# Patient Record
Sex: FEMALE | Race: BLACK OR AFRICAN AMERICAN | Employment: OTHER | URBAN - METROPOLITAN AREA
[De-identification: names, ages, dates, MRNs, and addresses within clinical notes are randomized per-mention and may not be internally consistent; named-entity substitution may affect disease eponyms.]

---

## 2017-01-31 PROBLEM — I10 ESSENTIAL HYPERTENSION: Status: ACTIVE | Noted: 2017-01-31

## 2017-06-14 PROBLEM — R07.89 ATYPICAL CHEST PAIN: Status: ACTIVE | Noted: 2017-06-14

## 2018-03-19 ENCOUNTER — OFFICE VISIT (OUTPATIENT)
Dept: FAMILY MEDICINE CLINIC | Age: 63
End: 2018-03-19
Payer: MEDICARE

## 2018-03-19 VITALS
BODY MASS INDEX: 33.63 KG/M2 | HEIGHT: 64 IN | TEMPERATURE: 97.5 F | WEIGHT: 197 LBS | SYSTOLIC BLOOD PRESSURE: 130 MMHG | OXYGEN SATURATION: 96 % | HEART RATE: 87 BPM | DIASTOLIC BLOOD PRESSURE: 60 MMHG

## 2018-03-19 DIAGNOSIS — L72.3 SEBACEOUS CYST: Primary | ICD-10-CM

## 2018-03-19 PROCEDURE — G8400 PT W/DXA NO RESULTS DOC: HCPCS | Performed by: PHYSICIAN ASSISTANT

## 2018-03-19 PROCEDURE — 4040F PNEUMOC VAC/ADMIN/RCVD: CPT | Performed by: PHYSICIAN ASSISTANT

## 2018-03-19 PROCEDURE — 3014F SCREEN MAMMO DOC REV: CPT | Performed by: PHYSICIAN ASSISTANT

## 2018-03-19 PROCEDURE — 1036F TOBACCO NON-USER: CPT | Performed by: PHYSICIAN ASSISTANT

## 2018-03-19 PROCEDURE — 1090F PRES/ABSN URINE INCON ASSESS: CPT | Performed by: PHYSICIAN ASSISTANT

## 2018-03-19 PROCEDURE — 3017F COLORECTAL CA SCREEN DOC REV: CPT | Performed by: PHYSICIAN ASSISTANT

## 2018-03-19 PROCEDURE — G8417 CALC BMI ABV UP PARAM F/U: HCPCS | Performed by: PHYSICIAN ASSISTANT

## 2018-03-19 PROCEDURE — 1123F ACP DISCUSS/DSCN MKR DOCD: CPT | Performed by: PHYSICIAN ASSISTANT

## 2018-03-19 PROCEDURE — 99213 OFFICE O/P EST LOW 20 MIN: CPT | Performed by: PHYSICIAN ASSISTANT

## 2018-03-19 PROCEDURE — G8484 FLU IMMUNIZE NO ADMIN: HCPCS | Performed by: PHYSICIAN ASSISTANT

## 2018-03-19 PROCEDURE — G8427 DOCREV CUR MEDS BY ELIG CLIN: HCPCS | Performed by: PHYSICIAN ASSISTANT

## 2018-03-19 RX ORDER — CEPHALEXIN 500 MG/1
500 CAPSULE ORAL 2 TIMES DAILY
Qty: 20 CAPSULE | Refills: 0 | Status: SHIPPED | OUTPATIENT
Start: 2018-03-19 | End: 2018-03-26

## 2018-03-19 NOTE — PROGRESS NOTES
nodules noted on left arm. No excoriations, macules, papules, or bullae noted. No drainage noted. No lymphangitic streaking. Neurological:  Alert and oriented. Motor functions intact.    ------------------------------------------Test Results Section----------------------------------------------  (All laboratory and radiology results have been personally reviewed by myself)  Laboratory:    Radiology: All Radiology results interpreted by Radiologist unless otherwise noted. -------------------------------------Impression & Disposition Section-----------------------------------  Impression(s):  1. Sebaceous cyst      Disposition:  Disposition: Discharge to home    New Prescriptions    CEPHALEXIN (KEFLEX) 500 MG CAPSULE    Take 1 capsule by mouth 2 times daily     Appears to be irritated due to attempted drainage from the area. Discussed warm compresses and avoidance of trying to pop or drain the area with force. Patient voiced understanding. Pt advised to f/u with PCP in 5-7 days for continued management and further care. ER if changes or worse. Pt advised to take all medications as directed.

## 2018-03-26 ENCOUNTER — OFFICE VISIT (OUTPATIENT)
Dept: FAMILY MEDICINE CLINIC | Age: 63
End: 2018-03-26
Payer: MEDICARE

## 2018-03-26 VITALS
WEIGHT: 199 LBS | HEIGHT: 64 IN | SYSTOLIC BLOOD PRESSURE: 134 MMHG | OXYGEN SATURATION: 99 % | BODY MASS INDEX: 33.97 KG/M2 | RESPIRATION RATE: 20 BRPM | DIASTOLIC BLOOD PRESSURE: 72 MMHG | HEART RATE: 76 BPM

## 2018-03-26 DIAGNOSIS — F31.62 BIPOLAR DISORDER, CURRENT EPISODE MIXED, MODERATE (HCC): ICD-10-CM

## 2018-03-26 DIAGNOSIS — E11.9 TYPE 2 DIABETES MELLITUS WITHOUT COMPLICATION, WITHOUT LONG-TERM CURRENT USE OF INSULIN (HCC): Primary | ICD-10-CM

## 2018-03-26 DIAGNOSIS — Z23 NEED FOR PROPHYLACTIC VACCINATION AGAINST STREPTOCOCCUS PNEUMONIAE (PNEUMOCOCCUS): ICD-10-CM

## 2018-03-26 LAB — HBA1C MFR BLD: 6.7 %

## 2018-03-26 PROCEDURE — 83036 HEMOGLOBIN GLYCOSYLATED A1C: CPT | Performed by: FAMILY MEDICINE

## 2018-03-26 PROCEDURE — 3017F COLORECTAL CA SCREEN DOC REV: CPT | Performed by: FAMILY MEDICINE

## 2018-03-26 PROCEDURE — 4040F PNEUMOC VAC/ADMIN/RCVD: CPT | Performed by: FAMILY MEDICINE

## 2018-03-26 PROCEDURE — G0009 ADMIN PNEUMOCOCCAL VACCINE: HCPCS | Performed by: FAMILY MEDICINE

## 2018-03-26 PROCEDURE — 1090F PRES/ABSN URINE INCON ASSESS: CPT | Performed by: FAMILY MEDICINE

## 2018-03-26 PROCEDURE — 99213 OFFICE O/P EST LOW 20 MIN: CPT | Performed by: FAMILY MEDICINE

## 2018-03-26 PROCEDURE — 1036F TOBACCO NON-USER: CPT | Performed by: FAMILY MEDICINE

## 2018-03-26 PROCEDURE — G8417 CALC BMI ABV UP PARAM F/U: HCPCS | Performed by: FAMILY MEDICINE

## 2018-03-26 PROCEDURE — G8598 ASA/ANTIPLAT THER USED: HCPCS | Performed by: FAMILY MEDICINE

## 2018-03-26 PROCEDURE — G8484 FLU IMMUNIZE NO ADMIN: HCPCS | Performed by: FAMILY MEDICINE

## 2018-03-26 PROCEDURE — 3044F HG A1C LEVEL LT 7.0%: CPT | Performed by: FAMILY MEDICINE

## 2018-03-26 PROCEDURE — 90670 PCV13 VACCINE IM: CPT | Performed by: FAMILY MEDICINE

## 2018-03-26 PROCEDURE — G8400 PT W/DXA NO RESULTS DOC: HCPCS | Performed by: FAMILY MEDICINE

## 2018-03-26 PROCEDURE — G8427 DOCREV CUR MEDS BY ELIG CLIN: HCPCS | Performed by: FAMILY MEDICINE

## 2018-03-26 PROCEDURE — 1123F ACP DISCUSS/DSCN MKR DOCD: CPT | Performed by: FAMILY MEDICINE

## 2018-03-26 PROCEDURE — 3014F SCREEN MAMMO DOC REV: CPT | Performed by: FAMILY MEDICINE

## 2018-03-26 ASSESSMENT — ENCOUNTER SYMPTOMS
DIARRHEA: 0
VOMITING: 0
BLURRED VISION: 0
NAUSEA: 0
SHORTNESS OF BREATH: 0

## 2018-03-26 NOTE — PATIENT INSTRUCTIONS
rapid-acting insulin to take before you eat. If you use an insulin pump, you get a constant rate of insulin during the day. So the pump must be programmed at meals to give you extra insulin to cover the rise in blood sugar after meals. When you know how much carbohydrate you will eat, you can take the right amount of insulin. Or, if you always use the same amount of insulin, you need to make sure that you eat the same amount of carbohydrate at meals. If you need more help to understand carbohydrate counting and food labels, ask your doctor, dietitian, or diabetes educator. How do you get started with meal planning? Here are some tips to get started:  · Plan your meals a week at a time. Don't forget to include snacks too. · Use cookbooks or online recipes to plan several main meals. Plan some quick meals for busy nights. You also can double some recipes that freeze well. Then you can save half for other busy nights when you don't have time to cook. · Make sure you have the ingredients you need for your recipes. If you're running low on basic items, put these items on your shopping list too. · List foods that you use to make breakfasts, lunches, and snacks. List plenty of fruits and vegetables. · Post this list on the refrigerator. Add to it as you think of more things you need. · Take the list to the store to do your weekly shopping. Follow-up care is a key part of your treatment and safety. Be sure to make and go to all appointments, and call your doctor if you are having problems. It's also a good idea to know your test results and keep a list of the medicines you take. Where can you learn more? Go to https://Codewisemarkewsukhdeep.Feedback. org and sign in to your [employer] account. Enter D100 in the Marketo box to learn more about \"Learning About Meal Planning for Diabetes. \"     If you do not have an account, please click on the \"Sign Up Now\" link.   Current as of: March 13, 2017  Content

## 2018-03-26 NOTE — PROGRESS NOTES
OFFICE PROGRESS NOTE      SUBJECTIVE:        Patient ID:   Viviana Camara is a 70 y.o. female who presents for follow up diabetes, hypertension  Chief Complaint   Patient presents with    Diabetes           HPI:   Patient is here to follow up on diabetes. Fasting blood sugars:120-130's Midday blood sugars: 90-low 100's. Patient checks blood glucose 2 times per day. Patient is following diabetic diet. Patient is a nonsmoker. Last ophthalmology visit: 1/18. Patient is taking a daily statin. Patient doing well on current regimen for hypertension. Patient not currently taking medication for bipolar disorder. Not following up with psychiatry at this time. Prior to Admission medications    Medication Sig Start Date End Date Taking? Authorizing Provider   ONE TOUCH LANCETS MISC Check blood sugar bid 2/16/18  Yes Ida Valdez MD   montelukast (SINGULAIR) 10 MG tablet Take 1 tablet by mouth nightly 2/6/18  Yes Vanessa Woods MD   fluticasone-salmeterol (ADVAIR HFA) 230-21 MCG/ACT inhaler Inhale 2 puffs into the lungs 2 times daily 2/6/18  Yes Vanessa Woods MD   amLODIPine (NORVASC) 10 MG tablet Take 1 tablet by mouth daily 2/6/18  Yes Vanessa Woods MD   metFORMIN (GLUCOPHAGE) 1000 MG tablet Take 1 tablet by mouth 2 times daily (with meals) 2/6/18  Yes Vanessa Woods MD   metoprolol succinate (TOPROL XL) 50 MG extended release tablet Take 1 tablet by mouth daily 2/6/18  Yes Vanessa Woods MD   famotidine (PEPCID) 20 MG tablet Take 1 tablet by mouth daily 2/6/18  Yes Vanessa Woods MD   lisinopril-hydrochlorothiazide (PRINZIDE;ZESTORETIC) 20-12.5 MG per tablet Take 1 tablet by mouth daily 2/6/18  Yes Vanessa Woods MD   mupirocin [facility] NASAL) 2 % nasal ointment Take by Nasal route 2 times daily.  2/6/18  Yes Vanessa Woods MD   JANUVIA 50 MG tablet TAKE 1 TABLET BY MOUTH ONCE DAILY 1/10/18  Yes Jay Jay Solares FRANDY Medina   glucose blood VI test strips (ONE TOUCH ULTRA TEST) strip Check blood sugar bid 12/20/17  Yes Claudia Boykin MD   Misc. Devices MISC Bedside commode 7/25/16  Yes Claudia Boykin MD   Blood Glucose Monitoring Suppl (ONE TOUCH ULTRA 2) W/DEVICE KIT Check blood sugar bid 3/8/16  Yes Sophia Mullen MD   pravastatin (PRAVACHOL) 40 MG tablet Take 1 tablet by mouth nightly 3/27/18 4/26/18  Harper Murray MD   Prenatal MV-Min-Fe Fum-FA-DHA (PRENATAL 1) 10-7.839-323 MG CAPS Take 1 tablet by mouth daily 3/27/18 4/26/18  Harper Murray MD   aspirin (ASPIRIN LOW DOSE) 81 MG EC tablet Take 1 tablet by mouth 2 times daily 3/27/18 4/26/18  Harper Murray MD     Social History     Social History    Marital status:      Spouse name: N/A    Number of children: 2    Years of education: 21     Occupational History    ? 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 Other Topics Concern    None     Social History Narrative    ** Merged History Encounter **            I have reviewed Sweetie's allergies, medications, problem list, medical, social and family history and have updated as needed in the electronic medical record  Review Of Systems:    Review of Systems   Eyes: Negative for blurred vision. Respiratory: Negative for shortness of breath. Cardiovascular: Positive for chest pain (rare--takes aspirin). Negative for palpitations and leg swelling. Gastrointestinal: Negative for diarrhea, nausea and vomiting. Genitourinary: Negative for dysuria, frequency and urgency. Skin: Negative for rash.            OBJECTIVE:     VS:  Wt Readings from Last 3 Encounters:   03/27/18 199 lb (90.3 kg)   03/26/18 199 lb (90.3 kg) have reviewed my findings and recommendations with Nehemias French.     Coleen Tam M.D

## 2018-03-28 PROBLEM — I67.2 CEREBRAL ATHEROSCLEROSIS: Status: ACTIVE | Noted: 2018-03-28

## 2018-03-28 PROBLEM — R41.89 COGNITIVE IMPAIRMENT: Status: ACTIVE | Noted: 2018-03-28

## 2018-03-28 PROBLEM — E78.00 HYPERCHOLESTEROLEMIA: Status: ACTIVE | Noted: 2018-03-28

## 2018-04-03 ENCOUNTER — HOSPITAL ENCOUNTER (OUTPATIENT)
Age: 63
Discharge: HOME | End: 2018-04-03
Payer: MEDICARE

## 2018-04-03 LAB
ALBUMIN SERPL-MCNC: 4.4 G/DL (ref 3.5–5.2)
ALP BLD-CCNC: 77 U/L (ref 35–104)
ALT SERPL-CCNC: 17 U/L (ref 0–32)
ANION GAP SERPL CALCULATED.3IONS-SCNC: 11 MMOL/L (ref 7–16)
AST SERPL-CCNC: 13 U/L (ref 0–31)
BASOPHILS ABSOLUTE: 0.09 E9/L (ref 0–0.2)
BASOPHILS RELATIVE PERCENT: 1 % (ref 0–2)
BILIRUB SERPL-MCNC: 0.5 MG/DL (ref 0–1.2)
BUN BLDV-MCNC: 12 MG/DL (ref 8–23)
BURR CELLS: ABNORMAL
CALCIUM SERPL-MCNC: 10.4 MG/DL (ref 8.6–10.2)
CHLORIDE BLD-SCNC: 102 MMOL/L (ref 98–107)
CO2: 29 MMOL/L (ref 22–29)
CREAT SERPL-MCNC: 0.7 MG/DL (ref 0.5–1)
CREATININE URINE: 221 MG/DL (ref 29–226)
EOSINOPHILS ABSOLUTE: 0.19 E9/L (ref 0.05–0.5)
EOSINOPHILS RELATIVE PERCENT: 2.1 % (ref 0–6)
GFR AFRICAN AMERICAN: >60
GFR NON-AFRICAN AMERICAN: >60 ML/MIN/1.73
GLUCOSE BLD-MCNC: 173 MG/DL (ref 74–109)
HCT VFR BLD CALC: 39.9 % (ref 34–48)
HEMOGLOBIN: 12 G/DL (ref 11.5–15.5)
HYPOCHROMIA: ABNORMAL
LYMPHOCYTES ABSOLUTE: 1.96 E9/L (ref 1.5–4)
LYMPHOCYTES RELATIVE PERCENT: 21.6 % (ref 20–42)
MAGNESIUM: 1.5 MG/DL (ref 1.6–2.6)
MCH RBC QN AUTO: 23.7 PG (ref 26–35)
MCHC RBC AUTO-ENTMCNC: 30.1 % (ref 32–34.5)
MCV RBC AUTO: 78.9 FL (ref 80–99.9)
MONOCYTES ABSOLUTE: 0.36 E9/L (ref 0.1–0.95)
MONOCYTES RELATIVE PERCENT: 3.6 % (ref 2–12)
NEUTROPHILS ABSOLUTE: 6.41 E9/L (ref 1.8–7.3)
NEUTROPHILS RELATIVE PERCENT: 71.6 % (ref 43–80)
OVALOCYTES: ABNORMAL
PDW BLD-RTO: 15.9 FL (ref 11.5–15)
PHOSPHORUS: 2.9 MG/DL (ref 2.5–4.5)
PLATELET # BLD: 298 E9/L (ref 130–450)
PMV BLD AUTO: 8.9 FL (ref 7–12)
POIKILOCYTES: ABNORMAL
POLYCHROMASIA: ABNORMAL
POTASSIUM SERPL-SCNC: 3.8 MMOL/L (ref 3.5–5)
PROTEIN PROTEIN: 26 MG/DL (ref 0–12)
PROTEIN/CREAT RATIO: 0.1
PROTEIN/CREAT RATIO: 0.1 (ref 0–0.2)
RBC # BLD: 5.06 E12/L (ref 3.5–5.5)
SODIUM BLD-SCNC: 142 MMOL/L (ref 132–146)
TEAR DROP CELLS: ABNORMAL
TOTAL PROTEIN: 8 G/DL (ref 6.4–8.3)
URIC ACID, SERUM: 8.1 MG/DL (ref 2.4–5.7)
WBC # BLD: 8.9 E9/L (ref 4.5–11.5)

## 2018-04-03 PROCEDURE — 84550 ASSAY OF BLOOD/URIC ACID: CPT

## 2018-04-03 PROCEDURE — 84156 ASSAY OF PROTEIN URINE: CPT

## 2018-04-03 PROCEDURE — 84100 ASSAY OF PHOSPHORUS: CPT

## 2018-04-03 PROCEDURE — 83735 ASSAY OF MAGNESIUM: CPT

## 2018-04-03 PROCEDURE — 80053 COMPREHEN METABOLIC PANEL: CPT

## 2018-04-03 PROCEDURE — 85025 COMPLETE CBC W/AUTO DIFF WBC: CPT

## 2018-04-03 PROCEDURE — 82570 ASSAY OF URINE CREATININE: CPT

## 2018-04-03 PROCEDURE — 36415 COLL VENOUS BLD VENIPUNCTURE: CPT

## 2018-04-05 DIAGNOSIS — E11.9 TYPE 2 DIABETES MELLITUS WITHOUT COMPLICATION, WITHOUT LONG-TERM CURRENT USE OF INSULIN (HCC): ICD-10-CM

## 2018-04-09 DIAGNOSIS — E11.9 TYPE 2 DIABETES MELLITUS WITHOUT COMPLICATION, WITHOUT LONG-TERM CURRENT USE OF INSULIN (HCC): ICD-10-CM

## 2018-04-27 ENCOUNTER — TELEPHONE (OUTPATIENT)
Dept: FAMILY MEDICINE CLINIC | Age: 63
End: 2018-04-27

## 2018-05-03 ENCOUNTER — TELEPHONE (OUTPATIENT)
Dept: FAMILY MEDICINE CLINIC | Age: 63
End: 2018-05-03

## 2018-05-06 ENCOUNTER — APPOINTMENT (OUTPATIENT)
Dept: GENERAL RADIOLOGY | Age: 63
End: 2018-05-06
Payer: MEDICARE

## 2018-05-06 ENCOUNTER — HOSPITAL ENCOUNTER (EMERGENCY)
Age: 63
Discharge: HOME | End: 2018-05-07
Attending: EMERGENCY MEDICINE
Payer: MEDICARE

## 2018-05-06 VITALS
BODY MASS INDEX: 35.33 KG/M2 | SYSTOLIC BLOOD PRESSURE: 159 MMHG | HEART RATE: 68 BPM | DIASTOLIC BLOOD PRESSURE: 75 MMHG | HEIGHT: 62 IN | WEIGHT: 192 LBS | OXYGEN SATURATION: 98 % | TEMPERATURE: 98.8 F | RESPIRATION RATE: 20 BRPM

## 2018-05-06 DIAGNOSIS — M16.10 HIP ARTHRITIS: Primary | ICD-10-CM

## 2018-05-06 LAB
ALBUMIN SERPL-MCNC: 3.9 G/DL (ref 3.5–5.2)
ALP BLD-CCNC: 58 U/L (ref 35–104)
ALT SERPL-CCNC: 14 U/L (ref 0–32)
ANION GAP SERPL CALCULATED.3IONS-SCNC: 12 MMOL/L (ref 7–16)
AST SERPL-CCNC: 19 U/L (ref 0–31)
BILIRUB SERPL-MCNC: 0.4 MG/DL (ref 0–1.2)
BILIRUBIN DIRECT: <0.2 MG/DL (ref 0–0.3)
BILIRUBIN URINE: NEGATIVE
BILIRUBIN, INDIRECT: NORMAL MG/DL (ref 0–1)
BLOOD, URINE: NEGATIVE
BUN BLDV-MCNC: 13 MG/DL (ref 8–23)
CALCIUM SERPL-MCNC: 9.7 MG/DL (ref 8.6–10.2)
CHLORIDE BLD-SCNC: 102 MMOL/L (ref 98–107)
CHP ED QC CHECK: YES
CLARITY: CLEAR
CO2: 26 MMOL/L (ref 22–29)
COLOR: YELLOW
CREAT SERPL-MCNC: 0.6 MG/DL (ref 0.5–1)
EKG ATRIAL RATE: 65 BPM
EKG P AXIS: 66 DEGREES
EKG P-R INTERVAL: 168 MS
EKG Q-T INTERVAL: 412 MS
EKG QRS DURATION: 86 MS
EKG QTC CALCULATION (BAZETT): 428 MS
EKG R AXIS: -10 DEGREES
EKG T AXIS: 40 DEGREES
EKG VENTRICULAR RATE: 65 BPM
GFR AFRICAN AMERICAN: >60
GFR NON-AFRICAN AMERICAN: >60 ML/MIN/1.73
GLUCOSE BLD-MCNC: 82 MG/DL
GLUCOSE BLD-MCNC: 97 MG/DL (ref 74–109)
GLUCOSE URINE: NEGATIVE MG/DL
HCT VFR BLD CALC: 32.3 % (ref 34–48)
HEMOGLOBIN: 10 G/DL (ref 11.5–15.5)
KETONES, URINE: NEGATIVE MG/DL
LACTIC ACID: 1.9 MMOL/L (ref 0.5–2.2)
LEUKOCYTE ESTERASE, URINE: NEGATIVE
LIPASE: 33 U/L (ref 13–60)
MCH RBC QN AUTO: 24.6 PG (ref 26–35)
MCHC RBC AUTO-ENTMCNC: 31 % (ref 32–34.5)
MCV RBC AUTO: 79.6 FL (ref 80–99.9)
METER GLUCOSE: 82 MG/DL (ref 70–110)
NITRITE, URINE: NEGATIVE
PDW BLD-RTO: 15.5 FL (ref 11.5–15)
PH UA: 7 (ref 5–9)
PLATELET # BLD: 219 E9/L (ref 130–450)
PMV BLD AUTO: 8.7 FL (ref 7–12)
POTASSIUM SERPL-SCNC: 3.4 MMOL/L (ref 3.5–5)
PROTEIN UA: NEGATIVE MG/DL
RBC # BLD: 4.06 E12/L (ref 3.5–5.5)
SODIUM BLD-SCNC: 140 MMOL/L (ref 132–146)
SPECIFIC GRAVITY UA: 1.01 (ref 1–1.03)
TOTAL PROTEIN: 6.8 G/DL (ref 6.4–8.3)
TROPONIN: <0.01 NG/ML (ref 0–0.03)
UROBILINOGEN, URINE: 0.2 E.U./DL
WBC # BLD: 8.5 E9/L (ref 4.5–11.5)

## 2018-05-06 PROCEDURE — 84484 ASSAY OF TROPONIN QUANT: CPT

## 2018-05-06 PROCEDURE — 99284 EMERGENCY DEPT VISIT MOD MDM: CPT

## 2018-05-06 PROCEDURE — 80076 HEPATIC FUNCTION PANEL: CPT

## 2018-05-06 PROCEDURE — 83690 ASSAY OF LIPASE: CPT

## 2018-05-06 PROCEDURE — 36415 COLL VENOUS BLD VENIPUNCTURE: CPT

## 2018-05-06 PROCEDURE — 74022 RADEX COMPL AQT ABD SERIES: CPT

## 2018-05-06 PROCEDURE — 85027 COMPLETE CBC AUTOMATED: CPT

## 2018-05-06 PROCEDURE — 6370000000 HC RX 637 (ALT 250 FOR IP): Performed by: EMERGENCY MEDICINE

## 2018-05-06 PROCEDURE — 83605 ASSAY OF LACTIC ACID: CPT

## 2018-05-06 PROCEDURE — 2580000003 HC RX 258: Performed by: EMERGENCY MEDICINE

## 2018-05-06 PROCEDURE — 80048 BASIC METABOLIC PNL TOTAL CA: CPT

## 2018-05-06 PROCEDURE — 82962 GLUCOSE BLOOD TEST: CPT

## 2018-05-06 PROCEDURE — 81003 URINALYSIS AUTO W/O SCOPE: CPT

## 2018-05-06 RX ORDER — POTASSIUM CHLORIDE 20 MEQ/1
40 TABLET, EXTENDED RELEASE ORAL ONCE
Status: COMPLETED | OUTPATIENT
Start: 2018-05-06 | End: 2018-05-06

## 2018-05-06 RX ORDER — LITHIUM CARBONATE 300 MG
300 TABLET ORAL 2 TIMES DAILY
COMMUNITY
End: 2019-01-24

## 2018-05-06 RX ORDER — SODIUM CHLORIDE 0.9 % (FLUSH) 0.9 %
10 SYRINGE (ML) INJECTION PRN
Status: DISCONTINUED | OUTPATIENT
Start: 2018-05-06 | End: 2018-05-07 | Stop reason: HOSPADM

## 2018-05-06 RX ORDER — 0.9 % SODIUM CHLORIDE 0.9 %
1000 INTRAVENOUS SOLUTION INTRAVENOUS ONCE
Status: COMPLETED | OUTPATIENT
Start: 2018-05-06 | End: 2018-05-07

## 2018-05-06 RX ADMIN — SODIUM CHLORIDE 1000 ML: 900 INJECTION, SOLUTION INTRAVENOUS at 21:33

## 2018-05-06 RX ADMIN — POTASSIUM CHLORIDE 40 MEQ: 20 TABLET, EXTENDED RELEASE ORAL at 22:59

## 2018-05-06 ASSESSMENT — ENCOUNTER SYMPTOMS
EYE REDNESS: 0
SHORTNESS OF BREATH: 0
VOMITING: 0
NAUSEA: 0
SINUS PRESSURE: 0
SORE THROAT: 0
EYE PAIN: 0
COUGH: 0
WHEEZING: 0
DIARRHEA: 1
ABDOMINAL DISTENTION: 0
EYE DISCHARGE: 0
ABDOMINAL PAIN: 0
BACK PAIN: 0

## 2018-05-06 ASSESSMENT — PAIN DESCRIPTION - PAIN TYPE: TYPE: ACUTE PAIN

## 2018-05-06 ASSESSMENT — PAIN SCALES - GENERAL: PAINLEVEL_OUTOF10: 10

## 2018-05-06 ASSESSMENT — PAIN DESCRIPTION - ORIENTATION: ORIENTATION: LEFT

## 2018-05-06 ASSESSMENT — PAIN DESCRIPTION - LOCATION: LOCATION: LEG

## 2018-05-07 ENCOUNTER — OFFICE VISIT (OUTPATIENT)
Dept: FAMILY MEDICINE CLINIC | Age: 63
End: 2018-05-07
Payer: MEDICARE

## 2018-05-07 VITALS
OXYGEN SATURATION: 96 % | WEIGHT: 198 LBS | SYSTOLIC BLOOD PRESSURE: 136 MMHG | BODY MASS INDEX: 36.44 KG/M2 | HEIGHT: 62 IN | HEART RATE: 90 BPM | DIASTOLIC BLOOD PRESSURE: 82 MMHG | TEMPERATURE: 98.6 F

## 2018-05-07 DIAGNOSIS — R19.7 DIARRHEA, UNSPECIFIED TYPE: Primary | ICD-10-CM

## 2018-05-07 DIAGNOSIS — R11.0 NAUSEA: ICD-10-CM

## 2018-05-07 PROCEDURE — G8598 ASA/ANTIPLAT THER USED: HCPCS | Performed by: PHYSICIAN ASSISTANT

## 2018-05-07 PROCEDURE — 3017F COLORECTAL CA SCREEN DOC REV: CPT | Performed by: PHYSICIAN ASSISTANT

## 2018-05-07 PROCEDURE — 1123F ACP DISCUSS/DSCN MKR DOCD: CPT | Performed by: PHYSICIAN ASSISTANT

## 2018-05-07 PROCEDURE — G8417 CALC BMI ABV UP PARAM F/U: HCPCS | Performed by: PHYSICIAN ASSISTANT

## 2018-05-07 PROCEDURE — 1090F PRES/ABSN URINE INCON ASSESS: CPT | Performed by: PHYSICIAN ASSISTANT

## 2018-05-07 PROCEDURE — 4040F PNEUMOC VAC/ADMIN/RCVD: CPT | Performed by: PHYSICIAN ASSISTANT

## 2018-05-07 PROCEDURE — G8400 PT W/DXA NO RESULTS DOC: HCPCS | Performed by: PHYSICIAN ASSISTANT

## 2018-05-07 PROCEDURE — 99213 OFFICE O/P EST LOW 20 MIN: CPT | Performed by: PHYSICIAN ASSISTANT

## 2018-05-07 PROCEDURE — G8427 DOCREV CUR MEDS BY ELIG CLIN: HCPCS | Performed by: PHYSICIAN ASSISTANT

## 2018-05-07 PROCEDURE — 1036F TOBACCO NON-USER: CPT | Performed by: PHYSICIAN ASSISTANT

## 2018-05-16 ENCOUNTER — OFFICE VISIT (OUTPATIENT)
Dept: FAMILY MEDICINE CLINIC | Age: 63
End: 2018-05-16
Payer: MEDICARE

## 2018-05-16 VITALS
OXYGEN SATURATION: 97 % | HEART RATE: 82 BPM | BODY MASS INDEX: 35.51 KG/M2 | HEIGHT: 62 IN | SYSTOLIC BLOOD PRESSURE: 126 MMHG | RESPIRATION RATE: 16 BRPM | DIASTOLIC BLOOD PRESSURE: 72 MMHG | WEIGHT: 193 LBS

## 2018-05-16 DIAGNOSIS — M25.552 CHRONIC LEFT HIP PAIN: Primary | ICD-10-CM

## 2018-05-16 DIAGNOSIS — E11.9 TYPE 2 DIABETES MELLITUS WITHOUT COMPLICATION, WITHOUT LONG-TERM CURRENT USE OF INSULIN (HCC): ICD-10-CM

## 2018-05-16 DIAGNOSIS — G89.29 CHRONIC LEFT HIP PAIN: Primary | ICD-10-CM

## 2018-05-16 LAB — HBA1C MFR BLD: 6.9 %

## 2018-05-16 PROCEDURE — 83036 HEMOGLOBIN GLYCOSYLATED A1C: CPT | Performed by: FAMILY MEDICINE

## 2018-05-16 PROCEDURE — G8400 PT W/DXA NO RESULTS DOC: HCPCS | Performed by: FAMILY MEDICINE

## 2018-05-16 PROCEDURE — 1123F ACP DISCUSS/DSCN MKR DOCD: CPT | Performed by: FAMILY MEDICINE

## 2018-05-16 PROCEDURE — 3017F COLORECTAL CA SCREEN DOC REV: CPT | Performed by: FAMILY MEDICINE

## 2018-05-16 PROCEDURE — 4040F PNEUMOC VAC/ADMIN/RCVD: CPT | Performed by: FAMILY MEDICINE

## 2018-05-16 PROCEDURE — 1036F TOBACCO NON-USER: CPT | Performed by: FAMILY MEDICINE

## 2018-05-16 PROCEDURE — 99214 OFFICE O/P EST MOD 30 MIN: CPT | Performed by: FAMILY MEDICINE

## 2018-05-16 PROCEDURE — 2022F DILAT RTA XM EVC RTNOPTHY: CPT | Performed by: FAMILY MEDICINE

## 2018-05-16 PROCEDURE — G8417 CALC BMI ABV UP PARAM F/U: HCPCS | Performed by: FAMILY MEDICINE

## 2018-05-16 PROCEDURE — G8427 DOCREV CUR MEDS BY ELIG CLIN: HCPCS | Performed by: FAMILY MEDICINE

## 2018-05-16 PROCEDURE — G8598 ASA/ANTIPLAT THER USED: HCPCS | Performed by: FAMILY MEDICINE

## 2018-05-16 PROCEDURE — 1090F PRES/ABSN URINE INCON ASSESS: CPT | Performed by: FAMILY MEDICINE

## 2018-05-16 PROCEDURE — 3044F HG A1C LEVEL LT 7.0%: CPT | Performed by: FAMILY MEDICINE

## 2018-05-16 ASSESSMENT — ENCOUNTER SYMPTOMS
DIARRHEA: 0
NAUSEA: 0
VOMITING: 1
SHORTNESS OF BREATH: 0

## 2018-06-05 ENCOUNTER — HOSPITAL ENCOUNTER (OUTPATIENT)
Age: 63
Discharge: HOME | End: 2018-06-05
Payer: MEDICARE

## 2018-06-05 LAB
ALBUMIN SERPL-MCNC: 4 G/DL (ref 3.5–5.2)
ALP BLD-CCNC: 60 U/L (ref 35–104)
ALT SERPL-CCNC: 17 U/L (ref 0–32)
ANION GAP SERPL CALCULATED.3IONS-SCNC: 11 MMOL/L (ref 7–16)
AST SERPL-CCNC: 19 U/L (ref 0–31)
BILIRUB SERPL-MCNC: 0.3 MG/DL (ref 0–1.2)
BUN BLDV-MCNC: 12 MG/DL (ref 8–23)
CALCIUM SERPL-MCNC: 9.8 MG/DL (ref 8.6–10.2)
CHLORIDE BLD-SCNC: 103 MMOL/L (ref 98–107)
CO2: 26 MMOL/L (ref 22–29)
CREAT SERPL-MCNC: 0.9 MG/DL (ref 0.5–1)
GFR AFRICAN AMERICAN: >60
GFR NON-AFRICAN AMERICAN: >60 ML/MIN/1.73
GLUCOSE BLD-MCNC: 93 MG/DL (ref 74–109)
LITHIUM DOSE AMOUNT: NORMAL
LITHIUM LEVEL: 1.28 MMOL/L (ref 0.5–1.5)
POTASSIUM SERPL-SCNC: 3.9 MMOL/L (ref 3.5–5)
SODIUM BLD-SCNC: 140 MMOL/L (ref 132–146)
TOTAL PROTEIN: 7 G/DL (ref 6.4–8.3)
TSH SERPL DL<=0.05 MIU/L-ACNC: 2.21 UIU/ML (ref 0.27–4.2)

## 2018-06-05 PROCEDURE — 80053 COMPREHEN METABOLIC PANEL: CPT

## 2018-06-05 PROCEDURE — 80178 ASSAY OF LITHIUM: CPT

## 2018-06-05 PROCEDURE — 84443 ASSAY THYROID STIM HORMONE: CPT

## 2018-06-05 PROCEDURE — 36415 COLL VENOUS BLD VENIPUNCTURE: CPT

## 2018-06-07 ENCOUNTER — HOSPITAL ENCOUNTER (EMERGENCY)
Age: 63
Discharge: HOME | End: 2018-06-08
Attending: EMERGENCY MEDICINE
Payer: MEDICARE

## 2018-06-07 DIAGNOSIS — R07.89 ATYPICAL CHEST PAIN: Primary | ICD-10-CM

## 2018-06-07 DIAGNOSIS — K21.9 GASTROESOPHAGEAL REFLUX DISEASE WITHOUT ESOPHAGITIS: ICD-10-CM

## 2018-06-07 LAB
EKG ATRIAL RATE: 86 BPM
EKG P AXIS: 68 DEGREES
EKG P-R INTERVAL: 154 MS
EKG Q-T INTERVAL: 384 MS
EKG QRS DURATION: 78 MS
EKG QTC CALCULATION (BAZETT): 459 MS
EKG R AXIS: -7 DEGREES
EKG T AXIS: 58 DEGREES
EKG VENTRICULAR RATE: 86 BPM
HCT VFR BLD CALC: 31.8 % (ref 34–48)
HEMOGLOBIN: 9.9 G/DL (ref 11.5–15.5)
MCH RBC QN AUTO: 24.7 PG (ref 26–35)
MCHC RBC AUTO-ENTMCNC: 31.1 % (ref 32–34.5)
MCV RBC AUTO: 79.3 FL (ref 80–99.9)
PDW BLD-RTO: 14.7 FL (ref 11.5–15)
PLATELET # BLD: 235 E9/L (ref 130–450)
PMV BLD AUTO: 9.2 FL (ref 7–12)
RBC # BLD: 4.01 E12/L (ref 3.5–5.5)
WBC # BLD: 7.1 E9/L (ref 4.5–11.5)

## 2018-06-07 PROCEDURE — 93005 ELECTROCARDIOGRAM TRACING: CPT

## 2018-06-07 PROCEDURE — 85378 FIBRIN DEGRADE SEMIQUANT: CPT

## 2018-06-07 PROCEDURE — 80076 HEPATIC FUNCTION PANEL: CPT

## 2018-06-07 PROCEDURE — 85027 COMPLETE CBC AUTOMATED: CPT

## 2018-06-07 PROCEDURE — 36415 COLL VENOUS BLD VENIPUNCTURE: CPT

## 2018-06-07 PROCEDURE — 83690 ASSAY OF LIPASE: CPT

## 2018-06-07 PROCEDURE — 99285 EMERGENCY DEPT VISIT HI MDM: CPT

## 2018-06-07 PROCEDURE — 80048 BASIC METABOLIC PNL TOTAL CA: CPT

## 2018-06-07 PROCEDURE — 84484 ASSAY OF TROPONIN QUANT: CPT

## 2018-06-07 ASSESSMENT — ENCOUNTER SYMPTOMS
ABDOMINAL PAIN: 1
BACK PAIN: 0
VOMITING: 1
DIARRHEA: 1
NAUSEA: 1
COUGH: 0
CONSTIPATION: 0
EYES NEGATIVE: 1
SHORTNESS OF BREATH: 1

## 2018-06-07 ASSESSMENT — PAIN DESCRIPTION - PAIN TYPE: TYPE: ACUTE PAIN

## 2018-06-07 ASSESSMENT — PAIN DESCRIPTION - DESCRIPTORS: DESCRIPTORS: ACHING

## 2018-06-07 ASSESSMENT — PAIN SCALES - GENERAL: PAINLEVEL_OUTOF10: 10

## 2018-06-07 ASSESSMENT — PAIN DESCRIPTION - LOCATION: LOCATION: LEG;ARM;CHEST

## 2018-06-08 ENCOUNTER — APPOINTMENT (OUTPATIENT)
Dept: GENERAL RADIOLOGY | Age: 63
End: 2018-06-08
Payer: MEDICARE

## 2018-06-08 VITALS
OXYGEN SATURATION: 99 % | RESPIRATION RATE: 20 BRPM | DIASTOLIC BLOOD PRESSURE: 86 MMHG | HEART RATE: 81 BPM | HEIGHT: 62 IN | TEMPERATURE: 98.6 F | WEIGHT: 193 LBS | BODY MASS INDEX: 35.51 KG/M2 | SYSTOLIC BLOOD PRESSURE: 195 MMHG

## 2018-06-08 LAB
ALBUMIN SERPL-MCNC: 4 G/DL (ref 3.5–5.2)
ALP BLD-CCNC: 64 U/L (ref 35–104)
ALT SERPL-CCNC: 20 U/L (ref 0–32)
ANION GAP SERPL CALCULATED.3IONS-SCNC: 13 MMOL/L (ref 7–16)
AST SERPL-CCNC: 21 U/L (ref 0–31)
BILIRUB SERPL-MCNC: 0.3 MG/DL (ref 0–1.2)
BILIRUBIN DIRECT: <0.2 MG/DL (ref 0–0.3)
BILIRUBIN, INDIRECT: NORMAL MG/DL (ref 0–1)
BUN BLDV-MCNC: 9 MG/DL (ref 8–23)
CALCIUM SERPL-MCNC: 9.5 MG/DL (ref 8.6–10.2)
CHLORIDE BLD-SCNC: 104 MMOL/L (ref 98–107)
CO2: 25 MMOL/L (ref 22–29)
CREAT SERPL-MCNC: 0.6 MG/DL (ref 0.5–1)
D DIMER: <200 NG/ML DDU
GFR AFRICAN AMERICAN: >60
GFR NON-AFRICAN AMERICAN: >60 ML/MIN/1.73
GLUCOSE BLD-MCNC: 156 MG/DL (ref 74–109)
LIPASE: 44 U/L (ref 13–60)
POTASSIUM SERPL-SCNC: 4.3 MMOL/L (ref 3.5–5)
SODIUM BLD-SCNC: 142 MMOL/L (ref 132–146)
TOTAL PROTEIN: 6.7 G/DL (ref 6.4–8.3)
TROPONIN: <0.01 NG/ML (ref 0–0.03)

## 2018-06-08 PROCEDURE — 96374 THER/PROPH/DIAG INJ IV PUSH: CPT

## 2018-06-08 PROCEDURE — 2500000003 HC RX 250 WO HCPCS: Performed by: EMERGENCY MEDICINE

## 2018-06-08 PROCEDURE — 6370000000 HC RX 637 (ALT 250 FOR IP): Performed by: EMERGENCY MEDICINE

## 2018-06-08 PROCEDURE — S0028 INJECTION, FAMOTIDINE, 20 MG: HCPCS | Performed by: EMERGENCY MEDICINE

## 2018-06-08 PROCEDURE — 71045 X-RAY EXAM CHEST 1 VIEW: CPT

## 2018-06-08 RX ADMIN — FAMOTIDINE 20 MG: 10 INJECTION, SOLUTION INTRAVENOUS at 01:18

## 2018-06-08 RX ADMIN — LIDOCAINE HYDROCHLORIDE: 20 SOLUTION ORAL; TOPICAL at 01:18

## 2018-06-08 ASSESSMENT — ENCOUNTER SYMPTOMS
BACK PAIN: 0
EYES NEGATIVE: 1
NAUSEA: 1
ABDOMINAL PAIN: 1
VOMITING: 1
CONSTIPATION: 0
SHORTNESS OF BREATH: 1
COUGH: 0
DIARRHEA: 1

## 2018-06-18 ENCOUNTER — OFFICE VISIT (OUTPATIENT)
Dept: ORTHOPEDIC SURGERY | Age: 63
End: 2018-06-18
Payer: MEDICARE

## 2018-06-18 VITALS — WEIGHT: 198 LBS | BODY MASS INDEX: 36.44 KG/M2 | HEIGHT: 62 IN

## 2018-06-18 DIAGNOSIS — M16.12 PRIMARY OSTEOARTHRITIS OF LEFT HIP: Primary | ICD-10-CM

## 2018-06-18 PROCEDURE — 1123F ACP DISCUSS/DSCN MKR DOCD: CPT | Performed by: ORTHOPAEDIC SURGERY

## 2018-06-18 PROCEDURE — G8598 ASA/ANTIPLAT THER USED: HCPCS | Performed by: ORTHOPAEDIC SURGERY

## 2018-06-18 PROCEDURE — G8417 CALC BMI ABV UP PARAM F/U: HCPCS | Performed by: ORTHOPAEDIC SURGERY

## 2018-06-18 PROCEDURE — 1036F TOBACCO NON-USER: CPT | Performed by: ORTHOPAEDIC SURGERY

## 2018-06-18 PROCEDURE — 4040F PNEUMOC VAC/ADMIN/RCVD: CPT | Performed by: ORTHOPAEDIC SURGERY

## 2018-06-18 PROCEDURE — 1090F PRES/ABSN URINE INCON ASSESS: CPT | Performed by: ORTHOPAEDIC SURGERY

## 2018-06-18 PROCEDURE — 99214 OFFICE O/P EST MOD 30 MIN: CPT | Performed by: ORTHOPAEDIC SURGERY

## 2018-06-18 PROCEDURE — G8400 PT W/DXA NO RESULTS DOC: HCPCS | Performed by: ORTHOPAEDIC SURGERY

## 2018-06-18 PROCEDURE — 3017F COLORECTAL CA SCREEN DOC REV: CPT | Performed by: ORTHOPAEDIC SURGERY

## 2018-06-18 PROCEDURE — G8428 CUR MEDS NOT DOCUMENT: HCPCS | Performed by: ORTHOPAEDIC SURGERY

## 2018-06-25 ENCOUNTER — OFFICE VISIT (OUTPATIENT)
Dept: FAMILY MEDICINE CLINIC | Age: 63
End: 2018-06-25
Payer: MEDICARE

## 2018-06-25 VITALS
DIASTOLIC BLOOD PRESSURE: 72 MMHG | OXYGEN SATURATION: 99 % | WEIGHT: 192 LBS | BODY MASS INDEX: 34.02 KG/M2 | SYSTOLIC BLOOD PRESSURE: 124 MMHG | HEIGHT: 63 IN | RESPIRATION RATE: 20 BRPM | HEART RATE: 82 BPM

## 2018-06-25 DIAGNOSIS — M16.12 ARTHRITIS OF LEFT HIP: ICD-10-CM

## 2018-06-25 DIAGNOSIS — E11.9 TYPE 2 DIABETES MELLITUS WITHOUT COMPLICATION, WITHOUT LONG-TERM CURRENT USE OF INSULIN (HCC): Primary | ICD-10-CM

## 2018-06-25 DIAGNOSIS — I10 ESSENTIAL HYPERTENSION: ICD-10-CM

## 2018-06-25 LAB — HBA1C MFR BLD: 7.4 %

## 2018-06-25 PROCEDURE — 2022F DILAT RTA XM EVC RTNOPTHY: CPT | Performed by: FAMILY MEDICINE

## 2018-06-25 PROCEDURE — 1123F ACP DISCUSS/DSCN MKR DOCD: CPT | Performed by: FAMILY MEDICINE

## 2018-06-25 PROCEDURE — 83036 HEMOGLOBIN GLYCOSYLATED A1C: CPT | Performed by: FAMILY MEDICINE

## 2018-06-25 PROCEDURE — G8427 DOCREV CUR MEDS BY ELIG CLIN: HCPCS | Performed by: FAMILY MEDICINE

## 2018-06-25 PROCEDURE — 1090F PRES/ABSN URINE INCON ASSESS: CPT | Performed by: FAMILY MEDICINE

## 2018-06-25 PROCEDURE — 99214 OFFICE O/P EST MOD 30 MIN: CPT | Performed by: FAMILY MEDICINE

## 2018-06-25 PROCEDURE — 3045F PR MOST RECENT HEMOGLOBIN A1C LEVEL 7.0-9.0%: CPT | Performed by: FAMILY MEDICINE

## 2018-06-25 PROCEDURE — G8400 PT W/DXA NO RESULTS DOC: HCPCS | Performed by: FAMILY MEDICINE

## 2018-06-25 PROCEDURE — G8598 ASA/ANTIPLAT THER USED: HCPCS | Performed by: FAMILY MEDICINE

## 2018-06-25 PROCEDURE — 4040F PNEUMOC VAC/ADMIN/RCVD: CPT | Performed by: FAMILY MEDICINE

## 2018-06-25 PROCEDURE — 3017F COLORECTAL CA SCREEN DOC REV: CPT | Performed by: FAMILY MEDICINE

## 2018-06-25 PROCEDURE — G8417 CALC BMI ABV UP PARAM F/U: HCPCS | Performed by: FAMILY MEDICINE

## 2018-06-25 PROCEDURE — 1036F TOBACCO NON-USER: CPT | Performed by: FAMILY MEDICINE

## 2018-06-25 RX ORDER — MONTELUKAST SODIUM 10 MG/1
10 TABLET ORAL NIGHTLY
Qty: 30 TABLET | Refills: 2 | Status: SHIPPED | OUTPATIENT
Start: 2018-06-25 | End: 2018-09-25 | Stop reason: SDUPTHER

## 2018-06-25 RX ORDER — FAMOTIDINE 20 MG/1
20 TABLET, FILM COATED ORAL DAILY
Qty: 30 TABLET | Refills: 3 | Status: SHIPPED | OUTPATIENT
Start: 2018-06-25 | End: 2018-09-25 | Stop reason: SDUPTHER

## 2018-06-25 RX ORDER — AMLODIPINE BESYLATE 10 MG/1
10 TABLET ORAL DAILY
Qty: 30 TABLET | Refills: 3 | Status: SHIPPED | OUTPATIENT
Start: 2018-06-25 | End: 2018-09-25 | Stop reason: SDUPTHER

## 2018-06-25 ASSESSMENT — ENCOUNTER SYMPTOMS
NAUSEA: 0
VOMITING: 0
DIARRHEA: 0
BLURRED VISION: 0
SHORTNESS OF BREATH: 0

## 2018-06-26 RX ORDER — BLOOD-GLUCOSE METER
EACH MISCELLANEOUS
Qty: 1 KIT | Refills: 0 | Status: SHIPPED | OUTPATIENT
Start: 2018-06-26 | End: 2018-07-25 | Stop reason: SDUPTHER

## 2018-07-11 ENCOUNTER — TELEPHONE (OUTPATIENT)
Dept: FAMILY MEDICINE CLINIC | Age: 63
End: 2018-07-11

## 2018-07-11 NOTE — TELEPHONE ENCOUNTER
Received a pre-op clearance form for pt as she is having a left hit SILVINA on 8/8/18 per Dr Lake Tran. Pt was here on 6/25/18 and was cleared by Dr Naet Matthews. This office note and copy of EKG was attached to form and set on Dr [employer] desk to sign when she returns so the patient can proceed with surgery. When this is complete I will deliver this personally to Dr Lake Tran.

## 2018-07-26 RX ORDER — BLOOD-GLUCOSE METER
EACH MISCELLANEOUS
Qty: 1 KIT | Refills: 0 | Status: SHIPPED
Start: 2018-07-26 | End: 2021-01-13

## 2018-07-27 ENCOUNTER — HOSPITAL ENCOUNTER (OUTPATIENT)
Dept: ULTRASOUND IMAGING | Age: 63
Discharge: HOME | End: 2018-07-27
Payer: COMMERCIAL

## 2018-07-27 DIAGNOSIS — E04.2 AUTOSOMAL DOMINANT MULTINODULAR GOITER ASSOCIATED WITH MUTATION IN DICER1 GENE: ICD-10-CM

## 2018-07-27 PROCEDURE — 76536 US EXAM OF HEAD AND NECK: CPT

## 2018-07-30 PROBLEM — M16.12 PRIMARY OSTEOARTHRITIS OF LEFT HIP: Status: ACTIVE | Noted: 2018-07-30

## 2018-08-01 DIAGNOSIS — Z78.9 DIFFICULT INTRAVENOUS ACCESS: Primary | ICD-10-CM

## 2018-08-02 RX ORDER — SODIUM CHLORIDE 9 MG/ML
INJECTION, SOLUTION INTRAVENOUS CONTINUOUS
Status: CANCELLED | OUTPATIENT
Start: 2018-08-08

## 2018-08-03 ENCOUNTER — HOSPITAL ENCOUNTER (OUTPATIENT)
Dept: PREADMISSION TESTING | Age: 63
Discharge: HOME | End: 2018-08-03
Payer: COMMERCIAL

## 2018-08-03 ENCOUNTER — HOSPITAL ENCOUNTER (OUTPATIENT)
Dept: GENERAL RADIOLOGY | Age: 63
Discharge: HOME | End: 2018-08-05
Payer: COMMERCIAL

## 2018-08-03 VITALS
BODY MASS INDEX: 34.65 KG/M2 | DIASTOLIC BLOOD PRESSURE: 60 MMHG | WEIGHT: 188.31 LBS | TEMPERATURE: 98.7 F | SYSTOLIC BLOOD PRESSURE: 130 MMHG | HEART RATE: 98 BPM | OXYGEN SATURATION: 96 % | RESPIRATION RATE: 16 BRPM | HEIGHT: 62 IN

## 2018-08-03 DIAGNOSIS — I10 ESSENTIAL HYPERTENSION: Primary | ICD-10-CM

## 2018-08-03 DIAGNOSIS — M16.12 PRIMARY OSTEOARTHRITIS OF LEFT HIP: ICD-10-CM

## 2018-08-03 LAB
ABO/RH: NORMAL
ANION GAP SERPL CALCULATED.3IONS-SCNC: 12 MMOL/L (ref 7–16)
ANTIBODY SCREEN: NORMAL
APTT: 31.6 SEC (ref 24.5–35.1)
BACTERIA: NORMAL /HPF
BILIRUBIN URINE: NEGATIVE
BLOOD, URINE: NEGATIVE
BUN BLDV-MCNC: 11 MG/DL (ref 8–23)
CALCIUM SERPL-MCNC: 9.8 MG/DL (ref 8.6–10.2)
CHLORIDE BLD-SCNC: 107 MMOL/L (ref 98–107)
CLARITY: CLEAR
CO2: 22 MMOL/L (ref 22–29)
COLOR: YELLOW
CREAT SERPL-MCNC: 0.7 MG/DL (ref 0.5–1)
EPITHELIAL CELLS, UA: NORMAL /HPF
GFR AFRICAN AMERICAN: >60
GFR NON-AFRICAN AMERICAN: >60 ML/MIN/1.73
GLUCOSE BLD-MCNC: 140 MG/DL (ref 74–109)
GLUCOSE URINE: NEGATIVE MG/DL
HCT VFR BLD CALC: 34.4 % (ref 34–48)
HEMOGLOBIN: 10.6 G/DL (ref 11.5–15.5)
INR BLD: 1
KETONES, URINE: NEGATIVE MG/DL
LEUKOCYTE ESTERASE, URINE: NEGATIVE
MCH RBC QN AUTO: 24.4 PG (ref 26–35)
MCHC RBC AUTO-ENTMCNC: 30.8 % (ref 32–34.5)
MCV RBC AUTO: 79.3 FL (ref 80–99.9)
NITRITE, URINE: NEGATIVE
PDW BLD-RTO: 15.3 FL (ref 11.5–15)
PH UA: 6.5 (ref 5–9)
PLATELET # BLD: 275 E9/L (ref 130–450)
PMV BLD AUTO: 9.7 FL (ref 7–12)
POTASSIUM REFLEX MAGNESIUM: 4 MMOL/L (ref 3.5–5)
PREALBUMIN: 37 MG/DL (ref 20–40)
PROTEIN UA: NORMAL MG/DL
PROTHROMBIN TIME: 11 SEC (ref 9.3–12.4)
RBC # BLD: 4.34 E12/L (ref 3.5–5.5)
RBC UA: NORMAL /HPF (ref 0–2)
SODIUM BLD-SCNC: 141 MMOL/L (ref 132–146)
SPECIFIC GRAVITY UA: 1.01 (ref 1–1.03)
UROBILINOGEN, URINE: 0.2 E.U./DL
WBC # BLD: 7.1 E9/L (ref 4.5–11.5)
WBC UA: NORMAL /HPF (ref 0–5)

## 2018-08-03 PROCEDURE — 85730 THROMBOPLASTIN TIME PARTIAL: CPT

## 2018-08-03 PROCEDURE — 86900 BLOOD TYPING SEROLOGIC ABO: CPT

## 2018-08-03 PROCEDURE — 36415 COLL VENOUS BLD VENIPUNCTURE: CPT

## 2018-08-03 PROCEDURE — 71046 X-RAY EXAM CHEST 2 VIEWS: CPT

## 2018-08-03 PROCEDURE — 86901 BLOOD TYPING SEROLOGIC RH(D): CPT

## 2018-08-03 PROCEDURE — 80048 BASIC METABOLIC PNL TOTAL CA: CPT

## 2018-08-03 PROCEDURE — 84134 ASSAY OF PREALBUMIN: CPT

## 2018-08-03 PROCEDURE — 87088 URINE BACTERIA CULTURE: CPT

## 2018-08-03 PROCEDURE — 87081 CULTURE SCREEN ONLY: CPT

## 2018-08-03 PROCEDURE — 85027 COMPLETE CBC AUTOMATED: CPT

## 2018-08-03 PROCEDURE — 86850 RBC ANTIBODY SCREEN: CPT

## 2018-08-03 PROCEDURE — 85610 PROTHROMBIN TIME: CPT

## 2018-08-03 PROCEDURE — 81001 URINALYSIS AUTO W/SCOPE: CPT

## 2018-08-03 ASSESSMENT — PAIN DESCRIPTION - DESCRIPTORS: DESCRIPTORS: CONSTANT

## 2018-08-03 ASSESSMENT — PAIN DESCRIPTION - LOCATION: LOCATION: GROIN;HIP

## 2018-08-03 ASSESSMENT — PAIN DESCRIPTION - ONSET: ONSET: ON-GOING

## 2018-08-03 ASSESSMENT — PAIN SCALES - GENERAL: PAINLEVEL_OUTOF10: 8

## 2018-08-03 ASSESSMENT — PAIN DESCRIPTION - PAIN TYPE: TYPE: CHRONIC PAIN

## 2018-08-03 ASSESSMENT — PAIN DESCRIPTION - ORIENTATION: ORIENTATION: LEFT

## 2018-08-03 NOTE — PROGRESS NOTES
fingers or toes. 11. DO NOT wear any jewelry or piercings on day of surgery. All body piercing jewelry must be removed. 12. Shower the night before surgery with _x__Antibacterial soap ___Hibiclens. 15. Remember to bring Blood Bank bracelet to the hospital on the day of surgery. 14. If you have a Living Will and Durable Power of  for Healthcare, please bring in a copy. 15. If appropriate bring crutches, inspirex, etc... 12. Notify your Surgeon if you develop any illness between now and surgery time, cough, cold, fever, sore throat, nausea, vomiting, etc.  Please notify your surgeon if you experience dizziness, shortness of breath or blurred vision between now & the time of your surgery. 17. If you have ___dentures, they will be removed before going to the OR; we will provide you a container. If you wear ___contact lenses or _x__glasses, they will be removed; please bring a case for them. 18. To provide excellent care visitors will be limited to one in the room at any given time. 19. Please bring picture ID and insurance card. 20. Sleep apnea patients need to bring CPAP to hospital on day of surgery. 21. Visit our web site for additional information: ThemeContent.[email or web address] [email or web address]_[email or web address]    22. During flu season no children under the age of 15 are permitted in the hospital for the safety of all patients. 23. Other                 Please call pre admission testing if you have any further questions.    [facility]     [address]

## 2018-08-05 LAB
ORGANISM: ABNORMAL
URINE CULTURE, ROUTINE: NORMAL

## 2018-08-06 ENCOUNTER — HOSPITAL ENCOUNTER (OUTPATIENT)
Dept: INTERVENTIONAL RADIOLOGY/VASCULAR | Age: 63
Discharge: HOME | End: 2018-08-06
Payer: COMMERCIAL

## 2018-08-06 DIAGNOSIS — Z22.322 MRSA (METHICILLIN RESISTANT STAPH AUREUS) CULTURE POSITIVE: Primary | ICD-10-CM

## 2018-08-06 DIAGNOSIS — Z78.9 DIFFICULT INTRAVENOUS ACCESS: ICD-10-CM

## 2018-08-06 PROCEDURE — C1751 CATH, INF, PER/CENT/MIDLINE: HCPCS

## 2018-08-07 ENCOUNTER — HOSPITAL ENCOUNTER (OUTPATIENT)
Age: 63
Discharge: HOME | End: 2018-08-09
Payer: COMMERCIAL

## 2018-08-07 PROCEDURE — 76937 US GUIDE VASCULAR ACCESS: CPT | Performed by: RADIOLOGY

## 2018-08-07 PROCEDURE — C1751 CATH, INF, PER/CENT/MIDLINE: HCPCS

## 2018-08-07 PROCEDURE — 36569 INSJ PICC 5 YR+ W/O IMAGING: CPT | Performed by: RADIOLOGY

## 2018-08-07 PROCEDURE — 77001 FLUOROGUIDE FOR VEIN DEVICE: CPT | Performed by: RADIOLOGY

## 2018-08-08 ENCOUNTER — APPOINTMENT (OUTPATIENT)
Dept: GENERAL RADIOLOGY | Age: 63
DRG: 470 | End: 2018-08-08
Attending: ORTHOPAEDIC SURGERY
Payer: COMMERCIAL

## 2018-08-08 ENCOUNTER — HOSPITAL ENCOUNTER (INPATIENT)
Age: 63
LOS: 2 days | Discharge: NURSING, REHAB OR LONG-TERM CARE | DRG: 470 | End: 2018-08-10
Attending: ORTHOPAEDIC SURGERY | Admitting: ORTHOPAEDIC SURGERY
Payer: COMMERCIAL

## 2018-08-08 ENCOUNTER — ANESTHESIA EVENT (OUTPATIENT)
Dept: OPERATING ROOM | Age: 63
DRG: 470 | End: 2018-08-08
Payer: COMMERCIAL

## 2018-08-08 ENCOUNTER — ANESTHESIA (OUTPATIENT)
Dept: OPERATING ROOM | Age: 63
DRG: 470 | End: 2018-08-08
Payer: COMMERCIAL

## 2018-08-08 VITALS
TEMPERATURE: 96.4 F | RESPIRATION RATE: 23 BRPM | OXYGEN SATURATION: 100 % | DIASTOLIC BLOOD PRESSURE: 60 MMHG | SYSTOLIC BLOOD PRESSURE: 103 MMHG

## 2018-08-08 DIAGNOSIS — M16.12 PRIMARY OSTEOARTHRITIS OF LEFT HIP: Primary | ICD-10-CM

## 2018-08-08 LAB
METER GLUCOSE: 133 MG/DL (ref 70–110)
METER GLUCOSE: 208 MG/DL (ref 70–110)
METER GLUCOSE: 217 MG/DL (ref 70–110)
METER GLUCOSE: 227 MG/DL (ref 70–110)

## 2018-08-08 PROCEDURE — 6360000002 HC RX W HCPCS: Performed by: ORTHOPAEDIC SURGERY

## 2018-08-08 PROCEDURE — 97165 OT EVAL LOW COMPLEX 30 MIN: CPT

## 2018-08-08 PROCEDURE — 3600000005 HC SURGERY LEVEL 5 BASE: Performed by: ORTHOPAEDIC SURGERY

## 2018-08-08 PROCEDURE — 88311 DECALCIFY TISSUE: CPT

## 2018-08-08 PROCEDURE — 2580000003 HC RX 258: Performed by: NURSE PRACTITIONER

## 2018-08-08 PROCEDURE — C1776 JOINT DEVICE (IMPLANTABLE): HCPCS | Performed by: ORTHOPAEDIC SURGERY

## 2018-08-08 PROCEDURE — 6360000002 HC RX W HCPCS: Performed by: NURSE PRACTITIONER

## 2018-08-08 PROCEDURE — 3600000015 HC SURGERY LEVEL 5 ADDTL 15MIN: Performed by: ORTHOPAEDIC SURGERY

## 2018-08-08 PROCEDURE — G8979 MOBILITY GOAL STATUS: HCPCS | Performed by: PHYSICAL THERAPIST

## 2018-08-08 PROCEDURE — 2709999900 HC NON-CHARGEABLE SUPPLY: Performed by: ORTHOPAEDIC SURGERY

## 2018-08-08 PROCEDURE — 6360000002 HC RX W HCPCS

## 2018-08-08 PROCEDURE — G8987 SELF CARE CURRENT STATUS: HCPCS

## 2018-08-08 PROCEDURE — 6370000000 HC RX 637 (ALT 250 FOR IP): Performed by: INTERNAL MEDICINE

## 2018-08-08 PROCEDURE — 97530 THERAPEUTIC ACTIVITIES: CPT | Performed by: PHYSICAL THERAPIST

## 2018-08-08 PROCEDURE — 27130 TOTAL HIP ARTHROPLASTY: CPT | Performed by: ORTHOPAEDIC SURGERY

## 2018-08-08 PROCEDURE — 7100000000 HC PACU RECOVERY - FIRST 15 MIN: Performed by: ORTHOPAEDIC SURGERY

## 2018-08-08 PROCEDURE — 97161 PT EVAL LOW COMPLEX 20 MIN: CPT | Performed by: PHYSICAL THERAPIST

## 2018-08-08 PROCEDURE — 6360000002 HC RX W HCPCS: Performed by: STUDENT IN AN ORGANIZED HEALTH CARE EDUCATION/TRAINING PROGRAM

## 2018-08-08 PROCEDURE — 2500000003 HC RX 250 WO HCPCS

## 2018-08-08 PROCEDURE — 94664 DEMO&/EVAL PT USE INHALER: CPT

## 2018-08-08 PROCEDURE — 2580000003 HC RX 258: Performed by: ORTHOPAEDIC SURGERY

## 2018-08-08 PROCEDURE — 2500000003 HC RX 250 WO HCPCS: Performed by: ORTHOPAEDIC SURGERY

## 2018-08-08 PROCEDURE — 6370000000 HC RX 637 (ALT 250 FOR IP): Performed by: ANESTHESIOLOGY

## 2018-08-08 PROCEDURE — 0SRB04A REPLACEMENT OF LEFT HIP JOINT WITH CERAMIC ON POLYETHYLENE SYNTHETIC SUBSTITUTE, UNCEMENTED, OPEN APPROACH: ICD-10-PCS | Performed by: ORTHOPAEDIC SURGERY

## 2018-08-08 PROCEDURE — 2580000003 HC RX 258: Performed by: ANESTHESIOLOGY

## 2018-08-08 PROCEDURE — 1200000000 HC SEMI PRIVATE

## 2018-08-08 PROCEDURE — 2580000003 HC RX 258: Performed by: STUDENT IN AN ORGANIZED HEALTH CARE EDUCATION/TRAINING PROGRAM

## 2018-08-08 PROCEDURE — 73502 X-RAY EXAM HIP UNI 2-3 VIEWS: CPT

## 2018-08-08 PROCEDURE — 3700000000 HC ANESTHESIA ATTENDED CARE: Performed by: ORTHOPAEDIC SURGERY

## 2018-08-08 PROCEDURE — 7100000001 HC PACU RECOVERY - ADDTL 15 MIN: Performed by: ORTHOPAEDIC SURGERY

## 2018-08-08 PROCEDURE — 2500000003 HC RX 250 WO HCPCS: Performed by: STUDENT IN AN ORGANIZED HEALTH CARE EDUCATION/TRAINING PROGRAM

## 2018-08-08 PROCEDURE — 97530 THERAPEUTIC ACTIVITIES: CPT

## 2018-08-08 PROCEDURE — G8978 MOBILITY CURRENT STATUS: HCPCS | Performed by: PHYSICAL THERAPIST

## 2018-08-08 PROCEDURE — 88304 TISSUE EXAM BY PATHOLOGIST: CPT

## 2018-08-08 PROCEDURE — 82962 GLUCOSE BLOOD TEST: CPT

## 2018-08-08 PROCEDURE — 6370000000 HC RX 637 (ALT 250 FOR IP): Performed by: ORTHOPAEDIC SURGERY

## 2018-08-08 PROCEDURE — G8988 SELF CARE GOAL STATUS: HCPCS

## 2018-08-08 PROCEDURE — 3700000001 HC ADD 15 MINUTES (ANESTHESIA): Performed by: ORTHOPAEDIC SURGERY

## 2018-08-08 DEVICE — HEAD FEM DIA32MM +4MM OFFSET BIOLOX DELT CERAMIC V40 TAPR: Type: IMPLANTABLE DEVICE | Status: FUNCTIONAL

## 2018-08-08 DEVICE — COMPONENT TOT HIP CAPPED ADV STRYHIPA] STRYKER CORP]: Type: IMPLANTABLE DEVICE | Status: FUNCTIONAL

## 2018-08-08 DEVICE — CUP ACET HIP TRITANIUM: Type: IMPLANTABLE DEVICE | Status: FUNCTIONAL

## 2018-08-08 DEVICE — SCREW BNE L35MM DIA6.5MM CANC HIP TRITANIUM ST CANN: Type: IMPLANTABLE DEVICE | Status: FUNCTIONAL

## 2018-08-08 DEVICE — IMPLANTABLE DEVICE: Type: IMPLANTABLE DEVICE | Status: FUNCTIONAL

## 2018-08-08 DEVICE — SHELL ACET SZ D DIA50MM HIP X3 TRITANIUM HMSPHR CLUS H MOD: Type: IMPLANTABLE DEVICE | Status: FUNCTIONAL

## 2018-08-08 DEVICE — LINER ACET SZ D OD50X52MM ID32MM THK5.9MM 0DEG HIP X3: Type: IMPLANTABLE DEVICE | Status: FUNCTIONAL

## 2018-08-08 RX ORDER — BISACODYL 10 MG
10 SUPPOSITORY, RECTAL RECTAL PRN
Status: DISCONTINUED | OUTPATIENT
Start: 2018-08-08 | End: 2018-08-10 | Stop reason: HOSPADM

## 2018-08-08 RX ORDER — GLYCOPYRROLATE 1 MG/5 ML
SYRINGE (ML) INTRAVENOUS PRN
Status: DISCONTINUED | OUTPATIENT
Start: 2018-08-08 | End: 2018-08-08 | Stop reason: SDUPTHER

## 2018-08-08 RX ORDER — FAMOTIDINE 20 MG/1
20 TABLET, FILM COATED ORAL DAILY
Status: DISCONTINUED | OUTPATIENT
Start: 2018-08-08 | End: 2018-08-10 | Stop reason: HOSPADM

## 2018-08-08 RX ORDER — FENTANYL CITRATE 50 UG/ML
INJECTION, SOLUTION INTRAMUSCULAR; INTRAVENOUS
Status: DISPENSED
Start: 2018-08-08 | End: 2018-08-08

## 2018-08-08 RX ORDER — DEXTROSE MONOHYDRATE 50 MG/ML
100 INJECTION, SOLUTION INTRAVENOUS PRN
Status: DISCONTINUED | OUTPATIENT
Start: 2018-08-08 | End: 2018-08-10 | Stop reason: HOSPADM

## 2018-08-08 RX ORDER — ACETAMINOPHEN 325 MG/1
650 TABLET ORAL EVERY 4 HOURS PRN
Status: DISCONTINUED | OUTPATIENT
Start: 2018-08-08 | End: 2018-08-10 | Stop reason: HOSPADM

## 2018-08-08 RX ORDER — HYDROCODONE BITARTRATE AND ACETAMINOPHEN 5; 325 MG/1; MG/1
1 TABLET ORAL EVERY 6 HOURS PRN
Qty: 28 TABLET | Refills: 0 | Status: SHIPPED | OUTPATIENT
Start: 2018-08-08 | End: 2018-08-15

## 2018-08-08 RX ORDER — SODIUM CHLORIDE 9 MG/ML
INJECTION, SOLUTION INTRAVENOUS CONTINUOUS
Status: DISCONTINUED | OUTPATIENT
Start: 2018-08-08 | End: 2018-08-08

## 2018-08-08 RX ORDER — PROMETHAZINE HYDROCHLORIDE 25 MG/ML
25 INJECTION, SOLUTION INTRAMUSCULAR; INTRAVENOUS EVERY 6 HOURS PRN
Status: DISCONTINUED | OUTPATIENT
Start: 2018-08-08 | End: 2018-08-10 | Stop reason: HOSPADM

## 2018-08-08 RX ORDER — VANCOMYCIN HYDROCHLORIDE 500 MG/10ML
INJECTION, POWDER, LYOPHILIZED, FOR SOLUTION INTRAVENOUS PRN
Status: DISCONTINUED | OUTPATIENT
Start: 2018-08-08 | End: 2018-08-08 | Stop reason: HOSPADM

## 2018-08-08 RX ORDER — CELECOXIB 100 MG/1
200 CAPSULE ORAL ONCE
Status: COMPLETED | OUTPATIENT
Start: 2018-08-08 | End: 2018-08-08

## 2018-08-08 RX ORDER — ACETAMINOPHEN 500 MG
1000 TABLET ORAL ONCE
Status: COMPLETED | OUTPATIENT
Start: 2018-08-08 | End: 2018-08-08

## 2018-08-08 RX ORDER — NEOSTIGMINE METHYLSULFATE 1 MG/ML
INJECTION, SOLUTION INTRAVENOUS PRN
Status: DISCONTINUED | OUTPATIENT
Start: 2018-08-08 | End: 2018-08-08 | Stop reason: SDUPTHER

## 2018-08-08 RX ORDER — GABAPENTIN 100 MG/1
200 CAPSULE ORAL ONCE
Status: COMPLETED | OUTPATIENT
Start: 2018-08-08 | End: 2018-08-08

## 2018-08-08 RX ORDER — DEXAMETHASONE SODIUM PHOSPHATE 10 MG/ML
INJECTION, SOLUTION INTRAMUSCULAR; INTRAVENOUS PRN
Status: DISCONTINUED | OUTPATIENT
Start: 2018-08-08 | End: 2018-08-08 | Stop reason: SDUPTHER

## 2018-08-08 RX ORDER — LIDOCAINE HYDROCHLORIDE 20 MG/ML
INJECTION, SOLUTION INFILTRATION; PERINEURAL PRN
Status: DISCONTINUED | OUTPATIENT
Start: 2018-08-08 | End: 2018-08-08 | Stop reason: SDUPTHER

## 2018-08-08 RX ORDER — MONTELUKAST SODIUM 10 MG/1
10 TABLET ORAL NIGHTLY
Status: DISCONTINUED | OUTPATIENT
Start: 2018-08-08 | End: 2018-08-10 | Stop reason: HOSPADM

## 2018-08-08 RX ORDER — MORPHINE SULFATE 10 MG/ML
INJECTION, SOLUTION INTRAMUSCULAR; INTRAVENOUS PRN
Status: DISCONTINUED | OUTPATIENT
Start: 2018-08-08 | End: 2018-08-08 | Stop reason: SDUPTHER

## 2018-08-08 RX ORDER — ASPIRIN 81 MG/1
81 TABLET ORAL 2 TIMES DAILY
Status: DISCONTINUED | OUTPATIENT
Start: 2018-08-09 | End: 2018-08-10 | Stop reason: HOSPADM

## 2018-08-08 RX ORDER — MORPHINE SULFATE 4 MG/ML
4 INJECTION, SOLUTION INTRAMUSCULAR; INTRAVENOUS EVERY 4 HOURS PRN
Status: DISCONTINUED | OUTPATIENT
Start: 2018-08-08 | End: 2018-08-10 | Stop reason: HOSPADM

## 2018-08-08 RX ORDER — MIDAZOLAM HYDROCHLORIDE 1 MG/ML
INJECTION INTRAMUSCULAR; INTRAVENOUS PRN
Status: DISCONTINUED | OUTPATIENT
Start: 2018-08-08 | End: 2018-08-08 | Stop reason: SDUPTHER

## 2018-08-08 RX ORDER — LITHIUM CARBONATE 300 MG/1
300 CAPSULE ORAL 2 TIMES DAILY
Status: DISCONTINUED | OUTPATIENT
Start: 2018-08-08 | End: 2018-08-10 | Stop reason: HOSPADM

## 2018-08-08 RX ORDER — DOCUSATE SODIUM 100 MG/1
100 CAPSULE, LIQUID FILLED ORAL 2 TIMES DAILY
Status: DISCONTINUED | OUTPATIENT
Start: 2018-08-08 | End: 2018-08-10 | Stop reason: HOSPADM

## 2018-08-08 RX ORDER — ROCURONIUM BROMIDE 10 MG/ML
INJECTION, SOLUTION INTRAVENOUS PRN
Status: DISCONTINUED | OUTPATIENT
Start: 2018-08-08 | End: 2018-08-08 | Stop reason: SDUPTHER

## 2018-08-08 RX ORDER — FENTANYL CITRATE 50 UG/ML
50 INJECTION, SOLUTION INTRAMUSCULAR; INTRAVENOUS EVERY 5 MIN PRN
Status: DISCONTINUED | OUTPATIENT
Start: 2018-08-08 | End: 2018-08-08 | Stop reason: HOSPADM

## 2018-08-08 RX ORDER — FENTANYL CITRATE 50 UG/ML
25 INJECTION, SOLUTION INTRAMUSCULAR; INTRAVENOUS EVERY 5 MIN PRN
Status: DISCONTINUED | OUTPATIENT
Start: 2018-08-08 | End: 2018-08-08 | Stop reason: HOSPADM

## 2018-08-08 RX ORDER — ONDANSETRON 2 MG/ML
INJECTION INTRAMUSCULAR; INTRAVENOUS PRN
Status: DISCONTINUED | OUTPATIENT
Start: 2018-08-08 | End: 2018-08-08 | Stop reason: SDUPTHER

## 2018-08-08 RX ORDER — CHLORHEXIDINE GLUCONATE 4 G/100ML
SOLUTION TOPICAL
Status: DISCONTINUED | OUTPATIENT
Start: 2018-08-08 | End: 2018-08-08 | Stop reason: HOSPADM

## 2018-08-08 RX ORDER — ONDANSETRON 2 MG/ML
4 INJECTION INTRAMUSCULAR; INTRAVENOUS
Status: DISCONTINUED | OUTPATIENT
Start: 2018-08-08 | End: 2018-08-08 | Stop reason: HOSPADM

## 2018-08-08 RX ORDER — SODIUM CHLORIDE 0.9 % (FLUSH) 0.9 %
10 SYRINGE (ML) INJECTION PRN
Status: DISCONTINUED | OUTPATIENT
Start: 2018-08-08 | End: 2018-08-08 | Stop reason: HOSPADM

## 2018-08-08 RX ORDER — M-VIT,TX,IRON,MINS/CALC/FOLIC 27MG-0.4MG
1 TABLET ORAL DAILY
Status: DISCONTINUED | OUTPATIENT
Start: 2018-08-08 | End: 2018-08-10 | Stop reason: HOSPADM

## 2018-08-08 RX ORDER — METFORMIN HYDROCHLORIDE 500 MG/1
1000 TABLET, EXTENDED RELEASE ORAL
Status: DISCONTINUED | OUTPATIENT
Start: 2018-08-09 | End: 2018-08-10 | Stop reason: HOSPADM

## 2018-08-08 RX ORDER — LABETALOL HYDROCHLORIDE 5 MG/ML
INJECTION, SOLUTION INTRAVENOUS PRN
Status: DISCONTINUED | OUTPATIENT
Start: 2018-08-08 | End: 2018-08-08 | Stop reason: SDUPTHER

## 2018-08-08 RX ORDER — SODIUM CHLORIDE 9 MG/ML
INJECTION, SOLUTION INTRAVENOUS CONTINUOUS
Status: DISCONTINUED | OUTPATIENT
Start: 2018-08-08 | End: 2018-08-10 | Stop reason: HOSPADM

## 2018-08-08 RX ORDER — PROPOFOL 10 MG/ML
INJECTION, EMULSION INTRAVENOUS PRN
Status: DISCONTINUED | OUTPATIENT
Start: 2018-08-08 | End: 2018-08-08 | Stop reason: SDUPTHER

## 2018-08-08 RX ORDER — FENTANYL CITRATE 50 UG/ML
INJECTION, SOLUTION INTRAMUSCULAR; INTRAVENOUS PRN
Status: DISCONTINUED | OUTPATIENT
Start: 2018-08-08 | End: 2018-08-08 | Stop reason: SDUPTHER

## 2018-08-08 RX ORDER — NICOTINE POLACRILEX 4 MG
15 LOZENGE BUCCAL PRN
Status: DISCONTINUED | OUTPATIENT
Start: 2018-08-08 | End: 2018-08-10 | Stop reason: HOSPADM

## 2018-08-08 RX ORDER — SODIUM CHLORIDE 0.9 % (FLUSH) 0.9 %
10 SYRINGE (ML) INJECTION EVERY 12 HOURS SCHEDULED
Status: DISCONTINUED | OUTPATIENT
Start: 2018-08-08 | End: 2018-08-08 | Stop reason: HOSPADM

## 2018-08-08 RX ORDER — DEXTROSE MONOHYDRATE 25 G/50ML
12.5 INJECTION, SOLUTION INTRAVENOUS PRN
Status: DISCONTINUED | OUTPATIENT
Start: 2018-08-08 | End: 2018-08-10 | Stop reason: HOSPADM

## 2018-08-08 RX ADMIN — SODIUM CHLORIDE: 9 INJECTION, SOLUTION INTRAVENOUS at 06:53

## 2018-08-08 RX ADMIN — ONDANSETRON HYDROCHLORIDE 4 MG: 2 INJECTION, SOLUTION INTRAMUSCULAR; INTRAVENOUS at 08:37

## 2018-08-08 RX ADMIN — LABETALOL HYDROCHLORIDE 10 MG: 5 INJECTION, SOLUTION INTRAVENOUS at 09:01

## 2018-08-08 RX ADMIN — FENTANYL CITRATE 50 MCG: 50 INJECTION, SOLUTION INTRAMUSCULAR; INTRAVENOUS at 08:42

## 2018-08-08 RX ADMIN — MORPHINE SULFATE 5 MG: 10 INJECTION INTRAVENOUS at 10:21

## 2018-08-08 RX ADMIN — INSULIN LISPRO 1 UNITS: 100 INJECTION, SOLUTION INTRAVENOUS; SUBCUTANEOUS at 21:23

## 2018-08-08 RX ADMIN — Medication 10 ML: at 06:49

## 2018-08-08 RX ADMIN — GABAPENTIN 200 MG: 100 CAPSULE ORAL at 07:19

## 2018-08-08 RX ADMIN — ROCURONIUM BROMIDE 10 MG: 10 INJECTION, SOLUTION INTRAVENOUS at 09:38

## 2018-08-08 RX ADMIN — Medication 3 MG: at 10:05

## 2018-08-08 RX ADMIN — ACETAMINOPHEN 1000 MG: 500 TABLET, FILM COATED ORAL at 07:18

## 2018-08-08 RX ADMIN — ROCURONIUM BROMIDE 50 MG: 10 INJECTION, SOLUTION INTRAVENOUS at 08:13

## 2018-08-08 RX ADMIN — LIDOCAINE HYDROCHLORIDE 80 MG: 20 INJECTION, SOLUTION INFILTRATION; PERINEURAL at 08:13

## 2018-08-08 RX ADMIN — MIDAZOLAM 2 MG: 1 INJECTION INTRAMUSCULAR; INTRAVENOUS at 08:00

## 2018-08-08 RX ADMIN — FENTANYL CITRATE 50 MCG: 50 INJECTION, SOLUTION INTRAMUSCULAR; INTRAVENOUS at 08:13

## 2018-08-08 RX ADMIN — PROMETHAZINE HYDROCHLORIDE 25 MG: 25 INJECTION INTRAMUSCULAR; INTRAVENOUS at 13:10

## 2018-08-08 RX ADMIN — LITHIUM CARBONATE 300 MG: 300 CAPSULE, GELATIN COATED ORAL at 21:19

## 2018-08-08 RX ADMIN — DOCUSATE SODIUM 100 MG: 100 CAPSULE, LIQUID FILLED ORAL at 21:19

## 2018-08-08 RX ADMIN — Medication 1 MG: at 10:15

## 2018-08-08 RX ADMIN — MONTELUKAST SODIUM 10 MG: 10 TABLET, COATED ORAL at 21:19

## 2018-08-08 RX ADMIN — Medication 2 G: at 11:34

## 2018-08-08 RX ADMIN — SODIUM CHLORIDE: 9 INJECTION, SOLUTION INTRAVENOUS at 09:34

## 2018-08-08 RX ADMIN — Medication 2 G: at 19:29

## 2018-08-08 RX ADMIN — DEXAMETHASONE SODIUM PHOSPHATE 10 MG: 10 INJECTION, SOLUTION INTRAMUSCULAR; INTRAVENOUS at 08:33

## 2018-08-08 RX ADMIN — FAMOTIDINE 20 MG: 20 TABLET ORAL at 16:39

## 2018-08-08 RX ADMIN — FENTANYL CITRATE 100 MCG: 50 INJECTION, SOLUTION INTRAMUSCULAR; INTRAVENOUS at 08:32

## 2018-08-08 RX ADMIN — MOMETASONE FUROATE AND FORMOTEROL FUMARATE DIHYDRATE 2 PUFF: 200; 5 AEROSOL RESPIRATORY (INHALATION) at 17:56

## 2018-08-08 RX ADMIN — Medication 0.6 MG: at 10:05

## 2018-08-08 RX ADMIN — LABETALOL HYDROCHLORIDE 5 MG: 5 INJECTION, SOLUTION INTRAVENOUS at 08:50

## 2018-08-08 RX ADMIN — Medication 2 G: at 08:06

## 2018-08-08 RX ADMIN — MORPHINE SULFATE 5 MG: 10 INJECTION INTRAVENOUS at 10:24

## 2018-08-08 RX ADMIN — PROPOFOL 170 MG: 10 INJECTION, EMULSION INTRAVENOUS at 08:13

## 2018-08-08 RX ADMIN — LABETALOL HYDROCHLORIDE 10 MG: 5 INJECTION, SOLUTION INTRAVENOUS at 08:54

## 2018-08-08 RX ADMIN — CELECOXIB 200 MG: 100 CAPSULE ORAL at 07:18

## 2018-08-08 RX ADMIN — INSULIN LISPRO 2 UNITS: 100 INJECTION, SOLUTION INTRAVENOUS; SUBCUTANEOUS at 16:34

## 2018-08-08 RX ADMIN — FENTANYL CITRATE 50 MCG: 50 INJECTION, SOLUTION INTRAMUSCULAR; INTRAVENOUS at 08:01

## 2018-08-08 ASSESSMENT — PULMONARY FUNCTION TESTS
PIF_VALUE: 29
PIF_VALUE: 27
PIF_VALUE: 24
PIF_VALUE: 28
PIF_VALUE: 28
PIF_VALUE: 12
PIF_VALUE: 53
PIF_VALUE: 3
PIF_VALUE: 27
PIF_VALUE: 26
PIF_VALUE: 28
PIF_VALUE: 27
PIF_VALUE: 3
PIF_VALUE: 29
PIF_VALUE: 31
PIF_VALUE: 26
PIF_VALUE: 27
PIF_VALUE: 26
PIF_VALUE: 30
PIF_VALUE: 26
PIF_VALUE: 28
PIF_VALUE: 28
PIF_VALUE: 27
PIF_VALUE: 25
PIF_VALUE: 28
PIF_VALUE: 28
PIF_VALUE: 27
PIF_VALUE: 28
PIF_VALUE: 28
PIF_VALUE: 26
PIF_VALUE: 28
PIF_VALUE: 28
PIF_VALUE: 26
PIF_VALUE: 3
PIF_VALUE: 27
PIF_VALUE: 28
PIF_VALUE: 4
PIF_VALUE: 27
PIF_VALUE: 25
PIF_VALUE: 28
PIF_VALUE: 27
PIF_VALUE: 28
PIF_VALUE: 4
PIF_VALUE: 27
PIF_VALUE: 27
PIF_VALUE: 28
PIF_VALUE: 2
PIF_VALUE: 27
PIF_VALUE: 28
PIF_VALUE: 25
PIF_VALUE: 25
PIF_VALUE: 28
PIF_VALUE: 27
PIF_VALUE: 20
PIF_VALUE: 28
PIF_VALUE: 28
PIF_VALUE: 27
PIF_VALUE: 1
PIF_VALUE: 27
PIF_VALUE: 26
PIF_VALUE: 25
PIF_VALUE: 12
PIF_VALUE: 26
PIF_VALUE: 27
PIF_VALUE: 27
PIF_VALUE: 28
PIF_VALUE: 27
PIF_VALUE: 27
PIF_VALUE: 2
PIF_VALUE: 30
PIF_VALUE: 28
PIF_VALUE: 27
PIF_VALUE: 28
PIF_VALUE: 28
PIF_VALUE: 27
PIF_VALUE: 27
PIF_VALUE: 0
PIF_VALUE: 27
PIF_VALUE: 27
PIF_VALUE: 30
PIF_VALUE: 28
PIF_VALUE: 1
PIF_VALUE: 27
PIF_VALUE: 28
PIF_VALUE: 6
PIF_VALUE: 28
PIF_VALUE: 22
PIF_VALUE: 28
PIF_VALUE: 25
PIF_VALUE: 28
PIF_VALUE: 27
PIF_VALUE: 23
PIF_VALUE: 25
PIF_VALUE: 28
PIF_VALUE: 22
PIF_VALUE: 29
PIF_VALUE: 1
PIF_VALUE: 28
PIF_VALUE: 27
PIF_VALUE: 28
PIF_VALUE: 27
PIF_VALUE: 6
PIF_VALUE: 27
PIF_VALUE: 29
PIF_VALUE: 26
PIF_VALUE: 27
PIF_VALUE: 27
PIF_VALUE: 30
PIF_VALUE: 3
PIF_VALUE: 3
PIF_VALUE: 28
PIF_VALUE: 25
PIF_VALUE: 27
PIF_VALUE: 28
PIF_VALUE: 26
PIF_VALUE: 28
PIF_VALUE: 26
PIF_VALUE: 1
PIF_VALUE: 27
PIF_VALUE: 28
PIF_VALUE: 28
PIF_VALUE: 27
PIF_VALUE: 28
PIF_VALUE: 29
PIF_VALUE: 31
PIF_VALUE: 27
PIF_VALUE: 27
PIF_VALUE: 25

## 2018-08-08 ASSESSMENT — PAIN DESCRIPTION - LOCATION: LOCATION: HIP

## 2018-08-08 ASSESSMENT — PAIN SCALES - GENERAL
PAINLEVEL_OUTOF10: 0
PAINLEVEL_OUTOF10: 6
PAINLEVEL_OUTOF10: 0
PAINLEVEL_OUTOF10: 0
PAINLEVEL_OUTOF10: 9
PAINLEVEL_OUTOF10: 0

## 2018-08-08 ASSESSMENT — COPD QUESTIONNAIRES: CAT_SEVERITY: NO INTERVAL CHANGE

## 2018-08-08 ASSESSMENT — PAIN DESCRIPTION - DESCRIPTORS: DESCRIPTORS: CONSTANT;DISCOMFORT;DULL

## 2018-08-08 ASSESSMENT — PAIN - FUNCTIONAL ASSESSMENT: PAIN_FUNCTIONAL_ASSESSMENT: 0-10

## 2018-08-08 ASSESSMENT — PAIN DESCRIPTION - ORIENTATION: ORIENTATION: LEFT

## 2018-08-08 ASSESSMENT — PAIN DESCRIPTION - PROGRESSION: CLINICAL_PROGRESSION: GRADUALLY IMPROVING

## 2018-08-08 ASSESSMENT — PAIN DESCRIPTION - FREQUENCY: FREQUENCY: CONTINUOUS

## 2018-08-08 ASSESSMENT — PAIN DESCRIPTION - PAIN TYPE: TYPE: ACUTE PAIN;SURGICAL PAIN

## 2018-08-08 ASSESSMENT — PAIN DESCRIPTION - ONSET: ONSET: ON-GOING

## 2018-08-08 NOTE — CONSULTS
Name:  Slava Campuzano  :  1955  MRN:  [id number]  Room:  [facility]/0320-01  DOS:  2018    [address] [facility]  Internal Medicine  -Resident Consult Note-    PCP:  Kya Jones MD  Admitting Physician:  Chavo Benavidez DO  Consultants: Internal medicine  Chief Complaint:  No chief complaint on file. History of Present Illness  Slava Campuzano is a 70 y.o. female who presents to [facility] complaining of left hip pain. Slava Campuzano has a past medical history that includes diabetes mellitus, seizures, bipolar, hypertension, hyperlipidemia, history of blood clots, emphysema, atrial fibrillation. Sedrick King presents twith left hip pain that began two years ago. The pain has been worsening. It was ultimately decided that patient was to undergo left hip arthoplasty. She has no complaints post op. Last Hospital Admission - 10/22/15   1. Bipolar disorder with acute psychosis. 2.    Hypertension. 3.    Arthritis. 4.    Non-insulin-dependent diabetes mellitus type 2.   5.    Hyperlipidemia. 6.    Microcytic anemia. Last Echocardiogram - 16   Left ventricle is normal in size .   Borderline concentric left ventricular hypertrophy   Normal left ventricular ejection fraction.   There is doppler evidence of stage I diastolic dysfunction.   The aortic valve appears mildly sclerotic.      VITALS  BP: 101/62, Temp: 97.4 °F (36.3 °C), Pulse: 62, Resp: 16, SpO2: 99 %    Vitals:    18 1146 18 1147 18 1148 18 1215   BP:    101/62   Pulse: 67   62   Resp: 13   16   Temp:    97.4 °F (36.3 °C)   TempSrc:    Oral   SpO2: 99% 100% 99% 99%   Weight:    190 lb (86.2 kg)   Height:    5' 2\" (1.575 m)         Histories  Past Medical History:   Diagnosis Date    [facility]fib [facility] [address]     Anxiety     Arthritis     Atrial fibrillation (HCC)     DVT (deep venous thrombosis) (HCC)     Emphysema of lung ([facility] [address]     Encounter for imaging of bilateral cephalic veins History of bilateral cephalic vein thrombosis    Headache     History of blood transfusion     Hx of blood clots     Hyperlipidemia     Hypertension     Psychiatric problem     Seizures ([address]     Thyroid disease     Type II or unspecified type diabetes mellitus without mention of complication, not stated as uncontrolled      Past Surgical History:   Procedure Laterality Date    CARDIAC CATHETERIZATION  12/01/2008    Normal Coronary arteries. Normal LV. Elevated left ventricular end diastolic pressure suggestive of impaired relaxatin and possible diastolic dysfunction    HYSTERECTOMY      LEG SURGERY Right     right hip area, removed cyst      Family History   Problem Relation Age of Onset    Diabetes Mother     Stroke Father        Home Medications  Prior to Admission medications    Medication Sig Start Date End Date Taking? Authorizing Provider   HYDROcodone-acetaminophen (NORCO) 5-325 MG per tablet Take 1 tablet by mouth every 6 hours as needed for Pain for up to 7 days. Intended supply: 7 days. Take lowest dose possible to manage pain.  8/8/18 8/15/18 Yes Bill Butler DO   rivaroxaban (XARELTO) 10 MG TABS tablet Take 1 tablet by mouth daily 8/8/18  Yes Bill Butler DO   montelukast (SINGULAIR) 10 MG tablet Take 1 tablet by mouth nightly 6/25/18  Yes Jim Cook MD   amLODIPine (NORVASC) 10 MG tablet Take 1 tablet by mouth daily 6/25/18  Yes Jim Cook MD   famotidine (PEPCID) 20 MG tablet Take 1 tablet by mouth daily 6/25/18  Yes Jim Cook MD   SITagliptin-metFORMIN (JANUMET XR)  MG TB24 per extended release tablet 2 po qam 6/25/18  Yes Jim Cook MD   metoprolol succinate (TOPROL XL) 50 MG extended release tablet TAKE 1 TABLET BY MOUTH DAILY 5/23/18  Yes Jim Cook MD   lisinopril-hydrochlorothiazide (PRINZIDE;ZESTORETIC) 20-12.5 MG per tablet TAKE 1 TABLET BY MOUTH DAILY 5/23/18  Yes Melanie Muhammad MD Kenneth   lithium 300 MG tablet Take 300 mg by mouth 2 times daily   Yes Historical Provider, MD   fluticasone-salmeterol (ADVAIR HFA) 230-21 MCG/ACT inhaler Inhale 2 puffs into the lungs 2 times daily 2/6/18  Yes MD sandro Looney [facility] NASAL) 2 % nasal ointment Take by Nasal route 2 times daily. 2/6/18  Yes MD sandro Looney [facility] NASAL) 2 % nasal ointment Take by Nasal route 2 times daily. 8/6/18   WILLIAM Mosquera - CNP   Blood Glucose Monitoring Suppl (ONE TOUCH ULTRA 2) w/Device KIT Check blood sugar bid 7/26/18   Jessica Glover MD   ONE TOUCH LANCETS MISC Check blood sugar bid. Switch to whatever brand is covered by insurance. 5/3/18   Jessica Glover MD   pravastatin (PRAVACHOL) 40 MG tablet Take 1 tablet by mouth nightly 3/27/18 8/2/18  Beacher Aase., MD   Prenatal MV-Min-Fe Fum-FA-DHA (PRENATAL 1) 34-0.801-320 MG CAPS Take 1 tablet by mouth daily 3/27/18 8/2/18  Beacher Aase., MD   aspirin (ASPIRIN LOW DOSE) 81 MG EC tablet Take 1 tablet by mouth 2 times daily 3/27/18 8/2/18  Beacher Aase., MD   glucose blood VI test strips (ONE TOUCH ULTRA TEST) strip Check blood sugar bid 12/20/17   Jessica Glover MD   [facility]. Devices [facility] Bedside commode 7/25/16   Jessica Glover MD       Allergies  Atenolol; Codeine; Lipitor [atorvastatin]; Other; Percocet [oxycodone-acetaminophen]; and Vicodin [hydrocodone-acetaminophen]    Social Hx  Social History     Social History    Marital status:       Spouse name: N/A    Number of children: 2    Years of education: 21     Occupational History    retired Unemployed     PT IS A POOR HISTORIAN     Social History Main Topics    Smoking status: Never Smoker    Smokeless tobacco: Never Used    Alcohol use No    Drug use: No    Sexual activity: Yes     Other Topics Concern    Not on file     Social History Narrative    ** Merged History Encounter **            Review of Systems  All bolded are positive; please see HPI  General:  Fever, chills, diaphoresis, fatigue, malaise, night sweats, weight loss  Psychological:  Anxiety, disorientation, hallucinations. ENT:  Epistaxis, headaches, vertigo, visual changes. Cardiovascular:  Chest pain, irregular heartbeats, palpitations, paroxysmal nocturnal dyspnea. Respiratory:  Shortness of breath, coughing, sputum production, hemoptysis, wheezing, orthopnea. Gastrointestinal:  Nausea, vomiting, diarrhea, heartburn, constipation, abdominal pain, hematemesis, hematochezia, melena, acholic stools  Genito-Urinary:  Dysuria, urgency, frequency, hematuria  Musculoskeletal:  Joint pain, joint stiffness, joint swelling, muscle pain  Neurology:  Headache, focal neurological deficits, weakness, numbness, paresthesia  Derm:  Rashes, ulcers, excoriations, bruising  Extremities:  Decreased ROM, peripheral edema, mottling    Physical Examination  Vitals:  /62   Pulse 62   Temp 97.4 °F (36.3 °C) (Oral)   Resp 16   Ht 5' 2\" (1.575 m)   Wt 190 lb (86.2 kg)   LMP 06/26/1953   SpO2 99%   BMI 34.75 kg/m²   General Appearance:  awake, alert, and oriented to person, place, time, and purpose; appears stated age and cooperative; no apparent distress no labored breathing  HEENT:  NCAT; PERRL; conjunctiva pink, sclera clear  Neck:  no adenopathy, bruit, JVD, tenderness, masses, or nodules; supple, symmetrical, trachea midline, thyroid not enlarged  Lung:  clear to auscultation bilaterally; no use of accessory muscles; no rhonchi, rales, or crackles  Heart:  regular rate and regular rhythm without murmur, rub, or gallop  Abdomen:  soft, nontender, nondistended; normoactive bowel sounds; no organomegaly  Extremities:  extremities normal, atraumatic, no cyanosis post op left hip  Musculokeletal:  no joint swelling, no muscle tenderness. ROM normal in all joints of extremities.    Neurologic:  mental status diabetes mellitus type 2  · History of bipolar disorder with psychosis  · Hyperlipidemia  · Microcytic anemia    - held hypertension meds secondary to current hypotension  - reordered home meds  - low dose SSI  - hypoglycemia protocol    · Routine labs in the morning. · DVT prophylaxis. · Please see orders for further management and care. Lisa Love DO, PGYII  2:18 PM  8/8/2018    The chart was reviewed and the patient was seen and examined. The case was discussed in detail with the resident and agree with current impressions and plan.     Pablo Godfrey D.O.  3:36 PM  8/8/2018

## 2018-08-08 NOTE — H&P
Chief Complaint   Patient presents with    Hip Pain       left hip pain for a couple of years         Divya Pacheco  Is a 70 y.o.  female who presents today complaining of left hip pain. She states that the pain began 2  year(s) ago. She did not have a history of injury. Patients states pain is located anterior and has tenderness over the  Anterior portion of the hip. Hip pain is described as aching and  is worse with weight bearing, is aggravated by walking and is worse after period of inactivity along with diffuse and groin discomfort. She states the pain occurs in the  continuous. Patient states hip pain is relieved by rest. Patient does not have a history of back pain. The patient does use ambulatory aid walker or cane.     Past Medical History        Past Medical History:   Diagnosis Date    A-fib ([facility] [address]      Anxiety      Arthritis      Atrial fibrillation (HCC)      DVT (deep venous thrombosis) (HCC)      Emphysema of lung (HCC)      Encounter for imaging of bilateral cephalic veins       History of bilateral cephalic vein thrombosis    Headache      Hyperlipidemia      Hypertension      Psychiatric problem      Seizures (HCC)      Thyroid disease      Type II or unspecified type diabetes mellitus without mention of complication, not stated as uncontrolled           Past Surgical History         Past Surgical History:   Procedure Laterality Date    CARDIAC CATHETERIZATION   12/01/2008     Normal Coronary arteries. Normal LV.   Elevated left ventricular end diastolic pressure suggestive of impaired relaxatin and possible diastolic dysfunction    HYSTERECTOMY        LEG SURGERY               Current Medication      Current Outpatient Prescriptions:     metoprolol succinate (TOPROL XL) 50 MG extended release tablet, TAKE 1 TABLET BY MOUTH DAILY, Disp: 30 tablet, Rfl: 3    lisinopril-hydrochlorothiazide (PRINZIDE;ZESTORETIC) 20-12.5 MG per tablet, TAKE 1 TABLET BY MOUTH effort is not labored. Patient is not gasping. Palpation of the chest reveals no tactile fremitus.     Skin:     Upon inspection: the skin appears warm, dry and intact. There is not a previous scar over the affected area. There is not any cellulitis, lymphedema or cutaneous lesions noted in the lower extremities. Upon palpation there is no induration noted.             Neurologic:        Motor exam of the lower extremities show ; quadriceps, hamstrings, foot dorsi and plantar flexors intact R.  5/5 and L. 5/5. Deep tendon reflexes are 2/4 at the knees and 2/4 at the ankles with strong extensor hallicus longus motor strength bilaterally. Sensory to both feet is intact to all sensory roots.     Cardiovascular:     The vascular exam is normal and is well perfused to distal extremities. Distal pulses DP/PT: R. 2+; L. 2+. There is cap refill noted less than two seconds in all digits. There is not edema of the bilateral lower extremities. There is not varicosities noted in the distal extremities.       Lymph:     Upon palpation,  there is no lymphadenopathy noted in bilateral lower extremities.          Musculoskeletal:  Gait: antalgic;  Examination of the digits and nails reveal no cyanosis or clubbing           Lumbar exam:     On visual inspection, there is not deformity of the spine. limited range of motion, pain with motion noted during exam. Special tests: Straight Leg Raise negative, Gonsalo test negative.        Hip exam:     Upon visual inspection there is not a deformity noted. Patient complains of tenderness at the  groin. Exam of the bilateral hip shows none leg length discrepancy. Range of motion of the involved/uninvolved hip is 10 degrees internal rotation and 10 degrees external rotation/5 degrees internal rotation and 10 degrees external rotation, the hip joint is stable to testing.  Strength of the lower extremity is normal.The patient does not have ipsilateral knee pain, and is described

## 2018-08-08 NOTE — OP NOTE
Department of Orthopedic Surgery  Operative Report        Pre-operative Diagnosis:  Left Hip Osteoarthritis    Post-operative Diagnosis:  Left Hip Osteoarthritis    Procedure:  Left Hip Arthroplasty    Surgeon:  Dorota Hernandez DO     Assistant(s):  Ayala/kevon/santiago    Anesthesia:  Spinal anesthesia    Estimated blood loss:  500 ml    Specimens:  none    Implants:  tritanium cup 50, 9 secure fit stem, 32+4 ceramic head    Complications:  none    Condition:  Stable    Brief Hospital Course:  Fernando Garcia is a patient known to [facility] practice with persistent complaints of Left hip pain. Hip pain has failed to be relieved by non-operative conservative measures, and has began affecting daily activities of living. After examination of the patient, review of the radiologic studies, and appropriate pre-operative risk assessment, Karri Ruiz DO recommended Left hip arthroplasty, which the patient was agreeable towards. Operative Course: The patient seen and identified outside the operative suite. Operative site was marked as appropriate by patient, surgeon, staff, and anesthesia. The patient was then taken into the operative suite, and transferred to the operative table. The patient was then sedated under the care of the anesthesia team. The patient was then positioned in the lateral decubitus position with the operative leg up. All bony prominences and neurovascular structures were well padded and protected. Operative site was then prepped and draped in the standard sterile fashion. Using a posterior Baum approach, I made an incision through the skin and subcutaneous tissue. I dissected down to the level of the abductor mechanism. The IT band and gluteus katherin fascia was identified and then released in the center of the trochanter. I put Charnley retractor deep within the wound and identified our short external rotators. They were tagged and released.  I identified the posterior hip capsule. It was  from the short external rotators. It was T'd, released off the neck of the femur and released up to the edge of the acetabulum. Hip was then carefully dislocated. The femoral neck osteotomy approximately 1 fingerbreadth proximal to the lesser trochanter was then performed with an oscillating saw. At this time, the acetabulum was exposed with 3 retractors, 1 anterior, 1 posterolateral, and 1 superiorly to expose the acetabulum. The acetabulum was completely worn. I then carried out reaming of the shell, starting about 6 sizes below the templated femoral head size. Once reaming reached appropriate depth and coverage, an acetabular trial at the appropriate size was then placed. It was found that there was appropriate fit, coronal tilt, and anteversion. Copious irrigation was performed after removal of the trial and the final cup was then impacted into position. I had great fixation, had good bleeding bone underneath the shell. I then placed a trial liner within the hip, prepared the proximal femur, using a box chisel reamers and then followed by a broach. Broaching was carried up until appropriate fit was appreciated. The final femoral stem was placed after trialing of appropriate neck and head component trials. The hip was then trialed. The patient had good shuck, a few millimeters, had full extension of the hip, had no significant tightness in extension. The patient had no dislocation of the dislocation of the hip until about 85 to 90 degrees of internal rotation in an adducted position. We had great stability of the hip. The trial implants were removed. Final implants were implanted and impacted into position. The hip was reduced to the acetabulum. I thoroughly irrigated the wound upon closure. Closed the capsular incision with #2 TiCron, closed the short external rotators in the posterior femur with #2 TiCron. I placed our PRP deep within the hip wound.  I closed the subcutaneous layer

## 2018-08-08 NOTE — PROGRESS NOTES
steps to enter with rail, tub shower. PLOF: independent with BADL and IADL, ambulated with rollator as needed    Equipment owned: rollator, cane, shower chair  Cognition: oriented x 3; follows 2 step directions. Very lethargic    fair  Problem solving skills   fair  Memory    fair  Sequencing  Communication: intact   Visual perceptual skills: intact     Glasses: yes   Edema: yes, surgical extremity     Sensation: intact   Hand Dominance:  Left     X Right     Left Right Comment   Passive range of motion [facility]     Active range of motion [facility]     Muscle Grade 4/5 4/5    /pinch Strength Intact  Intact     [] Malnutrition indicators have been identified and nursing has been notified to ensure a dietitian consult is ordered. Function Assessment    Status  Goal Comment   Feeding:  Supervision   Independent      Grooming: Moderate Assist  Independent      UE dressing: Moderate Assist  Independent     LE dressing:  Dependent  Independent     Bathing:  Maximal Assist  Independent     Bed Mobility:     Minimal Assist  for supine to sit,   Minimal Assist  for scooting,  Minimal Assist  for sit to supine  Independent     Functional Transfers:    Minimal Assist x 2 for sit to stand transfer from EOB to wheeled walker Independent  Safety: good    Functional Mobility: 3 side steps towards head of bed and 2 feet forward and 2 feet backward with wheeled walker and  Minimal Assist x 2  Modified [place]       Pt/family participated in establishment of goals.      Balance:   Sitting: good   Standing: fair with wheeled walker    Endurance: fair       Assessment of Current Deficits:   [x]Functional mobility  []ROM  [x]Strength   []Cognition   [x]Safety Awareness    [x]ADLs [x]IADLs   [x]Endurance  [x]Balance    []Vision  []Sensation     []Gross Motor Coordination       []Fine Motor Coordination     · Low Complexity  · History: Brief history including review of medical records relating to the problem  · Exam: 1-3 performance Deficits  · Assistance/Modification: No assistance or modifications required to perform tasks. No comorbities affecting occupational performance. Patients Goal: go to rehab    Treatment: OT noemi, pt educated and trained in weight bearing status and hip precautions, will reinforced d/t lethargy during evaluation, bed mobility with assistance to guide lower extremity off bed and bring trunk to upright position, sitting balance at EOB for 10 minutes with intermittent minimal assist, transfer training with verbal cues for hand placement, static standing balance with wheeled walker, functional mobility with wheeled walker, verbal cues for walker sequence and safety, pt returned to bed at end of eval. Education provided for proper positioning of lower extremity in bed. All needs within reach. Bed alarm on. Rehab Potential: good   Plan of care: Patient will be seen by OT 1-3 times a week for therapeutic activity, ADL re-training, bed mobility, functional transfers, functional mobility, safety and fall prevention, balance and endurance activities, instruction in energy conservation principles, and patient/family education. Patient and/or family understands diagnosis, prognosis, education and plan of care. Pt/family verbalized understanding.      Time Code treatment minutes: Malgorzata OTR/L #[id number]

## 2018-08-08 NOTE — ANESTHESIA PRE PROCEDURE
Department of Anesthesiology  Preprocedure Note       Name:  Huy Garcia   Age:  70 y.o.  :  1955                                          MRN:  [id number]         Date:  2018      Surgeon: Renny Chilel): Chandrakant Mckeon DO    Procedure: Procedure(s):  LEFT HIP TOTAL ARTHROPLASTY (KYLIE)    Medications prior to admission:   Prior to Admission medications    Medication Sig Start Date End Date Taking? Authorizing Provider   montelukast (SINGULAIR) 10 MG tablet Take 1 tablet by mouth nightly 18  Yes Jamie Cervantes MD   amLODIPine (NORVASC) 10 MG tablet Take 1 tablet by mouth daily 18  Yes Jamie Cervantes MD   famotidine (PEPCID) 20 MG tablet Take 1 tablet by mouth daily 18  Yes Jamie Cervantes MD   SITagliptin-metFORMIN (JANUMET XR)  MG TB24 per extended release tablet 2 po qam 18  Yes Jamie Cervantes MD   metoprolol succinate (TOPROL XL) 50 MG extended release tablet TAKE 1 TABLET BY MOUTH DAILY 18  Yes Jamie Cervantes MD   lisinopril-hydrochlorothiazide (PRINZIDE;ZESTORETIC) 20-12.5 MG per tablet TAKE 1 TABLET BY MOUTH DAILY 18  Yes Jamie Cervantes MD   lithium 300 MG tablet Take 300 mg by mouth 2 times daily   Yes Historical Provider, MD   fluticasone-salmeterol (ADVAIR HFA) 230-21 MCG/ACT inhaler Inhale 2 puffs into the lungs 2 times daily 18  Yes MD sandro Wilkinson [facility]) 2 % nasal ointment Take by Nasal route 2 times daily. 18  Yes MD sandro Wilkinson [facility]) 2 % nasal ointment Take by Nasal route 2 times daily. 18   Joana Sandoval, APRN - CNP   Blood Glucose Monitoring Suppl (ONE TOUCH ULTRA 2) w/Device KIT Check blood sugar bid 18   Jamie Cervantes MD   ONE TOUCH LANCETS MISC Check blood sugar bid. Switch to whatever brand is covered by insurance.  5/3/18   Jamie Cervantes MD Delirium R41.0    Microcytic anemia D50.9    Bipolar disorder, current episode manic severe with psychotic features ([facility] F31.2    Mixed hyperlipidemia E78.2    Type 2 diabetes mellitus without complication, without long-term current use of insulin (HCC) E11.9    Moderate obesity E66.8    Palpitations R00.2    Essential hypertension I10    Atypical chest pain R07.89    Cerebral atherosclerosis I67.2    Cognitive impairment R41.89    Hypercholesterolemia E78.00    Primary osteoarthritis of left hip M16.12    Primary localized osteoarthritis of left hip M16.12       Past Medical History:        Diagnosis Date    A-fib ([facility]     Anxiety     Arthritis     [facility] fibrillation [facility]     DVT (deep venous thrombosis) ([facility])     [facility]     Encounter for imaging of bilateral cephalic veins     History of bilateral cephalic vein thrombosis    Headache     History of blood transfusion     Hx of blood clots     Hyperlipidemia     Hypertension     Psychiatric problem     Seizures ([facility]     Thyroid disease     Type II or unspecified type diabetes mellitus without mention of complication, not stated as uncontrolled        Past Surgical History:        Procedure Laterality Date    CARDIAC CATHETERIZATION  12/01/2008    Normal Coronary arteries. Normal LV.   Elevated left ventricular end diastolic pressure suggestive of impaired relaxatin and possible diastolic dysfunction    HYSTERECTOMY      LEG SURGERY Right     right hip area, removed cyst        Social History:    Social History   Substance Use Topics    Smoking status: Never Smoker    Smokeless tobacco: Never Used    Alcohol use No                                Counseling given: Not Answered      Vital Signs (Current):   Vitals:    08/08/18 0622   BP: 130/74   Pulse: 70   Resp: 16   Temp: 97.6 °F (36.4 °C)   TempSrc: Temporal   SpO2: 98%   Weight: 190 lb (86.2 kg)   Height: 5' 2\" (1.575 m) Cardiovascular:    (+) hypertension:, CHF: diastolic, hyperlipidemia      ECG reviewed  Rhythm: regular  Rate: normal  Echocardiogram reviewed         Beta Blocker:  Dose within 24 Hrs      ROS comment: Pt denies active cardiac sx    TTE (2016)   Summary   Left ventricle is normal in size .   Borderline concentric left ventricular hypertrophy   Normal left ventricular ejection fraction.   There is doppler evidence of stage I diastolic dysfunction.   The aortic valve appears mildly sclerotic.        Neuro/Psych:   (+) seizures (over 10 yrs ago): no interval change, headaches:, psychiatric history: stable with treatment            GI/Hepatic/Renal:             Endo/Other:    (+) Diabetes, : arthritis: OA., .                 Abdominal:   (+) obese,         Vascular:   + DVT, . Anesthesia Plan      general     ASA 3     (Spinal discussed. Pt does not want to consider spinal.  Plan for GA. Allergies reviewed, pt says okay to take PO tylenol.  )  Induction: intravenous. MIPS: Postoperative opioids intended and Prophylactic antiemetics administered. Anesthetic plan and risks discussed with patient. Plan discussed with CRNA.                   Lauren Hong MD   8/8/2018

## 2018-08-09 LAB
ANION GAP SERPL CALCULATED.3IONS-SCNC: 14 MMOL/L (ref 7–16)
BUN BLDV-MCNC: 19 MG/DL (ref 8–23)
CALCIUM SERPL-MCNC: 9.2 MG/DL (ref 8.6–10.2)
CHLORIDE BLD-SCNC: 104 MMOL/L (ref 98–107)
CO2: 20 MMOL/L (ref 22–29)
CREAT SERPL-MCNC: 0.9 MG/DL (ref 0.5–1)
GFR AFRICAN AMERICAN: >60
GFR NON-AFRICAN AMERICAN: >60 ML/MIN/1.73
GLUCOSE BLD-MCNC: 214 MG/DL (ref 74–109)
HCT VFR BLD CALC: 25.2 % (ref 34–48)
HCT VFR BLD CALC: 25.7 % (ref 34–48)
HEMOGLOBIN: 7.7 G/DL (ref 11.5–15.5)
HEMOGLOBIN: 8 G/DL (ref 11.5–15.5)
METER GLUCOSE: 152 MG/DL (ref 70–110)
METER GLUCOSE: 182 MG/DL (ref 70–110)
METER GLUCOSE: 254 MG/DL (ref 70–110)
METER GLUCOSE: 282 MG/DL (ref 70–110)
POTASSIUM SERPL-SCNC: 4.6 MMOL/L (ref 3.5–5)
SODIUM BLD-SCNC: 138 MMOL/L (ref 132–146)

## 2018-08-09 PROCEDURE — 82962 GLUCOSE BLOOD TEST: CPT

## 2018-08-09 PROCEDURE — 94640 AIRWAY INHALATION TREATMENT: CPT

## 2018-08-09 PROCEDURE — 36415 COLL VENOUS BLD VENIPUNCTURE: CPT

## 2018-08-09 PROCEDURE — 6360000002 HC RX W HCPCS: Performed by: ORTHOPAEDIC SURGERY

## 2018-08-09 PROCEDURE — 85018 HEMOGLOBIN: CPT

## 2018-08-09 PROCEDURE — 6370000000 HC RX 637 (ALT 250 FOR IP): Performed by: INTERNAL MEDICINE

## 2018-08-09 PROCEDURE — 6370000000 HC RX 637 (ALT 250 FOR IP): Performed by: ORTHOPAEDIC SURGERY

## 2018-08-09 PROCEDURE — 80048 BASIC METABOLIC PNL TOTAL CA: CPT

## 2018-08-09 PROCEDURE — 97535 SELF CARE MNGMENT TRAINING: CPT

## 2018-08-09 PROCEDURE — 1200000000 HC SEMI PRIVATE

## 2018-08-09 PROCEDURE — 85014 HEMATOCRIT: CPT

## 2018-08-09 PROCEDURE — 97530 THERAPEUTIC ACTIVITIES: CPT

## 2018-08-09 PROCEDURE — 97110 THERAPEUTIC EXERCISES: CPT

## 2018-08-09 PROCEDURE — 36592 COLLECT BLOOD FROM PICC: CPT

## 2018-08-09 RX ORDER — AMLODIPINE BESYLATE 10 MG/1
10 TABLET ORAL DAILY
Status: DISCONTINUED | OUTPATIENT
Start: 2018-08-09 | End: 2018-08-09

## 2018-08-09 RX ORDER — METOPROLOL SUCCINATE 50 MG/1
50 TABLET, EXTENDED RELEASE ORAL DAILY
Status: DISCONTINUED | OUTPATIENT
Start: 2018-08-09 | End: 2018-08-10 | Stop reason: HOSPADM

## 2018-08-09 RX ADMIN — LITHIUM CARBONATE 300 MG: 300 CAPSULE, GELATIN COATED ORAL at 08:08

## 2018-08-09 RX ADMIN — DOCUSATE SODIUM 100 MG: 100 CAPSULE, LIQUID FILLED ORAL at 20:19

## 2018-08-09 RX ADMIN — MOMETASONE FUROATE AND FORMOTEROL FUMARATE DIHYDRATE 2 PUFF: 200; 5 AEROSOL RESPIRATORY (INHALATION) at 18:39

## 2018-08-09 RX ADMIN — DOCUSATE SODIUM 100 MG: 100 CAPSULE, LIQUID FILLED ORAL at 08:07

## 2018-08-09 RX ADMIN — METOPROLOL SUCCINATE 50 MG: 50 TABLET, EXTENDED RELEASE ORAL at 13:41

## 2018-08-09 RX ADMIN — AMLODIPINE BESYLATE 10 MG: 10 TABLET ORAL at 10:02

## 2018-08-09 RX ADMIN — MOMETASONE FUROATE AND FORMOTEROL FUMARATE DIHYDRATE 2 PUFF: 200; 5 AEROSOL RESPIRATORY (INHALATION) at 06:23

## 2018-08-09 RX ADMIN — LITHIUM CARBONATE 300 MG: 300 CAPSULE, GELATIN COATED ORAL at 20:18

## 2018-08-09 RX ADMIN — MULTIPLE VITAMINS W/ MINERALS TAB 1 TABLET: TAB at 08:08

## 2018-08-09 RX ADMIN — INSULIN LISPRO 3 UNITS: 100 INJECTION, SOLUTION INTRAVENOUS; SUBCUTANEOUS at 20:20

## 2018-08-09 RX ADMIN — RIVAROXABAN 10 MG: 10 TABLET, FILM COATED ORAL at 08:07

## 2018-08-09 RX ADMIN — FAMOTIDINE 20 MG: 20 TABLET ORAL at 08:08

## 2018-08-09 RX ADMIN — INSULIN LISPRO 2 UNITS: 100 INJECTION, SOLUTION INTRAVENOUS; SUBCUTANEOUS at 16:06

## 2018-08-09 RX ADMIN — ACETAMINOPHEN 650 MG: 325 TABLET, FILM COATED ORAL at 04:19

## 2018-08-09 RX ADMIN — LINAGLIPTIN 5 MG: 5 TABLET, FILM COATED ORAL at 08:08

## 2018-08-09 RX ADMIN — Medication 2 G: at 02:48

## 2018-08-09 RX ADMIN — INSULIN LISPRO 2 UNITS: 100 INJECTION, SOLUTION INTRAVENOUS; SUBCUTANEOUS at 11:49

## 2018-08-09 RX ADMIN — ACETAMINOPHEN 650 MG: 325 TABLET, FILM COATED ORAL at 14:10

## 2018-08-09 RX ADMIN — MONTELUKAST SODIUM 10 MG: 10 TABLET, COATED ORAL at 20:18

## 2018-08-09 RX ADMIN — ASPIRIN 81 MG: 81 TABLET, COATED ORAL at 08:07

## 2018-08-09 RX ADMIN — ASPIRIN 81 MG: 81 TABLET, COATED ORAL at 20:19

## 2018-08-09 RX ADMIN — ACETAMINOPHEN 650 MG: 325 TABLET, FILM COATED ORAL at 19:32

## 2018-08-09 RX ADMIN — INSULIN LISPRO 3 UNITS: 100 INJECTION, SOLUTION INTRAVENOUS; SUBCUTANEOUS at 08:04

## 2018-08-09 RX ADMIN — METFORMIN HYDROCHLORIDE 1000 MG: 500 TABLET, EXTENDED RELEASE ORAL at 08:08

## 2018-08-09 ASSESSMENT — PAIN SCALES - GENERAL
PAINLEVEL_OUTOF10: 1
PAINLEVEL_OUTOF10: 3
PAINLEVEL_OUTOF10: 4
PAINLEVEL_OUTOF10: 0
PAINLEVEL_OUTOF10: 2
PAINLEVEL_OUTOF10: 4
PAINLEVEL_OUTOF10: 1
PAINLEVEL_OUTOF10: 2
PAINLEVEL_OUTOF10: 0

## 2018-08-09 ASSESSMENT — PAIN DESCRIPTION - FREQUENCY: FREQUENCY: INTERMITTENT

## 2018-08-09 ASSESSMENT — PAIN DESCRIPTION - LOCATION: LOCATION: HIP

## 2018-08-09 ASSESSMENT — PAIN DESCRIPTION - DESCRIPTORS: DESCRIPTORS: DISCOMFORT;SORE;TENDER

## 2018-08-09 ASSESSMENT — PAIN DESCRIPTION - ORIENTATION: ORIENTATION: LEFT

## 2018-08-09 ASSESSMENT — PAIN DESCRIPTION - PAIN TYPE: TYPE: SURGICAL PAIN

## 2018-08-09 NOTE — PROGRESS NOTES
[facility]  Internal Medicine  -Resident Progress Note-    PCP:  Jaylen Herzog MD  Admitting Physician:  Luda Saenz DO  Consultants:  Internal medicine  Chief Complaint:  No chief complaint on file. History of Present Illness  Jason Duarte was seen and examined at bedside today; no family was present for the examination. No acute overnight events were noted. She states she has mild post operative pain. Hemoglobin 7.7 today. Continue to monitor. Review of Systems  All bolded are positive; please see HPI  General:  Fever, chills, diaphoresis, fatigue, malaise, night sweats, weight loss  Psychological:  Anxiety, disorientation, hallucinations. ENT:  Epistaxis, headaches, vertigo, visual changes. Cardiovascular:  Chest pain, irregular heartbeats, palpitations, paroxysmal nocturnal dyspnea. Respiratory:  Shortness of breath, coughing, sputum production, hemoptysis, wheezing, orthopnea.   Gastrointestinal:  Nausea, vomiting, diarrhea, heartburn, constipation, abdominal pain, hematemesis, hematochezia, melena, acholic stools  Genito-Urinary:  Dysuria, urgency, frequency, hematuria  Musculoskeletal:  Joint pain, joint stiffness, joint swelling, muscle pain  Neurology:  Headache, focal neurological deficits, weakness, numbness, paresthesia  Derm:  Rashes, ulcers, excoriations, bruising  Extremities:  Decreased ROM, peripheral edema, mottling    Physical Examination  Vitals:  /69   Pulse 99   Temp 98.9 °F (37.2 °C) (Oral)   Resp 16   Ht 5' 2\" (1.575 m)   Wt 190 lb (86.2 kg)   LMP 06/26/1953   SpO2 98%   BMI 34.75 kg/m²   General Appearance:  awake, alert, and oriented to person, place, time, and purpose; appears stated age and cooperative; no apparent distress no labored breathing  HEENT:  PERRL; EOMI; sclera clear; buccal mucosa moist  Neck:  supple; trachea midline; no thyromegaly; no JVD; no bruits  Heart:  rhythm regular; rate controlled; no murmurs  Lungs: mL/min/1.73    GFR African American >60     Calcium 9.2 8.6 - 10.2 mg/dL   Hemoglobin and Hematocrit, Blood    Collection Time: 08/09/18  5:10 AM   Result Value Ref Range    Hemoglobin 7.7 (L) 11.5 - 15.5 g/dL    Hematocrit 25.2 (L) 34.0 - 48.0 %   POCT Glucose    Collection Time: 08/09/18  6:41 AM   Result Value Ref Range    Meter Glucose 254 (H) 70 - 110 mg/dL       Imaging  X-ray Chest Pa And Lateral    Result Date: 8/3/2018  Reading location: 200 Indication: Preoperative examination. Comparison: Chest radiograph from 6/8/2018. Technique: Chest PA and lateral radiographs were obtained. Findings: The cardiomediastinal silhouette is stable in size and contours. Bilateral lungs and costophrenic angles are clear. There is no evidence of pneumothorax. No acute cardiopulmonary disease. Xr Hip Left (2-3 Views)    Result Date: 8/8/2018  Reading location: 200 INDICATION: Left total hip replacement FINDINGS: 2 portable views left hip. Intact alignment at the hip joints. Left total hip prosthesis. No fracture. Severe narrowing of the right hip joint space with subchondral sclerosis and spurring consistent with osteoarthritis. Left total hip replacement without radiographic complication. Us Head Neck Soft Tissue Thyroid    Result Date: 7/27/2018  LOCATION: 200 EXAM: US HEAD NECK SOFT TISSUE THYROID COMPARISON: None HISTORY: Thyroid goiter TECHNIQUE: Realtime grayscale ultrasound imaging was obtained in the transverse and longitudinal planes of the thyroid gland. FINDINGS: The thyroid gland is heterogeneous with 2 nodules seen inferiorly in the right thyroid lobe. A solid nodule inferiorly measuring 0.7 cm while a cystic nodule measuring 0.9 cm. The right thyroid lobe measures 3.3 x 1.7 x 1.6 cm in size. The left thyroid lobe measures  3.7 x 1.8 x 2.0 cm in size. The isthmus measures 0.2 cm  in thickness. 2 small subcentimeter nodules in the right thyroid lobe, one of which is solid.  Continued follow-up

## 2018-08-09 NOTE — PROGRESS NOTES
Department of Orthopedic Surgery  Resident Progress Note    Patient seen and examined. Pain controlled. No new complaints. Denies chest pain, shortness of breath, calf pain, dizziness/lightheadedness. -BM, +flatulence. 5 steps, no stairs with PT yesterday.  Plan is for acute rehab at discharge    VITALS:  BP (!) 152/69   Pulse 99   Temp 98.4 °F (36.9 °C) (Oral)   Resp 18   Ht 5' 2\" (1.575 m)   Wt 190 lb (86.2 kg)   LMP 06/26/1953   SpO2 96%   BMI 34.75 kg/m²     GENERAL: alert, awake, oriented  MUSCULOSKELETAL:   left lower extremity:  · Aquacel dressing C/D/I  · Compartments soft and compressible, calf non-tender  · Palpable dorsalis pedis and posterior tibialis pulse, brisk cap refill to toes, foot warm and perfused  · Sensation intact to light touch in sural/deep peroneal/superficial peroneal/saphenous/posterior tibial nerve distributions to foot/ankle  · Demonstrates active ankle plantar/dorsiflexion/great toe extension    CBC:   Lab Results   Component Value Date    WBC 7.1 08/03/2018    HGB 10.6 08/03/2018    HCT 34.4 08/03/2018     08/03/2018     Pre-op Hb- 10.6    ASSESSMENT  · L SILVINA 8/8    PLAN    WBAT  Pain control IV & PO  DVT prophylaxis- xarelto, early mobilization  Monitor Labs  Trend H&H- pending this am  24 hr post op ABX   PT/OT  D/C planning, SW/PT recs  Discuss with attending

## 2018-08-09 NOTE — PROGRESS NOTES
decreased endurance, strengt, pain and fatigue)  -Edema: yes - LLE; nsg aware; ice provided  -Safety:fair (VC's for walker safety, sequencing, hand placement, hip precautions)  - Pt/family education/training: bed mobility, transfer training, functional mobility, AE for LE dressing, LE dressing technique, UE dressing,   sequencing, hand placement, walker safety, bathroom safety, DME, x's, ECT's, hip precautions,  grooming, ADL retraining  -Pt would benefit from additional ADLs, safety education, balance activities, transfer training, strength exercises, and over all endurance building.     P:  - Plan of care: Patient will be seen by OT 1-3x/wk  for therapeutic activity, ADL re-training, bed mobility,functional transfers, functional mobility, safety and fall prevention, balance and endurance activities, instruction and training in energy conservation principles, and   patient and family education.   - Patient and/or family understands diagnosis, prognosis and plan of care: yes   - ADL retraining, bathroom safety, DME    Treatment minutes: [facility], [address]

## 2018-08-10 VITALS
TEMPERATURE: 98 F | BODY MASS INDEX: 34.96 KG/M2 | HEIGHT: 62 IN | RESPIRATION RATE: 18 BRPM | OXYGEN SATURATION: 96 % | DIASTOLIC BLOOD PRESSURE: 72 MMHG | SYSTOLIC BLOOD PRESSURE: 140 MMHG | WEIGHT: 190 LBS | HEART RATE: 83 BPM

## 2018-08-10 LAB
HCT VFR BLD CALC: 24.7 % (ref 34–48)
HEMOGLOBIN: 7.6 G/DL (ref 11.5–15.5)
METER GLUCOSE: 122 MG/DL (ref 70–110)
METER GLUCOSE: 149 MG/DL (ref 70–110)

## 2018-08-10 PROCEDURE — 97116 GAIT TRAINING THERAPY: CPT

## 2018-08-10 PROCEDURE — 36415 COLL VENOUS BLD VENIPUNCTURE: CPT

## 2018-08-10 PROCEDURE — 6370000000 HC RX 637 (ALT 250 FOR IP): Performed by: ORTHOPAEDIC SURGERY

## 2018-08-10 PROCEDURE — 85018 HEMOGLOBIN: CPT

## 2018-08-10 PROCEDURE — 6370000000 HC RX 637 (ALT 250 FOR IP): Performed by: INTERNAL MEDICINE

## 2018-08-10 PROCEDURE — 97535 SELF CARE MNGMENT TRAINING: CPT

## 2018-08-10 PROCEDURE — 97530 THERAPEUTIC ACTIVITIES: CPT

## 2018-08-10 PROCEDURE — 82962 GLUCOSE BLOOD TEST: CPT

## 2018-08-10 PROCEDURE — 97110 THERAPEUTIC EXERCISES: CPT

## 2018-08-10 PROCEDURE — 85014 HEMATOCRIT: CPT

## 2018-08-10 PROCEDURE — 94640 AIRWAY INHALATION TREATMENT: CPT

## 2018-08-10 RX ORDER — SODIUM CHLORIDE 0.9 % (FLUSH) 0.9 %
SYRINGE (ML) INJECTION
Status: DISCONTINUED
Start: 2018-08-10 | End: 2018-08-10 | Stop reason: HOSPADM

## 2018-08-10 RX ORDER — FERROUS SULFATE 325(65) MG
325 TABLET ORAL 2 TIMES DAILY WITH MEALS
Status: DISCONTINUED | OUTPATIENT
Start: 2018-08-10 | End: 2018-08-10 | Stop reason: HOSPADM

## 2018-08-10 RX ORDER — FERROUS SULFATE 325(65) MG
325 TABLET ORAL 2 TIMES DAILY WITH MEALS
Qty: 30 TABLET | Refills: 3
Start: 2018-08-10

## 2018-08-10 RX ORDER — LOSARTAN POTASSIUM 50 MG/1
50 TABLET ORAL DAILY
Status: DISCONTINUED | OUTPATIENT
Start: 2018-08-10 | End: 2018-08-10 | Stop reason: HOSPADM

## 2018-08-10 RX ORDER — AMLODIPINE BESYLATE 5 MG/1
5 TABLET ORAL DAILY
Status: DISCONTINUED | OUTPATIENT
Start: 2018-08-10 | End: 2018-08-10 | Stop reason: HOSPADM

## 2018-08-10 RX ADMIN — FERROUS SULFATE TAB 325 MG (65 MG ELEMENTAL FE) 325 MG: 325 (65 FE) TAB at 09:08

## 2018-08-10 RX ADMIN — ACETAMINOPHEN 650 MG: 325 TABLET, FILM COATED ORAL at 08:17

## 2018-08-10 RX ADMIN — LINAGLIPTIN 5 MG: 5 TABLET, FILM COATED ORAL at 09:08

## 2018-08-10 RX ADMIN — DOCUSATE SODIUM 100 MG: 100 CAPSULE, LIQUID FILLED ORAL at 09:08

## 2018-08-10 RX ADMIN — LOSARTAN POTASSIUM 50 MG: 50 TABLET ORAL at 09:08

## 2018-08-10 RX ADMIN — INSULIN LISPRO 2 UNITS: 100 INJECTION, SOLUTION INTRAVENOUS; SUBCUTANEOUS at 09:09

## 2018-08-10 RX ADMIN — MOMETASONE FUROATE AND FORMOTEROL FUMARATE DIHYDRATE 2 PUFF: 200; 5 AEROSOL RESPIRATORY (INHALATION) at 06:02

## 2018-08-10 RX ADMIN — METFORMIN HYDROCHLORIDE 1000 MG: 500 TABLET, EXTENDED RELEASE ORAL at 09:11

## 2018-08-10 RX ADMIN — AMLODIPINE BESYLATE 5 MG: 5 TABLET ORAL at 09:08

## 2018-08-10 RX ADMIN — FAMOTIDINE 20 MG: 20 TABLET ORAL at 09:08

## 2018-08-10 RX ADMIN — METOPROLOL SUCCINATE 50 MG: 50 TABLET, EXTENDED RELEASE ORAL at 09:08

## 2018-08-10 RX ADMIN — ASPIRIN 81 MG: 81 TABLET, COATED ORAL at 09:08

## 2018-08-10 RX ADMIN — LITHIUM CARBONATE 300 MG: 300 CAPSULE, GELATIN COATED ORAL at 09:11

## 2018-08-10 RX ADMIN — MULTIPLE VITAMINS W/ MINERALS TAB 1 TABLET: TAB at 09:08

## 2018-08-10 ASSESSMENT — PAIN SCALES - GENERAL
PAINLEVEL_OUTOF10: 4
PAINLEVEL_OUTOF10: 8

## 2018-08-10 ASSESSMENT — PAIN DESCRIPTION - PAIN TYPE: TYPE: SURGICAL PAIN

## 2018-08-10 ASSESSMENT — PAIN DESCRIPTION - LOCATION: LOCATION: HIP

## 2018-08-10 ASSESSMENT — PAIN DESCRIPTION - ORIENTATION: ORIENTATION: LEFT

## 2018-08-10 ASSESSMENT — PAIN DESCRIPTION - DESCRIPTORS: DESCRIPTORS: ACHING

## 2018-08-10 NOTE — PROGRESS NOTES
hip precautions and use of AE ; fair follow through    AM [facility] Daily Activity Inpatient     AM-[facility] Daily Activity Inpatient   How much help for putting on and taking off regular lower body clothing?:  A Lot  How much help for Bathing?: A Lot  How much help for Toileting?: A Little  How much help for putting on and taking off regular upper body clothing?: A Little  How much help for taking care of personal grooming?: A Little  How much help for eating meals?: A Little  AM-[facility] Inpatient Daily Activity Raw Score: 16/24      Precautions: falls, full weight bearing: L LE, Hip precautions     S:  - Pt cleared for treatment through nursing.  - Pain: pt c/o pain in L hip/groin, however pt did not rate pain. Nsg aware and provided pt with pain medication prior to session. Pt concerned about ANTONIO dressing; nsg notified; batteries changed  - Pt pleasant and cooperative during session. O:  - BUE strengthening exercises: 15 reps in all planes of movement with mod resist theraband to increase/maintain strength required for functional transfers/ADL participation. Exercises completed in shoulder and elbow flexion/extension, internal/external rotation and abduction/adduction. Min rest breaks provided 2* to decreased endurance/ tolerance. Ex's completed while pt seated in w/c for clinic. Function Assessment     Status  Goal Comment   Feeding:  Supervision   Independent    per last report   Grooming:  Min Assist  Independent   While standing sinkside in clinic to wash hands   UE dressing:  Min Assist  Independent  Per last report   LE dressing:   Mod Assist Independent  Assist to adjust socks; pt able to don/doff pants familiar with use of AE 2* to hip precautions; Min A for standing balance as pt donned/doffed pants over B hips to change batteries in ANTONIO dressing; continue to assess   Bathing:  Min/Mod Assist  Independent   Per last report; pt education, demonstration and participation with use of DME in clinic  for bathroom safety; Assist to support LLE into/out of tub/shower   Bed Mobility:       N/T as pt in w/c for clinic and in chair at end of session; alarm on; nsg aware Independent      Functional Transfers:    Minimal Assist for sit to stand transfers to/from w/c and multiple surfaces in clinic with use of FWW; transfer from w/c to chair in room at min A with FWW Independent  Safety: good   Cuing for hip precautions   Functional Mobility: 6 ft x 2 in clinic and 10 ft in room  with Minimal Assist  With FWW Modified [place]  Cuing for sequencing, walker safety, hip precautions      A:  -Balance: Sitting: fair         Standing: fair with Minimal Assist  with FWW  -Endurance: fair - (2* to decreased endurance, strength, pain)  -Edema: yes - LLE; nsg aware  -Safety:fair (VC's for walker safety, sequencing, hand placement, hip precautions)  - Pt/family education/training: transfer training, functional mobility,  LE dressing technique, sequencing, hand placement, walker safety, bathroom safety, DME, B UE ex's, ECT's, hip precautions,  grooming, ADL retraining  -Pt would benefit from additional ADLs, safety education, balance activities, transfer training, strength exercises, and over all endurance building. P:  - Plan of care: Patient will be D/C'd to IP rehab for OT PRN.   - Patient and/or family understands diagnosis, prognosis and plan of care: yes   - ADL retraining, bathroom safety, DME, review hip precautions    Treatment minutes: [facility]

## 2018-08-10 NOTE — PROGRESS NOTES
Physical Therapy  PHYSICAL THERAPY  TREATMENT NOTE    8/10/2018  0320/0320-01                      Patient Name: Taz Keen      [id number]                              1955     Recommendation for discharge: Acute vs. Subacute  Equipment prescriptions needed: to be determined    AM-[facility] Mobility Inpatient   How much difficulty turning over in bed?: A Little  How much difficulty sitting down on / standing up from a chair with arms?: A Little  How much difficulty moving from lying on back to sitting on side of bed?: A Little  How much help from another person moving to and from a bed to a chair?: A Little  How much help from another person needed to walk in hospital room?: A Little  How much help from another person for climbing 3-5 steps with a railing?: A Lot  AM-PAC Inpatient Mobility Raw Score : 17  AM-PAC Inpatient T-Scale Score : 42.13  Mobility Inpatient CMS 0-100% Score: 50.57  Mobility Inpatient CMS G-Code Modifier : CK          Patient Active Problem List   Diagnosis    Arthritis    Delirium    Microcytic anemia    Bipolar disorder, current episode manic severe with psychotic features ([address]    Mixed hyperlipidemia    Type 2 diabetes mellitus without complication, without long-term current use of insulin ([facility])    Moderate obesity    Palpitations    Essential hypertension    Atypical chest pain    Cerebral atherosclerosis    Cognitive impairment    Hypercholesterolemia    Primary osteoarthritis of left hip    Primary localized osteoarthritis of left hip    Thyroid disease    Hypertension    Hyperlipidemia    Hx of blood clots    Emphysema of lung ([address]    DVT (deep venous thrombosis) ([facility])    Atrial fibrillation ([address]    Anxiety         Weight bearing/Precautions: falls, LLE : FWB, hip precautions     S: Patient cleared by nursing for treatment. Patient is agreeable to treatment. Pt c/o pain with L hip.   Pain status: (measured on a visual analog scale with 0=no pain and

## 2018-08-10 NOTE — PROGRESS NOTES
6:40 AM   Result Value Ref Range    Meter Glucose 149 (H) 70 - 110 mg/dL   Hemoglobin and Hematocrit, Blood    Collection Time: 08/10/18  7:00 AM   Result Value Ref Range    Hemoglobin 7.6 (L) 11.5 - 15.5 g/dL    Hematocrit 24.7 (L) 34.0 - 48.0 %       Imaging  X-ray Chest Pa And Lateral    Result Date: 8/3/2018  Reading location: 200 Indication: Preoperative examination. Comparison: Chest radiograph from 6/8/2018. Technique: Chest PA and lateral radiographs were obtained. Findings: The cardiomediastinal silhouette is stable in size and contours. Bilateral lungs and costophrenic angles are clear. There is no evidence of pneumothorax. No acute cardiopulmonary disease. Xr Hip Left (2-3 Views)    Result Date: 8/8/2018  Reading location: 200 INDICATION: Left total hip replacement FINDINGS: 2 portable views left hip. Intact alignment at the hip joints. Left total hip prosthesis. No fracture. Severe narrowing of the right hip joint space with subchondral sclerosis and spurring consistent with osteoarthritis. Left total hip replacement without radiographic complication. Us Head Neck Soft Tissue Thyroid    Result Date: 7/27/2018  LOCATION: 200 EXAM: US HEAD NECK SOFT TISSUE THYROID COMPARISON: None HISTORY: Thyroid goiter TECHNIQUE: Realtime grayscale ultrasound imaging was obtained in the transverse and longitudinal planes of the thyroid gland. FINDINGS: The thyroid gland is heterogeneous with 2 nodules seen inferiorly in the right thyroid lobe. A solid nodule inferiorly measuring 0.7 cm while a cystic nodule measuring 0.9 cm. The right thyroid lobe measures 3.3 x 1.7 x 1.6 cm in size. The left thyroid lobe measures  3.7 x 1.8 x 2.0 cm in size. The isthmus measures 0.2 cm  in thickness. 2 small subcentimeter nodules in the right thyroid lobe, one of which is solid. Continued follow-up recommended.      Ir Audelia Powers Device Placement    Result Date: 8/7/2018  Location:200 Exam: IR FLUORO GUIDED CVA

## 2018-08-10 NOTE — PROGRESS NOTES
P Quality Flow/Interdisciplinary Rounds Progress Note        Quality Flow Rounds held on August 10, 2018    Disciplines Attending:  Bedside Nurse, ,  and Nursing Unit 35[address] Christo Uribe was admitted on 8/8/2018  6:05 AM    Anticipated Discharge Date:  Expected Discharge Date: 08/11/18    Disposition:    Kalia Score:  Kalia Scale Score: 20    Readmission Risk              Risk of Unplanned Readmission:        17             Discussed patient goal for the day, patient clinical progression, and barriers to discharge.   The following Goal(s) of the Day/Commitment(s) have been identified:  Awaiting precert to [facility]; monitor H/H; up twice with PT/OT today      [facility]  August 10, 2018

## 2018-08-10 NOTE — PROGRESS NOTES
Department of Orthopedic Surgery  Resident Progress Note    Patient seen and examined. Pain controlled. No new complaints. Denies chest pain, shortness of breath, calf pain, dizziness/lightheadedness. -BM, +flatulence.  70ft, no stairs with PT yesterday    VITALS:  BP (!) 156/80   Pulse 94   Temp 98.3 °F (36.8 °C) (Oral)   Resp 18   Ht 5' 2\" (1.575 m)   Wt 190 lb (86.2 kg)   LMP 06/26/1953   SpO2 94%   BMI 34.75 kg/m²     GENERAL: alert, awake, oriented  MUSCULOSKELETAL:   left lower extremity:  · Samantha dressing C/D/I  · Compartments soft and compressible, calf non-tender  · Palpable dorsalis pedis and posterior tibialis pulse, brisk cap refill to toes, foot warm and perfused  · Sensation intact to light touch in sural/deep peroneal/superficial peroneal/saphenous/posterior tibial nerve distributions to foot/ankle  · Demonstrates active ankle plantar/dorsiflexion/great toe extension    CBC:   Lab Results   Component Value Date    WBC 7.1 08/03/2018    HGB 8.0 08/09/2018    HCT 25.7 08/09/2018     08/03/2018     Pre-op Hb- 10.6    ASSESSMENT  · L SILVINA 8/8    PLAN    WBAT  Pain control IV & PO  DVT prophylaxis- xarelto, early mobilization  Monitor Labs  Trend H&H- 8  PT/OT  D/C planning, SW/PT recs, ok for rehab today after PT  Discuss with attending

## 2018-08-12 NOTE — DISCHARGE SUMMARY
[facility]  Progress Note   Nursery    Patient Name:  Wm Cabrera  MRN: [id number]  Admission Date: 2017    Subjective:     Stable, no events noted overnight.     Feeding: Breastmilk    Breastfeeding Q3-4hrs, 5-30 mins/feed  Infant has voided x2  Stooling x3    Objective:     Vital Signs (Most Recent)  Temp: 98.9 °F (37.2 °C) (17 0800)  Pulse: 144 (17 0800)  Resp: 62 (17 0800)  BP: 55/44 (17 1322)  BP Location: Left leg (17 1322)  SpO2: (!) 98 % (17 1430)    Most Recent Weight: 3.575 kg (7 lb 14.1 oz) (17 1322)  Percent Weight Change Since Birth: 0     Physical Exam   Constitutional: He appears well-developed and well-nourished. He is active and consolable. He cries on exam. He has a strong cry.   HENT:   Head: Normocephalic. Anterior fontanelle is flat.   Right Ear: External ear, pinna and canal normal.   Left Ear: External ear, pinna and canal normal.   Nose: Nose normal.   Mouth/Throat: Mucous membranes are moist. Oropharynx is clear.   Eyes: Conjunctivae, EOM and lids are normal.   Neck: Normal range of motion and full passive range of motion without pain. Neck supple. No tenderness is present.   Cardiovascular: Normal rate, regular rhythm, S1 normal and S2 normal.  Pulses are strong and palpable.    Pulses:       Brachial pulses are 2+ on the right side, and 2+ on the left side.       Femoral pulses are 2+ on the right side, and 2+ on the left side.  Pulmonary/Chest: Effort normal and breath sounds normal. There is normal air entry.   Abdominal: Soft. Bowel sounds are normal. The umbilical stump is clean.   Genitourinary: Testes normal and penis normal. Cremasteric reflex is present. Uncircumcised.   Musculoskeletal: Normal range of motion.   Neurological: He is alert. He has normal strength. Suck and root normal. Symmetric Mike.   Skin: Skin is warm and dry. Capillary refill takes less than 3 seconds. Turgor is turgor normal.        Labs:  Recent Results (from the past 24 hour(s))   Cord blood evaluation    Collection Time: 17  2:31 PM   Result Value Ref Range    Cord ABO B     Cord Rh POS     Cord Direct Kathy NEG        Assessment and Plan:     36w1d  , doing well. Continue routine  care. Cleared for circumcision. Anticipate discharge tomorrow pending no acute events. Pulse oximetry studies and bili level prior to discharge.      Active Hospital Problems    Diagnosis  POA    *Single liveborn, born in hospital, delivered without mention of  delivery [Z38.00]  Unknown      Resolved Hospital Problems    Diagnosis Date Resolved POA   No resolved problems to display.       Astrid Pantoja  Pediatrics  [facility]   appointment. staples (s) were to be removed on within 2 weeks.       Signed:  Hansa Carnes  8/12/2018  12:39 PM

## 2018-08-22 DIAGNOSIS — M16.12 PRIMARY OSTEOARTHRITIS OF LEFT HIP: Primary | ICD-10-CM

## 2018-08-23 ENCOUNTER — OFFICE VISIT (OUTPATIENT)
Dept: ORTHOPEDIC SURGERY | Age: 63
End: 2018-08-23

## 2018-08-23 VITALS — HEIGHT: 62 IN | TEMPERATURE: 98 F | WEIGHT: 180 LBS | BODY MASS INDEX: 33.13 KG/M2

## 2018-08-23 DIAGNOSIS — Z96.642 STATUS POST TOTAL REPLACEMENT OF LEFT HIP: Primary | ICD-10-CM

## 2018-08-23 PROCEDURE — 99024 POSTOP FOLLOW-UP VISIT: CPT | Performed by: ORTHOPAEDIC SURGERY

## 2018-08-24 ENCOUNTER — TELEPHONE (OUTPATIENT)
Dept: ADMINISTRATIVE | Age: 63
End: 2018-08-24

## 2018-09-20 ENCOUNTER — OFFICE VISIT (OUTPATIENT)
Dept: ORTHOPEDIC SURGERY | Age: 63
End: 2018-09-20

## 2018-09-20 ENCOUNTER — TELEPHONE (OUTPATIENT)
Dept: FAMILY MEDICINE CLINIC | Age: 63
End: 2018-09-20

## 2018-09-20 VITALS — WEIGHT: 180 LBS | BODY MASS INDEX: 33.13 KG/M2 | HEIGHT: 62 IN

## 2018-09-20 DIAGNOSIS — Z96.642 STATUS POST TOTAL REPLACEMENT OF LEFT HIP: Primary | ICD-10-CM

## 2018-09-20 PROCEDURE — 99024 POSTOP FOLLOW-UP VISIT: CPT | Performed by: NURSE PRACTITIONER

## 2018-09-20 NOTE — PROGRESS NOTES
Meena Neely is now 6 weeks post left total hip arthroplasty, DOS: 8/8/18. Pain is controlled without any medications. The patient denies  fever, wound drainage, increasing redness, pus, increasing pain, increasing swelling. Post op problems reported:  none. She is ambulating walker due to her balance       Objective:         General :    alert, appears stated age and cooperative   Gait:  Normal.   Sutures:   removed   Incision:  healing well, no significant drainage, no dehiscence, no significant erythema   Tenderness:  none   Flexion ROM:  full range of motion   Extension ROM:  full range of motion   Abduction ROM:  full range of motion   DVT Evaluation:  No evidence of DVT seen on physical exam.     Imaging    None today     Assessment:        Encounter Diagnosis   Name Primary?  Status post total replacement of left hip Yes          Plan:       Continues current post-op course  Patient is to continue taking enteric coated aspirin per PCP  Patient is to discontinue wearing EUSEBIA hose. Continue with outpatient physical therapy. Follow up 2 months.

## 2018-09-24 ENCOUNTER — TELEPHONE (OUTPATIENT)
Dept: FAMILY MEDICINE CLINIC | Age: 63
End: 2018-09-24

## 2018-09-24 RX ORDER — DIAPER,BRIEF,ADULT, DISPOSABLE
EACH MISCELLANEOUS
Qty: 28 EACH | Refills: 5 | Status: SHIPPED | OUTPATIENT
Start: 2018-09-24 | End: 2019-02-11 | Stop reason: SDUPTHER

## 2018-09-24 NOTE — TELEPHONE ENCOUNTER
re: Rx for hand held shower head. Area agency on [facility] [phone] requesting Rx for hand held shower head to be faxed to [phone].  Please advise

## 2018-09-25 ENCOUNTER — OFFICE VISIT (OUTPATIENT)
Dept: FAMILY MEDICINE CLINIC | Age: 63
End: 2018-09-25
Payer: COMMERCIAL

## 2018-09-25 VITALS
SYSTOLIC BLOOD PRESSURE: 118 MMHG | DIASTOLIC BLOOD PRESSURE: 64 MMHG | BODY MASS INDEX: 33.68 KG/M2 | WEIGHT: 183 LBS | RESPIRATION RATE: 20 BRPM | HEIGHT: 62 IN | HEART RATE: 60 BPM | OXYGEN SATURATION: 99 %

## 2018-09-25 DIAGNOSIS — I10 ESSENTIAL HYPERTENSION: ICD-10-CM

## 2018-09-25 DIAGNOSIS — E66.9 OBESITY (BMI 30-39.9): ICD-10-CM

## 2018-09-25 DIAGNOSIS — E11.9 TYPE 2 DIABETES MELLITUS WITHOUT COMPLICATION, WITHOUT LONG-TERM CURRENT USE OF INSULIN (HCC): Primary | ICD-10-CM

## 2018-09-25 LAB
CREATININE URINE POCT: 100
HBA1C MFR BLD: 5.9 %
MICROALBUMIN/CREAT 24H UR: 80 MG/G{CREAT}
MICROALBUMIN/CREAT UR-RTO: NORMAL

## 2018-09-25 PROCEDURE — 2022F DILAT RTA XM EVC RTNOPTHY: CPT | Performed by: FAMILY MEDICINE

## 2018-09-25 PROCEDURE — 99213 OFFICE O/P EST LOW 20 MIN: CPT | Performed by: FAMILY MEDICINE

## 2018-09-25 PROCEDURE — 1101F PT FALLS ASSESS-DOCD LE1/YR: CPT | Performed by: FAMILY MEDICINE

## 2018-09-25 PROCEDURE — G8417 CALC BMI ABV UP PARAM F/U: HCPCS | Performed by: FAMILY MEDICINE

## 2018-09-25 PROCEDURE — 4040F PNEUMOC VAC/ADMIN/RCVD: CPT | Performed by: FAMILY MEDICINE

## 2018-09-25 PROCEDURE — 82044 UR ALBUMIN SEMIQUANTITATIVE: CPT | Performed by: FAMILY MEDICINE

## 2018-09-25 PROCEDURE — G8427 DOCREV CUR MEDS BY ELIG CLIN: HCPCS | Performed by: FAMILY MEDICINE

## 2018-09-25 PROCEDURE — 1036F TOBACCO NON-USER: CPT | Performed by: FAMILY MEDICINE

## 2018-09-25 PROCEDURE — 3044F HG A1C LEVEL LT 7.0%: CPT | Performed by: FAMILY MEDICINE

## 2018-09-25 PROCEDURE — 1090F PRES/ABSN URINE INCON ASSESS: CPT | Performed by: FAMILY MEDICINE

## 2018-09-25 PROCEDURE — 83036 HEMOGLOBIN GLYCOSYLATED A1C: CPT | Performed by: FAMILY MEDICINE

## 2018-09-25 PROCEDURE — 1123F ACP DISCUSS/DSCN MKR DOCD: CPT | Performed by: FAMILY MEDICINE

## 2018-09-25 PROCEDURE — G8400 PT W/DXA NO RESULTS DOC: HCPCS | Performed by: FAMILY MEDICINE

## 2018-09-25 PROCEDURE — 3017F COLORECTAL CA SCREEN DOC REV: CPT | Performed by: FAMILY MEDICINE

## 2018-09-25 PROCEDURE — G8598 ASA/ANTIPLAT THER USED: HCPCS | Performed by: FAMILY MEDICINE

## 2018-09-25 RX ORDER — METOPROLOL SUCCINATE 50 MG/1
50 TABLET, EXTENDED RELEASE ORAL DAILY
Qty: 30 TABLET | Refills: 5 | Status: SHIPPED | OUTPATIENT
Start: 2018-09-25 | End: 2019-02-11 | Stop reason: SDUPTHER

## 2018-09-25 RX ORDER — FAMOTIDINE 20 MG/1
20 TABLET, FILM COATED ORAL DAILY
Qty: 30 TABLET | Refills: 5 | Status: SHIPPED | OUTPATIENT
Start: 2018-09-25 | End: 2019-02-11 | Stop reason: SDUPTHER

## 2018-09-25 RX ORDER — PRAVASTATIN SODIUM 40 MG
40 TABLET ORAL NIGHTLY
Qty: 30 TABLET | Refills: 5 | Status: SHIPPED | OUTPATIENT
Start: 2018-09-25 | End: 2019-02-11 | Stop reason: SDUPTHER

## 2018-09-25 RX ORDER — AMLODIPINE BESYLATE 10 MG/1
10 TABLET ORAL DAILY
Qty: 30 TABLET | Refills: 5 | Status: SHIPPED | OUTPATIENT
Start: 2018-09-25 | End: 2019-02-11 | Stop reason: SDUPTHER

## 2018-09-25 RX ORDER — ASPIRIN 81 MG/1
81 TABLET ORAL 2 TIMES DAILY
Qty: 60 TABLET | Refills: 5 | Status: SHIPPED | OUTPATIENT
Start: 2018-09-25 | End: 2019-02-26 | Stop reason: SDUPTHER

## 2018-09-25 RX ORDER — LISINOPRIL AND HYDROCHLOROTHIAZIDE 20; 12.5 MG/1; MG/1
1 TABLET ORAL DAILY
Qty: 30 TABLET | Refills: 5 | Status: SHIPPED | OUTPATIENT
Start: 2018-09-25 | End: 2019-02-11 | Stop reason: SDUPTHER

## 2018-09-25 RX ORDER — MONTELUKAST SODIUM 10 MG/1
10 TABLET ORAL NIGHTLY
Qty: 30 TABLET | Refills: 5 | Status: SHIPPED | OUTPATIENT
Start: 2018-09-25 | End: 2019-01-24

## 2018-09-25 ASSESSMENT — ENCOUNTER SYMPTOMS
NAUSEA: 0
BLURRED VISION: 1
DIARRHEA: 0
VOMITING: 0
SHORTNESS OF BREATH: 0

## 2018-09-25 ASSESSMENT — PATIENT HEALTH QUESTIONNAIRE - PHQ9
2. FEELING DOWN, DEPRESSED OR HOPELESS: 0
1. LITTLE INTEREST OR PLEASURE IN DOING THINGS: 0
SUM OF ALL RESPONSES TO PHQ QUESTIONS 1-9: 0
SUM OF ALL RESPONSES TO PHQ QUESTIONS 1-9: 0
SUM OF ALL RESPONSES TO PHQ9 QUESTIONS 1 & 2: 0

## 2018-09-25 NOTE — PATIENT INSTRUCTIONS
the Plate Method to plan meals. It is a good, quick way to make sure that you have a balanced meal. It also helps you spread carbs throughout the day. ¨ Divide your plate by types of foods. Put non-starchy vegetables on half the plate, meat or other protein food on one-quarter of the plate, and a grain or starchy vegetable in the final quarter of the plate. To this you can add a small piece of fruit and 1 cup of milk or yogurt, depending on how many carbs you are supposed to eat at a meal.  · Try to eat about the same amount of carbs at each meal. Do not \"save up\" your daily allowance of carbs to eat at one meal.  · Proteins have very little or no carbs per serving. Examples of proteins are beef, chicken, turkey, fish, eggs, tofu, cheese, cottage cheese, and peanut butter. A serving size of meat is 3 ounces, which is about the size of a deck of cards. Examples of meat substitute serving sizes (equal to 1 ounce of meat) are 1/4 cup of cottage cheese, 1 egg, 1 tablespoon of peanut butter, and ½ cup of tofu. How can you eat out and still eat healthy? · Learn to estimate the serving sizes of foods that have carbohydrate. If you measure food at home, it will be easier to estimate the amount in a serving of restaurant food. · If the meal you order has too much carbohydrate (such as potatoes, corn, or baked beans), ask to have a low-carbohydrate food instead. Ask for a salad or green vegetables. · If you use insulin, check your blood sugar before and after eating out to help you plan how much to eat in the future. · If you eat more carbohydrate at a meal than you had planned, take a walk or do other exercise. This will help lower your blood sugar. What else should you know? · Limit saturated fat, such as the fat from meat and dairy products. This is a healthy choice because people who have diabetes are at higher risk of heart disease. So choose lean cuts of meat and nonfat or low-fat dairy products.  Use olive or canola oil instead of butter or shortening when cooking. · Don't skip meals. Your blood sugar may drop too low if you skip meals and take insulin or certain medicines for diabetes. · Check with your doctor before you drink alcohol. Alcohol can cause your blood sugar to drop too low. Alcohol can also cause a bad reaction if you take certain diabetes medicines. Follow-up care is a key part of your treatment and safety. Be sure to make and go to all appointments, and call your doctor if you are having problems. It's also a good idea to know your test results and keep a list of the medicines you take. Where can you learn more? Go to https://[email or web address] ([email or web address]). org and sign in to your [employer] account. Enter N358 in the [employer] box to learn more about \"Learning About Diabetes Food Guidelines. \"     If you do not have an account, please click on the \"Sign Up Now\" link. Current as of: December 7, 2017  Content Version: 11.7  © 8068-2843 [employer]. Care instructions adapted under license by [facility] If you have questions about a medical condition or this instruction, always ask your healthcare professional. [place] [address] any warranty or liability for your use of this information.

## 2018-09-25 NOTE — PROGRESS NOTES
OFFICE PROGRESS NOTE      SUBJECTIVE:        Patient ID:   Nabil Langley is a 70 y.o. female who presents for   Chief Complaint   Patient presents with    Diabetes     needs rx for comode, shower bench extented to take to aj supply       HPI:   Patient is here to follow up on diabetes. Fasting blood sugars:220's Midday blood sugars: up to 300's. Patient checks blood glucose 2 times per day. Patient is following diabetic diet. Patient is a nonsmoker. Last ophthalmology visit: 1/2018. Patient is taking a daily statin. Patient doing well on current regimen for hypertension. Patient having difficulty losing weight. Prior to Admission medications    Medication Sig Start Date End Date Taking?  Authorizing Provider   montelukast (SINGULAIR) 10 MG tablet Take 1 tablet by mouth nightly 9/25/18  Yes Kayla Andres MD   amLODIPine (NORVASC) 10 MG tablet Take 1 tablet by mouth daily 9/25/18  Yes Kayla Andres MD   famotidine (PEPCID) 20 MG tablet Take 1 tablet by mouth daily 9/25/18  Yes Kayla Andres MD   SITagliptin-metFORMIN (JANUMET XR)  MG TB24 per extended release tablet 2 po qam 9/25/18  Yes Kayla Andres MD   metoprolol succinate (TOPROL XL) 50 MG extended release tablet Take 1 tablet by mouth daily 9/25/18  Yes Kayla Andres MD   lisinopril-hydrochlorothiazide (PRINZIDE;ZESTORETIC) 20-12.5 MG per tablet Take 1 tablet by mouth daily 9/25/18  Yes Kayla Andres MD   aspirin (ASPIRIN LOW DOSE) 81 MG EC tablet Take 1 tablet by mouth 2 times daily 9/25/18  Yes Kayla Andres MD   pravastatin (PRAVACHOL) 40 MG tablet Take 1 tablet by mouth nightly 9/25/18  Yes Kayla Andres MD   blood glucose test strips (ONE TOUCH ULTRA TEST) strip Check blood sugar bid 9/24/18  Yes Kayla Andres MD   Incontinence Supply Disposable (DEPEND UNDERWEAR LARGE) MISC Wear daily 9/24/18  Yes Josselyn Jones Negative for chest pain and leg swelling. Gastrointestinal: Negative for diarrhea, nausea and vomiting. Genitourinary: Negative for dysuria, frequency and urgency. Skin: Negative for rash. Psychiatric/Behavioral: Negative for depression. OBJECTIVE:     VS:  Wt Readings from Last 3 Encounters:   09/25/18 183 lb (83 kg)   09/20/18 180 lb (81.6 kg)   08/23/18 180 lb (81.6 kg)                   Vitals:    09/25/18 1625   BP: 118/64   Pulse: 60   Resp: 20   SpO2: 99%       Physical Exam   Constitutional: She is oriented to person, place, and time and well-developed, well-nourished, and in no distress. Neck: Neck supple. Carotid bruit is not present. Cardiovascular: Normal rate, regular rhythm and normal heart sounds. Exam reveals no gallop. No murmur heard. Pulmonary/Chest: Effort normal and breath sounds normal. She has no wheezes. She has no rales. Abdominal: Soft. Bowel sounds are normal. She exhibits no distension. There is no tenderness. Musculoskeletal: She exhibits no edema. Neurological: She is alert and oriented to person, place, and time. Skin: Skin is warm and dry. Psychiatric: Affect normal.   Vitals reviewed. Results for orders placed or performed in visit on 09/25/18   POCT glycosylated hemoglobin (Hb A1C)   Result Value Ref Range    Hemoglobin A1C 5.9 %   POCT Microalbumin   Result Value Ref Range    Microalb, Ur 80     Creatinine Ur POCT 100     Microalbumin Creatinine Ratio abnormal          Luiz Negrete was seen today for diabetes. Diagnoses and all orders for this visit:    Type 2 diabetes mellitus without complication, without long-term current use of insulin ([facility])  -     POCT glycosylated hemoglobin (Hb A1C)  -     POCT Microalbumin  -     SITagliptin-metFORMIN (JANUMET XR)  MG TB24 per extended release tablet; 2 po qam  -     aspirin (ASPIRIN LOW DOSE) 81 MG EC tablet;  Take 1 tablet by mouth 2 times daily    Essential hypertension  -     amLODIPine

## 2018-09-26 ENCOUNTER — TELEPHONE (OUTPATIENT)
Dept: FAMILY MEDICINE CLINIC | Age: 63
End: 2018-09-26

## 2018-09-26 RX ORDER — PRENATAL WITH FERROUS FUM AND FOLIC ACID 3080; 920; 120; 400; 22; 1.84; 3; 20; 10; 1; 12; 200; 27; 25; 2 [IU]/1; [IU]/1; MG/1; [IU]/1; MG/1; MG/1; MG/1; MG/1; MG/1; MG/1; UG/1; MG/1; MG/1; MG/1; MG/1
1 TABLET ORAL DAILY
Qty: 30 TABLET | Refills: 5 | Status: SHIPPED | OUTPATIENT
Start: 2018-09-26 | End: 2019-02-26 | Stop reason: SDUPTHER

## 2018-09-28 PROBLEM — E66.9 OBESITY (BMI 30-39.9): Status: ACTIVE | Noted: 2018-09-28

## 2018-09-29 ENCOUNTER — HOSPITAL ENCOUNTER (OUTPATIENT)
Age: 63
Discharge: HOME | End: 2018-09-29
Payer: COMMERCIAL

## 2018-09-29 DIAGNOSIS — I10 ESSENTIAL HYPERTENSION: ICD-10-CM

## 2018-09-29 LAB
ALBUMIN SERPL-MCNC: 4.1 G/DL (ref 3.5–5.2)
ALP BLD-CCNC: 103 U/L (ref 35–104)
ALT SERPL-CCNC: 12 U/L (ref 0–32)
ANION GAP SERPL CALCULATED.3IONS-SCNC: 14 MMOL/L (ref 7–16)
AST SERPL-CCNC: 12 U/L (ref 0–31)
BILIRUB SERPL-MCNC: 0.3 MG/DL (ref 0–1.2)
BUN BLDV-MCNC: 13 MG/DL (ref 8–23)
CALCIUM SERPL-MCNC: 9.6 MG/DL (ref 8.6–10.2)
CHLORIDE BLD-SCNC: 103 MMOL/L (ref 98–107)
CHOLESTEROL, TOTAL: 154 MG/DL (ref 0–199)
CO2: 21 MMOL/L (ref 22–29)
CREAT SERPL-MCNC: 0.6 MG/DL (ref 0.5–1)
GFR AFRICAN AMERICAN: >60
GFR NON-AFRICAN AMERICAN: >60 ML/MIN/1.73
GLUCOSE BLD-MCNC: 187 MG/DL (ref 74–109)
HCT VFR BLD CALC: 33.2 % (ref 34–48)
HDLC SERPL-MCNC: 66 MG/DL
HEMOGLOBIN: 9.8 G/DL (ref 11.5–15.5)
LDL CHOLESTEROL CALCULATED: 41 MG/DL (ref 0–99)
MCH RBC QN AUTO: 23.7 PG (ref 26–35)
MCHC RBC AUTO-ENTMCNC: 29.5 % (ref 32–34.5)
MCV RBC AUTO: 80.2 FL (ref 80–99.9)
PDW BLD-RTO: 15.5 FL (ref 11.5–15)
PLATELET # BLD: 301 E9/L (ref 130–450)
PMV BLD AUTO: 8.9 FL (ref 7–12)
POTASSIUM SERPL-SCNC: 3.8 MMOL/L (ref 3.5–5)
RBC # BLD: 4.14 E12/L (ref 3.5–5.5)
SODIUM BLD-SCNC: 138 MMOL/L (ref 132–146)
TOTAL PROTEIN: 7.4 G/DL (ref 6.4–8.3)
TRIGL SERPL-MCNC: 236 MG/DL (ref 0–149)
VLDLC SERPL CALC-MCNC: 47 MG/DL
WBC # BLD: 8.3 E9/L (ref 4.5–11.5)

## 2018-09-29 PROCEDURE — 80061 LIPID PANEL: CPT

## 2018-09-29 PROCEDURE — 36415 COLL VENOUS BLD VENIPUNCTURE: CPT

## 2018-09-29 PROCEDURE — 85027 COMPLETE CBC AUTOMATED: CPT

## 2018-09-29 PROCEDURE — 80053 COMPREHEN METABOLIC PANEL: CPT

## 2018-11-19 DIAGNOSIS — Z96.642 STATUS POST TOTAL REPLACEMENT OF LEFT HIP: Primary | ICD-10-CM

## 2018-12-03 ENCOUNTER — HOSPITAL ENCOUNTER (OUTPATIENT)
Age: 63
Discharge: HOME | End: 2018-12-03
Payer: COMMERCIAL

## 2018-12-03 LAB
ALBUMIN SERPL-MCNC: 4.3 G/DL (ref 3.5–5.2)
ALP BLD-CCNC: 83 U/L (ref 35–104)
ALT SERPL-CCNC: 18 U/L (ref 0–32)
ANION GAP SERPL CALCULATED.3IONS-SCNC: 15 MMOL/L (ref 7–16)
AST SERPL-CCNC: 19 U/L (ref 0–31)
BILIRUB SERPL-MCNC: 0.5 MG/DL (ref 0–1.2)
BUN BLDV-MCNC: 11 MG/DL (ref 8–23)
CALCIUM SERPL-MCNC: 10.8 MG/DL (ref 8.6–10.2)
CHLORIDE BLD-SCNC: 101 MMOL/L (ref 98–107)
CHOLESTEROL, TOTAL: 127 MG/DL (ref 0–199)
CO2: 22 MMOL/L (ref 22–29)
CREAT SERPL-MCNC: 0.7 MG/DL (ref 0.5–1)
GFR AFRICAN AMERICAN: >60
GFR NON-AFRICAN AMERICAN: >60 ML/MIN/1.73
GLUCOSE BLD-MCNC: 102 MG/DL (ref 74–99)
HBA1C MFR BLD: 6.6 % (ref 4–5.6)
HCT VFR BLD CALC: 34.1 % (ref 34–48)
HDLC SERPL-MCNC: 65 MG/DL
HEMOGLOBIN: 10.3 G/DL (ref 11.5–15.5)
IMMATURE RETIC FRACT: 22.3 % (ref 3–15.9)
IRON SATURATION: 18 % (ref 15–50)
IRON: 60 MCG/DL (ref 37–145)
LDL CHOLESTEROL CALCULATED: 41 MG/DL (ref 0–99)
MCH RBC QN AUTO: 23.5 PG (ref 26–35)
MCHC RBC AUTO-ENTMCNC: 30.2 % (ref 32–34.5)
MCV RBC AUTO: 77.7 FL (ref 80–99.9)
PDW BLD-RTO: 16 FL (ref 11.5–15)
PLATELET # BLD: 260 E9/L (ref 130–450)
PMV BLD AUTO: 9.1 FL (ref 7–12)
POTASSIUM SERPL-SCNC: 3.6 MMOL/L (ref 3.5–5)
RBC # BLD: 4.39 E12/L (ref 3.5–5.5)
RETIC HGB EQUIVALENT: 28.1 PG (ref 28.2–36.6)
RETICULOCYTE ABSOLUTE COUNT: 0.07 E12/L
RETICULOCYTE COUNT PCT: 1.7 % (ref 0.4–1.9)
SODIUM BLD-SCNC: 138 MMOL/L (ref 132–146)
TOTAL IRON BINDING CAPACITY: 338 MCG/DL (ref 250–450)
TOTAL PROTEIN: 7.9 G/DL (ref 6.4–8.3)
TRIGL SERPL-MCNC: 107 MG/DL (ref 0–149)
VLDLC SERPL CALC-MCNC: 21 MG/DL
WBC # BLD: 9.1 E9/L (ref 4.5–11.5)

## 2018-12-03 PROCEDURE — 80053 COMPREHEN METABOLIC PANEL: CPT

## 2018-12-03 PROCEDURE — 83036 HEMOGLOBIN GLYCOSYLATED A1C: CPT

## 2018-12-03 PROCEDURE — 83540 ASSAY OF IRON: CPT

## 2018-12-03 PROCEDURE — 36415 COLL VENOUS BLD VENIPUNCTURE: CPT

## 2018-12-03 PROCEDURE — 83550 IRON BINDING TEST: CPT

## 2018-12-03 PROCEDURE — 85045 AUTOMATED RETICULOCYTE COUNT: CPT

## 2018-12-03 PROCEDURE — 80061 LIPID PANEL: CPT

## 2018-12-03 PROCEDURE — 85027 COMPLETE CBC AUTOMATED: CPT

## 2018-12-17 ENCOUNTER — TELEPHONE (OUTPATIENT)
Dept: ADMINISTRATIVE | Age: 63
End: 2018-12-17

## 2018-12-26 RX ORDER — MONTELUKAST SODIUM 10 MG/1
10 TABLET ORAL NIGHTLY
Qty: 30 TABLET | Refills: 10 | Status: SHIPPED | OUTPATIENT
Start: 2018-12-26 | End: 2019-09-03

## 2019-01-04 ENCOUNTER — HOSPITAL ENCOUNTER (OUTPATIENT)
Age: 64
Discharge: HOME | End: 2019-01-06
Payer: COMMERCIAL

## 2019-01-04 ENCOUNTER — HOSPITAL ENCOUNTER (OUTPATIENT)
Age: 64
Discharge: HOME | End: 2019-01-04
Payer: COMMERCIAL

## 2019-01-04 ENCOUNTER — HOSPITAL ENCOUNTER (OUTPATIENT)
Dept: GENERAL RADIOLOGY | Age: 64
Discharge: HOME | End: 2019-01-06
Payer: COMMERCIAL

## 2019-01-04 DIAGNOSIS — E83.52 HYPERCALCEMIA: ICD-10-CM

## 2019-01-04 LAB
CALCIUM IONIZED: 1.48 MMOL/L (ref 1.15–1.33)
HCT VFR BLD CALC: 35.9 % (ref 34–48)
HEMOGLOBIN: 11.1 G/DL (ref 11.5–15.5)
MCH RBC QN AUTO: 23.6 PG (ref 26–35)
MCHC RBC AUTO-ENTMCNC: 30.9 % (ref 32–34.5)
MCV RBC AUTO: 76.4 FL (ref 80–99.9)
PARATHYROID HORMONE INTACT: 47 PG/ML (ref 15–65)
PDW BLD-RTO: 17 FL (ref 11.5–15)
PLATELET # BLD: 285 E9/L (ref 130–450)
PMV BLD AUTO: 9.4 FL (ref 7–12)
RBC # BLD: 4.7 E12/L (ref 3.5–5.5)
VITAMIN D 25-HYDROXY: 21 NG/ML (ref 30–100)
WBC # BLD: 9.2 E9/L (ref 4.5–11.5)

## 2019-01-04 PROCEDURE — 82306 VITAMIN D 25 HYDROXY: CPT

## 2019-01-04 PROCEDURE — 36415 COLL VENOUS BLD VENIPUNCTURE: CPT

## 2019-01-04 PROCEDURE — 83970 ASSAY OF PARATHORMONE: CPT

## 2019-01-04 PROCEDURE — 82330 ASSAY OF CALCIUM: CPT

## 2019-01-04 PROCEDURE — 85027 COMPLETE CBC AUTOMATED: CPT

## 2019-01-04 PROCEDURE — 71046 X-RAY EXAM CHEST 2 VIEWS: CPT

## 2019-01-05 ENCOUNTER — APPOINTMENT (OUTPATIENT)
Dept: CT IMAGING | Age: 64
End: 2019-01-05
Payer: COMMERCIAL

## 2019-01-05 ENCOUNTER — APPOINTMENT (OUTPATIENT)
Dept: GENERAL RADIOLOGY | Age: 64
End: 2019-01-05
Payer: COMMERCIAL

## 2019-01-05 ENCOUNTER — HOSPITAL ENCOUNTER (EMERGENCY)
Age: 64
Discharge: HOME | End: 2019-01-05
Attending: EMERGENCY MEDICINE
Payer: COMMERCIAL

## 2019-01-05 VITALS
WEIGHT: 190 LBS | OXYGEN SATURATION: 98 % | DIASTOLIC BLOOD PRESSURE: 76 MMHG | RESPIRATION RATE: 18 BRPM | HEIGHT: 62 IN | TEMPERATURE: 98.4 F | SYSTOLIC BLOOD PRESSURE: 160 MMHG | BODY MASS INDEX: 34.96 KG/M2 | HEART RATE: 98 BPM

## 2019-01-05 DIAGNOSIS — J18.9 COMMUNITY ACQUIRED PNEUMONIA, UNSPECIFIED LATERALITY: Primary | ICD-10-CM

## 2019-01-05 LAB
ANION GAP SERPL CALCULATED.3IONS-SCNC: 16 MMOL/L (ref 7–16)
BASOPHILS ABSOLUTE: 0.05 E9/L (ref 0–0.2)
BASOPHILS RELATIVE PERCENT: 0.5 % (ref 0–2)
BUN BLDV-MCNC: 14 MG/DL (ref 8–23)
CALCIUM SERPL-MCNC: 10.7 MG/DL (ref 8.6–10.2)
CHLORIDE BLD-SCNC: 99 MMOL/L (ref 98–107)
CO2: 21 MMOL/L (ref 22–29)
CREAT SERPL-MCNC: 0.6 MG/DL (ref 0.5–1)
EKG ATRIAL RATE: 94 BPM
EKG P AXIS: 66 DEGREES
EKG P-R INTERVAL: 158 MS
EKG Q-T INTERVAL: 362 MS
EKG QRS DURATION: 90 MS
EKG QTC CALCULATION (BAZETT): 452 MS
EKG R AXIS: 5 DEGREES
EKG T AXIS: 63 DEGREES
EKG VENTRICULAR RATE: 94 BPM
EOSINOPHILS ABSOLUTE: 0.05 E9/L (ref 0.05–0.5)
EOSINOPHILS RELATIVE PERCENT: 0.5 % (ref 0–6)
GFR AFRICAN AMERICAN: >60
GFR NON-AFRICAN AMERICAN: >60 ML/MIN/1.73
GLUCOSE BLD-MCNC: 134 MG/DL (ref 74–99)
HCT VFR BLD CALC: 35.6 % (ref 34–48)
HEMOGLOBIN: 11.5 G/DL (ref 11.5–15.5)
IMMATURE GRANULOCYTES #: 0.06 E9/L
IMMATURE GRANULOCYTES %: 0.6 % (ref 0–5)
INFLUENZA A BY PCR: NOT DETECTED
INFLUENZA B BY PCR: NOT DETECTED
LYMPHOCYTES ABSOLUTE: 1.74 E9/L (ref 1.5–4)
LYMPHOCYTES RELATIVE PERCENT: 18.5 % (ref 20–42)
MCH RBC QN AUTO: 24.5 PG (ref 26–35)
MCHC RBC AUTO-ENTMCNC: 32.3 % (ref 32–34.5)
MCV RBC AUTO: 75.9 FL (ref 80–99.9)
METER GLUCOSE: 144 MG/DL (ref 74–99)
MONOCYTES ABSOLUTE: 0.79 E9/L (ref 0.1–0.95)
MONOCYTES RELATIVE PERCENT: 8.4 % (ref 2–12)
NEUTROPHILS ABSOLUTE: 6.73 E9/L (ref 1.8–7.3)
NEUTROPHILS RELATIVE PERCENT: 71.5 % (ref 43–80)
PDW BLD-RTO: 17.7 FL (ref 11.5–15)
PLATELET # BLD: 382 E9/L (ref 130–450)
PMV BLD AUTO: 10.7 FL (ref 7–12)
POTASSIUM SERPL-SCNC: 3.6 MMOL/L (ref 3.5–5)
PRO-BNP: 34 PG/ML (ref 0–125)
RBC # BLD: 4.69 E12/L (ref 3.5–5.5)
SODIUM BLD-SCNC: 136 MMOL/L (ref 132–146)
TROPONIN: <0.01 NG/ML (ref 0–0.03)
WBC # BLD: 9.4 E9/L (ref 4.5–11.5)

## 2019-01-05 PROCEDURE — 70450 CT HEAD/BRAIN W/O DYE: CPT

## 2019-01-05 PROCEDURE — 36415 COLL VENOUS BLD VENIPUNCTURE: CPT

## 2019-01-05 PROCEDURE — 99285 EMERGENCY DEPT VISIT HI MDM: CPT

## 2019-01-05 PROCEDURE — 6370000000 HC RX 637 (ALT 250 FOR IP): Performed by: EMERGENCY MEDICINE

## 2019-01-05 PROCEDURE — 83880 ASSAY OF NATRIURETIC PEPTIDE: CPT

## 2019-01-05 PROCEDURE — 94664 DEMO&/EVAL PT USE INHALER: CPT

## 2019-01-05 PROCEDURE — 87502 INFLUENZA DNA AMP PROBE: CPT

## 2019-01-05 PROCEDURE — 71046 X-RAY EXAM CHEST 2 VIEWS: CPT

## 2019-01-05 PROCEDURE — 82962 GLUCOSE BLOOD TEST: CPT

## 2019-01-05 PROCEDURE — 85025 COMPLETE CBC W/AUTO DIFF WBC: CPT

## 2019-01-05 PROCEDURE — 80048 BASIC METABOLIC PNL TOTAL CA: CPT

## 2019-01-05 PROCEDURE — 84484 ASSAY OF TROPONIN QUANT: CPT

## 2019-01-05 PROCEDURE — 87040 BLOOD CULTURE FOR BACTERIA: CPT

## 2019-01-05 PROCEDURE — 93005 ELECTROCARDIOGRAM TRACING: CPT

## 2019-01-05 RX ORDER — CEFDINIR 300 MG/1
300 CAPSULE ORAL 2 TIMES DAILY
Qty: 14 CAPSULE | Refills: 0 | Status: SHIPPED | OUTPATIENT
Start: 2019-01-05 | End: 2019-01-12

## 2019-01-05 RX ORDER — DOXYCYCLINE HYCLATE 100 MG
100 TABLET ORAL 2 TIMES DAILY
Qty: 20 TABLET | Refills: 0 | Status: SHIPPED | OUTPATIENT
Start: 2019-01-05 | End: 2019-01-15

## 2019-01-05 RX ORDER — IPRATROPIUM BROMIDE AND ALBUTEROL SULFATE 2.5; .5 MG/3ML; MG/3ML
1 SOLUTION RESPIRATORY (INHALATION) ONCE
Status: COMPLETED | OUTPATIENT
Start: 2019-01-05 | End: 2019-01-05

## 2019-01-05 RX ADMIN — IPRATROPIUM BROMIDE AND ALBUTEROL SULFATE 1 AMPULE: .5; 3 SOLUTION RESPIRATORY (INHALATION) at 14:46

## 2019-01-05 ASSESSMENT — ENCOUNTER SYMPTOMS
WHEEZING: 1
VOMITING: 0
NAUSEA: 0
SHORTNESS OF BREATH: 1
BACK PAIN: 0
ABDOMINAL PAIN: 0
COUGH: 1
BLOOD IN STOOL: 0

## 2019-01-05 ASSESSMENT — PAIN DESCRIPTION - PAIN TYPE: TYPE: ACUTE PAIN

## 2019-01-05 ASSESSMENT — PAIN DESCRIPTION - LOCATION: LOCATION: CHEST

## 2019-01-07 ENCOUNTER — OFFICE VISIT (OUTPATIENT)
Dept: ORTHOPEDIC SURGERY | Age: 64
End: 2019-01-07
Payer: COMMERCIAL

## 2019-01-07 VITALS — WEIGHT: 190 LBS | BODY MASS INDEX: 34.96 KG/M2 | HEIGHT: 62 IN

## 2019-01-07 DIAGNOSIS — Z96.642 STATUS POST TOTAL REPLACEMENT OF LEFT HIP: Primary | ICD-10-CM

## 2019-01-07 PROCEDURE — G8400 PT W/DXA NO RESULTS DOC: HCPCS | Performed by: ORTHOPAEDIC SURGERY

## 2019-01-07 PROCEDURE — G8484 FLU IMMUNIZE NO ADMIN: HCPCS | Performed by: ORTHOPAEDIC SURGERY

## 2019-01-07 PROCEDURE — 1123F ACP DISCUSS/DSCN MKR DOCD: CPT | Performed by: ORTHOPAEDIC SURGERY

## 2019-01-07 PROCEDURE — G8598 ASA/ANTIPLAT THER USED: HCPCS | Performed by: ORTHOPAEDIC SURGERY

## 2019-01-07 PROCEDURE — 99213 OFFICE O/P EST LOW 20 MIN: CPT | Performed by: ORTHOPAEDIC SURGERY

## 2019-01-07 PROCEDURE — 1036F TOBACCO NON-USER: CPT | Performed by: ORTHOPAEDIC SURGERY

## 2019-01-07 PROCEDURE — 3017F COLORECTAL CA SCREEN DOC REV: CPT | Performed by: ORTHOPAEDIC SURGERY

## 2019-01-07 PROCEDURE — 4040F PNEUMOC VAC/ADMIN/RCVD: CPT | Performed by: ORTHOPAEDIC SURGERY

## 2019-01-07 PROCEDURE — G8417 CALC BMI ABV UP PARAM F/U: HCPCS | Performed by: ORTHOPAEDIC SURGERY

## 2019-01-07 PROCEDURE — 1101F PT FALLS ASSESS-DOCD LE1/YR: CPT | Performed by: ORTHOPAEDIC SURGERY

## 2019-01-07 PROCEDURE — 1090F PRES/ABSN URINE INCON ASSESS: CPT | Performed by: ORTHOPAEDIC SURGERY

## 2019-01-07 PROCEDURE — G8427 DOCREV CUR MEDS BY ELIG CLIN: HCPCS | Performed by: ORTHOPAEDIC SURGERY

## 2019-01-10 ENCOUNTER — TELEPHONE (OUTPATIENT)
Dept: ADMINISTRATIVE | Age: 64
End: 2019-01-10

## 2019-01-10 LAB
BLOOD CULTURE, ROUTINE: NORMAL
CULTURE, BLOOD 2: NORMAL

## 2019-01-17 ENCOUNTER — TELEPHONE (OUTPATIENT)
Dept: SURGERY | Age: 64
End: 2019-01-17

## 2019-01-17 ENCOUNTER — INITIAL CONSULT (OUTPATIENT)
Dept: SURGERY | Age: 64
End: 2019-01-17
Payer: COMMERCIAL

## 2019-01-17 VITALS
HEIGHT: 62 IN | HEART RATE: 90 BPM | BODY MASS INDEX: 34.78 KG/M2 | WEIGHT: 189 LBS | SYSTOLIC BLOOD PRESSURE: 138 MMHG | DIASTOLIC BLOOD PRESSURE: 83 MMHG | TEMPERATURE: 99 F

## 2019-01-17 DIAGNOSIS — A63.0 PERIANAL VENEREAL WARTS: Primary | ICD-10-CM

## 2019-01-17 PROCEDURE — 1090F PRES/ABSN URINE INCON ASSESS: CPT | Performed by: SURGERY

## 2019-01-17 PROCEDURE — 3017F COLORECTAL CA SCREEN DOC REV: CPT | Performed by: SURGERY

## 2019-01-17 PROCEDURE — 1123F ACP DISCUSS/DSCN MKR DOCD: CPT | Performed by: SURGERY

## 2019-01-17 PROCEDURE — G8484 FLU IMMUNIZE NO ADMIN: HCPCS | Performed by: SURGERY

## 2019-01-17 PROCEDURE — 99204 OFFICE O/P NEW MOD 45 MIN: CPT | Performed by: SURGERY

## 2019-01-17 PROCEDURE — G8598 ASA/ANTIPLAT THER USED: HCPCS | Performed by: SURGERY

## 2019-01-17 PROCEDURE — 1036F TOBACCO NON-USER: CPT | Performed by: SURGERY

## 2019-01-17 PROCEDURE — G8400 PT W/DXA NO RESULTS DOC: HCPCS | Performed by: SURGERY

## 2019-01-17 PROCEDURE — 1101F PT FALLS ASSESS-DOCD LE1/YR: CPT | Performed by: SURGERY

## 2019-01-17 PROCEDURE — G8427 DOCREV CUR MEDS BY ELIG CLIN: HCPCS | Performed by: SURGERY

## 2019-01-17 PROCEDURE — G8417 CALC BMI ABV UP PARAM F/U: HCPCS | Performed by: SURGERY

## 2019-01-17 PROCEDURE — 4040F PNEUMOC VAC/ADMIN/RCVD: CPT | Performed by: SURGERY

## 2019-01-19 ENCOUNTER — HOSPITAL ENCOUNTER (OUTPATIENT)
Dept: GENERAL RADIOLOGY | Age: 64
Discharge: HOME | End: 2019-01-21
Payer: COMMERCIAL

## 2019-01-19 ENCOUNTER — HOSPITAL ENCOUNTER (OUTPATIENT)
Age: 64
Discharge: HOME | End: 2019-01-21
Payer: COMMERCIAL

## 2019-01-19 DIAGNOSIS — J18.9 PNEUMONIA DUE TO INFECTIOUS ORGANISM, UNSPECIFIED LATERALITY, UNSPECIFIED PART OF LUNG: ICD-10-CM

## 2019-01-19 PROCEDURE — 71046 X-RAY EXAM CHEST 2 VIEWS: CPT

## 2019-01-21 ENCOUNTER — PREP FOR PROCEDURE (OUTPATIENT)
Dept: SURGERY | Age: 64
End: 2019-01-21

## 2019-01-21 RX ORDER — SODIUM CHLORIDE 0.9 % (FLUSH) 0.9 %
10 SYRINGE (ML) INJECTION EVERY 12 HOURS SCHEDULED
Status: CANCELLED | OUTPATIENT
Start: 2019-01-21

## 2019-01-21 RX ORDER — SODIUM CHLORIDE 0.9 % (FLUSH) 0.9 %
10 SYRINGE (ML) INJECTION PRN
Status: CANCELLED | OUTPATIENT
Start: 2019-01-21

## 2019-01-24 ENCOUNTER — ANESTHESIA EVENT (OUTPATIENT)
Dept: OPERATING ROOM | Age: 64
End: 2019-01-24
Payer: COMMERCIAL

## 2019-01-24 RX ORDER — GLUCOSAMINE HCL 500 MG
5000 TABLET ORAL DAILY
COMMUNITY
End: 2019-02-11 | Stop reason: HOSPADM

## 2019-01-25 ENCOUNTER — ANESTHESIA (OUTPATIENT)
Dept: OPERATING ROOM | Age: 64
End: 2019-01-25
Payer: COMMERCIAL

## 2019-01-25 ENCOUNTER — HOSPITAL ENCOUNTER (OUTPATIENT)
Age: 64
Setting detail: OUTPATIENT SURGERY
Discharge: HOME | End: 2019-01-25
Attending: SURGERY | Admitting: SURGERY
Payer: COMMERCIAL

## 2019-01-25 VITALS
RESPIRATION RATE: 15 BRPM | SYSTOLIC BLOOD PRESSURE: 130 MMHG | OXYGEN SATURATION: 99 % | DIASTOLIC BLOOD PRESSURE: 88 MMHG

## 2019-01-25 VITALS
BODY MASS INDEX: 34.98 KG/M2 | WEIGHT: 190.06 LBS | HEART RATE: 79 BPM | OXYGEN SATURATION: 96 % | RESPIRATION RATE: 16 BRPM | TEMPERATURE: 96.7 F | DIASTOLIC BLOOD PRESSURE: 72 MMHG | HEIGHT: 62 IN | SYSTOLIC BLOOD PRESSURE: 120 MMHG

## 2019-01-25 PROBLEM — A63.0 ANAL WARTS: Status: ACTIVE | Noted: 2019-01-25

## 2019-01-25 LAB
ANION GAP SERPL CALCULATED.3IONS-SCNC: 13 MMOL/L (ref 7–16)
BUN BLDV-MCNC: 13 MG/DL (ref 8–23)
CALCIUM SERPL-MCNC: 10.2 MG/DL (ref 8.6–10.2)
CHLORIDE BLD-SCNC: 103 MMOL/L (ref 98–107)
CO2: 24 MMOL/L (ref 22–29)
CREAT SERPL-MCNC: 0.5 MG/DL (ref 0.5–1)
GFR AFRICAN AMERICAN: >60
GFR NON-AFRICAN AMERICAN: >60 ML/MIN/1.73
GLUCOSE BLD-MCNC: 135 MG/DL (ref 74–99)
HCT VFR BLD CALC: 34.8 % (ref 34–48)
HEMOGLOBIN: 10.8 G/DL (ref 11.5–15.5)
MCH RBC QN AUTO: 24.5 PG (ref 26–35)
MCHC RBC AUTO-ENTMCNC: 31 % (ref 32–34.5)
MCV RBC AUTO: 79.1 FL (ref 80–99.9)
PDW BLD-RTO: 18.4 FL (ref 11.5–15)
PLATELET # BLD: 295 E9/L (ref 130–450)
PMV BLD AUTO: 8.7 FL (ref 7–12)
POTASSIUM REFLEX MAGNESIUM: 5.1 MMOL/L (ref 3.5–5)
RBC # BLD: 4.4 E12/L (ref 3.5–5.5)
SODIUM BLD-SCNC: 140 MMOL/L (ref 132–146)
WBC # BLD: 9.8 E9/L (ref 4.5–11.5)

## 2019-01-25 PROCEDURE — 80048 BASIC METABOLIC PNL TOTAL CA: CPT

## 2019-01-25 PROCEDURE — 6360000002 HC RX W HCPCS

## 2019-01-25 PROCEDURE — 2580000003 HC RX 258: Performed by: ANESTHESIOLOGY

## 2019-01-25 PROCEDURE — 3600000002 HC SURGERY LEVEL 2 BASE: Performed by: SURGERY

## 2019-01-25 PROCEDURE — 3700000000 HC ANESTHESIA ATTENDED CARE: Performed by: SURGERY

## 2019-01-25 PROCEDURE — 2500000003 HC RX 250 WO HCPCS: Performed by: SURGERY

## 2019-01-25 PROCEDURE — 6360000002 HC RX W HCPCS: Performed by: SURGERY

## 2019-01-25 PROCEDURE — 7100000011 HC PHASE II RECOVERY - ADDTL 15 MIN: Performed by: SURGERY

## 2019-01-25 PROCEDURE — 2500000003 HC RX 250 WO HCPCS

## 2019-01-25 PROCEDURE — 3700000001 HC ADD 15 MINUTES (ANESTHESIA): Performed by: SURGERY

## 2019-01-25 PROCEDURE — 36415 COLL VENOUS BLD VENIPUNCTURE: CPT

## 2019-01-25 PROCEDURE — 99024 POSTOP FOLLOW-UP VISIT: CPT | Performed by: SURGERY

## 2019-01-25 PROCEDURE — 56501 DESTROY VULVA LESIONS SIM: CPT | Performed by: SURGERY

## 2019-01-25 PROCEDURE — 7100000010 HC PHASE II RECOVERY - FIRST 15 MIN: Performed by: SURGERY

## 2019-01-25 PROCEDURE — 3600000012 HC SURGERY LEVEL 2 ADDTL 15MIN: Performed by: SURGERY

## 2019-01-25 PROCEDURE — 85027 COMPLETE CBC AUTOMATED: CPT

## 2019-01-25 PROCEDURE — 88305 TISSUE EXAM BY PATHOLOGIST: CPT

## 2019-01-25 PROCEDURE — 46922 EXCISION OF ANAL LESION(S): CPT | Performed by: SURGERY

## 2019-01-25 RX ORDER — ACETAMINOPHEN 500 MG
500 TABLET ORAL EVERY 6 HOURS PRN
Qty: 28 TABLET | Refills: 0 | Status: SHIPPED | OUTPATIENT
Start: 2019-01-25 | End: 2019-02-11

## 2019-01-25 RX ORDER — SODIUM CHLORIDE 0.9 % (FLUSH) 0.9 %
10 SYRINGE (ML) INJECTION PRN
Status: DISCONTINUED | OUTPATIENT
Start: 2019-01-25 | End: 2019-01-25 | Stop reason: HOSPADM

## 2019-01-25 RX ORDER — MIDAZOLAM HYDROCHLORIDE 1 MG/ML
INJECTION INTRAMUSCULAR; INTRAVENOUS PRN
Status: DISCONTINUED | OUTPATIENT
Start: 2019-01-25 | End: 2019-01-25 | Stop reason: SDUPTHER

## 2019-01-25 RX ORDER — PROPOFOL 10 MG/ML
INJECTION, EMULSION INTRAVENOUS PRN
Status: DISCONTINUED | OUTPATIENT
Start: 2019-01-25 | End: 2019-01-25 | Stop reason: SDUPTHER

## 2019-01-25 RX ORDER — FENTANYL CITRATE 50 UG/ML
INJECTION, SOLUTION INTRAMUSCULAR; INTRAVENOUS PRN
Status: DISCONTINUED | OUTPATIENT
Start: 2019-01-25 | End: 2019-01-25 | Stop reason: SDUPTHER

## 2019-01-25 RX ORDER — PROPOFOL 10 MG/ML
INJECTION, EMULSION INTRAVENOUS CONTINUOUS PRN
Status: DISCONTINUED | OUTPATIENT
Start: 2019-01-25 | End: 2019-01-25 | Stop reason: SDUPTHER

## 2019-01-25 RX ORDER — DOCUSATE SODIUM 100 MG/1
100 CAPSULE, LIQUID FILLED ORAL 2 TIMES DAILY
Qty: 60 CAPSULE | Refills: 1 | Status: SHIPPED | OUTPATIENT
Start: 2019-01-25

## 2019-01-25 RX ORDER — CEFAZOLIN SODIUM 2 G/50ML
2 SOLUTION INTRAVENOUS
Status: COMPLETED | OUTPATIENT
Start: 2019-01-25 | End: 2019-01-25

## 2019-01-25 RX ORDER — LIDOCAINE HYDROCHLORIDE 20 MG/ML
INJECTION, SOLUTION INFILTRATION; PERINEURAL PRN
Status: DISCONTINUED | OUTPATIENT
Start: 2019-01-25 | End: 2019-01-25 | Stop reason: SDUPTHER

## 2019-01-25 RX ORDER — BUPIVACAINE HYDROCHLORIDE AND EPINEPHRINE 2.5; 5 MG/ML; UG/ML
INJECTION, SOLUTION EPIDURAL; INFILTRATION; INTRACAUDAL; PERINEURAL PRN
Status: DISCONTINUED | OUTPATIENT
Start: 2019-01-25 | End: 2019-01-25 | Stop reason: HOSPADM

## 2019-01-25 RX ORDER — SODIUM CHLORIDE 9 MG/ML
INJECTION, SOLUTION INTRAVENOUS CONTINUOUS
Status: DISCONTINUED | OUTPATIENT
Start: 2019-01-25 | End: 2019-01-25 | Stop reason: HOSPADM

## 2019-01-25 RX ORDER — SODIUM CHLORIDE 0.9 % (FLUSH) 0.9 %
10 SYRINGE (ML) INJECTION EVERY 12 HOURS SCHEDULED
Status: DISCONTINUED | OUTPATIENT
Start: 2019-01-25 | End: 2019-01-25 | Stop reason: HOSPADM

## 2019-01-25 RX ADMIN — PROPOFOL 30 MG: 10 INJECTION, EMULSION INTRAVENOUS at 09:10

## 2019-01-25 RX ADMIN — PROPOFOL 40 MG: 10 INJECTION, EMULSION INTRAVENOUS at 09:08

## 2019-01-25 RX ADMIN — FENTANYL CITRATE 50 MCG: 50 INJECTION, SOLUTION INTRAMUSCULAR; INTRAVENOUS at 09:06

## 2019-01-25 RX ADMIN — FENTANYL CITRATE 25 MCG: 50 INJECTION, SOLUTION INTRAMUSCULAR; INTRAVENOUS at 09:09

## 2019-01-25 RX ADMIN — MIDAZOLAM 2 MG: 1 INJECTION INTRAMUSCULAR; INTRAVENOUS at 09:00

## 2019-01-25 RX ADMIN — FENTANYL CITRATE 25 MCG: 50 INJECTION, SOLUTION INTRAMUSCULAR; INTRAVENOUS at 09:12

## 2019-01-25 RX ADMIN — PROPOFOL 60 MG: 10 INJECTION, EMULSION INTRAVENOUS at 09:06

## 2019-01-25 RX ADMIN — PROPOFOL 150 MCG/KG/MIN: 10 INJECTION, EMULSION INTRAVENOUS at 09:06

## 2019-01-25 RX ADMIN — LIDOCAINE HYDROCHLORIDE 80 MG: 20 INJECTION, SOLUTION INFILTRATION; PERINEURAL at 09:06

## 2019-01-25 RX ADMIN — SODIUM CHLORIDE: 9 INJECTION, SOLUTION INTRAVENOUS at 08:53

## 2019-01-25 RX ADMIN — CEFAZOLIN SODIUM 2 G: 2 SOLUTION INTRAVENOUS at 09:02

## 2019-01-25 ASSESSMENT — PULMONARY FUNCTION TESTS
PIF_VALUE: 1
PIF_VALUE: 0
PIF_VALUE: 0
PIF_VALUE: 1
PIF_VALUE: 1
PIF_VALUE: 0
PIF_VALUE: 1

## 2019-01-25 ASSESSMENT — COPD QUESTIONNAIRES: CAT_SEVERITY: MODERATE

## 2019-01-25 ASSESSMENT — ENCOUNTER SYMPTOMS: SHORTNESS OF BREATH: 1

## 2019-01-25 ASSESSMENT — PAIN - FUNCTIONAL ASSESSMENT: PAIN_FUNCTIONAL_ASSESSMENT: 0-10

## 2019-01-25 ASSESSMENT — PAIN SCALES - GENERAL: PAINLEVEL_OUTOF10: 0

## 2019-01-31 ENCOUNTER — TELEPHONE (OUTPATIENT)
Dept: FAMILY MEDICINE CLINIC | Age: 64
End: 2019-01-31

## 2019-01-31 DIAGNOSIS — N39.46 MIXED INCONTINENCE URGE AND STRESS: Primary | ICD-10-CM

## 2019-02-04 ENCOUNTER — TELEPHONE (OUTPATIENT)
Dept: FAMILY MEDICINE CLINIC | Age: 64
End: 2019-02-04

## 2019-02-07 ENCOUNTER — OFFICE VISIT (OUTPATIENT)
Dept: SURGERY | Age: 64
End: 2019-02-07
Payer: COMMERCIAL

## 2019-02-07 VITALS
WEIGHT: 189 LBS | RESPIRATION RATE: 16 BRPM | DIASTOLIC BLOOD PRESSURE: 82 MMHG | HEART RATE: 80 BPM | SYSTOLIC BLOOD PRESSURE: 130 MMHG | BODY MASS INDEX: 34.78 KG/M2 | HEIGHT: 62 IN | TEMPERATURE: 97.5 F | OXYGEN SATURATION: 98 %

## 2019-02-07 DIAGNOSIS — A63.0 GENITAL WARTS: Primary | ICD-10-CM

## 2019-02-07 PROCEDURE — 3017F COLORECTAL CA SCREEN DOC REV: CPT | Performed by: SURGERY

## 2019-02-07 PROCEDURE — G8417 CALC BMI ABV UP PARAM F/U: HCPCS | Performed by: SURGERY

## 2019-02-07 PROCEDURE — G8428 CUR MEDS NOT DOCUMENT: HCPCS | Performed by: SURGERY

## 2019-02-07 PROCEDURE — 99211 OFF/OP EST MAY X REQ PHY/QHP: CPT | Performed by: SURGERY

## 2019-02-11 ENCOUNTER — OFFICE VISIT (OUTPATIENT)
Dept: FAMILY MEDICINE CLINIC | Age: 64
End: 2019-02-11
Payer: COMMERCIAL

## 2019-02-11 VITALS
HEART RATE: 86 BPM | RESPIRATION RATE: 20 BRPM | SYSTOLIC BLOOD PRESSURE: 134 MMHG | OXYGEN SATURATION: 99 % | BODY MASS INDEX: 33.31 KG/M2 | DIASTOLIC BLOOD PRESSURE: 78 MMHG | HEIGHT: 62 IN | WEIGHT: 181 LBS

## 2019-02-11 DIAGNOSIS — E55.9 VITAMIN D DEFICIENCY: ICD-10-CM

## 2019-02-11 DIAGNOSIS — I10 ESSENTIAL HYPERTENSION: ICD-10-CM

## 2019-02-11 DIAGNOSIS — R07.89 OTHER CHEST PAIN: ICD-10-CM

## 2019-02-11 DIAGNOSIS — E11.9 TYPE 2 DIABETES MELLITUS WITHOUT COMPLICATION, WITHOUT LONG-TERM CURRENT USE OF INSULIN (HCC): Primary | ICD-10-CM

## 2019-02-11 DIAGNOSIS — Z12.31 ENCOUNTER FOR SCREENING MAMMOGRAM FOR BREAST CANCER: ICD-10-CM

## 2019-02-11 LAB — HBA1C MFR BLD: 6.7 %

## 2019-02-11 PROCEDURE — G8417 CALC BMI ABV UP PARAM F/U: HCPCS | Performed by: FAMILY MEDICINE

## 2019-02-11 PROCEDURE — 3017F COLORECTAL CA SCREEN DOC REV: CPT | Performed by: FAMILY MEDICINE

## 2019-02-11 PROCEDURE — G8400 PT W/DXA NO RESULTS DOC: HCPCS | Performed by: FAMILY MEDICINE

## 2019-02-11 PROCEDURE — 1123F ACP DISCUSS/DSCN MKR DOCD: CPT | Performed by: FAMILY MEDICINE

## 2019-02-11 PROCEDURE — 4040F PNEUMOC VAC/ADMIN/RCVD: CPT | Performed by: FAMILY MEDICINE

## 2019-02-11 PROCEDURE — 3044F HG A1C LEVEL LT 7.0%: CPT | Performed by: FAMILY MEDICINE

## 2019-02-11 PROCEDURE — G8598 ASA/ANTIPLAT THER USED: HCPCS | Performed by: FAMILY MEDICINE

## 2019-02-11 PROCEDURE — 1101F PT FALLS ASSESS-DOCD LE1/YR: CPT | Performed by: FAMILY MEDICINE

## 2019-02-11 PROCEDURE — G8484 FLU IMMUNIZE NO ADMIN: HCPCS | Performed by: FAMILY MEDICINE

## 2019-02-11 PROCEDURE — 2022F DILAT RTA XM EVC RTNOPTHY: CPT | Performed by: FAMILY MEDICINE

## 2019-02-11 PROCEDURE — 83036 HEMOGLOBIN GLYCOSYLATED A1C: CPT | Performed by: FAMILY MEDICINE

## 2019-02-11 PROCEDURE — 99214 OFFICE O/P EST MOD 30 MIN: CPT | Performed by: FAMILY MEDICINE

## 2019-02-11 PROCEDURE — 1090F PRES/ABSN URINE INCON ASSESS: CPT | Performed by: FAMILY MEDICINE

## 2019-02-11 PROCEDURE — G8427 DOCREV CUR MEDS BY ELIG CLIN: HCPCS | Performed by: FAMILY MEDICINE

## 2019-02-11 PROCEDURE — 1036F TOBACCO NON-USER: CPT | Performed by: FAMILY MEDICINE

## 2019-02-11 RX ORDER — AMLODIPINE BESYLATE 10 MG/1
10 TABLET ORAL DAILY
Qty: 30 TABLET | Refills: 5 | Status: SHIPPED | OUTPATIENT
Start: 2019-02-11 | End: 2019-10-21 | Stop reason: SDUPTHER

## 2019-02-11 RX ORDER — ERGOCALCIFEROL 1.25 MG/1
50000 CAPSULE ORAL WEEKLY
Qty: 4 CAPSULE | Refills: 5 | Status: SHIPPED | OUTPATIENT
Start: 2019-02-11 | End: 2019-07-30 | Stop reason: SDUPTHER

## 2019-02-11 RX ORDER — LISINOPRIL AND HYDROCHLOROTHIAZIDE 20; 12.5 MG/1; MG/1
1 TABLET ORAL DAILY
Qty: 30 TABLET | Refills: 5 | Status: SHIPPED | OUTPATIENT
Start: 2019-02-11 | End: 2019-09-27 | Stop reason: SDUPTHER

## 2019-02-11 RX ORDER — PRAVASTATIN SODIUM 40 MG
40 TABLET ORAL NIGHTLY
Qty: 30 TABLET | Refills: 5 | Status: SHIPPED | OUTPATIENT
Start: 2019-02-11 | End: 2019-10-21 | Stop reason: SDUPTHER

## 2019-02-11 RX ORDER — DIAPER,BRIEF,ADULT, DISPOSABLE
EACH MISCELLANEOUS
Qty: 50 EACH | Refills: 12 | Status: SHIPPED | OUTPATIENT
Start: 2019-02-11

## 2019-02-11 RX ORDER — FAMOTIDINE 20 MG/1
20 TABLET, FILM COATED ORAL DAILY
Qty: 30 TABLET | Refills: 5 | Status: SHIPPED | OUTPATIENT
Start: 2019-02-11 | End: 2019-09-27 | Stop reason: SDUPTHER

## 2019-02-11 RX ORDER — GLUCOSAMINE HCL 500 MG
5000 TABLET ORAL DAILY
Qty: 30 TABLET | Refills: 3 | Status: SHIPPED | OUTPATIENT
Start: 2019-02-11 | End: 2019-02-11 | Stop reason: HOSPADM

## 2019-02-11 RX ORDER — METOPROLOL SUCCINATE 50 MG/1
50 TABLET, EXTENDED RELEASE ORAL DAILY
Qty: 30 TABLET | Refills: 5 | Status: SHIPPED | OUTPATIENT
Start: 2019-02-11 | End: 2019-09-27 | Stop reason: SDUPTHER

## 2019-02-11 ASSESSMENT — PATIENT HEALTH QUESTIONNAIRE - PHQ9
SUM OF ALL RESPONSES TO PHQ QUESTIONS 1-9: 0
SUM OF ALL RESPONSES TO PHQ9 QUESTIONS 1 & 2: 0
SUM OF ALL RESPONSES TO PHQ QUESTIONS 1-9: 0
2. FEELING DOWN, DEPRESSED OR HOPELESS: 0
1. LITTLE INTEREST OR PLEASURE IN DOING THINGS: 0

## 2019-02-11 ASSESSMENT — ENCOUNTER SYMPTOMS
VOMITING: 1
DIARRHEA: 0
NAUSEA: 0
SHORTNESS OF BREATH: 0

## 2019-02-18 ENCOUNTER — HOSPITAL ENCOUNTER (OUTPATIENT)
Dept: GENERAL RADIOLOGY | Age: 64
Discharge: HOME | End: 2019-02-20
Payer: COMMERCIAL

## 2019-02-18 ENCOUNTER — HOSPITAL ENCOUNTER (OUTPATIENT)
Age: 64
Discharge: HOME | End: 2019-02-18
Payer: COMMERCIAL

## 2019-02-18 ENCOUNTER — HOSPITAL ENCOUNTER (OUTPATIENT)
Age: 64
Discharge: HOME | End: 2019-02-20
Payer: COMMERCIAL

## 2019-02-18 DIAGNOSIS — J32.8 CHRONIC FRONTOETHMOIDAL SINUSITIS: ICD-10-CM

## 2019-02-18 DIAGNOSIS — J45.40 MODERATE PERSISTENT ASTHMA, UNSPECIFIED WHETHER COMPLICATED: ICD-10-CM

## 2019-02-18 LAB
BASOPHILS ABSOLUTE: 0.04 E9/L (ref 0–0.2)
BASOPHILS RELATIVE PERCENT: 0.5 % (ref 0–2)
EOSINOPHILS ABSOLUTE: 0.07 E9/L (ref 0.05–0.5)
EOSINOPHILS RELATIVE PERCENT: 0.9 % (ref 0–6)
HCT VFR BLD CALC: 35.6 % (ref 34–48)
HEMOGLOBIN: 11 G/DL (ref 11.5–15.5)
IMMATURE GRANULOCYTES #: 0.03 E9/L
IMMATURE GRANULOCYTES %: 0.4 % (ref 0–5)
LYMPHOCYTES ABSOLUTE: 1.4 E9/L (ref 1.5–4)
LYMPHOCYTES RELATIVE PERCENT: 17.8 % (ref 20–42)
MCH RBC QN AUTO: 24.8 PG (ref 26–35)
MCHC RBC AUTO-ENTMCNC: 30.9 % (ref 32–34.5)
MCV RBC AUTO: 80.4 FL (ref 80–99.9)
MONOCYTES ABSOLUTE: 0.48 E9/L (ref 0.1–0.95)
MONOCYTES RELATIVE PERCENT: 6.1 % (ref 2–12)
NEUTROPHILS ABSOLUTE: 5.83 E9/L (ref 1.8–7.3)
NEUTROPHILS RELATIVE PERCENT: 74.3 % (ref 43–80)
PDW BLD-RTO: 16.2 FL (ref 11.5–15)
PLATELET # BLD: 279 E9/L (ref 130–450)
PMV BLD AUTO: 9.6 FL (ref 7–12)
RBC # BLD: 4.43 E12/L (ref 3.5–5.5)
WBC # BLD: 7.9 E9/L (ref 4.5–11.5)

## 2019-02-18 PROCEDURE — 82787 IGG 1 2 3 OR 4 EACH: CPT

## 2019-02-18 PROCEDURE — 86317 IMMUNOASSAY INFECTIOUS AGENT: CPT

## 2019-02-18 PROCEDURE — 70220 X-RAY EXAM OF SINUSES: CPT

## 2019-02-18 PROCEDURE — 82784 ASSAY IGA/IGD/IGG/IGM EACH: CPT

## 2019-02-18 PROCEDURE — 82785 ASSAY OF IGE: CPT

## 2019-02-18 PROCEDURE — 36415 COLL VENOUS BLD VENIPUNCTURE: CPT

## 2019-02-18 PROCEDURE — 71046 X-RAY EXAM CHEST 2 VIEWS: CPT

## 2019-02-18 PROCEDURE — 85025 COMPLETE CBC W/AUTO DIFF WBC: CPT

## 2019-02-20 LAB
IGA: 246 MG/DL (ref 70–400)
IGE: 22 KU/L
IGG 1: 744 MG/DL (ref 240–1118)
IGG 2: 182 MG/DL (ref 124–549)
IGG 3: 35 MG/DL (ref 21–134)
IGG 4: 30 MG/DL (ref 1–123)
IGG: 964 MG/DL (ref 700–1600)
IGM: 57 MG/DL (ref 40–230)
MISCELLANEOUS LAB TEST RESULT: NORMAL
MISCELLANEOUS LAB TEST RESULT: NORMAL

## 2019-02-21 DIAGNOSIS — M25.551 RIGHT HIP PAIN: ICD-10-CM

## 2019-02-21 DIAGNOSIS — M25.562 ACUTE PAIN OF LEFT KNEE: Primary | ICD-10-CM

## 2019-02-23 LAB
Lab: NORMAL
REPORT: NORMAL
THIS TEST SENT TO: NORMAL

## 2019-02-25 ENCOUNTER — OFFICE VISIT (OUTPATIENT)
Dept: ORTHOPEDIC SURGERY | Age: 64
End: 2019-02-25
Payer: COMMERCIAL

## 2019-02-25 VITALS — HEIGHT: 62 IN | WEIGHT: 181 LBS | BODY MASS INDEX: 33.31 KG/M2

## 2019-02-25 DIAGNOSIS — Z96.642 STATUS POST TOTAL REPLACEMENT OF LEFT HIP: Primary | ICD-10-CM

## 2019-02-25 PROCEDURE — 1123F ACP DISCUSS/DSCN MKR DOCD: CPT | Performed by: ORTHOPAEDIC SURGERY

## 2019-02-25 PROCEDURE — G8598 ASA/ANTIPLAT THER USED: HCPCS | Performed by: ORTHOPAEDIC SURGERY

## 2019-02-25 PROCEDURE — 4040F PNEUMOC VAC/ADMIN/RCVD: CPT | Performed by: ORTHOPAEDIC SURGERY

## 2019-02-25 PROCEDURE — 1036F TOBACCO NON-USER: CPT | Performed by: ORTHOPAEDIC SURGERY

## 2019-02-25 PROCEDURE — 1101F PT FALLS ASSESS-DOCD LE1/YR: CPT | Performed by: ORTHOPAEDIC SURGERY

## 2019-02-25 PROCEDURE — G8400 PT W/DXA NO RESULTS DOC: HCPCS | Performed by: ORTHOPAEDIC SURGERY

## 2019-02-25 PROCEDURE — 1090F PRES/ABSN URINE INCON ASSESS: CPT | Performed by: ORTHOPAEDIC SURGERY

## 2019-02-25 PROCEDURE — G8417 CALC BMI ABV UP PARAM F/U: HCPCS | Performed by: ORTHOPAEDIC SURGERY

## 2019-02-25 PROCEDURE — G8427 DOCREV CUR MEDS BY ELIG CLIN: HCPCS | Performed by: ORTHOPAEDIC SURGERY

## 2019-02-25 PROCEDURE — 3017F COLORECTAL CA SCREEN DOC REV: CPT | Performed by: ORTHOPAEDIC SURGERY

## 2019-02-25 PROCEDURE — G8484 FLU IMMUNIZE NO ADMIN: HCPCS | Performed by: ORTHOPAEDIC SURGERY

## 2019-02-25 PROCEDURE — 99213 OFFICE O/P EST LOW 20 MIN: CPT | Performed by: ORTHOPAEDIC SURGERY

## 2019-02-26 DIAGNOSIS — E11.9 TYPE 2 DIABETES MELLITUS WITHOUT COMPLICATION, WITHOUT LONG-TERM CURRENT USE OF INSULIN (HCC): ICD-10-CM

## 2019-02-28 ENCOUNTER — OFFICE VISIT (OUTPATIENT)
Dept: CARDIOLOGY CLINIC | Age: 64
End: 2019-02-28
Payer: COMMERCIAL

## 2019-02-28 VITALS
WEIGHT: 191 LBS | BODY MASS INDEX: 35.15 KG/M2 | HEIGHT: 62 IN | DIASTOLIC BLOOD PRESSURE: 72 MMHG | HEART RATE: 75 BPM | SYSTOLIC BLOOD PRESSURE: 130 MMHG

## 2019-02-28 DIAGNOSIS — E66.8 MODERATE OBESITY: ICD-10-CM

## 2019-02-28 DIAGNOSIS — E11.9 TYPE 2 DIABETES MELLITUS WITHOUT COMPLICATION, WITHOUT LONG-TERM CURRENT USE OF INSULIN (HCC): ICD-10-CM

## 2019-02-28 DIAGNOSIS — E78.00 PURE HYPERCHOLESTEROLEMIA: ICD-10-CM

## 2019-02-28 DIAGNOSIS — I10 ESSENTIAL HYPERTENSION: ICD-10-CM

## 2019-02-28 DIAGNOSIS — R07.89 ATYPICAL CHEST PAIN: Primary | ICD-10-CM

## 2019-02-28 PROCEDURE — 1090F PRES/ABSN URINE INCON ASSESS: CPT | Performed by: INTERNAL MEDICINE

## 2019-02-28 PROCEDURE — 4040F PNEUMOC VAC/ADMIN/RCVD: CPT | Performed by: INTERNAL MEDICINE

## 2019-02-28 PROCEDURE — 3044F HG A1C LEVEL LT 7.0%: CPT | Performed by: INTERNAL MEDICINE

## 2019-02-28 PROCEDURE — 93000 ELECTROCARDIOGRAM COMPLETE: CPT | Performed by: INTERNAL MEDICINE

## 2019-02-28 PROCEDURE — G8598 ASA/ANTIPLAT THER USED: HCPCS | Performed by: INTERNAL MEDICINE

## 2019-02-28 PROCEDURE — 1101F PT FALLS ASSESS-DOCD LE1/YR: CPT | Performed by: INTERNAL MEDICINE

## 2019-02-28 PROCEDURE — 2022F DILAT RTA XM EVC RTNOPTHY: CPT | Performed by: INTERNAL MEDICINE

## 2019-02-28 PROCEDURE — G8417 CALC BMI ABV UP PARAM F/U: HCPCS | Performed by: INTERNAL MEDICINE

## 2019-02-28 PROCEDURE — 1123F ACP DISCUSS/DSCN MKR DOCD: CPT | Performed by: INTERNAL MEDICINE

## 2019-02-28 PROCEDURE — 3017F COLORECTAL CA SCREEN DOC REV: CPT | Performed by: INTERNAL MEDICINE

## 2019-02-28 PROCEDURE — G8484 FLU IMMUNIZE NO ADMIN: HCPCS | Performed by: INTERNAL MEDICINE

## 2019-02-28 PROCEDURE — G8400 PT W/DXA NO RESULTS DOC: HCPCS | Performed by: INTERNAL MEDICINE

## 2019-02-28 PROCEDURE — 99213 OFFICE O/P EST LOW 20 MIN: CPT | Performed by: INTERNAL MEDICINE

## 2019-02-28 PROCEDURE — 1036F TOBACCO NON-USER: CPT | Performed by: INTERNAL MEDICINE

## 2019-02-28 PROCEDURE — G8427 DOCREV CUR MEDS BY ELIG CLIN: HCPCS | Performed by: INTERNAL MEDICINE

## 2019-02-28 RX ORDER — PRENATAL WITH FERROUS FUM AND FOLIC ACID 3080; 920; 120; 400; 22; 1.84; 3; 20; 10; 1; 12; 200; 27; 25; 2 [IU]/1; [IU]/1; MG/1; [IU]/1; MG/1; MG/1; MG/1; MG/1; MG/1; MG/1; UG/1; MG/1; MG/1; MG/1; MG/1
1 TABLET ORAL DAILY
Qty: 30 TABLET | Refills: 5 | Status: SHIPPED | OUTPATIENT
Start: 2019-02-28 | End: 2019-03-25 | Stop reason: SDUPTHER

## 2019-02-28 RX ORDER — ASPIRIN 81 MG/1
81 TABLET ORAL 2 TIMES DAILY
Qty: 60 TABLET | Refills: 5 | Status: SHIPPED | OUTPATIENT
Start: 2019-02-28 | End: 2019-08-27 | Stop reason: SDUPTHER

## 2019-03-01 ENCOUNTER — TELEPHONE (OUTPATIENT)
Dept: ADMINISTRATIVE | Age: 64
End: 2019-03-01

## 2019-03-25 RX ORDER — PRENATAL WITH FERROUS FUM AND FOLIC ACID 3080; 920; 120; 400; 22; 1.84; 3; 20; 10; 1; 12; 200; 27; 25; 2 [IU]/1; [IU]/1; MG/1; [IU]/1; MG/1; MG/1; MG/1; MG/1; MG/1; MG/1; UG/1; MG/1; MG/1; MG/1; MG/1
1 TABLET ORAL DAILY
Qty: 30 TABLET | Refills: 5 | Status: SHIPPED | OUTPATIENT
Start: 2019-03-25 | End: 2019-10-16

## 2019-03-27 ENCOUNTER — TELEPHONE (OUTPATIENT)
Dept: ADMINISTRATIVE | Age: 64
End: 2019-03-27

## 2019-03-27 ENCOUNTER — HOSPITAL ENCOUNTER (OUTPATIENT)
Dept: GENERAL RADIOLOGY | Age: 64
Discharge: HOME | End: 2019-03-29
Payer: COMMERCIAL

## 2019-03-27 DIAGNOSIS — Z12.31 ENCOUNTER FOR SCREENING MAMMOGRAM FOR BREAST CANCER: ICD-10-CM

## 2019-03-27 PROCEDURE — 77063 BREAST TOMOSYNTHESIS BI: CPT

## 2019-03-27 NOTE — TELEPHONE ENCOUNTER
Pt is leaving a message on refill line. She is out of her prenatal plus vitamin, requests it to be called into [address] [phone]. Thank you.

## 2019-04-05 ENCOUNTER — HOSPITAL ENCOUNTER (OUTPATIENT)
Dept: GENERAL RADIOLOGY | Age: 64
Discharge: HOME | End: 2019-04-07
Payer: COMMERCIAL

## 2019-04-05 ENCOUNTER — HOSPITAL ENCOUNTER (OUTPATIENT)
Age: 64
Discharge: HOME | End: 2019-04-07
Payer: COMMERCIAL

## 2019-04-05 DIAGNOSIS — R52 PAIN: ICD-10-CM

## 2019-04-05 LAB
BASOPHILS ABSOLUTE: 0.03 E9/L (ref 0–0.2)
BASOPHILS RELATIVE PERCENT: 0.4 % (ref 0–2)
EOSINOPHILS ABSOLUTE: 0.13 E9/L (ref 0.05–0.5)
EOSINOPHILS RELATIVE PERCENT: 1.6 % (ref 0–6)
HCT VFR BLD CALC: 34.9 % (ref 34–48)
HEMOGLOBIN: 10.8 G/DL (ref 11.5–15.5)
IMMATURE GRANULOCYTES #: 0.06 E9/L
IMMATURE GRANULOCYTES %: 0.7 % (ref 0–5)
LYMPHOCYTES ABSOLUTE: 1.79 E9/L (ref 1.5–4)
LYMPHOCYTES RELATIVE PERCENT: 21.6 % (ref 20–42)
MCH RBC QN AUTO: 24.9 PG (ref 26–35)
MCHC RBC AUTO-ENTMCNC: 30.9 % (ref 32–34.5)
MCV RBC AUTO: 80.6 FL (ref 80–99.9)
MONOCYTES ABSOLUTE: 0.6 E9/L (ref 0.1–0.95)
MONOCYTES RELATIVE PERCENT: 7.3 % (ref 2–12)
NEUTROPHILS ABSOLUTE: 5.66 E9/L (ref 1.8–7.3)
NEUTROPHILS RELATIVE PERCENT: 68.4 % (ref 43–80)
PDW BLD-RTO: 14.9 FL (ref 11.5–15)
PLATELET # BLD: 280 E9/L (ref 130–450)
PMV BLD AUTO: 9.7 FL (ref 7–12)
RBC # BLD: 4.33 E12/L (ref 3.5–5.5)
WBC # BLD: 8.3 E9/L (ref 4.5–11.5)

## 2019-04-05 PROCEDURE — 86160 COMPLEMENT ANTIGEN: CPT

## 2019-04-05 PROCEDURE — 85025 COMPLETE CBC W/AUTO DIFF WBC: CPT

## 2019-04-05 PROCEDURE — 36415 COLL VENOUS BLD VENIPUNCTURE: CPT

## 2019-04-05 PROCEDURE — 86357 NK CELLS TOTAL COUNT: CPT

## 2019-04-05 PROCEDURE — 82784 ASSAY IGA/IGD/IGG/IGM EACH: CPT

## 2019-04-05 PROCEDURE — 82787 IGG 1 2 3 OR 4 EACH: CPT

## 2019-04-05 PROCEDURE — 83520 IMMUNOASSAY QUANT NOS NONAB: CPT

## 2019-04-05 PROCEDURE — 86161 COMPLEMENT/FUNCTION ACTIVITY: CPT

## 2019-04-05 PROCEDURE — 82785 ASSAY OF IGE: CPT

## 2019-04-05 PROCEDURE — 70220 X-RAY EXAM OF SINUSES: CPT

## 2019-04-05 PROCEDURE — 86360 T CELL ABSOLUTE COUNT/RATIO: CPT

## 2019-04-05 PROCEDURE — 86003 ALLG SPEC IGE CRUDE XTRC EA: CPT

## 2019-04-05 PROCEDURE — 86359 T CELLS TOTAL COUNT: CPT

## 2019-04-05 PROCEDURE — 86317 IMMUNOASSAY INFECTIOUS AGENT: CPT

## 2019-04-06 LAB
C3 COMPLEMENT: 168 MG/DL (ref 90–180)
C4 COMPLEMENT: 44 MG/DL (ref 10–40)

## 2019-04-07 LAB
% CD 3 POS. LYMPH.: 75 % (ref 62–89)
% CD19: 19 % (ref 5–21)
% CD4: 62 % (ref 35–68)
% CD8: 11 % (ref 10–46)
% NK (CD56/16): 4 % (ref 5–28)
ABSOLUTE CD 3: 1412 CELLS/UL (ref 660–2200)
ABSOLUTE CD 4 HELPER: 1160 CELLS/UL (ref 490–1600)
ABSOLUTE CD 8 (SUPP): 214 CELLS/UL (ref 150–1050)
CD19 ABSOLUTE: 358 CELLS/UL (ref 74–510)
CD4/CD8 RATIO: 5.64 RATIO (ref 0.8–6.17)
IGA: 245 MG/DL (ref 70–400)
IGG: 1018 MG/DL (ref 700–1600)
IGM: 61 MG/DL (ref 40–230)
LYMPHOCYTE SUBSET PANEL 5 INFO: ABNORMAL
NATURAL KILLER CD16 AND CD56 ABSOLUTE: 70 CELLS/UL (ref 74–620)

## 2019-04-09 LAB
IGE: 16 KU/L
IGG 1: 721 MG/DL (ref 240–1118)
IGG 2: 130 MG/DL (ref 124–549)
IGG 3: 55 MG/DL (ref 21–134)
IGG 4: 29 MG/DL (ref 1–123)

## 2019-04-10 LAB
Lab: NORMAL
REPORT: NORMAL
THIS TEST SENT TO: NORMAL

## 2019-04-11 LAB
Lab: NORMAL
REPORT: NORMAL
THIS TEST SENT TO: NORMAL

## 2019-04-12 ENCOUNTER — HOSPITAL ENCOUNTER (OUTPATIENT)
Age: 64
Discharge: HOME | End: 2019-04-12
Payer: COMMERCIAL

## 2019-04-17 LAB
C1 ESTERASE INHIBITOR FUNCTIONAL ASSAY: 103 %
C1 ESTERASE INHIBITOR: 40 MG/DL (ref 21–39)
C4 COMPLEMENT: 48 MG/DL (ref 10–40)

## 2019-06-13 ENCOUNTER — OFFICE VISIT (OUTPATIENT)
Dept: FAMILY MEDICINE CLINIC | Age: 64
End: 2019-06-13
Payer: COMMERCIAL

## 2019-06-13 ENCOUNTER — HOSPITAL ENCOUNTER (OUTPATIENT)
Age: 64
Discharge: HOME | End: 2019-06-15
Payer: COMMERCIAL

## 2019-06-13 VITALS
DIASTOLIC BLOOD PRESSURE: 78 MMHG | RESPIRATION RATE: 20 BRPM | OXYGEN SATURATION: 95 % | HEIGHT: 62 IN | WEIGHT: 186 LBS | HEART RATE: 68 BPM | BODY MASS INDEX: 34.23 KG/M2 | SYSTOLIC BLOOD PRESSURE: 134 MMHG

## 2019-06-13 DIAGNOSIS — E11.9 TYPE 2 DIABETES MELLITUS WITHOUT COMPLICATION, WITHOUT LONG-TERM CURRENT USE OF INSULIN (HCC): ICD-10-CM

## 2019-06-13 DIAGNOSIS — M89.9 DISORDER OF BONE: ICD-10-CM

## 2019-06-13 DIAGNOSIS — E04.1 THYROID NODULE: ICD-10-CM

## 2019-06-13 DIAGNOSIS — Z00.00 ROUTINE GENERAL MEDICAL EXAMINATION AT A HEALTH CARE FACILITY: Primary | ICD-10-CM

## 2019-06-13 DIAGNOSIS — R53.83 FATIGUE, UNSPECIFIED TYPE: ICD-10-CM

## 2019-06-13 LAB
ALBUMIN SERPL-MCNC: 4.7 G/DL (ref 3.5–5.2)
ALP BLD-CCNC: 78 U/L (ref 35–104)
ALT SERPL-CCNC: 9 U/L (ref 0–32)
ANION GAP SERPL CALCULATED.3IONS-SCNC: 18 MMOL/L (ref 7–16)
AST SERPL-CCNC: 14 U/L (ref 0–31)
BILIRUB SERPL-MCNC: 0.5 MG/DL (ref 0–1.2)
BUN BLDV-MCNC: 12 MG/DL (ref 8–23)
CALCIUM SERPL-MCNC: 11.2 MG/DL (ref 8.6–10.2)
CHLORIDE BLD-SCNC: 100 MMOL/L (ref 98–107)
CO2: 20 MMOL/L (ref 22–29)
CREAT SERPL-MCNC: 0.7 MG/DL (ref 0.5–1)
GFR AFRICAN AMERICAN: >60
GFR NON-AFRICAN AMERICAN: >60 ML/MIN/1.73
GLUCOSE BLD-MCNC: 236 MG/DL (ref 74–99)
HBA1C MFR BLD: 6.2 %
HCT VFR BLD CALC: 37.2 % (ref 34–48)
HEMOGLOBIN: 11.2 G/DL (ref 11.5–15.5)
LITHIUM DOSE AMOUNT: NORMAL
LITHIUM LEVEL: 0.81 MMOL/L (ref 0.5–1.5)
MCH RBC QN AUTO: 24.8 PG (ref 26–35)
MCHC RBC AUTO-ENTMCNC: 30.1 % (ref 32–34.5)
MCV RBC AUTO: 82.3 FL (ref 80–99.9)
PDW BLD-RTO: 14.6 FL (ref 11.5–15)
PLATELET # BLD: 318 E9/L (ref 130–450)
PMV BLD AUTO: 9.7 FL (ref 7–12)
POTASSIUM SERPL-SCNC: 3.8 MMOL/L (ref 3.5–5)
RBC # BLD: 4.52 E12/L (ref 3.5–5.5)
SODIUM BLD-SCNC: 138 MMOL/L (ref 132–146)
T4 FREE: 1.72 NG/DL (ref 0.93–1.7)
TOTAL PROTEIN: 8.3 G/DL (ref 6.4–8.3)
TSH SERPL DL<=0.05 MIU/L-ACNC: 2.78 UIU/ML (ref 0.27–4.2)
VITAMIN D 25-HYDROXY: 30 NG/ML (ref 30–100)
WBC # BLD: 8 E9/L (ref 4.5–11.5)

## 2019-06-13 PROCEDURE — 3017F COLORECTAL CA SCREEN DOC REV: CPT | Performed by: FAMILY MEDICINE

## 2019-06-13 PROCEDURE — 4040F PNEUMOC VAC/ADMIN/RCVD: CPT | Performed by: FAMILY MEDICINE

## 2019-06-13 PROCEDURE — 36415 COLL VENOUS BLD VENIPUNCTURE: CPT

## 2019-06-13 PROCEDURE — 82306 VITAMIN D 25 HYDROXY: CPT

## 2019-06-13 PROCEDURE — 80178 ASSAY OF LITHIUM: CPT

## 2019-06-13 PROCEDURE — 84439 ASSAY OF FREE THYROXINE: CPT

## 2019-06-13 PROCEDURE — 1123F ACP DISCUSS/DSCN MKR DOCD: CPT | Performed by: FAMILY MEDICINE

## 2019-06-13 PROCEDURE — 84443 ASSAY THYROID STIM HORMONE: CPT

## 2019-06-13 PROCEDURE — G8598 ASA/ANTIPLAT THER USED: HCPCS | Performed by: FAMILY MEDICINE

## 2019-06-13 PROCEDURE — 3044F HG A1C LEVEL LT 7.0%: CPT | Performed by: FAMILY MEDICINE

## 2019-06-13 PROCEDURE — 85027 COMPLETE CBC AUTOMATED: CPT

## 2019-06-13 PROCEDURE — 80053 COMPREHEN METABOLIC PANEL: CPT

## 2019-06-13 PROCEDURE — G0439 PPPS, SUBSEQ VISIT: HCPCS | Performed by: FAMILY MEDICINE

## 2019-06-13 PROCEDURE — 83036 HEMOGLOBIN GLYCOSYLATED A1C: CPT | Performed by: FAMILY MEDICINE

## 2019-06-13 ASSESSMENT — PATIENT HEALTH QUESTIONNAIRE - PHQ9
SUM OF ALL RESPONSES TO PHQ QUESTIONS 1-9: 1
SUM OF ALL RESPONSES TO PHQ QUESTIONS 1-9: 1

## 2019-06-13 ASSESSMENT — LIFESTYLE VARIABLES: HOW OFTEN DO YOU HAVE A DRINK CONTAINING ALCOHOL: 0

## 2019-06-13 ASSESSMENT — ANXIETY QUESTIONNAIRES: GAD7 TOTAL SCORE: 0

## 2019-06-13 NOTE — PATIENT INSTRUCTIONS
Personalized Preventive Plan for Bridget Tompkins - 6/13/2019  Medicare offers a range of preventive health benefits. Some of the tests and screenings are paid in full while other may be subject to a deductible, co-insurance, and/or copay. Some of these benefits include a comprehensive review of your medical history including lifestyle, illnesses that may run in your family, and various assessments and screenings as appropriate. After reviewing your medical record and screening and assessments performed today your provider may have ordered immunizations, labs, imaging, and/or referrals for you. A list of these orders (if applicable) as well as your Preventive Care list are included within your After Visit Summary for your review. Other Preventive Recommendations:    · A preventive eye exam performed by an eye specialist is recommended every 1-2 years to screen for glaucoma; cataracts, macular degeneration, and other eye disorders. · A preventive dental visit is recommended every 6 months. · Try to get at least 150 minutes of exercise per week or 10,000 steps per day on a pedometer . · Order or download the FREE \"Exercise & Physical Activity: Your Everyday Guide\" from [employer]. Call 4-805.117.2024 or search [employer] online. · You need 6541-1817 mg of calcium and 7258-3906 IU of vitamin D per day. It is possible to meet your calcium requirement with diet alone, but a vitamin D supplement is usually necessary to meet this goal.  · When exposed to the sun, use a sunscreen that protects against both UVA and UVB radiation with an SPF of 30 or greater. Reapply every 2 to 3 hours or after sweating, drying off with a towel, or swimming. · Always wear a seat belt when traveling in a car. Always wear a helmet when riding a bicycle or motorcycle. Heart-Healthy Diet   Sodium, Fat, and Cholesterol Controlled Diet       What Is a Heart Healthy Diet?    A heart-healthy diet is one that limits sodium , certain types of fat , and cholesterol . This type of diet is recommended for:   People with any form of cardiovascular disease (eg, coronary heart disease , peripheral vascular disease , previous heart attack , previous stroke )   People with risk factors for cardiovascular disease, such as high blood pressure , high cholesterol , or diabetes   Anyone who wants to lower their risk of developing cardiovascular disease   Sodium    Sodium is a mineral found in many foods. In general, most people consume much more sodium than they need. Diets high in sodium can increase blood pressure and lead to edema (water retention). On a heart-healthy diet, you should consume no more than 2,300 mg (milligrams) of sodium per dayabout the amount in one teaspoon of table salt. The foods highest in sodium include table salt (about 50% sodium), processed foods, convenience foods, and preserved foods. Cholesterol    Cholesterol is a fat-like, waxy substance in your blood. Our bodies make some cholesterol. It is also found in animal products, with the highest amounts in fatty meat, egg yolks, whole milk, cheese, shellfish, and organ meats. On a heart-healthy diet, you should limit your cholesterol intake to less than 200 mg per day. It is normal and important to have some cholesterol in your bloodstream. But too much cholesterol can cause plaque to build up within your arteries, which can eventually lead to a heart attack or stroke. The two types of cholesterol that are most commonly referred to are:   Low-density lipoprotein (LDL) cholesterol  Also known as bad cholesterol, this is the cholesterol that tends to build up along your arteries. Bad cholesterol levels are increased by eating fats that are saturated or hydrogenated. Optimal level of this cholesterol is less than 100. Over 130 starts to get risky for heart disease.    High-density lipoprotein (HDL) cholesterol  Also known as good cholesterol, this type of cholesterol actually carries cholesterol away from your arteries and may, therefore, help lower your risk of having a heart attack. You want this level to be high (ideally greater than 60). It is a risk to have a level less than 40. You can raise this good cholesterol by eating olive oil, canola oil, avocados, or nuts. Exercise raises this level, too. Fat    Fat is calorie dense and packs a lot of calories into a small amount of food. Even though fats should be limited due to their high calorie content, not all fats are bad. In fact, some fats are quite healthful. Fat can be broken down into four main types. The good-for-you fats are:   Monounsaturated fat  found in oils such as olive and canola, avocados, and nuts and natural nut butters; can decrease cholesterol levels, while keeping levels of HDL cholesterol high   Polyunsaturated fat  found in oils such as safflower, sunflower, soybean, corn, and sesame; can decrease total cholesterol and LDL cholesterol   Omega-3 fatty acids  particularly those found in fatty fish (such as salmon, trout, tuna, mackerel, herring, and sardines); can decrease risk of arrhythmias, decrease triglyceride levels, and slightly lower blood pressure   The fats that you want to limit are:   Saturated fat  found in animal products, many fast foods, and a few vegetables; increases total blood cholesterol, including LDL levels   Animal fats that are saturated include: butter, lard, whole-milk dairy products, meat fat, and poultry skin   Vegetable fats that are saturated include: hydrogenated shortening, palm oil, coconut oil, cocoa butter   Hydrogenated or trans fat  found in margarine and vegetable shortening, most shelf stable snack foods, and fried foods; increases LDL and decreases HDL     It is generally recommended that you limit your total fat for the day to less than 30% of your total calories.  If you follow an 1800-calorie heart healthy diet, for week) Tofu Nuts or seeds (unsalted, dry-roasted), low-sodium peanut butter Dried peas, beans, and lentils   Any smoked, cured, salted, or canned meat, fish, or poultry (including gaviria, chipped beef, cold cuts, hot dogs, sausages, sardines, and anchovies) Poultry skins Breaded and/or fried fish or meats Canned peas, beans, and lentils Salted nuts   Fats and Oils   Olive oil and canola oil Low-sodium, low-fat salad dressings and mayonnaise   Butter, margarine, coconut and palm oils, gaviria fat   Snacks, Sweets, and Condiments   Low-sodium or unsalted versions of broths, soups, soy sauce, and condiments Pepper, herbs, and spices; vinegar, lemon, or lime juice Low-fat frozen desserts (yogurt, sherbet, fruit bars) Sugar, cocoa powder, honey, syrup, jam, and preserves Low-fat, trans-fat free cookies, cakes, and pies Cameron and animal crackers, fig bars, vandana snaps   High-fat desserts Broth, soups, gravies, and sauces, made from instant mixes or other high-sodium ingredients Salted snack foods Canned olives Meat tenderizers, seasoning salt, and most flavored vinegars   Beverages   Low-sodium carbonated beverages Tea and coffee in moderation Soy milk   Commercially softened water   Suggestions   Make whole grains, fruits, and vegetables the base of your diet. Choose heart-healthy fats such as canola, olive, and flaxseed oil, and foods high in heart-healthy fats, such as nuts, seeds, soybeans, tofu, and fish. Eat fish at least twice per week; the fish highest in omega-3 fatty acids and lowest in mercury include salmon, herring, mackerel, sardines, and canned chunk light tuna. If you eat fish less than twice per week or have high triglycerides, talk to your doctor about taking fish oil supplements. Read food labels.    For products low in fat and cholesterol, look for fat free, low-fat, cholesterol free, saturated fat free, and trans fat freeAlso scan the Nutrition Facts Label, which lists saturated fat, trans fat, and passages between rooms well lit? Can you reach a lamp without getting out of bed? Are floor surfaces well maintained? Shag rugs, high-pile carpeting, tile floors, and polished wood floors can be particularly slippery. Stairs should always have handrails and be carpeted or fitted with a non-skid tread. Is your telephone easily reachable. Is the cord safely tucked away? Room by Room   According to the Association of Aging, bathrooms and jeanette are the two most potentially hazardous rooms in your home. In the Kitchen    Be sure your stove is in proper working order and always make sure burners and the oven are off before you go out or go to sleep. Keep pots on the back burners, turn handles away from the front of the stove, and keep stove clean and free of grease build-up. Kitchen ventilation systems and range exhausts should be working properly. Keep flammable objects such as towels and pot holders away from the cooking area except when in use. Make sure kitchen curtains are tied back. Move cords and appliances away from the sink and hot surfaces. If extension cords are needed, install wiring guides so they do not hang over the sink, range, or working areas. Look for coffee pots, kettles and toaster ovens with automatic shut-offs. Keep a mop handy in the kitchen so you can wipe up spills instantly. You should also have a small fire extinguisher. Arrange your kitchen with frequently used items on lower shelves to avoid the need to stand on a stepstool to reach them. Make sure countertops are well-lit to avoid injuries while cutting and preparing food. In the Bathroom    Use a non-slip mat or decals in the tub and shower, since wet, soapy tile or porcelain surfaces are extremely slippery. Make sure bathroom rugs are non-skid or tape them firmly to the floor. Bathtubs should have at least one, preferably two, grab bars, firmly attached to structural supports in the wall.  (Do not use built-in soap holders or glass shower doors as grab bars.)    Tub seats fitted with non-slip material on the legs allow you to wash sitting down. For people with limited mobility, bathtub transfer benches allow you to slide safely into the tub. Raised toilet seats and toilet safety rails are helpful for those with knee or hip problems. In the [facility]    Make sure you use a nightlight and that the area around your bed is clear of potential obstacles. Be careful with electric blankets and never go to sleep with a heating pad, which can cause serious burns even if on a low setting. Use fire-resistant mattress covers and pillows, and NEVER smoke in bed. Keep a phone next to the bed that is programmed to dial 911 at the push of a button. If you have a chronic condition, you may want to sign on with an automatic call-in service. Typically the system includes a small pendant that connects directly to an emergency medical voice-response system. You should also make arrangements to stay in contact with someonefriend, neighbor, family memberon a regular schedule. Fire Prevention   According to the [employer]. (Smoke Alarms for Every) [address] [place] [address], senior citizens are one of the two highest risk groups for death and serious injuries due to residential fires. When cooking, wear short-sleeved items, never a bulky long-sleeved robe. The [facility]'s Safety Checklist for Older Consumers emphasizes the importance of checking basements, garages, workshops and storage areas for fire hazards, such as volatile liquids, piles of old rags or clothing and overloaded circuits. Never smoke in bed or when lying down on a couch or recliner chair. Small portable electric or kerosene heaters are responsible for many home fires and should be used cautiously if at all. If you do use one, be sure to keep them away from flammable materials.     In case of fire, make sure you have a pre-established emergency exit plan. Have a professional check your fireplace and other fuel-burning appliances yearly. Hel[employer]   Baby boomers entering the yoder years will continue to see the development of new products to help older adults live safely and independently in spite of age-related changes. Making Life More Livable  , by Jay Serna, lists over 1,000 products for \"living well in the mature years,\" such as bathing and mobility aids, household security devices, ergonomically designed knives and peelers, and faucet valves and knobs for temperature control. Medical supply stores and organizations are good sources of information about products that improve your quality of life and insure your safety. Last Reviewed: November 2009 Gracie Boxer, MD   Updated: 3/7/2011     ·        Learning About Living [place]  What is a living will? A living will is a legal form you use to write down the kind of care you want at the end of your life. It is used by the health professionals who will treat you if you aren't able to decide for yourself. If you put your wishes in writing, your loved ones and others will know what kind of care you want. They won't need to guess. This can ease your mind and be helpful to others. A living will is not the same as an estate or property will. An estate will explains what you want to happen with your money and property after you die. Is a living will a legal document? A living will is a legal document. Each state has its own laws about living roach. If you move to another state, make sure that your living will is legal in the state where you now live. Or you might use a universal form that has been approved by many states. This kind of form can sometimes be completed and stored online. Your electronic copy will then be available wherever you have a connection to the Internet. In most cases, doctors will respect your wishes even if you have a form from a different state.   You don't need an  to complete a living will. But legal advice can be helpful if your state's laws are unclear, your health history is complicated, or your family can't agree on what should be in your living will. You can change your living will at any time. Some people find that their wishes about end-of-life care change as their health changes. In addition to making a living will, think about completing a medical power of  form. This form lets you name the person you want to make end-of-life treatment decisions for you (your \"health care agent\") if you're not able to. Many hospitals and nursing homes will give you the forms you need to complete a living will and a medical power of . Your living will is used only if you can't make or communicate decisions for yourself anymore. If you become able to make decisions again, you can accept or refuse any treatment, no matter what you wrote in your living will. Your state may offer an online registry. This is a place where you can store your living will online so the doctors and nurses who need to treat you can find it right away. What should you think about when creating a living will? Talk about your end-of-life wishes with your family members and your doctor. Let them know what you want. That way the people making decisions for you won't be surprised by your choices. Think about these questions as you make your living will:  Do you know enough about life support methods that might be used? If not, talk to your doctor so you know what might be done if you can't breathe on your own, your heart stops, or you're unable to swallow. What things would you still want to be able to do after you receive life-support methods? Would you want to be able to walk? To speak? To eat on your own? To live without the help of machines? If you have a choice, where do you want to be cared for? In your home? At a hospital or nursing home?   Do you want certain Latter-day practices performed if you become very ill? If you have a choice at the end of your life, where would you prefer to die? At home? In a hospital or nursing home? Somewhere else? Would you prefer to be buried or cremated? Do you want your organs to be donated after you die? What should you do with your living will? Make sure that your family members and your health care agent have copies of your living will. Give your doctor a copy of your living will to keep in your medical record. If you have more than one doctor, make sure that each one has a copy. You may want to put a copy of your living will where it can be easily found. Where can you learn more? Go to https://DATYpepiceweb.[email or web address] org and sign in to your [employer] account. Enter F334 in the [employer] box to learn more about \"Learning About Living [place]. \"     If you do not have an account, please click on the \"Sign Up Now\" link. Current as of: April 18, 2018  Content Version: 12.0  © 6696-0424 Healthwise, Incorporated. Care instructions adapted under license by [facility] [address] If you have questions about a medical condition or this instruction, always ask your healthcare professional. [place] [address] any warranty or liability for your use of this information.     ·

## 2019-06-13 NOTE — PROGRESS NOTES
Medicare Annual Wellness Visit  Name: Shaun Narvaez Date: 2019   MRN: [id number] Sex: Female   Age: 75 y.o. Ethnicity: Non-/Non    : 1955 Race: Black      Stefany Cage is here for [facility]    Patient is here to follow up on diabetes. Fasting blood sugars:110-133 Midday blood sugars: not checking. Patient checks blood glucose 1 times per day. Patient is following diabetic diet. Patient is a nonsmoker. Last ophthalmology visit: 3/2019. Patient is taking daily statin. Patient states she recently saw Dr. Jeana Connell for follow up for thyroid nodules. Was told she needs an ultrasound in July but starting to notice difficulty swallowing and feels lump in her neck. Screenings for behavioral, psychosocial and functional/safety risks, and cognitive dysfunction are all negative except as indicated below. These results, as well as other patient data from the [address] [facility], [place], operated by [facility] form, are documented in Flowsheets linked to this Encounter. Allergies   Allergen Reactions    Atenolol      increased heart beat    Codeine     Lipitor [Atorvastatin]     Percocet [Oxycodone-Acetaminophen]     Vicodin [Hydrocodone-Acetaminophen]     Other Other (See Comments)     Anti-depressants/Anti-psychotic: Causes hallucinations    Allergic to all pain meds can take extra strenghth tylenol       Prior to Visit Medications    Medication Sig Taking?  Authorizing Provider   Prenatal Vit-Fe Fumarate-FA (PRENATAL VITAMIN) 27-1 MG TABS tablet Take 1 tablet by mouth daily Yes Letha Christianson MD   aspirin (ASPIRIN LOW DOSE) 81 MG EC tablet Take 1 tablet by mouth 2 times daily Yes Letha Christianson MD   amLODIPine (NORVASC) 10 MG tablet Take 1 tablet by mouth daily Yes Letha Christianson MD   famotidine (PEPCID) 20 MG tablet Take 1 tablet by mouth daily Yes Letha Christianson MD   SITagliptin-metFORMIN (JANUMET XR)  MG TB24 per extended release tablet 2 po qam Yes John Minor MD   metoprolol succinate (TOPROL XL) 50 MG extended release tablet Take 1 tablet by mouth daily Yes John Minor MD   lisinopril-hydrochlorothiazide (PRINZIDE;ZESTORETIC) 20-12.5 MG per tablet Take 1 tablet by mouth daily Yes John Minor MD   pravastatin (PRAVACHOL) 40 MG tablet Take 1 tablet by mouth nightly Yes John Minor MD   vitamin D (ERGOCALCIFEROL) 47952 units CAPS capsule Take 1 capsule by mouth once a week Yes John Minor MD   Incontinence Supply Disposable (DEPEND UNDERWEAR LARGE) MISC Wear daily Yes John Minor MD   docusate sodium (COLACE) 100 MG capsule Take 1 capsule by mouth 2 times daily Yes Rena Cao,    montelukast (SINGULAIR) 10 MG tablet TAKE 1 TABLET BY MOUTH NIGHTLY Yes Julia Dunbar MD   blood glucose test strips (ONE TOUCH ULTRA TEST) strip Check blood sugar bid Yes John Minor MD   ferrous sulfate 325 (65 Fe) MG tablet Take 1 tablet by mouth 2 times daily (with meals) Yes Windber Dwayne,    mupirocin (BACTROBAN NASAL) 2 % nasal ointment Take by Nasal route 2 times daily. Yes Jose Hargrove, APRN - CNP   Blood Glucose Monitoring Suppl (ONE TOUCH ULTRA 2) w/Device KIT Check blood sugar bid Yes John Minor MD   ONE TOUCH LANCETS MISC Check blood sugar bid. Switch to whatever brand is covered by insurance. Yes John Minor MD   fluticasone-salmeterol (ADVAIR HFA) 720-33 MCG/ACT inhaler Inhale 2 puffs into the lungs 2 times daily Yes John Minor MD   Misc.  Devices [facility] [place] [place] Bedside commode Yes John Minor MD     Past Medical History:   Diagnosis Date    A-fib ([address]     Anemia     Anxiety     Arthritis     Atrial fibrillation (HCC)     CAD (coronary artery disease)     af and heart murmur    DVT (deep venous thrombosis) (HCC)     Emphysema of lung ([address]     Encounter for imaging of bilateral cephalic veins     History of bilateral cephalic vein thrombosis    Headache     History of blood transfusion     Hx of blood clots     Hyperlipidemia     Hypertension     Psychiatric problem     Seizures ([address]     last seizure  15 yrs ago had been on depakote    Thyroid disease     Type II or unspecified type diabetes mellitus without mention of complication, not stated as uncontrolled      Past Surgical History:   Procedure Laterality Date    CARDIAC CATHETERIZATION  12/01/2008    Normal Coronary arteries. Normal LV. Elevated left ventricular end diastolic pressure suggestive of impaired relaxatin and possible diastolic dysfunction    HYSTERECTOMY  2004    LEG SURGERY Right     right hip area, removed cyst iccf developed infection went to [place]    MN SURG EXCISION OF ANAL LESION(S) N/A 1/25/2019    EXCISION PERIANAL WARTS performed by Daina Gary MD at [address] Left 8/8/2018    LEFT HIP TOTAL ARTHROPLASTY (KYLIE) performed by [place] DO at [address] History   Problem Relation Age of Onset    Diabetes Mother     Stroke Father        CareTeam (Including outside providers/suppliers regularly involved in providing care):   Patient Care Team:  Kit Guido MD as PCP - General (Family Medicine)  Kit Guido MD as PCP - [facility] Empaneled Provider  MD Layla Corona MD as Consulting Physician (Cardiology)    Wt Readings from Last 3 Encounters:   06/13/19 186 lb (84.4 kg)   02/28/19 191 lb (86.6 kg)   02/25/19 181 lb (82.1 kg)     Vitals:    06/13/19 1438   BP: 134/78   Pulse: 68   Resp: 20   SpO2: 95%   Weight: 186 lb (84.4 kg)   Height: 5' 2\" (1.575 m)     Body mass index is 34.02 kg/m². Based upon direct observation of the patient, evaluation of cognition reveals recent and remote memory intact. Physical Exam   Constitutional: She is oriented to person, place, and time.  She 34.02 kg/m². Health Habits/Nutrition Interventions:  · Inadequate physical activity:  patient is not ready to increase his/her physical activity level at this time    Safety:  Safety  Do you have working smoke detectors?: Yes  Have all throw rugs been removed or fastened?: (!) No  Do you have non-slip mats in all bathtubs?: Yes  Do all of your stairways have a railing or banister?: Yes  Are your doorways, halls and stairs free of clutter?: Yes  Do you always fasten your seatbelt when you are in a car?: Yes  Safety Interventions:  · none indicated    Personalized Preventive Plan   Current Health Maintenance Status  Immunization History   Administered Date(s) Administered    Pneumococcal 13-valent Conjugate ([id number]) 03/26/2018    Tdap (Boostrix, Adacel) 09/15/2016        Health Maintenance   Topic Date Due    Shingles Vaccine (1 of 2) 01/17/2003    Diabetic foot exam  04/26/2017    DEXA (modify frequency per FRAX score)  01/17/2018    Diabetic retinal exam  01/18/2019    Pneumococcal 65+ years Vaccine (2 of 2 - PPSV23) 03/26/2019    Flu vaccine (Season Ended) 09/01/2019    Diabetic microalbuminuria test  09/25/2019    Lipid screen  12/03/2019    Potassium monitoring  01/25/2020    Creatinine monitoring  01/25/2020    A1C test (Diabetic or Prediabetic)  02/11/2020    Breast cancer screen  03/27/2021    Colon cancer screen colonoscopy  05/07/2026    DTaP/Tdap/Td vaccine (2 - Td) 09/15/2026    Hepatitis C screen  Completed     Recommendations for Preventive Services Due: see orders and patient instructions/AVS.  .   Recommended screening schedule for the next 5-10 years is provided to the patient in written form: see Patient Instructions/AVS.

## 2019-06-16 ENCOUNTER — HOSPITAL ENCOUNTER (EMERGENCY)
Age: 64
Discharge: HOME | End: 2019-06-16
Payer: COMMERCIAL

## 2019-06-16 VITALS
WEIGHT: 168 LBS | TEMPERATURE: 99.5 F | HEART RATE: 78 BPM | RESPIRATION RATE: 18 BRPM | HEIGHT: 63 IN | BODY MASS INDEX: 29.77 KG/M2 | DIASTOLIC BLOOD PRESSURE: 68 MMHG | SYSTOLIC BLOOD PRESSURE: 151 MMHG | OXYGEN SATURATION: 99 %

## 2019-06-16 DIAGNOSIS — H60.391 INFECTIVE OTITIS EXTERNA OF RIGHT EAR: Primary | ICD-10-CM

## 2019-06-16 DIAGNOSIS — B00.1 COLD SORE: ICD-10-CM

## 2019-06-16 DIAGNOSIS — H66.91 RIGHT OTITIS MEDIA, UNSPECIFIED OTITIS MEDIA TYPE: ICD-10-CM

## 2019-06-16 PROCEDURE — 99282 EMERGENCY DEPT VISIT SF MDM: CPT

## 2019-06-16 RX ORDER — CIPROFLOXACIN AND DEXAMETHASONE 3; 1 MG/ML; MG/ML
4 SUSPENSION/ DROPS AURICULAR (OTIC) 2 TIMES DAILY
Qty: 1 BOTTLE | Refills: 0 | Status: SHIPPED | OUTPATIENT
Start: 2019-06-16 | End: 2019-06-23

## 2019-06-16 RX ORDER — ACYCLOVIR 50 MG/G
OINTMENT TOPICAL
Qty: 15 G | Refills: 1 | Status: SHIPPED | OUTPATIENT
Start: 2019-06-16 | End: 2019-06-23

## 2019-06-16 RX ORDER — AMOXICILLIN 500 MG/1
500 CAPSULE ORAL 2 TIMES DAILY
Qty: 20 CAPSULE | Refills: 0 | Status: SHIPPED | OUTPATIENT
Start: 2019-06-16 | End: 2019-06-26

## 2019-06-16 NOTE — ED PROVIDER NOTES
Independent   HPI:  6/16/19, Time: 6:51 PM         Stefany Cage is a 75 y.o. female presenting to the ED for right ear pain as well as a small nose bump and irritation noted below the left nostril. Patient reports that she has been dealing with ear pain now for the past 2 weeks. She does express pain and discomfort but also a muffled type sensation. She denies noticing any fevers denies any cough denies any sore throat. Patient also denies any injury or trauma. Patient also unaware of how she got a small bump states that sometimes she notices it inside her nostrils but there is a small reddened with vesicle area below the the left nostril. Denies any drainage. Review of Systems:   Pertinent positives and negatives are stated within HPI, all other systems reviewed and are negative.          --------------------------------------------- PAST HISTORY ---------------------------------------------  Past Medical History:  has a past medical history of A-fib ([address] Anemia, Anxiety, Arthritis, Atrial fibrillation ([address] CAD (coronary artery disease), DVT (deep venous thrombosis) ([address] Emphysema of lung ([address] Encounter for imaging of bilateral cephalic veins, Headache, History of blood transfusion, Hx of blood clots, Hyperlipidemia, Hypertension, Psychiatric problem, Seizures ([address] Thyroid disease, and Type II or unspecified type diabetes mellitus without mention of complication, not stated as uncontrolled. Past Surgical History:  has a past surgical history that includes Leg Surgery (Right); Cardiac catheterization (12/01/2008); pr total hip arthroplasty (Left, 8/8/2018); Hysterectomy (2004); and pr surg excision of anal lesion(s) (N/A, 1/25/2019). Social History:  reports that she has never smoked. She has never used smokeless tobacco. She reports that she does not drink alcohol or use drugs. Family History: family history includes Diabetes in her mother; Stroke in her father.      The patients home medications have been reviewed. Allergies: Atenolol; Codeine; Lipitor [atorvastatin]; Percocet [oxycodone-acetaminophen]; Vicodin [hydrocodone-acetaminophen]; and Other    -------------------------------------------------- RESULTS -------------------------------------------------  All laboratory and radiology results have been personally reviewed by myself   LABS:  No results found for this visit on 06/16/19. RADIOLOGY:  Interpreted by Radiologist.  No orders to display       ------------------------- NURSING NOTES AND VITALS REVIEWED ---------------------------   The nursing notes within the ED encounter and vital signs as below have been reviewed. BP (!) 151/68   Pulse 78   Temp 99.5 °F (37.5 °C) (Oral)   Resp 18   Ht 5' 3\" (1.6 m)   Wt 168 lb (76.2 kg)   LMP 06/26/1953   SpO2 99%   BMI 29.76 kg/m²   Oxygen Saturation Interpretation: Normal      ---------------------------------------------------PHYSICAL EXAM--------------------------------------      Constitutional/General: Alert and oriented x3, well appearing, non toxic in NAD  Head: Normocephalic and atraumatic  Eyes: PERRL, EOMI  Mouth: Oropharynx clear, handling secretions, no trismus, right TM with erythematous and swollen canal with erythematous tympanic membrane. Patient also with a small reddened with a yellow vesicle formation below the left nostril. No other areas noted. Inferior nasal turbinates without any erythema or concerning areas or polyps. Neck: Supple, full ROM,   Pulmonary: Lungs clear to auscultation bilaterally, no wheezes, rales, or rhonchi. Not in respiratory distress  Cardiovascular:  Regular rate and rhythm, no murmurs, gallops, or rubs. 2+ distal pulses  Abdomen: Soft, non tender, non distended,   Extremities: Moves all extremities x 4.  Warm and well perfused  Skin: warm and dry without rash  Neurologic: GCS 15,  Psych: Normal Affect      ------------------------------ ED COURSE/MEDICAL DECISION MAKING----------------------  Medications - No data to display      ED COURSE:       Medical Decision Making: Patient with component of right otitis media with externa and possible cold sore. Patient was made aware of plan for medication. Patient will be started on Ciprodex as well as amoxicillin and provided with Zovirax cream.  She was made aware of good follow-up care as well as when to return back to the emergency department. Patient neurovascularly and hemodynamically intact. No submandibular or any concerning lymph node enlargement noted. Patient without any wheezing or stridor. Patient was made aware to follow-up with her ENT physician in 1 week if no improvement. Patient expressed understanding and discharged home       Counseling: The emergency provider has spoken with the patient and discussed todays results, in addition to providing specific details for the plan of care and counseling regarding the diagnosis and prognosis. Questions are answered at this time and they are agreeable with the plan.      --------------------------------- IMPRESSION AND DISPOSITION ---------------------------------    IMPRESSION  1. Infective otitis externa of right ear Stable   2. Right otitis media, unspecified otitis media type Stable   3. Cold sore Stable       DISPOSITION  Disposition: Discharge to home  Patient condition is good      NOTE: This report was transcribed using voice recognition software.  Every effort was made to ensure accuracy; however, inadvertent computerized transcription errors may be present     WILLIAM Mata - NILO  06/17/19 0409

## 2019-07-10 ENCOUNTER — APPOINTMENT (OUTPATIENT)
Dept: GENERAL RADIOLOGY | Age: 64
End: 2019-07-10
Payer: COMMERCIAL

## 2019-07-10 ENCOUNTER — HOSPITAL ENCOUNTER (EMERGENCY)
Age: 64
Discharge: HOME | End: 2019-07-10
Payer: COMMERCIAL

## 2019-07-10 VITALS
TEMPERATURE: 98.9 F | WEIGHT: 182 LBS | SYSTOLIC BLOOD PRESSURE: 139 MMHG | HEIGHT: 62 IN | HEART RATE: 66 BPM | OXYGEN SATURATION: 99 % | DIASTOLIC BLOOD PRESSURE: 65 MMHG | RESPIRATION RATE: 19 BRPM | BODY MASS INDEX: 33.49 KG/M2

## 2019-07-10 DIAGNOSIS — J44.9 CHRONIC OBSTRUCTIVE PULMONARY DISEASE, UNSPECIFIED COPD TYPE (HCC): Primary | ICD-10-CM

## 2019-07-10 LAB
BACTERIA: ABNORMAL /HPF
BILIRUBIN URINE: NEGATIVE
BLOOD, URINE: NEGATIVE
CASTS: ABNORMAL /LPF
CLARITY: CLEAR
COLOR: YELLOW
EPITHELIAL CELLS, UA: ABNORMAL /HPF
GLUCOSE URINE: NEGATIVE MG/DL
KETONES, URINE: NEGATIVE MG/DL
LEUKOCYTE ESTERASE, URINE: ABNORMAL
NITRITE, URINE: NEGATIVE
PH UA: 6 (ref 5–9)
PROTEIN UA: 30 MG/DL
RBC UA: ABNORMAL /HPF (ref 0–2)
SPECIFIC GRAVITY UA: >=1.03 (ref 1–1.03)
UROBILINOGEN, URINE: 0.2 E.U./DL
WBC UA: ABNORMAL /HPF (ref 0–5)

## 2019-07-10 PROCEDURE — 99283 EMERGENCY DEPT VISIT LOW MDM: CPT

## 2019-07-10 PROCEDURE — 71046 X-RAY EXAM CHEST 2 VIEWS: CPT

## 2019-07-10 PROCEDURE — 81001 URINALYSIS AUTO W/SCOPE: CPT

## 2019-07-10 RX ORDER — ALBUTEROL SULFATE 90 UG/1
2 AEROSOL, METERED RESPIRATORY (INHALATION) EVERY 4 HOURS PRN
Qty: 1 INHALER | Refills: 1 | Status: SHIPPED | OUTPATIENT
Start: 2019-07-10 | End: 2020-08-28

## 2019-07-10 RX ORDER — PREDNISONE 10 MG/1
40 TABLET ORAL DAILY
Qty: 20 TABLET | Refills: 0 | Status: SHIPPED | OUTPATIENT
Start: 2019-07-10 | End: 2019-07-15

## 2019-07-10 RX ORDER — AZITHROMYCIN 250 MG/1
250 TABLET, FILM COATED ORAL ONCE
Status: DISCONTINUED | OUTPATIENT
Start: 2019-07-10 | End: 2019-07-10 | Stop reason: HOSPADM

## 2019-07-10 RX ORDER — AZITHROMYCIN 250 MG/1
TABLET, FILM COATED ORAL
Qty: 1 PACKET | Refills: 0 | Status: SHIPPED | OUTPATIENT
Start: 2019-07-10 | End: 2019-07-14

## 2019-07-10 ASSESSMENT — PAIN DESCRIPTION - DESCRIPTORS: DESCRIPTORS: BURNING

## 2019-07-10 ASSESSMENT — PAIN DESCRIPTION - LOCATION: LOCATION: NOSE

## 2019-07-10 ASSESSMENT — PAIN DESCRIPTION - PAIN TYPE: TYPE: ACUTE PAIN

## 2019-07-10 ASSESSMENT — PAIN SCALES - GENERAL: PAINLEVEL_OUTOF10: 10

## 2019-07-10 ASSESSMENT — PAIN DESCRIPTION - FREQUENCY: FREQUENCY: CONTINUOUS

## 2019-07-16 ENCOUNTER — HOSPITAL ENCOUNTER (OUTPATIENT)
Dept: ULTRASOUND IMAGING | Age: 64
Discharge: HOME | End: 2019-07-16
Payer: COMMERCIAL

## 2019-07-16 DIAGNOSIS — E04.2 MULTIPLE THYROID NODULES: ICD-10-CM

## 2019-07-16 PROCEDURE — 76536 US EXAM OF HEAD AND NECK: CPT

## 2019-07-17 ENCOUNTER — TELEPHONE (OUTPATIENT)
Dept: FAMILY MEDICINE CLINIC | Age: 64
End: 2019-07-17

## 2019-07-18 ENCOUNTER — OFFICE VISIT (OUTPATIENT)
Dept: FAMILY MEDICINE CLINIC | Age: 64
End: 2019-07-18
Payer: COMMERCIAL

## 2019-07-18 VITALS
HEIGHT: 62 IN | BODY MASS INDEX: 34.41 KG/M2 | RESPIRATION RATE: 20 BRPM | OXYGEN SATURATION: 96 % | DIASTOLIC BLOOD PRESSURE: 70 MMHG | HEART RATE: 80 BPM | SYSTOLIC BLOOD PRESSURE: 134 MMHG | WEIGHT: 187 LBS

## 2019-07-18 DIAGNOSIS — E04.1 THYROID CYST: Primary | ICD-10-CM

## 2019-07-18 PROCEDURE — G8427 DOCREV CUR MEDS BY ELIG CLIN: HCPCS | Performed by: FAMILY MEDICINE

## 2019-07-18 PROCEDURE — G8417 CALC BMI ABV UP PARAM F/U: HCPCS | Performed by: FAMILY MEDICINE

## 2019-07-18 PROCEDURE — 1036F TOBACCO NON-USER: CPT | Performed by: FAMILY MEDICINE

## 2019-07-18 PROCEDURE — G8598 ASA/ANTIPLAT THER USED: HCPCS | Performed by: FAMILY MEDICINE

## 2019-07-18 PROCEDURE — 3017F COLORECTAL CA SCREEN DOC REV: CPT | Performed by: FAMILY MEDICINE

## 2019-07-18 PROCEDURE — 4040F PNEUMOC VAC/ADMIN/RCVD: CPT | Performed by: FAMILY MEDICINE

## 2019-07-18 PROCEDURE — 1123F ACP DISCUSS/DSCN MKR DOCD: CPT | Performed by: FAMILY MEDICINE

## 2019-07-18 PROCEDURE — G8400 PT W/DXA NO RESULTS DOC: HCPCS | Performed by: FAMILY MEDICINE

## 2019-07-18 PROCEDURE — 99213 OFFICE O/P EST LOW 20 MIN: CPT | Performed by: FAMILY MEDICINE

## 2019-07-18 PROCEDURE — 1090F PRES/ABSN URINE INCON ASSESS: CPT | Performed by: FAMILY MEDICINE

## 2019-07-18 ASSESSMENT — ENCOUNTER SYMPTOMS
NAUSEA: 0
SHORTNESS OF BREATH: 0
DIARRHEA: 0
VOMITING: 1

## 2019-07-18 ASSESSMENT — PATIENT HEALTH QUESTIONNAIRE - PHQ9
SUM OF ALL RESPONSES TO PHQ9 QUESTIONS 1 & 2: 0
2. FEELING DOWN, DEPRESSED OR HOPELESS: 0
SUM OF ALL RESPONSES TO PHQ QUESTIONS 1-9: 0
SUM OF ALL RESPONSES TO PHQ QUESTIONS 1-9: 0
1. LITTLE INTEREST OR PLEASURE IN DOING THINGS: 0

## 2019-07-18 NOTE — PROGRESS NOTES
Chief Complaint   Patient presents with    Hypothyroidism       Hypothyroidism:  Patient is here today to follow up chronic hypothyroidism. This {IS/IS [id number]:47137}  generally controlled on current medication regimen. Takes medication as directed and tolerates well. Symptoms from thyroidstandpoint include ***. Most recent labs reviewed with patient and {ARE/ARE NOT:20895} remarkable. Thyroid function in particular {IS/IS QZT:40977} controlled. Patient's past medical, surgical, social and/orfamily history reviewed, updated in chart, and are non-contributory (unless otherwise stated). Medications and allergies also reviewed and updated in chart. /70   Pulse 80   Resp 20   Ht 5' 2\" (1.575 m)   Wt 187 lb (84.8 kg)   LMP 06/26/1953   SpO2 96%   BMI 34.20 kg/m²     Review of Systems    Current Outpatient Medications   Medication Sig Dispense Refill    mupirocin (BACTROBAN NASAL) 2 % nasal ointment Take by Nasal route 2 times daily.  1 Tube 3    albuterol sulfate HFA (PROVENTIL HFA) 108 (90 Base) MCG/ACT inhaler Inhale 2 puffs into the lungs every 4 hours as needed for Wheezing 1 Inhaler 1    Prenatal Vit-Fe Fumarate-FA (PRENATAL VITAMIN) 27-1 MG TABS tablet Take 1 tablet by mouth daily 30 tablet 5    aspirin (ASPIRIN LOW DOSE) 81 MG EC tablet Take 1 tablet by mouth 2 times daily 60 tablet 5    amLODIPine (NORVASC) 10 MG tablet Take 1 tablet by mouth daily 30 tablet 5    famotidine (PEPCID) 20 MG tablet Take 1 tablet by mouth daily 30 tablet 5    SITagliptin-metFORMIN (JANUMET XR)  MG TB24 per extended release tablet 2 po qam 60 tablet 5    metoprolol succinate (TOPROL XL) 50 MG extended release tablet Take 1 tablet by mouth daily 30 tablet 5    lisinopril-hydrochlorothiazide (PRINZIDE;ZESTORETIC) 20-12.5 MG per tablet Take 1 tablet by mouth daily 30 tablet 5    pravastatin (PRAVACHOL) 40 MG tablet Take 1 tablet by mouth nightly 30 tablet 5    vitamin D (ERGOCALCIFEROL) 05820

## 2019-07-18 NOTE — PROGRESS NOTES
[address] [facility], [address]   [address] [facility]   Yoli Burton MD     Patient: Lily Reynoso Birth: 1955  Visit Date: 7/18/19    Janene Montalvo is a 75y.o. year old female here today for   Chief Complaint   Patient presents with    Thyroid Problem       HPI  Patient had recent labs that showed mildly elevated Free T4. Patient had recent thyroid ultrasound that showed 3 cysts/nodules. Is being followed by Dr. Prosper Madison. Review of Systems   Eyes: Negative for visual disturbance. Respiratory: Negative for shortness of breath. Cardiovascular: Negative for chest pain, palpitations and leg swelling. Gastrointestinal: Positive for vomiting (comes and goes). Negative for diarrhea and nausea. Genitourinary: Negative for difficulty urinating, dysuria and frequency. Skin: Negative for rash. Psychiatric/Behavioral: Negative for dysphoric mood. Past medical, surgical, social and/or family historyreviewed, updated as needed, and are non-contributory (unless otherwise stated). Medications, allergies, and problem list also reviewed and updated as needed in patient's record. Current Outpatient Medications   Medication Sig Dispense Refill    mupirocin (BACTROBAN NASAL) 2 % nasal ointment Take by Nasal route 2 times daily.  1 Tube 3    albuterol sulfate HFA (PROVENTIL HFA) 108 (90 Base) MCG/ACT inhaler Inhale 2 puffs into the lungs every 4 hours as needed for Wheezing 1 Inhaler 1    Prenatal Vit-Fe Fumarate-FA (PRENATAL VITAMIN) 27-1 MG TABS tablet Take 1 tablet by mouth daily 30 tablet 5    aspirin (ASPIRIN LOW DOSE) 81 MG EC tablet Take 1 tablet by mouth 2 times daily 60 tablet 5    amLODIPine (NORVASC) 10 MG tablet Take 1 tablet by mouth daily 30 tablet 5    famotidine (PEPCID) 20 MG tablet Take 1 tablet by mouth daily 30 tablet 5    SITagliptin-metFORMIN (JANUMET XR)  MG TB24 per extended release tablet 2 po qam 60

## 2019-07-30 DIAGNOSIS — E55.9 VITAMIN D DEFICIENCY: ICD-10-CM

## 2019-08-01 RX ORDER — ERGOCALCIFEROL 1.25 MG/1
CAPSULE ORAL
Qty: 4 CAPSULE | Refills: 5 | Status: SHIPPED | OUTPATIENT
Start: 2019-08-01 | End: 2020-01-22

## 2019-08-27 DIAGNOSIS — E11.9 TYPE 2 DIABETES MELLITUS WITHOUT COMPLICATION, WITHOUT LONG-TERM CURRENT USE OF INSULIN (HCC): ICD-10-CM

## 2019-08-27 RX ORDER — ASPIRIN 81 MG/1
TABLET, COATED ORAL
Qty: 60 TABLET | Refills: 5 | Status: SHIPPED
Start: 2019-08-27 | End: 2020-02-21 | Stop reason: SDUPTHER

## 2019-09-27 DIAGNOSIS — E11.9 TYPE 2 DIABETES MELLITUS WITHOUT COMPLICATION, WITHOUT LONG-TERM CURRENT USE OF INSULIN (HCC): ICD-10-CM

## 2019-09-27 DIAGNOSIS — I10 ESSENTIAL HYPERTENSION: ICD-10-CM

## 2019-09-27 RX ORDER — FAMOTIDINE 20 MG/1
TABLET, FILM COATED ORAL
Qty: 30 TABLET | Refills: 5 | Status: SHIPPED
Start: 2019-09-27 | End: 2020-04-22

## 2019-09-27 RX ORDER — LISINOPRIL AND HYDROCHLOROTHIAZIDE 20; 12.5 MG/1; MG/1
1 TABLET ORAL DAILY
Qty: 30 TABLET | Refills: 5 | Status: ON HOLD
Start: 2019-09-27 | End: 2020-03-15 | Stop reason: HOSPADM

## 2019-09-27 RX ORDER — METOPROLOL SUCCINATE 50 MG/1
50 TABLET, EXTENDED RELEASE ORAL DAILY
Qty: 30 TABLET | Refills: 5 | Status: SHIPPED
Start: 2019-09-27 | End: 2020-03-26

## 2019-10-02 ENCOUNTER — APPOINTMENT (OUTPATIENT)
Dept: GENERAL RADIOLOGY | Age: 64
End: 2019-10-02
Payer: COMMERCIAL

## 2019-10-02 ENCOUNTER — APPOINTMENT (OUTPATIENT)
Dept: CT IMAGING | Age: 64
End: 2019-10-02
Payer: COMMERCIAL

## 2019-10-02 ENCOUNTER — HOSPITAL ENCOUNTER (EMERGENCY)
Age: 64
Discharge: HOME | End: 2019-10-02
Attending: EMERGENCY MEDICINE
Payer: COMMERCIAL

## 2019-10-02 VITALS
WEIGHT: 187 LBS | HEART RATE: 98 BPM | HEIGHT: 62 IN | TEMPERATURE: 98.7 F | RESPIRATION RATE: 16 BRPM | OXYGEN SATURATION: 98 % | SYSTOLIC BLOOD PRESSURE: 157 MMHG | DIASTOLIC BLOOD PRESSURE: 80 MMHG | BODY MASS INDEX: 34.41 KG/M2

## 2019-10-02 DIAGNOSIS — K06.8 PAIN OF GINGIVA: ICD-10-CM

## 2019-10-02 DIAGNOSIS — R06.02 SHORTNESS OF BREATH: ICD-10-CM

## 2019-10-02 DIAGNOSIS — E27.8 ADRENAL MASS, LEFT (HCC): ICD-10-CM
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
## 2019-10-02 DIAGNOSIS — R05.9 COUGH: ICD-10-CM

## 2019-10-02 DIAGNOSIS — R07.9 CHEST PAIN, UNSPECIFIED TYPE: ICD-10-CM

## 2019-10-02 LAB
ALBUMIN SERPL-MCNC: 4.4 G/DL (ref 3.5–5.2)
ALP BLD-CCNC: 72 U/L (ref 35–104)
ALT SERPL-CCNC: 14 U/L (ref 0–32)
ANION GAP SERPL CALCULATED.3IONS-SCNC: 11 MMOL/L (ref 7–16)
AST SERPL-CCNC: 18 U/L (ref 0–31)
BASOPHILS ABSOLUTE: 0.03 E9/L (ref 0–0.2)
BASOPHILS RELATIVE PERCENT: 0.3 % (ref 0–2)
BILIRUB SERPL-MCNC: 0.6 MG/DL (ref 0–1.2)
BUN BLDV-MCNC: 16 MG/DL (ref 8–23)
CALCIUM SERPL-MCNC: 10.4 MG/DL (ref 8.6–10.2)
CHLORIDE BLD-SCNC: 103 MMOL/L (ref 98–107)
CO2: 25 MMOL/L (ref 22–29)
CREAT SERPL-MCNC: 0.8 MG/DL (ref 0.5–1)
EOSINOPHILS ABSOLUTE: 0.13 E9/L (ref 0.05–0.5)
EOSINOPHILS RELATIVE PERCENT: 1.5 % (ref 0–6)
GFR AFRICAN AMERICAN: >60
GFR NON-AFRICAN AMERICAN: >60 ML/MIN/1.73
GLUCOSE BLD-MCNC: 154 MG/DL (ref 74–99)
HCT VFR BLD CALC: 32.5 % (ref 34–48)
HEMOGLOBIN: 9.6 G/DL (ref 11.5–15.5)
IMMATURE GRANULOCYTES #: 0.04 E9/L
IMMATURE GRANULOCYTES %: 0.5 % (ref 0–5)
LYMPHOCYTES ABSOLUTE: 1.5 E9/L (ref 1.5–4)
LYMPHOCYTES RELATIVE PERCENT: 17.3 % (ref 20–42)
MCH RBC QN AUTO: 24.6 PG (ref 26–35)
MCHC RBC AUTO-ENTMCNC: 29.5 % (ref 32–34.5)
MCV RBC AUTO: 83.1 FL (ref 80–99.9)
MONOCYTES ABSOLUTE: 0.8 E9/L (ref 0.1–0.95)
MONOCYTES RELATIVE PERCENT: 9.2 % (ref 2–12)
NEUTROPHILS ABSOLUTE: 6.18 E9/L (ref 1.8–7.3)
NEUTROPHILS RELATIVE PERCENT: 71.2 % (ref 43–80)
PDW BLD-RTO: 15 FL (ref 11.5–15)
PLATELET # BLD: 232 E9/L (ref 130–450)
PMV BLD AUTO: 9.8 FL (ref 7–12)
POTASSIUM REFLEX MAGNESIUM: 3.7 MMOL/L (ref 3.5–5)
RBC # BLD: 3.91 E12/L (ref 3.5–5.5)
SODIUM BLD-SCNC: 139 MMOL/L (ref 132–146)
TOTAL PROTEIN: 7.3 G/DL (ref 6.4–8.3)
TROPONIN: <0.01 NG/ML (ref 0–0.03)
WBC # BLD: 8.7 E9/L (ref 4.5–11.5)

## 2019-10-02 PROCEDURE — 73521 X-RAY EXAM HIPS BI 2 VIEWS: CPT

## 2019-10-02 PROCEDURE — 72125 CT NECK SPINE W/O DYE: CPT

## 2019-10-02 PROCEDURE — 36415 COLL VENOUS BLD VENIPUNCTURE: CPT

## 2019-10-02 PROCEDURE — 99285 EMERGENCY DEPT VISIT HI MDM: CPT

## 2019-10-02 PROCEDURE — 71046 X-RAY EXAM CHEST 2 VIEWS: CPT

## 2019-10-02 PROCEDURE — 80053 COMPREHEN METABOLIC PANEL: CPT

## 2019-10-02 PROCEDURE — 85025 COMPLETE CBC W/AUTO DIFF WBC: CPT

## 2019-10-02 PROCEDURE — 2580000003 HC RX 258: Performed by: RADIOLOGY

## 2019-10-02 PROCEDURE — 93005 ELECTROCARDIOGRAM TRACING: CPT | Performed by: STUDENT IN AN ORGANIZED HEALTH CARE EDUCATION/TRAINING PROGRAM

## 2019-10-02 PROCEDURE — 84484 ASSAY OF TROPONIN QUANT: CPT

## 2019-10-02 PROCEDURE — 6360000004 HC RX CONTRAST MEDICATION: Performed by: RADIOLOGY

## 2019-10-02 PROCEDURE — 74177 CT ABD & PELVIS W/CONTRAST: CPT

## 2019-10-02 PROCEDURE — 70450 CT HEAD/BRAIN W/O DYE: CPT

## 2019-10-02 RX ORDER — GUAIFENESIN 1200 MG/1
1 TABLET, EXTENDED RELEASE ORAL 2 TIMES DAILY PRN
Qty: 14 TABLET | Refills: 0 | Status: SHIPPED | OUTPATIENT
Start: 2019-10-02 | End: 2019-10-09

## 2019-10-02 RX ORDER — BENZONATATE 100 MG/1
100 CAPSULE ORAL 3 TIMES DAILY PRN
Qty: 21 CAPSULE | Refills: 0 | Status: SHIPPED | OUTPATIENT
Start: 2019-10-02 | End: 2019-10-09

## 2019-10-02 RX ORDER — SODIUM CHLORIDE 0.9 % (FLUSH) 0.9 %
10 SYRINGE (ML) INJECTION PRN
Status: COMPLETED | OUTPATIENT
Start: 2019-10-02 | End: 2019-10-02

## 2019-10-02 RX ADMIN — IOPAMIDOL 110 ML: 755 INJECTION, SOLUTION INTRAVENOUS at 14:26

## 2019-10-02 RX ADMIN — Medication 10 ML: at 14:21

## 2019-10-02 ASSESSMENT — ENCOUNTER SYMPTOMS
BLOOD IN STOOL: 1
SHORTNESS OF BREATH: 1
NAUSEA: 0
TROUBLE SWALLOWING: 0
BACK PAIN: 0
ABDOMINAL PAIN: 1
VOMITING: 0
DIARRHEA: 1
PHOTOPHOBIA: 0
VOICE CHANGE: 0
COUGH: 1

## 2019-10-02 ASSESSMENT — PAIN DESCRIPTION - PAIN TYPE: TYPE: ACUTE PAIN

## 2019-10-03 LAB
EKG ATRIAL RATE: 76 BPM
EKG P AXIS: 71 DEGREES
EKG P-R INTERVAL: 164 MS
EKG Q-T INTERVAL: 410 MS
EKG QRS DURATION: 80 MS
EKG QTC CALCULATION (BAZETT): 461 MS
EKG R AXIS: -6 DEGREES
EKG T AXIS: 61 DEGREES
EKG VENTRICULAR RATE: 76 BPM

## 2019-10-03 PROCEDURE — 93010 ELECTROCARDIOGRAM REPORT: CPT | Performed by: INTERNAL MEDICINE

## 2019-10-16 ENCOUNTER — OFFICE VISIT (OUTPATIENT)
Dept: CARDIOLOGY CLINIC | Age: 64
End: 2019-10-16
Payer: COMMERCIAL

## 2019-10-16 VITALS
DIASTOLIC BLOOD PRESSURE: 60 MMHG | BODY MASS INDEX: 34.96 KG/M2 | SYSTOLIC BLOOD PRESSURE: 110 MMHG | HEIGHT: 62 IN | HEART RATE: 58 BPM | WEIGHT: 190 LBS

## 2019-10-16 DIAGNOSIS — R07.89 ATYPICAL CHEST PAIN: Primary | ICD-10-CM

## 2019-10-16 DIAGNOSIS — I10 ESSENTIAL HYPERTENSION: ICD-10-CM

## 2019-10-16 DIAGNOSIS — E66.8 MODERATE OBESITY: ICD-10-CM

## 2019-10-16 DIAGNOSIS — J44.9 CHRONIC OBSTRUCTIVE PULMONARY DISEASE, UNSPECIFIED COPD TYPE (HCC): ICD-10-CM

## 2019-10-16 DIAGNOSIS — R00.2 PALPITATIONS: ICD-10-CM

## 2019-10-16 DIAGNOSIS — F31.2 BIPOLAR DISORDER, CURRENT EPISODE MANIC SEVERE WITH PSYCHOTIC FEATURES (HCC): ICD-10-CM

## 2019-10-16 DIAGNOSIS — E78.2 MIXED HYPERLIPIDEMIA: ICD-10-CM

## 2019-10-16 DIAGNOSIS — E11.9 TYPE 2 DIABETES MELLITUS WITHOUT COMPLICATION, WITHOUT LONG-TERM CURRENT USE OF INSULIN (HCC): ICD-10-CM

## 2019-10-16 PROCEDURE — 3017F COLORECTAL CA SCREEN DOC REV: CPT | Performed by: INTERNAL MEDICINE

## 2019-10-16 PROCEDURE — 93000 ELECTROCARDIOGRAM COMPLETE: CPT | Performed by: INTERNAL MEDICINE

## 2019-10-16 PROCEDURE — G8400 PT W/DXA NO RESULTS DOC: HCPCS | Performed by: INTERNAL MEDICINE

## 2019-10-16 PROCEDURE — G8417 CALC BMI ABV UP PARAM F/U: HCPCS | Performed by: INTERNAL MEDICINE

## 2019-10-16 PROCEDURE — G8598 ASA/ANTIPLAT THER USED: HCPCS | Performed by: INTERNAL MEDICINE

## 2019-10-16 PROCEDURE — 1123F ACP DISCUSS/DSCN MKR DOCD: CPT | Performed by: INTERNAL MEDICINE

## 2019-10-16 PROCEDURE — 99214 OFFICE O/P EST MOD 30 MIN: CPT | Performed by: INTERNAL MEDICINE

## 2019-10-16 PROCEDURE — 3044F HG A1C LEVEL LT 7.0%: CPT | Performed by: INTERNAL MEDICINE

## 2019-10-16 PROCEDURE — G8926 SPIRO NO PERF OR DOC: HCPCS | Performed by: INTERNAL MEDICINE

## 2019-10-16 PROCEDURE — 3023F SPIROM DOC REV: CPT | Performed by: INTERNAL MEDICINE

## 2019-10-16 PROCEDURE — G8427 DOCREV CUR MEDS BY ELIG CLIN: HCPCS | Performed by: INTERNAL MEDICINE

## 2019-10-16 PROCEDURE — 2022F DILAT RTA XM EVC RTNOPTHY: CPT | Performed by: INTERNAL MEDICINE

## 2019-10-16 PROCEDURE — 1036F TOBACCO NON-USER: CPT | Performed by: INTERNAL MEDICINE

## 2019-10-16 PROCEDURE — 1090F PRES/ABSN URINE INCON ASSESS: CPT | Performed by: INTERNAL MEDICINE

## 2019-10-16 PROCEDURE — G8484 FLU IMMUNIZE NO ADMIN: HCPCS | Performed by: INTERNAL MEDICINE

## 2019-10-16 PROCEDURE — 4040F PNEUMOC VAC/ADMIN/RCVD: CPT | Performed by: INTERNAL MEDICINE

## 2019-10-16 RX ORDER — BUDESONIDE AND FORMOTEROL FUMARATE DIHYDRATE 160; 4.5 UG/1; UG/1
AEROSOL RESPIRATORY (INHALATION)
Refills: 10 | COMMUNITY
Start: 2019-09-30 | End: 2021-01-13 | Stop reason: SDUPTHER

## 2019-10-21 ENCOUNTER — OFFICE VISIT (OUTPATIENT)
Dept: FAMILY MEDICINE CLINIC | Age: 64
End: 2019-10-21
Payer: COMMERCIAL

## 2019-10-21 VITALS
HEIGHT: 62 IN | WEIGHT: 187 LBS | SYSTOLIC BLOOD PRESSURE: 120 MMHG | RESPIRATION RATE: 20 BRPM | HEART RATE: 72 BPM | OXYGEN SATURATION: 97 % | BODY MASS INDEX: 34.41 KG/M2 | DIASTOLIC BLOOD PRESSURE: 74 MMHG

## 2019-10-21 DIAGNOSIS — E11.9 TYPE 2 DIABETES MELLITUS WITHOUT COMPLICATION, WITHOUT LONG-TERM CURRENT USE OF INSULIN (HCC): Primary | ICD-10-CM

## 2019-10-21 DIAGNOSIS — J44.9 CHRONIC OBSTRUCTIVE PULMONARY DISEASE, UNSPECIFIED COPD TYPE (HCC): ICD-10-CM

## 2019-10-21 DIAGNOSIS — I10 ESSENTIAL HYPERTENSION: ICD-10-CM

## 2019-10-21 LAB
CREATININE URINE POCT: NORMAL
HBA1C MFR BLD: 6.4 %
MICROALBUMIN/CREAT 24H UR: NORMAL MG/G{CREAT}
MICROALBUMIN/CREAT UR-RTO: NORMAL

## 2019-10-21 PROCEDURE — 3023F SPIROM DOC REV: CPT | Performed by: FAMILY MEDICINE

## 2019-10-21 PROCEDURE — G8427 DOCREV CUR MEDS BY ELIG CLIN: HCPCS | Performed by: FAMILY MEDICINE

## 2019-10-21 PROCEDURE — G8484 FLU IMMUNIZE NO ADMIN: HCPCS | Performed by: FAMILY MEDICINE

## 2019-10-21 PROCEDURE — G8598 ASA/ANTIPLAT THER USED: HCPCS | Performed by: FAMILY MEDICINE

## 2019-10-21 PROCEDURE — 82044 UR ALBUMIN SEMIQUANTITATIVE: CPT | Performed by: FAMILY MEDICINE

## 2019-10-21 PROCEDURE — 99213 OFFICE O/P EST LOW 20 MIN: CPT | Performed by: FAMILY MEDICINE

## 2019-10-21 PROCEDURE — G8417 CALC BMI ABV UP PARAM F/U: HCPCS | Performed by: FAMILY MEDICINE

## 2019-10-21 PROCEDURE — 1123F ACP DISCUSS/DSCN MKR DOCD: CPT | Performed by: FAMILY MEDICINE

## 2019-10-21 PROCEDURE — 2022F DILAT RTA XM EVC RTNOPTHY: CPT | Performed by: FAMILY MEDICINE

## 2019-10-21 PROCEDURE — 3044F HG A1C LEVEL LT 7.0%: CPT | Performed by: FAMILY MEDICINE

## 2019-10-21 PROCEDURE — 1090F PRES/ABSN URINE INCON ASSESS: CPT | Performed by: FAMILY MEDICINE

## 2019-10-21 PROCEDURE — 4040F PNEUMOC VAC/ADMIN/RCVD: CPT | Performed by: FAMILY MEDICINE

## 2019-10-21 PROCEDURE — G8926 SPIRO NO PERF OR DOC: HCPCS | Performed by: FAMILY MEDICINE

## 2019-10-21 PROCEDURE — 83036 HEMOGLOBIN GLYCOSYLATED A1C: CPT | Performed by: FAMILY MEDICINE

## 2019-10-21 PROCEDURE — G8400 PT W/DXA NO RESULTS DOC: HCPCS | Performed by: FAMILY MEDICINE

## 2019-10-21 PROCEDURE — 1036F TOBACCO NON-USER: CPT | Performed by: FAMILY MEDICINE

## 2019-10-21 PROCEDURE — 3017F COLORECTAL CA SCREEN DOC REV: CPT | Performed by: FAMILY MEDICINE

## 2019-10-21 RX ORDER — AMLODIPINE BESYLATE 10 MG/1
10 TABLET ORAL DAILY
Qty: 30 TABLET | Refills: 5 | Status: SHIPPED
Start: 2019-10-21 | End: 2020-04-22

## 2019-10-21 RX ORDER — PRAVASTATIN SODIUM 40 MG
40 TABLET ORAL NIGHTLY
Qty: 30 TABLET | Refills: 5 | Status: SHIPPED
Start: 2019-10-21 | End: 2020-06-20

## 2019-10-21 ASSESSMENT — ENCOUNTER SYMPTOMS
VOMITING: 0
DIARRHEA: 0
NAUSEA: 0
SHORTNESS OF BREATH: 1

## 2019-10-21 ASSESSMENT — PATIENT HEALTH QUESTIONNAIRE - PHQ9
SUM OF ALL RESPONSES TO PHQ QUESTIONS 1-9: 0
1. LITTLE INTEREST OR PLEASURE IN DOING THINGS: 0
2. FEELING DOWN, DEPRESSED OR HOPELESS: 0
SUM OF ALL RESPONSES TO PHQ QUESTIONS 1-9: 0
SUM OF ALL RESPONSES TO PHQ9 QUESTIONS 1 & 2: 0

## 2019-11-21 ENCOUNTER — HOSPITAL ENCOUNTER (EMERGENCY)
Age: 64
Discharge: HOME | End: 2019-11-21
Attending: EMERGENCY MEDICINE
Payer: COMMERCIAL

## 2019-11-21 ENCOUNTER — APPOINTMENT (OUTPATIENT)
Dept: CT IMAGING | Age: 64
End: 2019-11-21
Payer: COMMERCIAL

## 2019-11-21 VITALS
RESPIRATION RATE: 18 BRPM | HEART RATE: 65 BPM | TEMPERATURE: 98.2 F | BODY MASS INDEX: 34.96 KG/M2 | WEIGHT: 190 LBS | SYSTOLIC BLOOD PRESSURE: 124 MMHG | DIASTOLIC BLOOD PRESSURE: 53 MMHG | HEIGHT: 62 IN | OXYGEN SATURATION: 97 %

## 2019-11-21 DIAGNOSIS — R51.9 ACUTE NONINTRACTABLE HEADACHE, UNSPECIFIED HEADACHE TYPE: Primary | ICD-10-CM

## 2019-11-21 DIAGNOSIS — R53.81 MALAISE: ICD-10-CM

## 2019-11-21 LAB
ALBUMIN SERPL-MCNC: 4.2 G/DL (ref 3.5–5.2)
ALP BLD-CCNC: 61 U/L (ref 35–104)
ALT SERPL-CCNC: 11 U/L (ref 0–32)
ANION GAP SERPL CALCULATED.3IONS-SCNC: 12 MMOL/L (ref 7–16)
APTT: 34.4 SEC (ref 24.5–35.1)
AST SERPL-CCNC: 16 U/L (ref 0–31)
BACTERIA: ABNORMAL /HPF
BASOPHILS ABSOLUTE: 0.03 E9/L (ref 0–0.2)
BASOPHILS RELATIVE PERCENT: 0.4 % (ref 0–2)
BILIRUB SERPL-MCNC: 0.5 MG/DL (ref 0–1.2)
BILIRUBIN URINE: NEGATIVE
BLOOD, URINE: ABNORMAL
BUN BLDV-MCNC: 27 MG/DL (ref 8–23)
CALCIUM SERPL-MCNC: 10.6 MG/DL (ref 8.6–10.2)
CHLORIDE BLD-SCNC: 106 MMOL/L (ref 98–107)
CLARITY: CLEAR
CO2: 19 MMOL/L (ref 22–29)
COLOR: YELLOW
CREAT SERPL-MCNC: 0.8 MG/DL (ref 0.5–1)
EOSINOPHILS ABSOLUTE: 0.12 E9/L (ref 0.05–0.5)
EOSINOPHILS RELATIVE PERCENT: 1.8 % (ref 0–6)
EPITHELIAL CELLS, UA: ABNORMAL /HPF
GFR AFRICAN AMERICAN: >60
GFR NON-AFRICAN AMERICAN: >60 ML/MIN/1.73
GLUCOSE BLD-MCNC: 89 MG/DL (ref 74–99)
GLUCOSE URINE: NEGATIVE MG/DL
HCT VFR BLD CALC: 31.2 % (ref 34–48)
HEMOGLOBIN: 9.6 G/DL (ref 11.5–15.5)
IMMATURE GRANULOCYTES #: 0.03 E9/L
IMMATURE GRANULOCYTES %: 0.4 % (ref 0–5)
INR BLD: 1
KETONES, URINE: NEGATIVE MG/DL
LACTIC ACID: 1 MMOL/L (ref 0.5–2.2)
LEUKOCYTE ESTERASE, URINE: NEGATIVE
LIPASE: 40 U/L (ref 13–60)
LYMPHOCYTES ABSOLUTE: 1.62 E9/L (ref 1.5–4)
LYMPHOCYTES RELATIVE PERCENT: 23.9 % (ref 20–42)
MCH RBC QN AUTO: 24.8 PG (ref 26–35)
MCHC RBC AUTO-ENTMCNC: 30.8 % (ref 32–34.5)
MCV RBC AUTO: 80.6 FL (ref 80–99.9)
MONOCYTES ABSOLUTE: 0.68 E9/L (ref 0.1–0.95)
MONOCYTES RELATIVE PERCENT: 10 % (ref 2–12)
NEUTROPHILS ABSOLUTE: 4.3 E9/L (ref 1.8–7.3)
NEUTROPHILS RELATIVE PERCENT: 63.5 % (ref 43–80)
NITRITE, URINE: NEGATIVE
PDW BLD-RTO: 14.1 FL (ref 11.5–15)
PH UA: 6 (ref 5–9)
PLATELET # BLD: 241 E9/L (ref 130–450)
PMV BLD AUTO: 9.1 FL (ref 7–12)
POTASSIUM SERPL-SCNC: 3.3 MMOL/L (ref 3.5–5)
PROTEIN UA: NEGATIVE MG/DL
PROTHROMBIN TIME: 11.1 SEC (ref 9.3–12.4)
RBC # BLD: 3.87 E12/L (ref 3.5–5.5)
RBC UA: ABNORMAL /HPF (ref 0–2)
SODIUM BLD-SCNC: 137 MMOL/L (ref 132–146)
SPECIFIC GRAVITY UA: 1.01 (ref 1–1.03)
TOTAL PROTEIN: 7 G/DL (ref 6.4–8.3)
TROPONIN: <0.01 NG/ML (ref 0–0.03)
UROBILINOGEN, URINE: 0.2 E.U./DL
WBC # BLD: 6.8 E9/L (ref 4.5–11.5)
WBC UA: ABNORMAL /HPF (ref 0–5)

## 2019-11-21 PROCEDURE — 81001 URINALYSIS AUTO W/SCOPE: CPT

## 2019-11-21 PROCEDURE — 85730 THROMBOPLASTIN TIME PARTIAL: CPT

## 2019-11-21 PROCEDURE — 70450 CT HEAD/BRAIN W/O DYE: CPT

## 2019-11-21 PROCEDURE — 83605 ASSAY OF LACTIC ACID: CPT

## 2019-11-21 PROCEDURE — 80053 COMPREHEN METABOLIC PANEL: CPT

## 2019-11-21 PROCEDURE — 84484 ASSAY OF TROPONIN QUANT: CPT

## 2019-11-21 PROCEDURE — 93005 ELECTROCARDIOGRAM TRACING: CPT | Performed by: EMERGENCY MEDICINE

## 2019-11-21 PROCEDURE — 85025 COMPLETE CBC W/AUTO DIFF WBC: CPT

## 2019-11-21 PROCEDURE — 36415 COLL VENOUS BLD VENIPUNCTURE: CPT

## 2019-11-21 PROCEDURE — 6370000000 HC RX 637 (ALT 250 FOR IP): Performed by: EMERGENCY MEDICINE

## 2019-11-21 PROCEDURE — 83690 ASSAY OF LIPASE: CPT

## 2019-11-21 PROCEDURE — 85610 PROTHROMBIN TIME: CPT

## 2019-11-21 PROCEDURE — 99284 EMERGENCY DEPT VISIT MOD MDM: CPT

## 2019-11-21 RX ORDER — KETOROLAC TROMETHAMINE 15 MG/ML
15 INJECTION, SOLUTION INTRAMUSCULAR; INTRAVENOUS ONCE
Status: DISCONTINUED | OUTPATIENT
Start: 2019-11-21 | End: 2019-11-21 | Stop reason: HOSPADM

## 2019-11-21 RX ORDER — POTASSIUM CHLORIDE 20 MEQ/1
40 TABLET, EXTENDED RELEASE ORAL ONCE
Status: COMPLETED | OUTPATIENT
Start: 2019-11-21 | End: 2019-11-21

## 2019-11-21 RX ADMIN — POTASSIUM CHLORIDE 40 MEQ: 20 TABLET, EXTENDED RELEASE ORAL at 03:36

## 2019-11-21 ASSESSMENT — ENCOUNTER SYMPTOMS
ABDOMINAL PAIN: 1
SINUS PRESSURE: 0
COUGH: 0
SHORTNESS OF BREATH: 0
WHEEZING: 0
VOMITING: 0
ABDOMINAL DISTENTION: 0
SORE THROAT: 0
EYE REDNESS: 0
EYE DISCHARGE: 0
NAUSEA: 0
BACK PAIN: 0
EYE PAIN: 0
DIARRHEA: 1

## 2019-11-21 ASSESSMENT — PAIN DESCRIPTION - LOCATION: LOCATION: HEAD

## 2019-11-21 ASSESSMENT — PAIN DESCRIPTION - FREQUENCY: FREQUENCY: CONTINUOUS

## 2019-11-21 ASSESSMENT — PAIN DESCRIPTION - PAIN TYPE: TYPE: ACUTE PAIN

## 2019-11-21 ASSESSMENT — PAIN DESCRIPTION - ORIENTATION: ORIENTATION: POSTERIOR;ANTERIOR

## 2019-11-21 ASSESSMENT — PAIN DESCRIPTION - DESCRIPTORS: DESCRIPTORS: ACHING;HEADACHE

## 2019-11-22 LAB
EKG ATRIAL RATE: 64 BPM
EKG P AXIS: 62 DEGREES
EKG P-R INTERVAL: 178 MS
EKG Q-T INTERVAL: 398 MS
EKG QRS DURATION: 84 MS
EKG QTC CALCULATION (BAZETT): 410 MS
EKG R AXIS: -8 DEGREES
EKG T AXIS: 49 DEGREES
EKG VENTRICULAR RATE: 64 BPM

## 2019-11-25 ENCOUNTER — OFFICE VISIT (OUTPATIENT)
Dept: FAMILY MEDICINE CLINIC | Age: 64
End: 2019-11-25
Payer: COMMERCIAL

## 2019-11-25 VITALS
DIASTOLIC BLOOD PRESSURE: 70 MMHG | HEIGHT: 62 IN | HEART RATE: 88 BPM | BODY MASS INDEX: 34.78 KG/M2 | OXYGEN SATURATION: 99 % | RESPIRATION RATE: 20 BRPM | SYSTOLIC BLOOD PRESSURE: 122 MMHG | WEIGHT: 189 LBS

## 2019-11-25 DIAGNOSIS — J34.89 NASAL MUCOSA DRY: ICD-10-CM

## 2019-11-25 DIAGNOSIS — K52.9 ACUTE GASTROENTERITIS: Primary | ICD-10-CM

## 2019-11-25 DIAGNOSIS — E86.0 DEHYDRATION: ICD-10-CM

## 2019-11-25 PROCEDURE — G8417 CALC BMI ABV UP PARAM F/U: HCPCS | Performed by: FAMILY MEDICINE

## 2019-11-25 PROCEDURE — G8427 DOCREV CUR MEDS BY ELIG CLIN: HCPCS | Performed by: FAMILY MEDICINE

## 2019-11-25 PROCEDURE — 1090F PRES/ABSN URINE INCON ASSESS: CPT | Performed by: FAMILY MEDICINE

## 2019-11-25 PROCEDURE — 1123F ACP DISCUSS/DSCN MKR DOCD: CPT | Performed by: FAMILY MEDICINE

## 2019-11-25 PROCEDURE — G8598 ASA/ANTIPLAT THER USED: HCPCS | Performed by: FAMILY MEDICINE

## 2019-11-25 PROCEDURE — G8400 PT W/DXA NO RESULTS DOC: HCPCS | Performed by: FAMILY MEDICINE

## 2019-11-25 PROCEDURE — 1036F TOBACCO NON-USER: CPT | Performed by: FAMILY MEDICINE

## 2019-11-25 PROCEDURE — 3017F COLORECTAL CA SCREEN DOC REV: CPT | Performed by: FAMILY MEDICINE

## 2019-11-25 PROCEDURE — 4040F PNEUMOC VAC/ADMIN/RCVD: CPT | Performed by: FAMILY MEDICINE

## 2019-11-25 PROCEDURE — G8484 FLU IMMUNIZE NO ADMIN: HCPCS | Performed by: FAMILY MEDICINE

## 2019-11-25 PROCEDURE — 99214 OFFICE O/P EST MOD 30 MIN: CPT | Performed by: FAMILY MEDICINE

## 2019-11-25 RX ORDER — LOPERAMIDE HYDROCHLORIDE 2 MG/1
2 TABLET ORAL 4 TIMES DAILY PRN
Qty: 60 TABLET | Refills: 0 | Status: SHIPPED | OUTPATIENT
Start: 2019-11-25 | End: 2019-12-17 | Stop reason: SDUPTHER

## 2019-11-25 RX ORDER — PROMETHAZINE HYDROCHLORIDE 25 MG/1
25 TABLET ORAL 3 TIMES DAILY PRN
Qty: 30 TABLET | Refills: 0 | Status: SHIPPED | OUTPATIENT
Start: 2019-11-25 | End: 2019-12-17 | Stop reason: SDUPTHER

## 2019-11-25 ASSESSMENT — ENCOUNTER SYMPTOMS
NAUSEA: 1
DIARRHEA: 1
VOMITING: 1

## 2019-11-27 ENCOUNTER — TELEPHONE (OUTPATIENT)
Dept: FAMILY MEDICINE CLINIC | Age: 64
End: 2019-11-27

## 2019-11-28 ENCOUNTER — HOSPITAL ENCOUNTER (EMERGENCY)
Age: 64
Discharge: HOME | End: 2019-11-28
Attending: EMERGENCY MEDICINE
Payer: COMMERCIAL

## 2019-11-28 ENCOUNTER — APPOINTMENT (OUTPATIENT)
Dept: GENERAL RADIOLOGY | Age: 64
End: 2019-11-28
Payer: COMMERCIAL

## 2019-11-28 VITALS
RESPIRATION RATE: 17 BRPM | HEART RATE: 65 BPM | SYSTOLIC BLOOD PRESSURE: 120 MMHG | HEIGHT: 63 IN | OXYGEN SATURATION: 99 % | DIASTOLIC BLOOD PRESSURE: 73 MMHG | WEIGHT: 186 LBS | BODY MASS INDEX: 32.96 KG/M2 | TEMPERATURE: 98.3 F

## 2019-11-28 DIAGNOSIS — E83.42 HYPOMAGNESEMIA: ICD-10-CM

## 2019-11-28 DIAGNOSIS — E87.6 HYPOKALEMIA: ICD-10-CM

## 2019-11-28 DIAGNOSIS — I50.9 ACUTE CONGESTIVE HEART FAILURE, UNSPECIFIED HEART FAILURE TYPE (HCC): Primary | ICD-10-CM

## 2019-11-28 LAB
ALBUMIN SERPL-MCNC: 3.9 G/DL (ref 3.5–5.2)
ALP BLD-CCNC: 67 U/L (ref 35–104)
ALT SERPL-CCNC: 10 U/L (ref 0–32)
ANION GAP SERPL CALCULATED.3IONS-SCNC: 13 MMOL/L (ref 7–16)
AST SERPL-CCNC: 12 U/L (ref 0–31)
BASOPHILS ABSOLUTE: 0.02 E9/L (ref 0–0.2)
BASOPHILS RELATIVE PERCENT: 0.3 % (ref 0–2)
BILIRUB SERPL-MCNC: 0.4 MG/DL (ref 0–1.2)
BUN BLDV-MCNC: 12 MG/DL (ref 8–23)
CALCIUM SERPL-MCNC: 10 MG/DL (ref 8.6–10.2)
CHLORIDE BLD-SCNC: 108 MMOL/L (ref 98–107)
CO2: 20 MMOL/L (ref 22–29)
CREAT SERPL-MCNC: 0.7 MG/DL (ref 0.5–1)
EOSINOPHILS ABSOLUTE: 0.16 E9/L (ref 0.05–0.5)
EOSINOPHILS RELATIVE PERCENT: 2.4 % (ref 0–6)
GFR AFRICAN AMERICAN: >60
GFR NON-AFRICAN AMERICAN: >60 ML/MIN/1.73
GLUCOSE BLD-MCNC: 114 MG/DL (ref 74–99)
HCT VFR BLD CALC: 29.8 % (ref 34–48)
HEMOGLOBIN: 9.2 G/DL (ref 11.5–15.5)
IMMATURE GRANULOCYTES #: 0.04 E9/L
IMMATURE GRANULOCYTES %: 0.6 % (ref 0–5)
LYMPHOCYTES ABSOLUTE: 1.45 E9/L (ref 1.5–4)
LYMPHOCYTES RELATIVE PERCENT: 21.6 % (ref 20–42)
MAGNESIUM: 1.3 MG/DL (ref 1.6–2.6)
MCH RBC QN AUTO: 24.6 PG (ref 26–35)
MCHC RBC AUTO-ENTMCNC: 30.9 % (ref 32–34.5)
MCV RBC AUTO: 79.7 FL (ref 80–99.9)
MONOCYTES ABSOLUTE: 0.48 E9/L (ref 0.1–0.95)
MONOCYTES RELATIVE PERCENT: 7.1 % (ref 2–12)
NEUTROPHILS ABSOLUTE: 4.57 E9/L (ref 1.8–7.3)
NEUTROPHILS RELATIVE PERCENT: 68 % (ref 43–80)
PDW BLD-RTO: 14.1 FL (ref 11.5–15)
PLATELET # BLD: 216 E9/L (ref 130–450)
PMV BLD AUTO: 9.3 FL (ref 7–12)
POTASSIUM REFLEX MAGNESIUM: 3.3 MMOL/L (ref 3.5–5)
RBC # BLD: 3.74 E12/L (ref 3.5–5.5)
SODIUM BLD-SCNC: 141 MMOL/L (ref 132–146)
TOTAL PROTEIN: 6.8 G/DL (ref 6.4–8.3)
TROPONIN: <0.01 NG/ML (ref 0–0.03)
WBC # BLD: 6.7 E9/L (ref 4.5–11.5)

## 2019-11-28 PROCEDURE — 96375 TX/PRO/DX INJ NEW DRUG ADDON: CPT

## 2019-11-28 PROCEDURE — 85025 COMPLETE CBC W/AUTO DIFF WBC: CPT

## 2019-11-28 PROCEDURE — 2580000003 HC RX 258: Performed by: STUDENT IN AN ORGANIZED HEALTH CARE EDUCATION/TRAINING PROGRAM

## 2019-11-28 PROCEDURE — 93005 ELECTROCARDIOGRAM TRACING: CPT | Performed by: STUDENT IN AN ORGANIZED HEALTH CARE EDUCATION/TRAINING PROGRAM

## 2019-11-28 PROCEDURE — 99285 EMERGENCY DEPT VISIT HI MDM: CPT

## 2019-11-28 PROCEDURE — 36415 COLL VENOUS BLD VENIPUNCTURE: CPT

## 2019-11-28 PROCEDURE — 71046 X-RAY EXAM CHEST 2 VIEWS: CPT

## 2019-11-28 PROCEDURE — 83735 ASSAY OF MAGNESIUM: CPT

## 2019-11-28 PROCEDURE — 6370000000 HC RX 637 (ALT 250 FOR IP): Performed by: STUDENT IN AN ORGANIZED HEALTH CARE EDUCATION/TRAINING PROGRAM

## 2019-11-28 PROCEDURE — 80053 COMPREHEN METABOLIC PANEL: CPT

## 2019-11-28 PROCEDURE — 96365 THER/PROPH/DIAG IV INF INIT: CPT

## 2019-11-28 PROCEDURE — 6360000002 HC RX W HCPCS: Performed by: STUDENT IN AN ORGANIZED HEALTH CARE EDUCATION/TRAINING PROGRAM

## 2019-11-28 PROCEDURE — 84484 ASSAY OF TROPONIN QUANT: CPT

## 2019-11-28 RX ORDER — FUROSEMIDE 10 MG/ML
20 INJECTION INTRAMUSCULAR; INTRAVENOUS ONCE
Status: COMPLETED | OUTPATIENT
Start: 2019-11-28 | End: 2019-11-28

## 2019-11-28 RX ORDER — 0.9 % SODIUM CHLORIDE 0.9 %
1000 INTRAVENOUS SOLUTION INTRAVENOUS ONCE
Status: COMPLETED | OUTPATIENT
Start: 2019-11-28 | End: 2019-11-28

## 2019-11-28 RX ORDER — ONDANSETRON 2 MG/ML
4 INJECTION INTRAMUSCULAR; INTRAVENOUS ONCE
Status: COMPLETED | OUTPATIENT
Start: 2019-11-28 | End: 2019-11-28

## 2019-11-28 RX ORDER — MAGNESIUM SULFATE 1 G/100ML
1 INJECTION INTRAVENOUS ONCE
Status: COMPLETED | OUTPATIENT
Start: 2019-11-28 | End: 2019-11-28

## 2019-11-28 RX ORDER — ASPIRIN 81 MG/1
324 TABLET, CHEWABLE ORAL ONCE
Status: COMPLETED | OUTPATIENT
Start: 2019-11-28 | End: 2019-11-28

## 2019-11-28 RX ADMIN — ONDANSETRON 4 MG: 2 INJECTION INTRAMUSCULAR; INTRAVENOUS at 07:11

## 2019-11-28 RX ADMIN — SODIUM CHLORIDE 1000 ML: 9 INJECTION, SOLUTION INTRAVENOUS at 07:11

## 2019-11-28 RX ADMIN — POTASSIUM BICARBONATE 20 MEQ: 782 TABLET, EFFERVESCENT ORAL at 10:06

## 2019-11-28 RX ADMIN — FUROSEMIDE 20 MG: 10 INJECTION INTRAMUSCULAR; INTRAVENOUS at 10:45

## 2019-11-28 RX ADMIN — MAGNESIUM SULFATE 1 G: 1 INJECTION INTRAVENOUS at 10:06

## 2019-11-28 RX ADMIN — ASPIRIN 81 MG 324 MG: 81 TABLET ORAL at 07:11

## 2019-11-28 ASSESSMENT — ENCOUNTER SYMPTOMS
DIARRHEA: 1
BACK PAIN: 0
NAUSEA: 1
ABDOMINAL PAIN: 0
COUGH: 0
VOMITING: 1
SHORTNESS OF BREATH: 1
PHOTOPHOBIA: 0
TROUBLE SWALLOWING: 0

## 2019-11-29 LAB
EKG ATRIAL RATE: 67 BPM
EKG P AXIS: 73 DEGREES
EKG P-R INTERVAL: 174 MS
EKG Q-T INTERVAL: 420 MS
EKG QRS DURATION: 90 MS
EKG QTC CALCULATION (BAZETT): 443 MS
EKG R AXIS: 3 DEGREES
EKG T AXIS: 30 DEGREES
EKG VENTRICULAR RATE: 67 BPM

## 2019-11-29 PROCEDURE — 93010 ELECTROCARDIOGRAM REPORT: CPT | Performed by: INTERNAL MEDICINE

## 2019-12-02 ENCOUNTER — OFFICE VISIT (OUTPATIENT)
Dept: FAMILY MEDICINE CLINIC | Age: 64
End: 2019-12-02
Payer: COMMERCIAL

## 2019-12-02 ENCOUNTER — TELEPHONE (OUTPATIENT)
Dept: FAMILY MEDICINE CLINIC | Age: 64
End: 2019-12-02

## 2019-12-02 VITALS
WEIGHT: 180 LBS | OXYGEN SATURATION: 98 % | HEART RATE: 77 BPM | BODY MASS INDEX: 31.89 KG/M2 | SYSTOLIC BLOOD PRESSURE: 132 MMHG | HEIGHT: 63 IN | RESPIRATION RATE: 20 BRPM | DIASTOLIC BLOOD PRESSURE: 70 MMHG

## 2019-12-02 DIAGNOSIS — I50.31 ACUTE DIASTOLIC CONGESTIVE HEART FAILURE (HCC): Primary | ICD-10-CM

## 2019-12-02 PROCEDURE — G8400 PT W/DXA NO RESULTS DOC: HCPCS | Performed by: FAMILY MEDICINE

## 2019-12-02 PROCEDURE — G8484 FLU IMMUNIZE NO ADMIN: HCPCS | Performed by: FAMILY MEDICINE

## 2019-12-02 PROCEDURE — 4040F PNEUMOC VAC/ADMIN/RCVD: CPT | Performed by: FAMILY MEDICINE

## 2019-12-02 PROCEDURE — G8417 CALC BMI ABV UP PARAM F/U: HCPCS | Performed by: FAMILY MEDICINE

## 2019-12-02 PROCEDURE — 1036F TOBACCO NON-USER: CPT | Performed by: FAMILY MEDICINE

## 2019-12-02 PROCEDURE — G8427 DOCREV CUR MEDS BY ELIG CLIN: HCPCS | Performed by: FAMILY MEDICINE

## 2019-12-02 PROCEDURE — G8598 ASA/ANTIPLAT THER USED: HCPCS | Performed by: FAMILY MEDICINE

## 2019-12-02 PROCEDURE — 1090F PRES/ABSN URINE INCON ASSESS: CPT | Performed by: FAMILY MEDICINE

## 2019-12-02 PROCEDURE — 1123F ACP DISCUSS/DSCN MKR DOCD: CPT | Performed by: FAMILY MEDICINE

## 2019-12-02 PROCEDURE — 99214 OFFICE O/P EST MOD 30 MIN: CPT | Performed by: FAMILY MEDICINE

## 2019-12-02 PROCEDURE — 3017F COLORECTAL CA SCREEN DOC REV: CPT | Performed by: FAMILY MEDICINE

## 2019-12-02 RX ORDER — MAGNESIUM OXIDE 400 MG/1
400 TABLET ORAL DAILY
Qty: 30 TABLET | Refills: 2 | Status: SHIPPED | OUTPATIENT
Start: 2019-12-02 | End: 2020-01-22 | Stop reason: SDUPTHER

## 2019-12-02 RX ORDER — FUROSEMIDE 20 MG/1
20 TABLET ORAL DAILY PRN
Qty: 30 TABLET | Refills: 3 | Status: ON HOLD
Start: 2019-12-02 | End: 2020-03-15 | Stop reason: HOSPADM

## 2019-12-02 RX ORDER — POTASSIUM CHLORIDE 750 MG/1
10 TABLET, EXTENDED RELEASE ORAL DAILY PRN
Qty: 30 TABLET | Refills: 3 | Status: ON HOLD
Start: 2019-12-02 | End: 2021-07-16 | Stop reason: HOSPADM

## 2019-12-02 ASSESSMENT — ENCOUNTER SYMPTOMS
NAUSEA: 1
VOMITING: 1
DIARRHEA: 1
SHORTNESS OF BREATH: 1

## 2019-12-17 DIAGNOSIS — K52.9 ACUTE GASTROENTERITIS: ICD-10-CM

## 2019-12-17 RX ORDER — PROMETHAZINE HYDROCHLORIDE 25 MG/1
TABLET ORAL
Qty: 30 TABLET | Refills: 0 | Status: SHIPPED | OUTPATIENT
Start: 2019-12-17 | End: 2019-12-23

## 2019-12-17 RX ORDER — LOPERAMIDE HYDROCHLORIDE 2 MG/1
CAPSULE ORAL
Qty: 60 CAPSULE | Refills: 0 | Status: SHIPPED | OUTPATIENT
Start: 2019-12-17 | End: 2019-12-23

## 2019-12-20 DIAGNOSIS — K52.9 ACUTE GASTROENTERITIS: ICD-10-CM

## 2019-12-23 RX ORDER — LOPERAMIDE HYDROCHLORIDE 2 MG/1
CAPSULE ORAL
Qty: 60 CAPSULE | Refills: 2 | Status: SHIPPED
Start: 2019-12-23 | End: 2020-03-26

## 2019-12-23 RX ORDER — PROMETHAZINE HYDROCHLORIDE 25 MG/1
TABLET ORAL
Qty: 60 TABLET | Refills: 1 | Status: SHIPPED
Start: 2019-12-23 | End: 2020-02-21 | Stop reason: SDUPTHER

## 2020-01-22 RX ORDER — MAGNESIUM OXIDE 400 MG/1
400 TABLET ORAL DAILY
Qty: 30 TABLET | Refills: 2 | Status: SHIPPED
Start: 2020-01-22 | End: 2020-04-22

## 2020-01-22 RX ORDER — ERGOCALCIFEROL 1.25 MG/1
CAPSULE ORAL
Qty: 4 CAPSULE | Refills: 5 | Status: SHIPPED
Start: 2020-01-22 | End: 2020-07-21

## 2020-02-04 ENCOUNTER — OFFICE VISIT (OUTPATIENT)
Dept: FAMILY MEDICINE CLINIC | Age: 65
End: 2020-02-04
Payer: COMMERCIAL

## 2020-02-04 VITALS
HEIGHT: 63 IN | BODY MASS INDEX: 32.07 KG/M2 | HEART RATE: 75 BPM | DIASTOLIC BLOOD PRESSURE: 68 MMHG | SYSTOLIC BLOOD PRESSURE: 128 MMHG | OXYGEN SATURATION: 98 % | WEIGHT: 181 LBS

## 2020-02-04 LAB — HBA1C MFR BLD: 6 %

## 2020-02-04 PROCEDURE — 1090F PRES/ABSN URINE INCON ASSESS: CPT | Performed by: FAMILY MEDICINE

## 2020-02-04 PROCEDURE — 4040F PNEUMOC VAC/ADMIN/RCVD: CPT | Performed by: FAMILY MEDICINE

## 2020-02-04 PROCEDURE — 3044F HG A1C LEVEL LT 7.0%: CPT | Performed by: FAMILY MEDICINE

## 2020-02-04 PROCEDURE — 1036F TOBACCO NON-USER: CPT | Performed by: FAMILY MEDICINE

## 2020-02-04 PROCEDURE — 83036 HEMOGLOBIN GLYCOSYLATED A1C: CPT | Performed by: FAMILY MEDICINE

## 2020-02-04 PROCEDURE — G8417 CALC BMI ABV UP PARAM F/U: HCPCS | Performed by: FAMILY MEDICINE

## 2020-02-04 PROCEDURE — 2022F DILAT RTA XM EVC RTNOPTHY: CPT | Performed by: FAMILY MEDICINE

## 2020-02-04 PROCEDURE — G8427 DOCREV CUR MEDS BY ELIG CLIN: HCPCS | Performed by: FAMILY MEDICINE

## 2020-02-04 PROCEDURE — G8400 PT W/DXA NO RESULTS DOC: HCPCS | Performed by: FAMILY MEDICINE

## 2020-02-04 PROCEDURE — 3017F COLORECTAL CA SCREEN DOC REV: CPT | Performed by: FAMILY MEDICINE

## 2020-02-04 PROCEDURE — 99213 OFFICE O/P EST LOW 20 MIN: CPT | Performed by: FAMILY MEDICINE

## 2020-02-04 PROCEDURE — 1123F ACP DISCUSS/DSCN MKR DOCD: CPT | Performed by: FAMILY MEDICINE

## 2020-02-04 PROCEDURE — G8484 FLU IMMUNIZE NO ADMIN: HCPCS | Performed by: FAMILY MEDICINE

## 2020-02-04 ASSESSMENT — ENCOUNTER SYMPTOMS
SHORTNESS OF BREATH: 0
DIARRHEA: 1
BLOOD IN STOOL: 0
NAUSEA: 0
VOMITING: 0

## 2020-02-04 NOTE — PROGRESS NOTES
OFFICE PROGRESS NOTE      SUBJECTIVE:        Patient ID:   Azra Hackett is a 77 y.o. female whopresents for   Chief Complaint   Patient presents with    Diabetes           HPI:   Patient is here to follow up on diabetes. Fasting blood sugars:'s Midday blood sugars: not checking. Patient checks blood glucose 1 times per day. Patient is following diabetic diet. Patient is a nonsmoker. Last ophthalmology visit: 7/2019. Patient is taking a daily statin. Patient doing well on current regimen for hypertension. Patient has had diarrhea for past 2 months. Patient will see Dr. Joana Miller for this. Prior to Admission medications    Medication Sig Start Date End Date Taking? Authorizing Provider   vitamin D (ERGOCALCIFEROL) 1.25 MG (35464 UT) CAPS capsule TAKE 1 CAPSULE BY MOUTH ONCE WEEKLY 1/22/20  Yes Delta Barney MD   magnesium oxide (MAG-OX) 400 MG tablet Take 1 tablet by mouth daily 1/22/20  Yes Delta Barney MD   promethazine (PHENERGAN) 25 MG tablet TAKE 1 TABLET BY MOUTH THREE TIMES DAILY AS NEEDED FOR NAUSEA 12/23/19  Yes Delta Barney MD   loperamide (IMODIUM) 2 MG capsule TAKE ONE (1) CAPSULE BY MOUTH FOUR TIMES A DAY AS NEEDED FOR DIARRHEA 12/23/19  Yes Delta Barney MD   furosemide (LASIX) 20 MG tablet Take 1 tablet by mouth daily as needed (swelling and shortness of breath) 12/2/19  Yes Delta Barney MD   potassium chloride (KLOR-CON M) 10 MEQ extended release tablet Take 1 tablet by mouth daily as needed (with Lasix) 12/2/19  Yes Delta Barney MD   mupirocin Kim Stephanie NASAL) 2 % nasal ointment Take by Nasal route 2 times daily.  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   Social Needs    Financial resource strain: None    Food insecurity:     Worry: None     Inability: None    Transportation needs:     Medical: None     Non-medical: None   Tobacco Use    Smoking status: Never Smoker    Smokeless tobacco: Never Used   Substance and Sexual Activity    Alcohol use: No     Alcohol/week: 0.0 standard drinks     Comment: occasional diet pepsi    Drug use: No    Sexual activity: Yes   Lifestyle    Physical activity:     Days per week: None     Minutes per session: None    Stress: None   Relationships    Social connections:     Talks on phone: None     Gets together: None     Attends Sabianism service: None     Active member of club or organization: None     Attends meetings of clubs or organizations: None     Relationship status: None    Intimate partner violence:     Fear of current or ex partner: None     Emotionally abused: None     Physically abused: None     Forced sexual activity: None   Other Topics Concern    None   Social History Narrative    ** Merged History Encounter **            I have reviewed Sweetie's allergies, medications, problem list, medical, social and family history and have updated as needed in the electronic medical record    Current Outpatient Medications   Medication Sig Dispense Refill    vitamin D (ERGOCALCIFEROL) 1.25 MG (03633 UT) CAPS capsule TAKE 1 CAPSULE BY MOUTH ONCE WEEKLY 4 capsule 5    magnesium oxide (MAG-OX) 400 MG tablet Take 1 tablet by mouth daily 30 tablet 2    promethazine (PHENERGAN) 25 MG tablet TAKE 1 TABLET BY MOUTH THREE TIMES DAILY AS NEEDED FOR NAUSEA 60 tablet 1    loperamide (IMODIUM) 2 MG capsule TAKE ONE (1) CAPSULE BY MOUTH FOUR TIMES A DAY AS NEEDED FOR DIARRHEA 60 capsule 2  furosemide (LASIX) 20 MG tablet Take 1 tablet by mouth daily as needed (swelling and shortness of breath) 30 tablet 3    potassium chloride (KLOR-CON M) 10 MEQ extended release tablet Take 1 tablet by mouth daily as needed (with Lasix) 30 tablet 3    mupirocin (BACTROBAN NASAL) 2 % nasal ointment Take by Nasal route 2 times daily. 1 Tube 3    amLODIPine (NORVASC) 10 MG tablet Take 1 tablet by mouth daily 30 tablet 5    pravastatin (PRAVACHOL) 40 MG tablet Take 1 tablet by mouth nightly 30 tablet 5    SYMBICORT 160-4.5 MCG/ACT AERO INHALE TWO (2) PUFFS BY MOUTH TWICE DAILY  10    famotidine (PEPCID) 20 MG tablet TAKE (1) TABLET BY MOUTH DAILY 30 tablet 5    lisinopril-hydrochlorothiazide (PRINZIDE;ZESTORETIC) 20-12.5 MG per tablet TAKE 1 TABLET BY MOUTH DAILY 30 tablet 5    metoprolol succinate (TOPROL XL) 50 MG extended release tablet TAKE 1 TABLET BY MOUTH DAILY 30 tablet 5    SITagliptin-metFORMIN (JANUMET XR)  MG TB24 per extended release tablet TAKE TWO (2) TABLETS BY MOUTH IN THE MORNING 60 tablet 5    montelukast (SINGULAIR) 10 MG tablet TAKE 1 TABLET BY MOUTH NIGHTLY 30 tablet 3    ASPIRIN LOW DOSE 81 MG EC tablet TAKE 1 TABLET BY MOUTH TWICE DAILY 60 tablet 5    albuterol sulfate HFA (PROVENTIL HFA) 108 (90 Base) MCG/ACT inhaler Inhale 2 puffs into the lungs every 4 hours as needed for Wheezing 1 Inhaler 1    Incontinence Supply Disposable (DEPEND UNDERWEAR LARGE) [facility] [place] 50 each 12    docusate sodium (COLACE) 100 MG capsule Take 1 capsule by mouth 2 times daily 60 capsule 1    blood glucose test strips (ONE TOUCH ULTRA TEST) strip Check blood sugar bid 100 strip 12    ferrous sulfate 325 (65 Fe) MG tablet Take 1 tablet by mouth 2 times daily (with meals) 30 tablet 3    Blood Glucose Monitoring Suppl (ONE TOUCH ULTRA 2) w/Device KIT Check blood sugar bid 1 kit 0    ONE TOUCH LANCETS MISC Check blood sugar bid. Switch to whatever brand is covered by insurance.  100 each 12  Mis. Devices [facility] [place] Bedside commode 1 Device 0     No current facility-administered medications for this visit. Review Of Systems:    Review of Systems   Respiratory: Negative for shortness of breath. Cardiovascular: Negative for chest pain, palpitations and leg swelling. Gastrointestinal: Positive for diarrhea. Negative for blood in stool, nausea and vomiting. Genitourinary: Negative for difficulty urinating, dysuria and frequency. OBJECTIVE:     VS:  Wt Readings from Last 3 Encounters:   02/04/20 181 lb (82.1 kg)   12/02/19 180 lb (81.6 kg)   11/28/19 186 lb (84.4 kg)     Vitals:    02/04/20 1638   BP: 128/68   Pulse: 75   SpO2: 98%       Physical Exam  Vitals signs reviewed. Constitutional:       General: She is not in acute distress. Appearance: She is well-developed. Neck:      Musculoskeletal: Neck supple. Vascular: No carotid bruit. Cardiovascular:      Rate and Rhythm: Normal rate and regular rhythm. Heart sounds: Normal heart sounds. No murmur. No gallop. Pulmonary:      Effort: Pulmonary effort is normal.      Breath sounds: Normal breath sounds. No wheezing or rales. Abdominal:      General: Bowel sounds are normal. There is no distension. Palpations: Abdomen is soft. Tenderness: There is no abdominal tenderness. Skin:     General: Skin is warm and dry. Neurological:      Mental Status: She is alert and oriented to person, place, and time. Results for orders placed or performed in visit on 02/04/20   POCT glycosylated hemoglobin (Hb A1C)   Result Value Ref Range    Hemoglobin A1C 6.0 %         Stephen Corral was seen today for diabetes.     Diagnoses and all orders for this visit:    Type 2 diabetes mellitus without complication, without long-term current use of insulin (HCC)  -     POCT glycosylated hemoglobin (Hb A1C)        -     Continue Janumet XR    Essential hypertension        -     Continue antihypertensive regimen    Functional diarrhea        -     Follow up with GI      BMI was elevated today, and weight loss plan recommended is : conventional weight loss. Phone/MyChart follow up if tests abnormal.    Return in about 3 months (around 5/4/2020) for diabetes. I have reviewed my findings and recommendations with Isis Castro.     Lynette Maier M.D

## 2020-02-04 NOTE — PATIENT INSTRUCTIONS
Patient Education        Learning About Meal Planning for Diabetes  Why plan your meals? Meal planning can be a key part of managing diabetes. Planning meals and snacks with the right balance of carbohydrate, protein, and fat can help you keep your blood sugar at the target level you set with your doctor. You don't have to eat special foods. You can eat what your family eats, including sweets once in a while. But you do have to pay attention to how often you eat and how much you eat of certain foods. You may want to work with a dietitian or a certified diabetes educator. He or she can give you tips and meal ideas and can answer your questions about meal planning. This health professional can also help you reach a healthy weight if that is one of your goals. What plan is right for you? Your dietitian or diabetes educator may suggest that you start with the plate format or carbohydrate counting. The plate format  The plate format is a simple way to help you manage how you eat. You plan meals by learning how much space each food should take on a plate. Using the plate format helps you spread carbohydrate throughout the day. It can make it easier to keep your blood sugar level within your target range. It also helps you see if you're eating healthy portion sizes. To use the plate format, you put non-starchy vegetables on half your plate. Add meat or meat substitutes on one-quarter of the plate. Put a grain or starchy vegetable (such as brown rice or a potato) on the final quarter of the plate. You can add a small piece of fruit and some low-fat or fat-free milk or yogurt, depending on your carbohydrate goal for each meal.  Here are some tips for using the plate format:  · Make sure that you are not using an oversized plate. A 9-inch plate is best. Many restaurants use larger plates. · Get used to using the plate format at home. Then you can use it when you eat out.   · Write down your questions about using Version: 12.3  © 7671-9890 [employer], [employer]orporated. Care instructions adapted under license by [facility] ([facility]). If you have questions about a medical condition or this instruction, always ask your healthcare professional. [address] any warranty or liability for your use of this information.

## 2020-02-10 NOTE — TELEPHONE ENCOUNTER
Patient called for refills of lancets and blood glucose test strips.     Last seen 2/4/2020  Next appt 5/5/2020  [place]

## 2020-02-21 ENCOUNTER — TELEPHONE (OUTPATIENT)
Dept: FAMILY MEDICINE CLINIC | Age: 65
End: 2020-02-21

## 2020-02-21 RX ORDER — ASPIRIN 81 MG/1
TABLET, COATED ORAL
Qty: 60 TABLET | Refills: 10 | Status: SHIPPED
Start: 2020-02-21 | End: 2021-01-06

## 2020-02-21 RX ORDER — PROMETHAZINE HYDROCHLORIDE 25 MG/1
TABLET ORAL
Qty: 60 TABLET | Refills: 5 | Status: ON HOLD
Start: 2020-02-21 | End: 2020-03-10

## 2020-02-21 NOTE — TELEPHONE ENCOUNTER
Leah/[facility], , called stating patient's personal care aide has not seen patient in the past couple of wks, as each time she arrived, patient has not come to the door. Sumanth Macias stated this can be typical of patient, however, she is concerned, and contacted the local Police Dept to do a Well Check. Sumanth Macias asked if patient has contacted our ofc over the past couple of wks. I noted patient called ofc on 2/10/20. Msg routed, for informational purposes.

## 2020-02-28 ENCOUNTER — HOSPITAL ENCOUNTER (OUTPATIENT)
Dept: GENERAL RADIOLOGY | Age: 65
Discharge: HOME | End: 2020-03-01
Payer: COMMERCIAL

## 2020-02-28 ENCOUNTER — HOSPITAL ENCOUNTER (OUTPATIENT)
Age: 65
Discharge: HOME | End: 2020-03-01
Payer: COMMERCIAL

## 2020-02-28 PROCEDURE — 71046 X-RAY EXAM CHEST 2 VIEWS: CPT

## 2020-03-09 ENCOUNTER — HOSPITAL ENCOUNTER (INPATIENT)
Age: 65
LOS: 5 days | Discharge: HOME | DRG: 193 | End: 2020-03-15
Attending: EMERGENCY MEDICINE | Admitting: INTERNAL MEDICINE
Payer: COMMERCIAL

## 2020-03-09 LAB
BASOPHILS ABSOLUTE: 0.01 E9/L (ref 0–0.2)
BASOPHILS RELATIVE PERCENT: 0.2 % (ref 0–2)
EOSINOPHILS ABSOLUTE: 0.07 E9/L (ref 0.05–0.5)
EOSINOPHILS RELATIVE PERCENT: 1.1 % (ref 0–6)
HCT VFR BLD CALC: 31.5 % (ref 34–48)
HEMOGLOBIN: 9.7 G/DL (ref 11.5–15.5)
IMMATURE GRANULOCYTES #: 0.05 E9/L
IMMATURE GRANULOCYTES %: 0.8 % (ref 0–5)
LYMPHOCYTES ABSOLUTE: 0.89 E9/L (ref 1.5–4)
LYMPHOCYTES RELATIVE PERCENT: 13.4 % (ref 20–42)
MCH RBC QN AUTO: 24.6 PG (ref 26–35)
MCHC RBC AUTO-ENTMCNC: 30.8 % (ref 32–34.5)
MCV RBC AUTO: 79.9 FL (ref 80–99.9)
MONOCYTES ABSOLUTE: 0.65 E9/L (ref 0.1–0.95)
MONOCYTES RELATIVE PERCENT: 9.8 % (ref 2–12)
NEUTROPHILS ABSOLUTE: 4.97 E9/L (ref 1.8–7.3)
NEUTROPHILS RELATIVE PERCENT: 74.7 % (ref 43–80)
PDW BLD-RTO: 14.6 FL (ref 11.5–15)
PLATELET # BLD: 224 E9/L (ref 130–450)
PMV BLD AUTO: 9.3 FL (ref 7–12)
RBC # BLD: 3.94 E12/L (ref 3.5–5.5)
WBC # BLD: 6.6 E9/L (ref 4.5–11.5)

## 2020-03-09 PROCEDURE — 6360000002 HC RX W HCPCS: Performed by: EMERGENCY MEDICINE

## 2020-03-09 PROCEDURE — 94664 DEMO&/EVAL PT USE INHALER: CPT

## 2020-03-09 PROCEDURE — 80048 BASIC METABOLIC PNL TOTAL CA: CPT

## 2020-03-09 PROCEDURE — 99285 EMERGENCY DEPT VISIT HI MDM: CPT

## 2020-03-09 PROCEDURE — 93005 ELECTROCARDIOGRAM TRACING: CPT

## 2020-03-09 PROCEDURE — 87502 INFLUENZA DNA AMP PROBE: CPT

## 2020-03-09 PROCEDURE — 83880 ASSAY OF NATRIURETIC PEPTIDE: CPT

## 2020-03-09 PROCEDURE — 85025 COMPLETE CBC W/AUTO DIFF WBC: CPT

## 2020-03-09 PROCEDURE — 2500000003 HC RX 250 WO HCPCS: Performed by: EMERGENCY MEDICINE

## 2020-03-09 PROCEDURE — 94640 AIRWAY INHALATION TREATMENT: CPT

## 2020-03-09 PROCEDURE — 84484 ASSAY OF TROPONIN QUANT: CPT

## 2020-03-09 PROCEDURE — 6370000000 HC RX 637 (ALT 250 FOR IP): Performed by: EMERGENCY MEDICINE

## 2020-03-09 PROCEDURE — 94760 N-INVAS EAR/PLS OXIMETRY 1: CPT

## 2020-03-09 RX ORDER — LIDOCAINE HYDROCHLORIDE 20 MG/ML
5 INJECTION, SOLUTION EPIDURAL; INFILTRATION; INTRACAUDAL; PERINEURAL ONCE
Status: COMPLETED | OUTPATIENT
Start: 2020-03-09 | End: 2020-03-09

## 2020-03-09 RX ORDER — KETOROLAC TROMETHAMINE 15 MG/ML
15 INJECTION, SOLUTION INTRAMUSCULAR; INTRAVENOUS ONCE
Status: COMPLETED | OUTPATIENT
Start: 2020-03-09 | End: 2020-03-09

## 2020-03-09 RX ORDER — IPRATROPIUM BROMIDE AND ALBUTEROL SULFATE 2.5; .5 MG/3ML; MG/3ML
1 SOLUTION RESPIRATORY (INHALATION) ONCE
Status: COMPLETED | OUTPATIENT
Start: 2020-03-09 | End: 2020-03-09

## 2020-03-09 RX ADMIN — KETOROLAC TROMETHAMINE 15 MG: 15 INJECTION, SOLUTION INTRAMUSCULAR; INTRAVENOUS at 23:48

## 2020-03-09 RX ADMIN — IPRATROPIUM BROMIDE AND ALBUTEROL SULFATE 1 AMPULE: .5; 3 SOLUTION RESPIRATORY (INHALATION) at 23:29

## 2020-03-09 RX ADMIN — LIDOCAINE HYDROCHLORIDE 5 ML: 20 INJECTION, SOLUTION EPIDURAL; INFILTRATION; INTRACAUDAL; PERINEURAL at 23:29

## 2020-03-09 ASSESSMENT — PAIN DESCRIPTION - FREQUENCY: FREQUENCY: INTERMITTENT

## 2020-03-09 ASSESSMENT — ENCOUNTER SYMPTOMS
VOMITING: 0
EYES NEGATIVE: 1
COUGH: 1
NAUSEA: 0
CHEST TIGHTNESS: 0
SHORTNESS OF BREATH: 0
ABDOMINAL PAIN: 1
DIARRHEA: 0

## 2020-03-09 ASSESSMENT — PAIN DESCRIPTION - PAIN TYPE: TYPE: ACUTE PAIN

## 2020-03-09 ASSESSMENT — PAIN DESCRIPTION - DESCRIPTORS: DESCRIPTORS: SHOOTING

## 2020-03-09 ASSESSMENT — PAIN DESCRIPTION - LOCATION: LOCATION: RIB CAGE;ABDOMEN

## 2020-03-09 ASSESSMENT — PAIN SCALES - GENERAL: PAINLEVEL_OUTOF10: 10

## 2020-03-10 ENCOUNTER — APPOINTMENT (OUTPATIENT)
Dept: GENERAL RADIOLOGY | Age: 65
DRG: 193 | End: 2020-03-10
Payer: COMMERCIAL

## 2020-03-10 PROBLEM — J18.9 CAP (COMMUNITY ACQUIRED PNEUMONIA): Status: ACTIVE | Noted: 2020-03-10

## 2020-03-10 PROBLEM — J44.1 ACUTE EXACERBATION OF CHRONIC OBSTRUCTIVE PULMONARY DISEASE (COPD) (HCC): Status: ACTIVE | Noted: 2020-03-10

## 2020-03-10 PROBLEM — J96.01 ACUTE RESPIRATORY FAILURE WITH HYPOXIA (HCC): Status: ACTIVE | Noted: 2020-03-10

## 2020-03-10 LAB
ADENOVIRUS BY PCR: NOT DETECTED
ANION GAP SERPL CALCULATED.3IONS-SCNC: 13 MMOL/L (ref 7–16)
ANION GAP SERPL CALCULATED.3IONS-SCNC: 13 MMOL/L (ref 7–16)
BORDETELLA PARAPERTUSSIS BY PCR: NOT DETECTED
BORDETELLA PERTUSSIS BY PCR: NOT DETECTED
BUN BLDV-MCNC: 10 MG/DL (ref 8–23)
BUN BLDV-MCNC: 8 MG/DL (ref 8–23)
CALCIUM SERPL-MCNC: 10.1 MG/DL (ref 8.6–10.2)
CALCIUM SERPL-MCNC: 10.2 MG/DL (ref 8.6–10.2)
CHLAMYDOPHILIA PNEUMONIAE BY PCR: NOT DETECTED
CHLORIDE BLD-SCNC: 100 MMOL/L (ref 98–107)
CHLORIDE BLD-SCNC: 102 MMOL/L (ref 98–107)
CO2: 23 MMOL/L (ref 22–29)
CO2: 23 MMOL/L (ref 22–29)
CORONAVIRUS 229E BY PCR: NOT DETECTED
CORONAVIRUS HKU1 BY PCR: NOT DETECTED
CORONAVIRUS NL63 BY PCR: NOT DETECTED
CORONAVIRUS OC43 BY PCR: NOT DETECTED
CREAT SERPL-MCNC: 0.7 MG/DL (ref 0.5–1)
CREAT SERPL-MCNC: 0.7 MG/DL (ref 0.5–1)
GFR AFRICAN AMERICAN: >60
GFR AFRICAN AMERICAN: >60
GFR NON-AFRICAN AMERICAN: >60 ML/MIN/1.73
GFR NON-AFRICAN AMERICAN: >60 ML/MIN/1.73
GLUCOSE BLD-MCNC: 132 MG/DL (ref 74–99)
GLUCOSE BLD-MCNC: 147 MG/DL (ref 74–99)
HBA1C MFR BLD: 7 % (ref 4–5.6)
HCT VFR BLD CALC: 31.3 % (ref 34–48)
HEMOGLOBIN: 9.6 G/DL (ref 11.5–15.5)
HUMAN METAPNEUMOVIRUS BY PCR: NOT DETECTED
HUMAN RHINOVIRUS/ENTEROVIRUS BY PCR: NOT DETECTED
INFLUENZA A BY PCR: NOT DETECTED
INFLUENZA A BY PCR: NOT DETECTED
INFLUENZA B BY PCR: NOT DETECTED
INFLUENZA B BY PCR: NOT DETECTED
LV EF: 72 %
LVEF MODALITY: NORMAL
MCH RBC QN AUTO: 24.3 PG (ref 26–35)
MCHC RBC AUTO-ENTMCNC: 30.7 % (ref 32–34.5)
MCV RBC AUTO: 79.2 FL (ref 80–99.9)
METER GLUCOSE: 138 MG/DL (ref 74–99)
METER GLUCOSE: 255 MG/DL (ref 74–99)
METER GLUCOSE: 280 MG/DL (ref 74–99)
METER GLUCOSE: 332 MG/DL (ref 74–99)
MYCOPLASMA PNEUMONIAE BY PCR: NOT DETECTED
PARAINFLUENZA VIRUS 1 BY PCR: NOT DETECTED
PARAINFLUENZA VIRUS 2 BY PCR: NOT DETECTED
PARAINFLUENZA VIRUS 3 BY PCR: NOT DETECTED
PARAINFLUENZA VIRUS 4 BY PCR: NOT DETECTED
PDW BLD-RTO: 14.6 FL (ref 11.5–15)
PLATELET # BLD: 235 E9/L (ref 130–450)
PMV BLD AUTO: 9.9 FL (ref 7–12)
POTASSIUM REFLEX MAGNESIUM: 4.1 MMOL/L (ref 3.5–5)
POTASSIUM SERPL-SCNC: 4 MMOL/L (ref 3.5–5)
PRO-BNP: 458 PG/ML (ref 0–125)
PROCALCITONIN: 0.11 NG/ML (ref 0–0.08)
PROCALCITONIN: 0.12 NG/ML (ref 0–0.08)
RBC # BLD: 3.95 E12/L (ref 3.5–5.5)
RESPIRATORY SYNCYTIAL VIRUS BY PCR: NOT DETECTED
SODIUM BLD-SCNC: 136 MMOL/L (ref 132–146)
SODIUM BLD-SCNC: 138 MMOL/L (ref 132–146)
TROPONIN: <0.01 NG/ML (ref 0–0.03)
WBC # BLD: 6.8 E9/L (ref 4.5–11.5)

## 2020-03-10 PROCEDURE — 0100U HC RESPIRPTHGN MULT REV TRANS & AMP PRB TECH 21 TRGT: CPT

## 2020-03-10 PROCEDURE — 2580000003 HC RX 258: Performed by: EMERGENCY MEDICINE

## 2020-03-10 PROCEDURE — 2700000000 HC OXYGEN THERAPY PER DAY

## 2020-03-10 PROCEDURE — 1200000000 HC SEMI PRIVATE

## 2020-03-10 PROCEDURE — 6360000002 HC RX W HCPCS: Performed by: NURSE PRACTITIONER

## 2020-03-10 PROCEDURE — 96375 TX/PRO/DX INJ NEW DRUG ADDON: CPT

## 2020-03-10 PROCEDURE — 83036 HEMOGLOBIN GLYCOSYLATED A1C: CPT

## 2020-03-10 PROCEDURE — 6370000000 HC RX 637 (ALT 250 FOR IP): Performed by: NURSE PRACTITIONER

## 2020-03-10 PROCEDURE — 2580000003 HC RX 258: Performed by: NURSE PRACTITIONER

## 2020-03-10 PROCEDURE — 93307 TTE W/O DOPPLER COMPLETE: CPT

## 2020-03-10 PROCEDURE — 85027 COMPLETE CBC AUTOMATED: CPT

## 2020-03-10 PROCEDURE — 84484 ASSAY OF TROPONIN QUANT: CPT

## 2020-03-10 PROCEDURE — 6360000002 HC RX W HCPCS: Performed by: EMERGENCY MEDICINE

## 2020-03-10 PROCEDURE — 87450 HC DIRECT STREP B ANTIGEN: CPT

## 2020-03-10 PROCEDURE — 36415 COLL VENOUS BLD VENIPUNCTURE: CPT

## 2020-03-10 PROCEDURE — 84145 PROCALCITONIN (PCT): CPT

## 2020-03-10 PROCEDURE — 96365 THER/PROPH/DIAG IV INF INIT: CPT

## 2020-03-10 PROCEDURE — 71046 X-RAY EXAM CHEST 2 VIEWS: CPT

## 2020-03-10 PROCEDURE — 80048 BASIC METABOLIC PNL TOTAL CA: CPT

## 2020-03-10 PROCEDURE — 93306 TTE W/DOPPLER COMPLETE: CPT

## 2020-03-10 PROCEDURE — 82962 GLUCOSE BLOOD TEST: CPT

## 2020-03-10 PROCEDURE — 94640 AIRWAY INHALATION TREATMENT: CPT

## 2020-03-10 RX ORDER — ACETAMINOPHEN 650 MG/1
650 SUPPOSITORY RECTAL EVERY 6 HOURS PRN
Status: DISCONTINUED | OUTPATIENT
Start: 2020-03-10 | End: 2020-03-15 | Stop reason: HOSPADM

## 2020-03-10 RX ORDER — PRAVASTATIN SODIUM 20 MG
40 TABLET ORAL NIGHTLY
Status: DISCONTINUED | OUTPATIENT
Start: 2020-03-10 | End: 2020-03-15 | Stop reason: HOSPADM

## 2020-03-10 RX ORDER — DOXYCYCLINE HYCLATE 100 MG/1
100 CAPSULE ORAL ONCE
Status: DISCONTINUED | OUTPATIENT
Start: 2020-03-10 | End: 2020-03-10

## 2020-03-10 RX ORDER — SODIUM CHLORIDE 0.9 % (FLUSH) 0.9 %
10 SYRINGE (ML) INJECTION PRN
Status: DISCONTINUED | OUTPATIENT
Start: 2020-03-10 | End: 2020-03-15 | Stop reason: HOSPADM

## 2020-03-10 RX ORDER — FERROUS SULFATE 325(65) MG
325 TABLET ORAL 2 TIMES DAILY WITH MEALS
Status: DISCONTINUED | OUTPATIENT
Start: 2020-03-10 | End: 2020-03-15 | Stop reason: HOSPADM

## 2020-03-10 RX ORDER — ACETAMINOPHEN 325 MG/1
650 TABLET ORAL EVERY 6 HOURS PRN
Status: DISCONTINUED | OUTPATIENT
Start: 2020-03-10 | End: 2020-03-15 | Stop reason: HOSPADM

## 2020-03-10 RX ORDER — NICOTINE POLACRILEX 4 MG
15 LOZENGE BUCCAL PRN
Status: DISCONTINUED | OUTPATIENT
Start: 2020-03-10 | End: 2020-03-15 | Stop reason: HOSPADM

## 2020-03-10 RX ORDER — ONDANSETRON 2 MG/ML
4 INJECTION INTRAMUSCULAR; INTRAVENOUS EVERY 6 HOURS PRN
Status: DISCONTINUED | OUTPATIENT
Start: 2020-03-10 | End: 2020-03-15 | Stop reason: HOSPADM

## 2020-03-10 RX ORDER — LISINOPRIL AND HYDROCHLOROTHIAZIDE 20; 12.5 MG/1; MG/1
1 TABLET ORAL DAILY
Status: DISCONTINUED | OUTPATIENT
Start: 2020-03-10 | End: 2020-03-10 | Stop reason: CLARIF

## 2020-03-10 RX ORDER — ARFORMOTEROL TARTRATE 15 UG/2ML
15 SOLUTION RESPIRATORY (INHALATION) 2 TIMES DAILY
Status: DISCONTINUED | OUTPATIENT
Start: 2020-03-10 | End: 2020-03-15 | Stop reason: HOSPADM

## 2020-03-10 RX ORDER — FUROSEMIDE 10 MG/ML
40 INJECTION INTRAMUSCULAR; INTRAVENOUS 2 TIMES DAILY
Status: DISCONTINUED | OUTPATIENT
Start: 2020-03-10 | End: 2020-03-14

## 2020-03-10 RX ORDER — LISINOPRIL 20 MG/1
20 TABLET ORAL DAILY
Status: DISCONTINUED | OUTPATIENT
Start: 2020-03-10 | End: 2020-03-15 | Stop reason: HOSPADM

## 2020-03-10 RX ORDER — LANOLIN ALCOHOL/MO/W.PET/CERES
400 CREAM (GRAM) TOPICAL DAILY
Status: DISCONTINUED | OUTPATIENT
Start: 2020-03-10 | End: 2020-03-15 | Stop reason: HOSPADM

## 2020-03-10 RX ORDER — DEXTROSE MONOHYDRATE 50 MG/ML
100 INJECTION, SOLUTION INTRAVENOUS PRN
Status: DISCONTINUED | OUTPATIENT
Start: 2020-03-10 | End: 2020-03-15 | Stop reason: HOSPADM

## 2020-03-10 RX ORDER — BUDESONIDE 0.5 MG/2ML
0.5 INHALANT ORAL 2 TIMES DAILY
Status: DISCONTINUED | OUTPATIENT
Start: 2020-03-10 | End: 2020-03-15 | Stop reason: HOSPADM

## 2020-03-10 RX ORDER — HYDROCHLOROTHIAZIDE 12.5 MG/1
12.5 TABLET ORAL DAILY
Status: DISCONTINUED | OUTPATIENT
Start: 2020-03-10 | End: 2020-03-15 | Stop reason: HOSPADM

## 2020-03-10 RX ORDER — PROMETHAZINE HYDROCHLORIDE 25 MG/1
12.5 TABLET ORAL EVERY 6 HOURS PRN
Status: DISCONTINUED | OUTPATIENT
Start: 2020-03-10 | End: 2020-03-15 | Stop reason: HOSPADM

## 2020-03-10 RX ORDER — SODIUM CHLORIDE 0.9 % (FLUSH) 0.9 %
10 SYRINGE (ML) INJECTION EVERY 12 HOURS SCHEDULED
Status: DISCONTINUED | OUTPATIENT
Start: 2020-03-10 | End: 2020-03-15 | Stop reason: HOSPADM

## 2020-03-10 RX ORDER — ALBUTEROL SULFATE 2.5 MG/3ML
2.5 SOLUTION RESPIRATORY (INHALATION) EVERY 4 HOURS PRN
Status: DISCONTINUED | OUTPATIENT
Start: 2020-03-10 | End: 2020-03-15 | Stop reason: HOSPADM

## 2020-03-10 RX ORDER — METHYLPREDNISOLONE SODIUM SUCCINATE 40 MG/ML
40 INJECTION, POWDER, LYOPHILIZED, FOR SOLUTION INTRAMUSCULAR; INTRAVENOUS EVERY 8 HOURS
Status: DISCONTINUED | OUTPATIENT
Start: 2020-03-10 | End: 2020-03-11

## 2020-03-10 RX ORDER — ASPIRIN 81 MG/1
81 TABLET, CHEWABLE ORAL DAILY
Status: DISCONTINUED | OUTPATIENT
Start: 2020-03-10 | End: 2020-03-15 | Stop reason: HOSPADM

## 2020-03-10 RX ORDER — METOPROLOL SUCCINATE 50 MG/1
50 TABLET, EXTENDED RELEASE ORAL DAILY
Status: DISCONTINUED | OUTPATIENT
Start: 2020-03-10 | End: 2020-03-15 | Stop reason: HOSPADM

## 2020-03-10 RX ORDER — DEXTROSE MONOHYDRATE 25 G/50ML
12.5 INJECTION, SOLUTION INTRAVENOUS PRN
Status: DISCONTINUED | OUTPATIENT
Start: 2020-03-10 | End: 2020-03-15 | Stop reason: HOSPADM

## 2020-03-10 RX ORDER — DOXYCYCLINE HYCLATE 100 MG
100 TABLET ORAL 2 TIMES DAILY
Qty: 20 TABLET | Refills: 0 | Status: SHIPPED | OUTPATIENT
Start: 2020-03-10 | End: 2020-03-15 | Stop reason: SDUPTHER

## 2020-03-10 RX ORDER — AMLODIPINE BESYLATE 10 MG/1
10 TABLET ORAL DAILY
Status: DISCONTINUED | OUTPATIENT
Start: 2020-03-10 | End: 2020-03-15 | Stop reason: HOSPADM

## 2020-03-10 RX ORDER — FAMOTIDINE 20 MG/1
20 TABLET, FILM COATED ORAL DAILY
Status: DISCONTINUED | OUTPATIENT
Start: 2020-03-10 | End: 2020-03-15 | Stop reason: HOSPADM

## 2020-03-10 RX ADMIN — Medication 10 ML: at 08:32

## 2020-03-10 RX ADMIN — HYDROCHLOROTHIAZIDE 12.5 MG: 12.5 TABLET ORAL at 08:31

## 2020-03-10 RX ADMIN — FUROSEMIDE 40 MG: 10 INJECTION, SOLUTION INTRAMUSCULAR; INTRAVENOUS at 10:48

## 2020-03-10 RX ADMIN — AMLODIPINE BESYLATE 10 MG: 10 TABLET ORAL at 08:31

## 2020-03-10 RX ADMIN — METHYLPREDNISOLONE SODIUM SUCCINATE 40 MG: 40 INJECTION, POWDER, FOR SOLUTION INTRAMUSCULAR; INTRAVENOUS at 05:38

## 2020-03-10 RX ADMIN — INSULIN LISPRO 3 UNITS: 100 INJECTION, SOLUTION INTRAVENOUS; SUBCUTANEOUS at 20:43

## 2020-03-10 RX ADMIN — FUROSEMIDE 40 MG: 10 INJECTION, SOLUTION INTRAMUSCULAR; INTRAVENOUS at 17:41

## 2020-03-10 RX ADMIN — ENOXAPARIN SODIUM 40 MG: 40 INJECTION SUBCUTANEOUS at 08:30

## 2020-03-10 RX ADMIN — BUDESONIDE 500 MCG: 0.5 INHALANT RESPIRATORY (INHALATION) at 05:26

## 2020-03-10 RX ADMIN — AZITHROMYCIN DIHYDRATE 500 MG: 500 INJECTION, POWDER, LYOPHILIZED, FOR SOLUTION INTRAVENOUS at 02:27

## 2020-03-10 RX ADMIN — FERROUS SULFATE TAB 325 MG (65 MG ELEMENTAL FE) 325 MG: 325 (65 FE) TAB at 16:50

## 2020-03-10 RX ADMIN — INSULIN LISPRO 3 UNITS: 100 INJECTION, SOLUTION INTRAVENOUS; SUBCUTANEOUS at 13:22

## 2020-03-10 RX ADMIN — Medication 10 ML: at 20:44

## 2020-03-10 RX ADMIN — FERROUS SULFATE TAB 325 MG (65 MG ELEMENTAL FE) 325 MG: 325 (65 FE) TAB at 08:31

## 2020-03-10 RX ADMIN — PRAVASTATIN SODIUM 40 MG: 20 TABLET ORAL at 20:41

## 2020-03-10 RX ADMIN — METHYLPREDNISOLONE SODIUM SUCCINATE 40 MG: 40 INJECTION, POWDER, FOR SOLUTION INTRAMUSCULAR; INTRAVENOUS at 13:21

## 2020-03-10 RX ADMIN — METOPROLOL SUCCINATE 50 MG: 50 TABLET, EXTENDED RELEASE ORAL at 08:31

## 2020-03-10 RX ADMIN — LISINOPRIL 20 MG: 20 TABLET ORAL at 08:31

## 2020-03-10 RX ADMIN — ARFORMOTEROL TARTRATE 15 MCG: 15 SOLUTION RESPIRATORY (INHALATION) at 05:26

## 2020-03-10 RX ADMIN — ARFORMOTEROL TARTRATE 15 MCG: 15 SOLUTION RESPIRATORY (INHALATION) at 16:43

## 2020-03-10 RX ADMIN — BUDESONIDE 500 MCG: 0.5 INHALANT RESPIRATORY (INHALATION) at 16:43

## 2020-03-10 RX ADMIN — INSULIN LISPRO 3 UNITS: 100 INJECTION, SOLUTION INTRAVENOUS; SUBCUTANEOUS at 16:51

## 2020-03-10 RX ADMIN — Medication 400 MG: at 08:31

## 2020-03-10 RX ADMIN — FAMOTIDINE 20 MG: 20 TABLET, FILM COATED ORAL at 08:31

## 2020-03-10 RX ADMIN — WATER 2 G: 1 INJECTION INTRAMUSCULAR; INTRAVENOUS; SUBCUTANEOUS at 02:23

## 2020-03-10 RX ADMIN — METHYLPREDNISOLONE SODIUM SUCCINATE 40 MG: 40 INJECTION, POWDER, FOR SOLUTION INTRAMUSCULAR; INTRAVENOUS at 20:41

## 2020-03-10 RX ADMIN — ASPIRIN 81 MG 81 MG: 81 TABLET ORAL at 08:31

## 2020-03-10 ASSESSMENT — PAIN SCALES - GENERAL
PAINLEVEL_OUTOF10: 0
PAINLEVEL_OUTOF10: 8
PAINLEVEL_OUTOF10: 0

## 2020-03-10 ASSESSMENT — PAIN - FUNCTIONAL ASSESSMENT: PAIN_FUNCTIONAL_ASSESSMENT: PREVENTS OR INTERFERES SOME ACTIVE ACTIVITIES AND ADLS

## 2020-03-10 ASSESSMENT — PAIN DESCRIPTION - ORIENTATION: ORIENTATION: RIGHT;MID

## 2020-03-10 ASSESSMENT — PAIN DESCRIPTION - DESCRIPTORS: DESCRIPTORS: ACHING;CRAMPING;DISCOMFORT

## 2020-03-10 ASSESSMENT — PAIN DESCRIPTION - LOCATION: LOCATION: ABDOMEN;RIB CAGE

## 2020-03-10 ASSESSMENT — PAIN DESCRIPTION - PROGRESSION: CLINICAL_PROGRESSION: NOT CHANGED

## 2020-03-10 ASSESSMENT — PAIN DESCRIPTION - PAIN TYPE: TYPE: ACUTE PAIN

## 2020-03-10 ASSESSMENT — PAIN DESCRIPTION - FREQUENCY: FREQUENCY: INTERMITTENT

## 2020-03-10 ASSESSMENT — PAIN DESCRIPTION - ONSET: ONSET: ON-GOING

## 2020-03-10 NOTE — H&P
Seizures ([facility] [address]     last seizure  15 yrs ago had been on depakote    Thyroid disease     Type II or unspecified type diabetes mellitus without mention of complication, not stated as uncontrolled        PAST SURGICAL Hx:   Past Surgical History:   Procedure Laterality Date    CARDIAC CATHETERIZATION  12/01/2008    Normal Coronary arteries. Normal LV. Elevated left ventricular end diastolic pressure suggestive of impaired relaxatin and possible diastolic dysfunction    HYSTERECTOMY  2004    LEG SURGERY Right     right hip area, removed cyst [facility] developed infection went to [facility] SURG EXCISION OF ANAL LESION(S) N/A 1/25/2019    EXCISION PERIANAL WARTS performed by Kerwin Boykin MD at [address] Left 8/8/2018    LEFT HIP TOTAL ARTHROPLASTY (KYLIE) performed by Valentín Bustamante DO at [address] Hx:  Family History   Problem Relation Age of Onset    Diabetes Mother     Stroke Father        HOME MEDICATIONS:  Prior to Admission medications    Medication Sig Start Date End Date Taking?  Authorizing Provider   doxycycline hyclate (VIBRA-TABS) 100 MG tablet Take 1 tablet by mouth 2 times daily for 10 days 3/10/20 3/20/20 Yes Dante Guzman DO   ASPIRIN LOW DOSE 81 MG EC tablet TAKE 1 TABLET BY MOUTH TWICE DAILY 2/21/20  Yes Geronimo Dickerson MD   mupirocin (BACTROBAN) 2 % ointment APPLY AS DIRECTED INTRANASALLY TWICE DAILY 2/21/20  Yes Geronimo Dickerson MD   vitamin D (ERGOCALCIFEROL) 1.25 MG (10375 UT) CAPS capsule TAKE 1 CAPSULE BY MOUTH ONCE WEEKLY  Patient taking differently: Indications: pt takes every wednesday 1/22/20  Yes Geronimo Dickerson MD   magnesium oxide (MAG-OX) 400 MG tablet Take 1 tablet by mouth daily 1/22/20  Yes Geronimo Dickerson MD   furosemide (LASIX) 20 MG tablet Take 1 tablet by mouth daily as needed (swelling and shortness of breath) 12/2/19  Yes Geronimo Dickerson MD   potassium chloride (KLOR-CON M)

## 2020-03-10 NOTE — PLAN OF CARE
Problem: Pain:  Goal: Pain level will decrease  Description: Pain level will decrease  Outcome: Met This Shift     Problem: Pain:  Goal: Control of acute pain  Description: Control of acute pain  Outcome: Met This Shift     Problem: Pain:  Goal: Control of chronic pain  Description: Control of chronic pain  Outcome: Met This Shift     Problem: Falls - Risk of:  Goal: Will remain free from falls  Description: Will remain free from falls  Outcome: Met This Shift     Problem: Falls - Risk of:  Goal: Absence of physical injury  Description: Absence of physical injury  Outcome: Met This Shift

## 2020-03-10 NOTE — ED PROVIDER NOTES
Conjunctiva/sclera: Conjunctivae normal.      Pupils: Pupils are equal, round, and reactive to light. Neck:      Musculoskeletal: Normal range of motion. Thyroid: No thyromegaly. Cardiovascular:      Rate and Rhythm: Normal rate and regular rhythm. Pulmonary:      Effort: Pulmonary effort is normal. No respiratory distress. Breath sounds: Wheezing present. Comments: Minimally coarse breath sound with some wheezing more on the right side  Abdominal:      General: There is no distension. Palpations: Abdomen is soft. Tenderness: There is no abdominal tenderness. There is no guarding or rebound. Musculoskeletal: Normal range of motion. General: No tenderness. Skin:     General: Skin is warm and dry. Findings: No erythema. Neurological:      Mental Status: She is alert and oriented to person, place, and time. Cranial Nerves: No cranial nerve deficit. Coordination: Coordination normal.          Procedures     MDM     ED Course as of Mar 10 2246   Tue Mar 10, 2020   0037 Patient sleeping comfortably during my reexamination. Heart rate was in the 70s, oxygenation is 95% on room air.    [SO]   0147 Patient eating dinner, in no distress, no hypoxia, no recurrent coughing at this point.    [SO]   0151 Patient remains in no distress, was febrile upon arrival with coughing and some shortness of breath. However, she did improve and is not in any respiratory distress at this point. She is not hypoxic, she has a concern for a infiltrate in the right side of her lung. This is slightly worsening than when compared to 2 weeks ago on the chest x-ray. She will be discharged home with antibiotics and given very clear instructions to see her family doctor in the next couple of days. She should return to the ED for any problems or any worsening.   No chest pain or lightheadedness or dizziness reported, she is not in any respiratory distress at this point.    [SO]   0209 Patient had episode of hypoxia and required oxygenation and nasal cannula. With getting up and walking around the patient desatted down to 87% on room air.    [SO]      ED Course User Index  [SO] Constanza Meneses DO                                 CURB-65 CRITERIA FOR PNEUMONIA  1. Confusion no (0)   2. BUN > 19mg/dl no (0)   3.    RR > 30 / min no (0)   4. SBP < 90 mm Hg or DBP < 60 mm Hg no (0)   5. Age > 72Years Old yes (1)    TOTAL 1     SCORE MORTALITY SUGGESTED MANAGEMENT   0 OR 1 1.5%  Likely suitable for home Tx     2   9.2%  Short stay inpatient   -vs-    supervised outpatient Tx   3-5 22% Inpatient, eval for ICU especially if score 4 or 5     ED Course as of Mar 10 2246   Tue Mar 10, 2020   0037 Patient sleeping comfortably during my reexamination. Heart rate was in the 70s, oxygenation is 95% on room air.    [SO]   0147 Patient eating dinner, in no distress, no hypoxia, no recurrent coughing at this point.    [SO]   0151 Patient remains in no distress, was febrile upon arrival with coughing and some shortness of breath. However, she did improve and is not in any respiratory distress at this point. She is not hypoxic, she has a concern for a infiltrate in the right side of her lung. This is slightly worsening than when compared to 2 weeks ago on the chest x-ray. She will be discharged home with antibiotics and given very clear instructions to see her family doctor in the next couple of days. She should return to the ED for any problems or any worsening. No chest pain or lightheadedness or dizziness reported, she is not in any respiratory distress at this point.    [SO]   0209 Patient had episode of hypoxia and required oxygenation and nasal cannula.   With getting up and walking around the patient desatted down to 87% on room air.    [SO]      ED Course User Index  [SO] Constanza Meneses DO       ED Course as of Mar 10 2246   Tue Mar 10, 2020   0037 Patient sleeping comfortably during my Hysterectomy (2004); and pr surg excision of anal lesion(s) (N/A, 1/25/2019). Social History:  reports that she has never smoked. She has never used smokeless tobacco. She reports that she does not drink alcohol or use drugs. Family History: family history includes Diabetes in her mother; Stroke in her father. The patients home medications have been reviewed. Allergies: Atenolol; Codeine; Lipitor [atorvastatin]; Percocet [oxycodone-acetaminophen];  Vicodin [hydrocodone-acetaminophen]; and Other    -------------------------------------------------- RESULTS -------------------------------------------------    LABS:  Results for orders placed or performed during the hospital encounter of 03/09/20   Rapid influenza A/B antigens   Result Value Ref Range    Influenza A by PCR Not Detected Not Detected    Influenza B by PCR Not Detected Not Detected   Respiratory Panel, Molecular   Result Value Ref Range    Adenovirus by PCR Not Detected Not Detected    Bordetella parapertussis by PCR Not Detected Not Detected    Bordetella pertussis by PCR Not Detected Not Detected    Chlamydophilia pneumoniae by PCR Not Detected Not Detected    Coronavirus 229E by PCR Not Detected Not Detected    Coronavirus HKU1 by PCR Not Detected Not Detected    Coronavirus NL63 by PCR Not Detected Not Detected    Coronavirus OC43 by PCR Not Detected Not Detected    Human Metapneumovirus by PCR Not Detected Not Detected    Human Rhinovirus/Enterovirus by PCR Not Detected Not Detected    Influenza A by PCR Not Detected Not Detected    Influenza B by PCR Not Detected Not Detected    Mycoplasma pneumoniae by PCR Not Detected Not Detected    Parainfluenza Virus 1 by PCR Not Detected Not Detected    Parainfluenza Virus 2 by PCR Not Detected Not Detected    Parainfluenza Virus 3 by PCR Not Detected Not Detected    Parainfluenza Virus 4 by PCR Not Detected Not Detected    Respiratory Syncytial Virus by PCR Not Detected Not Detected   CBC auto differential   Result Value Ref Range    WBC 6.6 4.5 - 11.5 E9/L    RBC 3.94 3.50 - 5.50 E12/L    Hemoglobin 9.7 (L) 11.5 - 15.5 g/dL    Hematocrit 31.5 (L) 34.0 - 48.0 %    MCV 79.9 (L) 80.0 - 99.9 fL    MCH 24.6 (L) 26.0 - 35.0 pg    MCHC 30.8 (L) 32.0 - 34.5 %    RDW 14.6 11.5 - 15.0 fL    Platelets 434 861 - 142 E9/L    MPV 9.3 7.0 - 12.0 fL    Neutrophils % 74.7 43.0 - 80.0 %    Immature Granulocytes % 0.8 0.0 - 5.0 %    Lymphocytes % 13.4 (L) 20.0 - 42.0 %    Monocytes % 9.8 2.0 - 12.0 %    Eosinophils % 1.1 0.0 - 6.0 %    Basophils % 0.2 0.0 - 2.0 %    Neutrophils Absolute 4.97 1.80 - 7.30 E9/L    Immature Granulocytes # 0.05 E9/L    Lymphocytes Absolute 0.89 (L) 1.50 - 4.00 E9/L    Monocytes Absolute 0.65 0.10 - 0.95 E9/L    Eosinophils Absolute 0.07 0.05 - 0.50 E9/L    Basophils Absolute 0.01 0.00 - 0.20 K0/F   Basic Metabolic Panel   Result Value Ref Range    Sodium 136 132 - 146 mmol/L    Potassium 4.0 3.5 - 5.0 mmol/L    Chloride 100 98 - 107 mmol/L    CO2 23 22 - 29 mmol/L    Anion Gap 13 7 - 16 mmol/L    Glucose 147 (H) 74 - 99 mg/dL    BUN 8 8 - 23 mg/dL    CREATININE 0.7 0.5 - 1.0 mg/dL    GFR Non-African American >60 >=60 mL/min/1.73    GFR African American >60     Calcium 10.2 8.6 - 10.2 mg/dL   Troponin   Result Value Ref Range    Troponin <0.01 0.00 - 0.03 ng/mL   Brain Natriuretic Peptide   Result Value Ref Range    Pro- (H) 0 - 125 pg/mL   Troponin   Result Value Ref Range    Troponin <0.01 0.00 - 0.03 ng/mL   Troponin   Result Value Ref Range    Troponin <0.01 0.00 - 0.03 ng/mL   Procalcitonin   Result Value Ref Range    Procalcitonin 0.12 (H) 0.00 - 0.08 ng/mL   Basic Metabolic Panel w/ Reflex to MG   Result Value Ref Range    Sodium 138 132 - 146 mmol/L    Potassium reflex Magnesium 4.1 3.5 - 5.0 mmol/L    Chloride 102 98 - 107 mmol/L    CO2 23 22 - 29 mmol/L    Anion Gap 13 7 - 16 mmol/L    Glucose 132 (H) 74 - 99 mg/dL    BUN 10 8 - 23 mg/dL    CREATININE 0.7 0.5 - 1.0 mg/dL    GFR Non-African American >60 >=60 mL/min/1.73    GFR African American >60     Calcium 10.1 8.6 - 10.2 mg/dL   CBC   Result Value Ref Range    WBC 6.8 4.5 - 11.5 E9/L    RBC 3.95 3.50 - 5.50 E12/L    Hemoglobin 9.6 (L) 11.5 - 15.5 g/dL    Hematocrit 31.3 (L) 34.0 - 48.0 %    MCV 79.2 (L) 80.0 - 99.9 fL    MCH 24.3 (L) 26.0 - 35.0 pg    MCHC 30.7 (L) 32.0 - 34.5 %    RDW 14.6 11.5 - 15.0 fL    Platelets 708 914 - 543 E9/L    MPV 9.9 7.0 - 12.0 fL   Hemoglobin A1c   Result Value Ref Range    Hemoglobin A1C 7.0 (H) 4.0 - 5.6 %   Troponin   Result Value Ref Range    Troponin <0.01 0.00 - 0.03 ng/mL   Procalcitonin   Result Value Ref Range    Procalcitonin 0.11 (H) 0.00 - 0.08 ng/mL   POCT Glucose   Result Value Ref Range    Meter Glucose 138 (H) 74 - 99 mg/dL   POCT Glucose   Result Value Ref Range    Meter Glucose 255 (H) 74 - 99 mg/dL   POCT Glucose   Result Value Ref Range    Meter Glucose 280 (H) 74 - 99 mg/dL   POCT Glucose   Result Value Ref Range    Meter Glucose 332 (H) 74 - 99 mg/dL   EKG 12 Lead   Result Value Ref Range    Ventricular Rate 76 BPM    Atrial Rate 76 BPM    P-R Interval 160 ms    QRS Duration 80 ms    Q-T Interval 378 ms    QTc Calculation (Bazett) 425 ms    P Axis 62 degrees    R Axis 43 degrees    T Axis 50 degrees       RADIOLOGY:  XR CHEST STANDARD (2 VW)   Final Result   CHF versus pneumonia            ------------------------- NURSING NOTES AND VITALS REVIEWED ---------------------------  Date / Time Roomed:  3/9/2020 10:26 PM  ED Bed Assignment:  [id number]    The nursing notes within the ED encounter and vital signs as below have been reviewed.      Patient Vitals for the past 24 hrs:   BP Temp Temp src Pulse Resp SpO2 Height Weight   03/10/20 1915 124/66 98.2 °F (36.8 °C) Oral 68 18 96 % -- --   03/10/20 1545 -- -- -- -- -- 96 % -- --   03/10/20 0730 (!) 173/77 100.4 °F (38 °C) Oral 83 22 94 % -- --   03/10/20 0443 127/64 98.8 °F (37.1 °C) Oral 76 20 95 % 5' 3\" (1.6 m) 191 lb (86.6 kg)

## 2020-03-11 LAB
ANION GAP SERPL CALCULATED.3IONS-SCNC: 16 MMOL/L (ref 7–16)
BUN BLDV-MCNC: 18 MG/DL (ref 8–23)
CALCIUM SERPL-MCNC: 10.2 MG/DL (ref 8.6–10.2)
CHLORIDE BLD-SCNC: 99 MMOL/L (ref 98–107)
CO2: 22 MMOL/L (ref 22–29)
CREAT SERPL-MCNC: 0.8 MG/DL (ref 0.5–1)
GFR AFRICAN AMERICAN: >60
GFR NON-AFRICAN AMERICAN: >60 ML/MIN/1.73
GLUCOSE BLD-MCNC: 221 MG/DL (ref 74–99)
HCT VFR BLD CALC: 31 % (ref 34–48)
HEMOGLOBIN: 9.6 G/DL (ref 11.5–15.5)
L. PNEUMOPHILA SEROGP 1 UR AG: NORMAL
MCH RBC QN AUTO: 24.1 PG (ref 26–35)
MCHC RBC AUTO-ENTMCNC: 31 % (ref 32–34.5)
MCV RBC AUTO: 77.9 FL (ref 80–99.9)
METER GLUCOSE: 233 MG/DL (ref 74–99)
METER GLUCOSE: 349 MG/DL (ref 74–99)
METER GLUCOSE: 379 MG/DL (ref 74–99)
METER GLUCOSE: 427 MG/DL (ref 74–99)
PDW BLD-RTO: 14.4 FL (ref 11.5–15)
PLATELET # BLD: 247 E9/L (ref 130–450)
PMV BLD AUTO: 10.4 FL (ref 7–12)
POTASSIUM REFLEX MAGNESIUM: 4 MMOL/L (ref 3.5–5)
RBC # BLD: 3.98 E12/L (ref 3.5–5.5)
SODIUM BLD-SCNC: 137 MMOL/L (ref 132–146)
WBC # BLD: 7.6 E9/L (ref 4.5–11.5)

## 2020-03-11 PROCEDURE — 80048 BASIC METABOLIC PNL TOTAL CA: CPT

## 2020-03-11 PROCEDURE — 94669 MECHANICAL CHEST WALL OSCILL: CPT

## 2020-03-11 PROCEDURE — 94640 AIRWAY INHALATION TREATMENT: CPT

## 2020-03-11 PROCEDURE — 1200000000 HC SEMI PRIVATE

## 2020-03-11 PROCEDURE — 6360000002 HC RX W HCPCS: Performed by: NURSE PRACTITIONER

## 2020-03-11 PROCEDURE — 2700000000 HC OXYGEN THERAPY PER DAY

## 2020-03-11 PROCEDURE — 82962 GLUCOSE BLOOD TEST: CPT

## 2020-03-11 PROCEDURE — 2580000003 HC RX 258: Performed by: NURSE PRACTITIONER

## 2020-03-11 PROCEDURE — 6370000000 HC RX 637 (ALT 250 FOR IP): Performed by: NURSE PRACTITIONER

## 2020-03-11 PROCEDURE — 85027 COMPLETE CBC AUTOMATED: CPT

## 2020-03-11 PROCEDURE — 36415 COLL VENOUS BLD VENIPUNCTURE: CPT

## 2020-03-11 RX ORDER — METHYLPREDNISOLONE SODIUM SUCCINATE 40 MG/ML
40 INJECTION, POWDER, LYOPHILIZED, FOR SOLUTION INTRAMUSCULAR; INTRAVENOUS EVERY 12 HOURS
Status: COMPLETED | OUTPATIENT
Start: 2020-03-11 | End: 2020-03-12

## 2020-03-11 RX ORDER — PREDNISONE 20 MG/1
40 TABLET ORAL DAILY
Status: DISCONTINUED | OUTPATIENT
Start: 2020-03-12 | End: 2020-03-13

## 2020-03-11 RX ADMIN — METOPROLOL SUCCINATE 50 MG: 50 TABLET, EXTENDED RELEASE ORAL at 08:45

## 2020-03-11 RX ADMIN — MAGNESIUM HYDROXIDE 30 ML: 400 SUSPENSION ORAL at 19:47

## 2020-03-11 RX ADMIN — BUDESONIDE 500 MCG: 0.5 INHALANT RESPIRATORY (INHALATION) at 07:35

## 2020-03-11 RX ADMIN — FUROSEMIDE 40 MG: 10 INJECTION, SOLUTION INTRAMUSCULAR; INTRAVENOUS at 17:33

## 2020-03-11 RX ADMIN — FERROUS SULFATE TAB 325 MG (65 MG ELEMENTAL FE) 325 MG: 325 (65 FE) TAB at 17:33

## 2020-03-11 RX ADMIN — HYDROCHLOROTHIAZIDE 12.5 MG: 12.5 TABLET ORAL at 08:45

## 2020-03-11 RX ADMIN — ASPIRIN 81 MG 81 MG: 81 TABLET ORAL at 08:45

## 2020-03-11 RX ADMIN — ENOXAPARIN SODIUM 40 MG: 40 INJECTION SUBCUTANEOUS at 08:46

## 2020-03-11 RX ADMIN — METHYLPREDNISOLONE SODIUM SUCCINATE 40 MG: 40 INJECTION, POWDER, FOR SOLUTION INTRAMUSCULAR; INTRAVENOUS at 17:33

## 2020-03-11 RX ADMIN — Medication 10 ML: at 08:53

## 2020-03-11 RX ADMIN — INSULIN LISPRO 2 UNITS: 100 INJECTION, SOLUTION INTRAVENOUS; SUBCUTANEOUS at 21:15

## 2020-03-11 RX ADMIN — FERROUS SULFATE TAB 325 MG (65 MG ELEMENTAL FE) 325 MG: 325 (65 FE) TAB at 08:46

## 2020-03-11 RX ADMIN — FAMOTIDINE 20 MG: 20 TABLET, FILM COATED ORAL at 08:45

## 2020-03-11 RX ADMIN — FUROSEMIDE 40 MG: 10 INJECTION, SOLUTION INTRAMUSCULAR; INTRAVENOUS at 08:47

## 2020-03-11 RX ADMIN — ARFORMOTEROL TARTRATE 15 MCG: 15 SOLUTION RESPIRATORY (INHALATION) at 07:35

## 2020-03-11 RX ADMIN — WATER 1 G: 1 INJECTION INTRAMUSCULAR; INTRAVENOUS; SUBCUTANEOUS at 02:10

## 2020-03-11 RX ADMIN — PRAVASTATIN SODIUM 40 MG: 20 TABLET ORAL at 21:15

## 2020-03-11 RX ADMIN — INSULIN LISPRO 6 UNITS: 100 INJECTION, SOLUTION INTRAVENOUS; SUBCUTANEOUS at 12:09

## 2020-03-11 RX ADMIN — AZITHROMYCIN DIHYDRATE 500 MG: 500 INJECTION, POWDER, LYOPHILIZED, FOR SOLUTION INTRAVENOUS at 02:10

## 2020-03-11 RX ADMIN — INSULIN LISPRO 5 UNITS: 100 INJECTION, SOLUTION INTRAVENOUS; SUBCUTANEOUS at 17:34

## 2020-03-11 RX ADMIN — INSULIN LISPRO 2 UNITS: 100 INJECTION, SOLUTION INTRAVENOUS; SUBCUTANEOUS at 08:51

## 2020-03-11 RX ADMIN — Medication 10 ML: at 19:48

## 2020-03-11 RX ADMIN — AMLODIPINE BESYLATE 10 MG: 10 TABLET ORAL at 08:45

## 2020-03-11 RX ADMIN — LISINOPRIL 20 MG: 20 TABLET ORAL at 08:46

## 2020-03-11 RX ADMIN — METHYLPREDNISOLONE SODIUM SUCCINATE 40 MG: 40 INJECTION, POWDER, FOR SOLUTION INTRAMUSCULAR; INTRAVENOUS at 05:51

## 2020-03-11 RX ADMIN — Medication 400 MG: at 08:45

## 2020-03-11 ASSESSMENT — PAIN SCALES - GENERAL: PAINLEVEL_OUTOF10: 0

## 2020-03-11 NOTE — PROGRESS NOTES
necessary information or participating in medical decisions. I also discussed the differential diagnosis and all of the proposed management plans with the patient and individuals accompanying the patient. Anh Martinez requires this high level of physician care and nursing on the IMC/Telemetry unit due the complexity of decision management and chance of rapid decline or death. Continued cardiac monitoring and higher level of nursing are required. I am readily available for any further decision-making and intervention.        Funmi Rocha, WILLIAM - CNP  3/11/2020  8:47 AM

## 2020-03-12 LAB
ANION GAP SERPL CALCULATED.3IONS-SCNC: 17 MMOL/L (ref 7–16)
BUN BLDV-MCNC: 29 MG/DL (ref 8–23)
CALCIUM SERPL-MCNC: 10 MG/DL (ref 8.6–10.2)
CHLORIDE BLD-SCNC: 98 MMOL/L (ref 98–107)
CO2: 22 MMOL/L (ref 22–29)
CREAT SERPL-MCNC: 0.9 MG/DL (ref 0.5–1)
GFR AFRICAN AMERICAN: >60
GFR NON-AFRICAN AMERICAN: >60 ML/MIN/1.73
GLUCOSE BLD-MCNC: 295 MG/DL (ref 74–99)
HCT VFR BLD CALC: 36.1 % (ref 34–48)
HEMOGLOBIN: 11.2 G/DL (ref 11.5–15.5)
MCH RBC QN AUTO: 24.1 PG (ref 26–35)
MCHC RBC AUTO-ENTMCNC: 31 % (ref 32–34.5)
MCV RBC AUTO: 77.6 FL (ref 80–99.9)
METER GLUCOSE: 265 MG/DL (ref 74–99)
METER GLUCOSE: 350 MG/DL (ref 74–99)
METER GLUCOSE: 407 MG/DL (ref 74–99)
METER GLUCOSE: 414 MG/DL (ref 74–99)
PDW BLD-RTO: 14.6 FL (ref 11.5–15)
PLATELET # BLD: 314 E9/L (ref 130–450)
PMV BLD AUTO: 9.7 FL (ref 7–12)
POTASSIUM REFLEX MAGNESIUM: 3.9 MMOL/L (ref 3.5–5)
RBC # BLD: 4.65 E12/L (ref 3.5–5.5)
SODIUM BLD-SCNC: 137 MMOL/L (ref 132–146)
WBC # BLD: 10.4 E9/L (ref 4.5–11.5)

## 2020-03-12 PROCEDURE — 36415 COLL VENOUS BLD VENIPUNCTURE: CPT

## 2020-03-12 PROCEDURE — 80048 BASIC METABOLIC PNL TOTAL CA: CPT

## 2020-03-12 PROCEDURE — 6360000002 HC RX W HCPCS: Performed by: NURSE PRACTITIONER

## 2020-03-12 PROCEDURE — 6370000000 HC RX 637 (ALT 250 FOR IP): Performed by: NURSE PRACTITIONER

## 2020-03-12 PROCEDURE — 82962 GLUCOSE BLOOD TEST: CPT

## 2020-03-12 PROCEDURE — 2580000003 HC RX 258: Performed by: NURSE PRACTITIONER

## 2020-03-12 PROCEDURE — 94640 AIRWAY INHALATION TREATMENT: CPT

## 2020-03-12 PROCEDURE — 1200000000 HC SEMI PRIVATE

## 2020-03-12 PROCEDURE — 85027 COMPLETE CBC AUTOMATED: CPT

## 2020-03-12 RX ADMIN — AZITHROMYCIN DIHYDRATE 500 MG: 500 INJECTION, POWDER, LYOPHILIZED, FOR SOLUTION INTRAVENOUS at 02:22

## 2020-03-12 RX ADMIN — FAMOTIDINE 20 MG: 20 TABLET, FILM COATED ORAL at 09:30

## 2020-03-12 RX ADMIN — FUROSEMIDE 40 MG: 10 INJECTION, SOLUTION INTRAMUSCULAR; INTRAVENOUS at 09:30

## 2020-03-12 RX ADMIN — Medication 400 MG: at 09:30

## 2020-03-12 RX ADMIN — INSULIN LISPRO 3 UNITS: 100 INJECTION, SOLUTION INTRAVENOUS; SUBCUTANEOUS at 21:21

## 2020-03-12 RX ADMIN — FERROUS SULFATE TAB 325 MG (65 MG ELEMENTAL FE) 325 MG: 325 (65 FE) TAB at 09:30

## 2020-03-12 RX ADMIN — FUROSEMIDE 40 MG: 10 INJECTION, SOLUTION INTRAMUSCULAR; INTRAVENOUS at 18:10

## 2020-03-12 RX ADMIN — Medication 10 ML: at 21:21

## 2020-03-12 RX ADMIN — WATER 1 G: 1 INJECTION INTRAMUSCULAR; INTRAVENOUS; SUBCUTANEOUS at 02:23

## 2020-03-12 RX ADMIN — PREDNISONE 40 MG: 20 TABLET ORAL at 09:30

## 2020-03-12 RX ADMIN — ALBUTEROL SULFATE 2.5 MG: 2.5 SOLUTION RESPIRATORY (INHALATION) at 17:10

## 2020-03-12 RX ADMIN — INSULIN LISPRO 5 UNITS: 100 INJECTION, SOLUTION INTRAVENOUS; SUBCUTANEOUS at 12:33

## 2020-03-12 RX ADMIN — ARFORMOTEROL TARTRATE 15 MCG: 15 SOLUTION RESPIRATORY (INHALATION) at 17:10

## 2020-03-12 RX ADMIN — HYDROCHLOROTHIAZIDE 12.5 MG: 12.5 TABLET ORAL at 09:30

## 2020-03-12 RX ADMIN — BUDESONIDE 500 MCG: 0.5 INHALANT RESPIRATORY (INHALATION) at 17:10

## 2020-03-12 RX ADMIN — INSULIN LISPRO 3 UNITS: 100 INJECTION, SOLUTION INTRAVENOUS; SUBCUTANEOUS at 09:37

## 2020-03-12 RX ADMIN — Medication 10 ML: at 09:37

## 2020-03-12 RX ADMIN — PRAVASTATIN SODIUM 40 MG: 20 TABLET ORAL at 21:21

## 2020-03-12 RX ADMIN — ENOXAPARIN SODIUM 40 MG: 40 INJECTION SUBCUTANEOUS at 09:31

## 2020-03-12 RX ADMIN — ARFORMOTEROL TARTRATE 15 MCG: 15 SOLUTION RESPIRATORY (INHALATION) at 06:38

## 2020-03-12 RX ADMIN — ASPIRIN 81 MG 81 MG: 81 TABLET ORAL at 09:37

## 2020-03-12 RX ADMIN — INSULIN LISPRO 6 UNITS: 100 INJECTION, SOLUTION INTRAVENOUS; SUBCUTANEOUS at 18:10

## 2020-03-12 RX ADMIN — FERROUS SULFATE TAB 325 MG (65 MG ELEMENTAL FE) 325 MG: 325 (65 FE) TAB at 18:10

## 2020-03-12 RX ADMIN — ALBUTEROL SULFATE 2.5 MG: 2.5 SOLUTION RESPIRATORY (INHALATION) at 13:28

## 2020-03-12 RX ADMIN — AMLODIPINE BESYLATE 10 MG: 10 TABLET ORAL at 09:30

## 2020-03-12 RX ADMIN — METHYLPREDNISOLONE SODIUM SUCCINATE 40 MG: 40 INJECTION, POWDER, FOR SOLUTION INTRAMUSCULAR; INTRAVENOUS at 06:16

## 2020-03-12 RX ADMIN — LISINOPRIL 20 MG: 20 TABLET ORAL at 09:30

## 2020-03-12 RX ADMIN — METOPROLOL SUCCINATE 50 MG: 50 TABLET, EXTENDED RELEASE ORAL at 09:30

## 2020-03-12 RX ADMIN — BUDESONIDE 500 MCG: 0.5 INHALANT RESPIRATORY (INHALATION) at 06:38

## 2020-03-12 NOTE — PROGRESS NOTES
Internal Medicine Progress Note    [facility]. Jono Charlton., & [address] [facility] [address] Jono Charlton., F.A.C.O.I. Zeina Little D.O., F.JU.C.O.I. Primary Care Physician: Rachelle Phillip MD   Admitting Physician:  Irma Arce DO  Admission date and time: 3/9/2020 10:26 PM    Room:  [facility]-    Patient Name: Charley Melendez  MRN: [id number]    Date of Service: 3/12/2020     Subjective:  Vinny De Oliveira is seen and examined at bedside today. She still with  shortness of breath and requires nasal cannula oxygen. Coughing more. Feels worse today. Epistaxis from dry nostrils. Has been ambulatory but states it exhausts her. Review of System: HEENT:  Antolin ear pain, sore throat, sinus or eye problems  Cardiovascular:   Denies any chest pain, irregular heartbeats, or palpitations. Respiratory:   Continued coughing with less congestion. Denies sputum production, hemoptysis, or wheezing. Gastrointestinal:   Denies nausea, vomiting, diarrhea, or constipation. Denies any abdominal pain. Extremities:   Denies any lower extremity swelling or edema. Neurology:    Denies any headache or focal neurological deficits. No weakness or paresthesia. Derm:    Denies any rashes, ulcers, or excoriations. Denies bruising. Genitourinary:    Denies any urgency, frequency, hematuria. Voiding without difficulty. Musculoskeletal:  Denies myalgias, joint complaints or back pain      Physical Exam:  No intake/output data recorded. Blood pressure (!) 173/85, pulse 68, temperature 98 °F (36.7 °C), temperature source Oral, resp. rate 18, height 5' 3\" (1.6 m), weight 187 lb 6.4 oz (85 kg), last menstrual period 06/26/1953, SpO2 96 %. HEENT:    PERRLA. EOMI. Sclera clear. Buccal mucosa moist. Impaired vision. Neck:    Supple. Trachea midline. No thyromegaly. No JVD. No bruits. Heart:    Rhythm regular, rate controlled. No murmurs. NSR. Lungs:    Diminished aeration throughout.   Clear to auscultation bilaterally. No wheezes. No rhonchi. No rales. Abdomen:   Soft. Obese. Non-tender. Non-distended. Bowel sounds positive. No organomegaly or masses. No pain on palpation  Extremities:    Peripheral pulses present. No peripheral edema. No ulcers. Neurologic:    Alert x 3. No focal deficit. Cranial nerves grossly intact. Skin:    No petechia. No hemorrhage. No wounds. Psych:   Behavior is normal. Mood appears normal. Speech is not rapid or pressured. Musculokeletal:  Spine ROM normal. Muscular strength intact. Gait not assessed.   Genitalia/Breast:  Deferred    Scheduled Meds:   predniSONE  40 mg Oral Daily    budesonide  0.5 mg Nebulization BID    Arformoterol Tartrate  15 mcg Nebulization BID    sodium chloride flush  10 mL Intravenous 2 times per day    enoxaparin  40 mg Subcutaneous Daily    aspirin  81 mg Oral Daily    amLODIPine  10 mg Oral Daily    famotidine  20 mg Oral Daily    ferrous sulfate  325 mg Oral BID WC    magnesium oxide  400 mg Oral Daily    metoprolol succinate  50 mg Oral Daily    pravastatin  40 mg Oral Nightly    insulin lispro  0-6 Units Subcutaneous TID WC    insulin lispro  0-3 Units Subcutaneous Nightly    cefTRIAXone (ROCEPHIN) IV  1 g Intravenous Q24H    azithromycin  500 mg Intravenous Q24H    lisinopril  20 mg Oral Daily    And    hydroCHLOROthiazide  12.5 mg Oral Daily    furosemide  40 mg Intravenous BID       Continuous Infusions:   dextrose         Objective Data:  CBC with Differential:    Lab Results   Component Value Date    WBC 10.4 03/12/2020    RBC 4.65 03/12/2020    HGB 11.2 03/12/2020    HCT 36.1 03/12/2020     03/12/2020    MCV 77.6 03/12/2020    MCH 24.1 03/12/2020    MCHC 31.0 03/12/2020    RDW 14.6 03/12/2020    NRBC 1 10/30/2015    LYMPHOPCT 13.4 03/09/2020    MONOPCT 9.8 03/09/2020    BASOPCT 0.2 03/09/2020    MONOSABS 0.65 03/09/2020    LYMPHSABS 0.89 03/09/2020    EOSABS 0.07 03/09/2020    BASOSABS 0.01 03/09/2020

## 2020-03-12 NOTE — CARE COORDINATION
CM note: Per QFR patient is now on room air, still receiving iv antibiotics/lasix. No anticipated discharge needs.

## 2020-03-13 LAB
ANION GAP SERPL CALCULATED.3IONS-SCNC: 18 MMOL/L (ref 7–16)
BUN BLDV-MCNC: 28 MG/DL (ref 8–23)
CALCIUM SERPL-MCNC: 9.6 MG/DL (ref 8.6–10.2)
CHLORIDE BLD-SCNC: 96 MMOL/L (ref 98–107)
CO2: 24 MMOL/L (ref 22–29)
CREAT SERPL-MCNC: 0.8 MG/DL (ref 0.5–1)
EKG ATRIAL RATE: 76 BPM
EKG P AXIS: 62 DEGREES
EKG P-R INTERVAL: 160 MS
EKG Q-T INTERVAL: 378 MS
EKG QRS DURATION: 80 MS
EKG QTC CALCULATION (BAZETT): 425 MS
EKG R AXIS: 43 DEGREES
EKG T AXIS: 50 DEGREES
EKG VENTRICULAR RATE: 76 BPM
GFR AFRICAN AMERICAN: >60
GFR NON-AFRICAN AMERICAN: >60 ML/MIN/1.73
GLUCOSE BLD-MCNC: 368 MG/DL (ref 74–99)
HCT VFR BLD CALC: 35.5 % (ref 34–48)
HEMOGLOBIN: 11.2 G/DL (ref 11.5–15.5)
MAGNESIUM: 1.8 MG/DL (ref 1.6–2.6)
MAGNESIUM: 1.9 MG/DL (ref 1.6–2.6)
MCH RBC QN AUTO: 24.2 PG (ref 26–35)
MCHC RBC AUTO-ENTMCNC: 31.5 % (ref 32–34.5)
MCV RBC AUTO: 76.7 FL (ref 80–99.9)
METER GLUCOSE: 295 MG/DL (ref 74–99)
METER GLUCOSE: 303 MG/DL (ref 74–99)
METER GLUCOSE: 352 MG/DL (ref 74–99)
METER GLUCOSE: 369 MG/DL (ref 74–99)
METER GLUCOSE: 396 MG/DL (ref 74–99)
METER GLUCOSE: 480 MG/DL (ref 74–99)
PDW BLD-RTO: 14.3 FL (ref 11.5–15)
PLATELET # BLD: 350 E9/L (ref 130–450)
PMV BLD AUTO: 9.7 FL (ref 7–12)
POTASSIUM REFLEX MAGNESIUM: 2.9 MMOL/L (ref 3.5–5)
PRO-BNP: 57 PG/ML (ref 0–125)
RBC # BLD: 4.63 E12/L (ref 3.5–5.5)
SODIUM BLD-SCNC: 138 MMOL/L (ref 132–146)
TSH SERPL DL<=0.05 MIU/L-ACNC: 0.91 UIU/ML (ref 0.27–4.2)
WBC # BLD: 12.1 E9/L (ref 4.5–11.5)

## 2020-03-13 PROCEDURE — 1200000000 HC SEMI PRIVATE

## 2020-03-13 PROCEDURE — 6370000000 HC RX 637 (ALT 250 FOR IP): Performed by: INTERNAL MEDICINE

## 2020-03-13 PROCEDURE — 36415 COLL VENOUS BLD VENIPUNCTURE: CPT

## 2020-03-13 PROCEDURE — 84443 ASSAY THYROID STIM HORMONE: CPT

## 2020-03-13 PROCEDURE — 94669 MECHANICAL CHEST WALL OSCILL: CPT

## 2020-03-13 PROCEDURE — 82962 GLUCOSE BLOOD TEST: CPT

## 2020-03-13 PROCEDURE — 2700000000 HC OXYGEN THERAPY PER DAY

## 2020-03-13 PROCEDURE — 6370000000 HC RX 637 (ALT 250 FOR IP): Performed by: NURSE PRACTITIONER

## 2020-03-13 PROCEDURE — 94640 AIRWAY INHALATION TREATMENT: CPT

## 2020-03-13 PROCEDURE — APPSS60 APP SPLIT SHARED TIME 46-60 MINUTES: Performed by: PHYSICIAN ASSISTANT

## 2020-03-13 PROCEDURE — 2580000003 HC RX 258: Performed by: NURSE PRACTITIONER

## 2020-03-13 PROCEDURE — 6360000002 HC RX W HCPCS: Performed by: NURSE PRACTITIONER

## 2020-03-13 PROCEDURE — 83880 ASSAY OF NATRIURETIC PEPTIDE: CPT

## 2020-03-13 PROCEDURE — 99222 1ST HOSP IP/OBS MODERATE 55: CPT | Performed by: INTERNAL MEDICINE

## 2020-03-13 PROCEDURE — 83735 ASSAY OF MAGNESIUM: CPT

## 2020-03-13 PROCEDURE — 80048 BASIC METABOLIC PNL TOTAL CA: CPT

## 2020-03-13 PROCEDURE — 85027 COMPLETE CBC AUTOMATED: CPT

## 2020-03-13 RX ORDER — PREDNISONE 20 MG/1
20 TABLET ORAL DAILY
Status: DISCONTINUED | OUTPATIENT
Start: 2020-03-14 | End: 2020-03-15 | Stop reason: HOSPADM

## 2020-03-13 RX ORDER — POTASSIUM CHLORIDE 20 MEQ/1
40 TABLET, EXTENDED RELEASE ORAL 2 TIMES DAILY
Status: DISCONTINUED | OUTPATIENT
Start: 2020-03-13 | End: 2020-03-15 | Stop reason: HOSPADM

## 2020-03-13 RX ORDER — INSULIN GLARGINE 100 [IU]/ML
10 INJECTION, SOLUTION SUBCUTANEOUS ONCE
Status: COMPLETED | OUTPATIENT
Start: 2020-03-13 | End: 2020-03-13

## 2020-03-13 RX ADMIN — FUROSEMIDE 40 MG: 10 INJECTION, SOLUTION INTRAMUSCULAR; INTRAVENOUS at 17:27

## 2020-03-13 RX ADMIN — INSULIN LISPRO 3 UNITS: 100 INJECTION, SOLUTION INTRAVENOUS; SUBCUTANEOUS at 21:35

## 2020-03-13 RX ADMIN — ENOXAPARIN SODIUM 40 MG: 40 INJECTION SUBCUTANEOUS at 08:24

## 2020-03-13 RX ADMIN — BUDESONIDE 500 MCG: 0.5 INHALANT RESPIRATORY (INHALATION) at 17:05

## 2020-03-13 RX ADMIN — BUDESONIDE 500 MCG: 0.5 INHALANT RESPIRATORY (INHALATION) at 04:39

## 2020-03-13 RX ADMIN — FAMOTIDINE 20 MG: 20 TABLET, FILM COATED ORAL at 08:24

## 2020-03-13 RX ADMIN — Medication 400 MG: at 08:25

## 2020-03-13 RX ADMIN — Medication 10 ML: at 23:15

## 2020-03-13 RX ADMIN — WATER 1 G: 1 INJECTION INTRAMUSCULAR; INTRAVENOUS; SUBCUTANEOUS at 02:35

## 2020-03-13 RX ADMIN — ALBUTEROL SULFATE 2.5 MG: 2.5 SOLUTION RESPIRATORY (INHALATION) at 00:01

## 2020-03-13 RX ADMIN — METOPROLOL SUCCINATE 50 MG: 50 TABLET, EXTENDED RELEASE ORAL at 08:25

## 2020-03-13 RX ADMIN — FERROUS SULFATE TAB 325 MG (65 MG ELEMENTAL FE) 325 MG: 325 (65 FE) TAB at 08:24

## 2020-03-13 RX ADMIN — INSULIN LISPRO 4 UNITS: 100 INJECTION, SOLUTION INTRAVENOUS; SUBCUTANEOUS at 08:50

## 2020-03-13 RX ADMIN — INSULIN GLARGINE 10 UNITS: 100 INJECTION, SOLUTION SUBCUTANEOUS at 23:39

## 2020-03-13 RX ADMIN — LISINOPRIL 20 MG: 20 TABLET ORAL at 08:25

## 2020-03-13 RX ADMIN — Medication 10 ML: at 08:28

## 2020-03-13 RX ADMIN — INSULIN LISPRO 6 UNITS: 100 INJECTION, SOLUTION INTRAVENOUS; SUBCUTANEOUS at 23:39

## 2020-03-13 RX ADMIN — ARFORMOTEROL TARTRATE 15 MCG: 15 SOLUTION RESPIRATORY (INHALATION) at 17:05

## 2020-03-13 RX ADMIN — FERROUS SULFATE TAB 325 MG (65 MG ELEMENTAL FE) 325 MG: 325 (65 FE) TAB at 17:27

## 2020-03-13 RX ADMIN — ARFORMOTEROL TARTRATE 15 MCG: 15 SOLUTION RESPIRATORY (INHALATION) at 04:38

## 2020-03-13 RX ADMIN — ALBUTEROL SULFATE 2.5 MG: 2.5 SOLUTION RESPIRATORY (INHALATION) at 10:14

## 2020-03-13 RX ADMIN — ASPIRIN 81 MG 81 MG: 81 TABLET ORAL at 08:25

## 2020-03-13 RX ADMIN — FUROSEMIDE 40 MG: 10 INJECTION, SOLUTION INTRAMUSCULAR; INTRAVENOUS at 08:25

## 2020-03-13 RX ADMIN — PRAVASTATIN SODIUM 40 MG: 20 TABLET ORAL at 21:35

## 2020-03-13 RX ADMIN — ALBUTEROL SULFATE 2.5 MG: 2.5 SOLUTION RESPIRATORY (INHALATION) at 04:38

## 2020-03-13 RX ADMIN — INSULIN LISPRO 4 UNITS: 100 INJECTION, SOLUTION INTRAVENOUS; SUBCUTANEOUS at 12:28

## 2020-03-13 RX ADMIN — POTASSIUM CHLORIDE 40 MEQ: 20 TABLET, EXTENDED RELEASE ORAL at 21:35

## 2020-03-13 RX ADMIN — AZITHROMYCIN DIHYDRATE 500 MG: 500 INJECTION, POWDER, LYOPHILIZED, FOR SOLUTION INTRAVENOUS at 02:35

## 2020-03-13 RX ADMIN — ALBUTEROL SULFATE 2.5 MG: 2.5 SOLUTION RESPIRATORY (INHALATION) at 22:55

## 2020-03-13 RX ADMIN — POTASSIUM CHLORIDE 40 MEQ: 20 TABLET, EXTENDED RELEASE ORAL at 12:28

## 2020-03-13 RX ADMIN — AMLODIPINE BESYLATE 10 MG: 10 TABLET ORAL at 08:25

## 2020-03-13 RX ADMIN — INSULIN LISPRO 5 UNITS: 100 INJECTION, SOLUTION INTRAVENOUS; SUBCUTANEOUS at 17:27

## 2020-03-13 RX ADMIN — PREDNISONE 40 MG: 20 TABLET ORAL at 08:25

## 2020-03-13 RX ADMIN — ALBUTEROL SULFATE 2.5 MG: 2.5 SOLUTION RESPIRATORY (INHALATION) at 17:05

## 2020-03-13 RX ADMIN — HYDROCHLOROTHIAZIDE 12.5 MG: 12.5 TABLET ORAL at 08:24

## 2020-03-13 ASSESSMENT — PAIN SCALES - GENERAL: PAINLEVEL_OUTOF10: 0

## 2020-03-13 NOTE — PROGRESS NOTES
Internal Medicine Progress Note    Nicole Byrd. Brittany Bills., & [address] [facility] [address] Brittany Bills., F.A.C.O.I. Chary Weaver D.O., F.A.C.O.I. Primary Care Physician: Eric Aldridge MD   Admitting Physician:  Fco Charles DO  Admission date and time: 3/9/2020 10:26 PM    Room:  [address] [place] [address]    Patient Name: Ashutosh Omer  MRN: [id number]    Date of Service: 3/13/2020     Subjective:  Maureen Ojeda is seen and examined at bedside today. Patient is complaining of shortness of breath today. We have adjusted some her cardiac medication echocardiogram was reviewed. Repeat chest x-ray for tomorrow. We have encouraged her to become more ambulatory      Review of System: HEENT:  Antolin ear pain, sore throat, sinus or eye problems  Cardiovascular:   Denies any chest pain, irregular heartbeats, or palpitations. Respiratory:   Continued coughing with less congestion. Denies sputum production, hemoptysis, or wheezing. Gastrointestinal:   Denies nausea, vomiting, diarrhea, or constipation. Denies any abdominal pain. Extremities:   Denies any lower extremity swelling or edema. Neurology:    Denies any headache or focal neurological deficits. No weakness or paresthesia. Derm:    Denies any rashes, ulcers, or excoriations. Denies bruising. Genitourinary:    Denies any urgency, frequency, hematuria. Voiding without difficulty. Musculoskeletal:  Denies myalgias, joint complaints or back pain      Physical Exam:  I/O this shift:  In: 240 [P.O.:240]  Out: 1200 [Urine:1200]  Blood pressure (!) 186/90, pulse 64, temperature 96.6 °F (35.9 °C), temperature source Oral, resp. rate 21, height 5' 3\" (1.6 m), weight 189 lb (85.7 kg), last menstrual period 06/26/1953, SpO2 100 %. HEENT:    PERRLA. EOMI. Sclera clear. Buccal mucosa moist. Impaired vision. Neck:    Supple. Trachea midline. No thyromegaly. No JVD. No bruits. Heart:    Rhythm regular, rate controlled. No murmurs. 03/09/2020    LYMPHSABS 0.89 03/09/2020    EOSABS 0.07 03/09/2020    BASOSABS 0.01 03/09/2020     BMP:    Lab Results   Component Value Date     03/13/2020    K 2.9 03/13/2020    CL 96 03/13/2020    CO2 24 03/13/2020    BUN 28 03/13/2020    LABALBU 3.9 11/28/2019    CREATININE 0.8 03/13/2020    CALCIUM 9.6 03/13/2020    GFRAA >60 03/13/2020    LABGLOM >60 03/13/2020    GLUCOSE 368 03/13/2020    GLUCOSE 103 01/27/2011     Last 3 Troponin:    Lab Results   Component Value Date    TROPONINI <0.01 03/10/2020    TROPONINI <0.01 03/10/2020    TROPONINI <0.01 03/10/2020       Wound Documentation:   Incision 08/08/18 Hip Left (Active)   Number of days: 580         Assessment:   Patient Active Problem List    Diagnosis Date Noted    Acute respiratory failure with hypoxia ([facility] [address] 03/10/2020    CAP (community acquired pneumonia) 03/10/2020    Acute exacerbation of chronic obstructive pulmonary disease (COPD) ([address] 03/10/2020    Anal warts 01/25/2019    Genital warts     Obesity (BMI 30-39.9) 09/28/2018    Thyroid disease     Hypertension     Hyperlipidemia     Hx of blood clots     Chronic obstructive pulmonary disease (HCC)     DVT (deep venous thrombosis) (HCC)     Atrial fibrillation ([address]     Anxiety     Primary localized osteoarthritis of left hip 08/08/2018    Primary osteoarthritis of left hip 07/30/2018    Cerebral atherosclerosis 03/28/2018    Cognitive impairment 03/28/2018    Hypercholesterolemia 03/28/2018    Atypical chest pain 06/14/2017    Essential hypertension 01/31/2017    Moderate obesity 10/14/2016    Palpitations 10/14/2016    Type 2 diabetes mellitus without complication, without long-term current use of insulin ([address] 07/25/2016    Mixed hyperlipidemia 04/26/2016    Bipolar disorder, current episode manic severe with psychotic features ([address] 03/05/2016    Microcytic anemia 10/21/2015    Arthritis 06/02/2015     Assessment    · Acute respiratory failure with hypoxia secondary to community-acquired pneumonia  · Acute exacerbation of COPD secondary to #1  · Questionable history of irregular heartbeat/Atrial fibrillation with controlled ventricular response, [id number] score of 5. Not on long term anticoagulation. · Acute on chronic stage I diastolic heart failure   · DM type II w/o long term use of insulin  · HLD  · Hypertension  · Iron deficiency anemia  · Bipolar disorder  · Hyperlipidemia  · Pulmonary hypertension-Mild  · Obesity secondary to excess caloric intake,     Plan:     · Patient continued to complain of shortness of breath. Echocardiogram was reviewed but does show preserved ejection fraction. We will continue to adjust pulmonary and cardiac medication as indicated. · Repeat chest x-ray will be ordered for tomorrow. · Continue to monitor blood sugar and make further adjustment as indicated  · Electrolyte imbalance of hypokalemia is present we will give additional supplementation    More than 50% of my  time was spent at the bedside counseling/coordinating care with the patient and/or family with face to face contact. This time was spent reviewing notes and laboratory data as well as instructing and counseling the patient. Time I spent with the family or surrogate(s) is included only if the patient was incapable of providing the necessary information or participating in medical decisions. I also discussed the differential diagnosis and all of the proposed management plans with the patient and individuals accompanying the patient.       Carlos Tapia,   3/13/2020  12:51 PM

## 2020-03-13 NOTE — PLAN OF CARE
Problem: Pain:  Goal: Control of acute pain  Description: Control of acute pain  Outcome: Met This Shift  Goal: Control of chronic pain  Description: Control of chronic pain  Outcome: Met This Shift     Problem: Falls - Risk of:  Goal: Will remain free from falls  Description: Will remain free from falls  Outcome: Met This Shift  Goal: Absence of physical injury  Description: Absence of physical injury  Outcome: Met This Shift

## 2020-03-13 NOTE — CONSULTS
Inpatient Cardiology Consultation      Reason for Consult:  Atrial fibrillation    Consulting Physician: Dr. Guadalupe Goodwin    Requesting Physician:  Dr. Yaima Coburn     Date of Consultation: 3/13/2020    HISTORY OF PRESENT ILLNESS:   Patient is a 78-year-old -American female who follows with Dr. Fern Hall outpatient. She has a past medical history of chronic obstructive pulmonary disease, bipolar disorder, obesity, hypertension, vitamin D deficiency, diabetes mellitus type II, hyperlipidemia, iron deficiency anemia, chronic diastolic heart failure, history of questionable atrial fibrillation in 2002, pulmonary hypertension, restless leg syndrome, arthritis. Patient originally presented here at [address] [place] [address] on 3/10/2020 with complaints of upper respiratory complaints. At that time, she noted of generalized fatigue, worsening shortness of breath particularly on exertion, productive cough with sputum production and fever. She was noted to be febrile in the ED. She also admits to nasal/sinus congestion. She was found to be hypoxic in the ED as well. She was admitted for acute hypoxic respiratory failure likely due to combination of community-acquired pneumonia/acute COPD exacerbation and acute on chronic diastolic heart failure. Of note, during interview/exam today, patient is noted to be a poor historian at times. No family present at bedside during interview/exam.  We were consulted for evaluation of possible atrial fibrillation noted on telemetry. Patient states that during her hospitalization thus far, she has improved clinically. She is now on room air with good oxygen saturations. She admits to improvement in shortness of breath, but still notes of shortness of breath on even mild exertion. She states sometimes she even gets short of breath when she is moving around too much in her hospital bed. She still admits to cough, but states that her cough is now nonproductive.   She denies any LV mass or thrombus. Mildly enlarged right atrium. Borderline dilated left atrium. No evidence of thrombus within the left atrium. Trace MR. Trace TR. Mild pulmonary hypertension, RVSP is 38 9 5 mmHg. Small to moderate left pleural effusion. 12.  History of bilateral cephalic vein thrombosis. 13.  Hospitalization in 2008 for elevated troponin. Left heart catheterization at that time revealed no significant coronary artery disease with hyperdynamic LV systolic function and elevated LV end-diastolic pressure. 14.  Restless leg syndrome. 15.  Arthritis. PAST SURGICAL HISTORY:    Past Surgical History:   Procedure Laterality Date    CARDIAC CATHETERIZATION  12/01/2008    Normal Coronary arteries. Normal LV. Elevated left ventricular end diastolic pressure suggestive of impaired relaxatin and possible diastolic dysfunction    HYSTERECTOMY  2004    LEG SURGERY Right     right hip area, removed cyst iccf developed infection went to [id number] [address] LESION(S) N/A 1/25/2019    EXCISION PERIANAL WARTS performed by rGant Sylvester MD at [address] Left 8/8/2018    LEFT HIP TOTAL ARTHROPLASTY (KYLIE) performed by Shahid Ramos DO at [facility]:  Prior to Admission medications    Medication Sig Start Date End Date Taking?  Authorizing Provider   doxycycline hyclate (VIBRA-TABS) 100 MG tablet Take 1 tablet by mouth 2 times daily for 10 days 3/10/20 3/20/20 Yes Dee Waite DO   ASPIRIN LOW DOSE 81 MG EC tablet TAKE 1 TABLET BY MOUTH TWICE DAILY 2/21/20  Yes Alicia Miller MD   mupirocin (BACTROBAN) 2 % ointment APPLY AS DIRECTED INTRANASALLY TWICE DAILY 2/21/20  Yes Alicia Miller MD   vitamin D (ERGOCALCIFEROL) 1.25 MG (69665 UT) CAPS capsule TAKE 1 CAPSULE BY MOUTH ONCE WEEKLY  Patient taking differently: Indications: pt takes every wednesday 1/22/20  Yes Alicia Miller MD   magnesium oxide (MAG-OX) 400 MG tablet Take 1 tablet by mouth daily 1/22/20  Yes Zhane Rosado MD   furosemide (LASIX) 20 MG tablet Take 1 tablet by mouth daily as needed (swelling and shortness of breath) 12/2/19  Yes Zhane Rosado MD   potassium chloride (KLOR-CON M) 10 MEQ extended release tablet Take 1 tablet by mouth daily as needed (with Lasix) 12/2/19  Yes Zhane Rosado MD   amLODIPine (NORVASC) 10 MG tablet Take 1 tablet by mouth daily 10/21/19  Yes Zhane Rosado MD   pravastatin (PRAVACHOL) 40 MG tablet Take 1 tablet by mouth nightly 10/21/19  Yes Zhane Rosado MD   SYMBICORT 160-4.5 MCG/ACT AERO INHALE TWO (2) PUFFS BY MOUTH TWICE DAILY 9/30/19  Yes Diamante Zambrano MD   famotidine (PEPCID) 20 MG tablet TAKE (1) TABLET BY MOUTH DAILY 9/27/19  Yes Zhane Rosado MD   lisinopril-hydrochlorothiazide (PRINZIDE;ZESTORETIC) 20-12.5 MG per tablet TAKE 1 TABLET BY MOUTH DAILY 9/27/19  Yes Zhane Rosado MD   metoprolol succinate (TOPROL XL) 50 MG extended release tablet TAKE 1 TABLET BY MOUTH DAILY 9/27/19  Yes Zhane Rosado MD   SITagliptin-metFORMIN (JANUMET XR)  MG TB24 per extended release tablet TAKE TWO (2) TABLETS BY MOUTH IN THE MORNING 9/27/19  Yes Zhane Rosado MD   montelukast (SINGULAIR) 10 MG tablet TAKE 1 TABLET BY MOUTH NIGHTLY 9/3/19  Yes Zhane Rosado MD   albuterol sulfate HFA (PROVENTIL HFA) 108 (90 Base) MCG/ACT inhaler Inhale 2 puffs into the lungs every 4 hours as needed for Wheezing 7/10/19 7/9/20 Yes JANICE Gamino   ferrous sulfate 325 (65 Fe) MG tablet Take 1 tablet by mouth 2 times daily (with meals) 8/10/18  Yes Citlaly Lyons, DO   ONE TOUCH LANCETS MISC Check blood sugar bid 2/13/20   Zhane Rosado MD   blood glucose test strips (ONE TOUCH ULTRA TEST) strip Check blood sugar bid 2/13/20   Zhane Rosado MD   loperamide (IMODIUM) 2 MG capsule TAKE ONE (1) CAPSULE BY MOUTH FOUR TIMES A DAY AS NEEDED FOR DIARRHEA 12/23/19   Carl Anderson MD   mupirocin [facility] NASAL) 2 % nasal ointment Take by Nasal route 2 times daily. 11/25/19 2/21/20  Carl Anderson MD   Incontinence Supply Disposable (DEPEND UNDERWEAR LARGE) MISC Wear daily 2/11/19   Carl Anderson MD   docusate sodium (COLACE) 100 MG capsule Take 1 capsule by mouth 2 times daily 1/25/19   Abhijeet Baker DO   Blood Glucose Monitoring Suppl (ONE TOUCH ULTRA 2) w/Device KIT Check blood sugar bid 7/26/18   Carl Anderson MD   Misc.  Devices MISC Bedside commode 7/25/16   Carl Anderson MD       CURRENT MEDICATIONS:      Current Facility-Administered Medications:     predniSONE (DELTASONE) tablet 40 mg, 40 mg, Oral, Daily, WILLIAM Mccall - CNP, 40 mg at 03/12/20 0930    budesonide (PULMICORT) nebulizer suspension 500 mcg, 0.5 mg, Nebulization, BID, Gilma Garrison APRN - CNP, 500 mcg at 03/13/20 0439    Arformoterol Tartrate (BROVANA) nebulizer solution 15 mcg, 15 mcg, Nebulization, BID, Gilma Garrison APRN - CNP, 15 mcg at 03/13/20 0438    sodium chloride flush 0.9 % injection 10 mL, 10 mL, Intravenous, 2 times per day, Gilma Garrison APRN - CNP, 10 mL at 03/12/20 2121    sodium chloride flush 0.9 % injection 10 mL, 10 mL, Intravenous, PRN, Gilma Garrison APRN - CNP    acetaminophen (TYLENOL) tablet 650 mg, 650 mg, Oral, Q6H PRN **OR** acetaminophen (TYLENOL) suppository 650 mg, 650 mg, Rectal, Q6H PRN, Gilma Garrison APRN - CNP    magnesium hydroxide (MILK OF MAGNESIA) 400 MG/5ML suspension 30 mL, 30 mL, Oral, Daily PRN, Gilma Garrison APRN - CNP, 30 mL at 03/11/20 1947    promethazine (PHENERGAN) tablet 12.5 mg, 12.5 mg, Oral, Q6H PRN **OR** ondansetron (ZOFRAN) injection 4 mg, 4 mg, Intravenous, Q6H PRN, WILLIAM Mccall CNP    enoxaparin (LOVENOX) injection 40 mg, 40 mg, Subcutaneous, Daily, [facility] ENZYMES:  Recent Labs     03/10/20  1321 03/10/20  1736   TROPONINI <0.01 <0.01     FASTING LIPID PANEL:  Lab Results   Component Value Date    CHOL 127 12/03/2018    HDL 65 12/03/2018    LDLCALC 41 12/03/2018    TRIG 107 12/03/2018       ASSESSMENT:  1. Acute hypoxic respiratory failure on admission, improved. 2.  Community-acquired pneumonia likely contributing to #1, with patient being febrile and elevated procalcitonin on admission. Chest x-ray findings suggestive of CHF versus pneumonia. 3.  Possible acute COPD exacerbation/viral URI contributing to #1. 4.  Possible acute on chronic diastolic heart failure contributing to #1 as well. BNP on admission around 458. Intake and output monitoring seems inaccurate for this admission. No clinical signs of acute decompensated heart failure. 5.  history of atrial fibrillation. 6.  Trace valvular disease on 2D echocardiogram this admission. 7.  Obesity. 8.  Hypertension, occasionally uncontrolled this admission. 9.  Hyperlipidemia, on statin therapy. 10.  Diabetes mellitus type II.  11.  Iron deficiency anemia. PLAN:  1.  2D echocardiogram this admission reviewed in detail. 2.  No clinical signs of acute heart failure noted on physical exam.  Intake and output documentation seems incorrect with patient getting IV Lasix 40 mg twice daily and still remains with positive fluid balance. 3.  Rest as per primary service. 4.  Further recommendations as per Dr. Emilia Ruiz. The above case was discussed in detail with Dr. Emilia Ruiz. Above assessment and plan made in collaboration with Dr. Emilia Ruiz. Further recommendations and assessment/plan as per Dr. Emilia Ruiz. Electronically signed by Shlomo Sandifer, PA-C on 3/13/2020 at 7:43 0172 [facility] MD Isi    I have personally participated in a face-to-face and personally obtained history and performed physical exam on the date of service. I reviewed chart, vitals, labs and complication, not stated as uncontrolled        Past Surgical History:   Procedure Laterality Date    CARDIAC CATHETERIZATION  12/01/2008    Normal Coronary arteries. Normal LV.   Elevated left ventricular end diastolic pressure suggestive of impaired relaxatin and possible diastolic dysfunction    HYSTERECTOMY  2004    LEG SURGERY Right     right hip area, removed cyst iccf developed infection went to [facility] SURG EXCISION OF ANAL LESION(S) N/A 1/25/2019    EXCISION PERIANAL WARTS performed by Florencio Watson MD at [address] Left 8/8/2018    LEFT HIP TOTAL ARTHROPLASTY (KYLIE) performed by Elis Cisse DO at [address]         Current Facility-Administered Medications:     potassium chloride (KLOR-CON M) extended release tablet 40 mEq, 40 mEq, Oral, BID, Neville Barker DO, 40 mEq at 03/13/20 1228    [START ON 3/14/2020] predniSONE (DELTASONE) tablet 20 mg, 20 mg, Oral, Daily, Ad Barker DO    budesonide (PULMICORT) nebulizer suspension 500 mcg, 0.5 mg, Nebulization, BID, Dima Naval, APRN - CNP, 500 mcg at 03/13/20 1705    Arformoterol Tartrate (BROVANA) nebulizer solution 15 mcg, 15 mcg, Nebulization, BID, Dima Naval, APRN - CNP, 15 mcg at 03/13/20 1705    sodium chloride flush 0.9 % injection 10 mL, 10 mL, Intravenous, 2 times per day, Dima Naval, APRN - CNP, 10 mL at 03/13/20 0828    sodium chloride flush 0.9 % injection 10 mL, 10 mL, Intravenous, PRN, Dima Naval, APRN - CNP    acetaminophen (TYLENOL) tablet 650 mg, 650 mg, Oral, Q6H PRN **OR** acetaminophen (TYLENOL) suppository 650 mg, 650 mg, Rectal, Q6H PRN, [place] Naval, APRN - CNP    magnesium hydroxide (MILK OF MAGNESIA) 400 MG/5ML suspension 30 mL, 30 mL, Oral, Daily PRN, Dima Naval, APRN - CNP, 30 mL at 03/11/20 1947    promethazine (PHENERGAN) tablet 12.5 mg, 12.5 mg, Oral, Q6H PRN **OR** ondansetron (ZOFRAN) injection 4 mg, 4 mg, Intravenous, Q6H PRN, WILLIAM Aguilar - NILO [employer] 03/13/20 0335    lisinopril (PRINIVIL;ZESTRIL) tablet 20 mg, 20 mg, Oral, Daily, 20 mg at 03/13/20 0825 **AND** hydrochlorothiazide (HYDRODIURIL) tablet 12.5 mg, 12.5 mg, Oral, Daily, Norvel Risen, APRN - CNP, 12.5 mg at 03/13/20 0824    furosemide (LASIX) injection 40 mg, 40 mg, Intravenous, BID, Norvel Risen, APRN - CNP, 40 mg at 03/13/20 1727    perflutren lipid microspheres (DEFINITY) injection 1.65 mg, 1.5 mL, Intravenous, ONCE PRN, Norvel Risen, APRN - CNP    Allergies as of 03/09/2020 - Review Complete 03/09/2020   Allergen Reaction Noted    Atenolol  06/02/2015    Codeine  11/06/2012    Lipitor [atorvastatin]  04/08/2015    Percocet [oxycodone-acetaminophen]  11/06/2012    Vicodin [hydrocodone-acetaminophen]  11/06/2012    Other Other (See Comments) 08/08/2018       Social History     Socioeconomic History    Marital status:       Spouse name: Not on file    Number of children: 2    Years of education: 21    Highest education level: Not on file   Occupational History    Occupation: retired     Employer: UNEMPLOYED     Comment: PT IS A [address] [place] [address] resource strain: Not on file    Food insecurity     Worry: Not on file     Inability: Not on file    Transportation needs     Medical: Not on file     Non-medical: Not on file   Tobacco Use    Smoking status: Never Smoker    Smokeless tobacco: Never Used   Substance and Sexual Activity    Alcohol use: No     Alcohol/week: 0.0 standard drinks     Comment: occasional diet pepsi    Drug use: No    Sexual activity: Yes   Lifestyle    Physical activity     Days per week: Not on file     Minutes per session: Not on file    Stress: Not on file   Relationships    Social connections     Talks on phone: Not on file     Gets together: Not on file     Attends Hindu service: Not on file     Active member of club or organization: Not on file     Attends meetings of clubs or organizations: Not on file Relationship status: Not on file    Intimate partner violence     Fear of current or ex partner: Not on file     Emotionally abused: Not on file     Physically abused: Not on file     Forced sexual activity: Not on file   Other Topics Concern    Not on file   Social History Narrative    ** Merged History Encounter **            Family History   Problem Relation Age of Onset    Diabetes Mother     Stroke Father        REVIEW OF SYSTEMS:     CONSTITUTIONAL:  negative for  fevers, chills, sweats, + fatigue  EYES:  negative for  double vision, blurred vision and blind spots  HEENT:  negative for  tinnitus, earaches, nasal congestion and epistaxis  RESPIRATORY: + Dyspnea, negative for wheezing and hemoptysis  CARDIOVASCULAR: as per HPI  GASTROINTESTINAL:  negative for nausea, vomiting, diarrhea, constipation, pruritus and jaundice  GENITOURINARY:  negative for frequency, dysuria, nocturia, urinary incontinence and hesitancy  HEMATOLOGIC/LYMPHATIC:  negative for easy bruising, bleeding, lymphadenopathy and petechiae  ALLERGIC/IMMUNOLOGIC:  negative for urticaria, hay fever and angioedema  ENDOCRINE:  negative for heat intolerance, cold intolerance, tremor, hair loss and diabetic symptoms including neither polyuria nor polydipsia nor blurred vision  MUSCULOSKELETAL:  negative for  myalgias, arthralgias, joint swelling, stiff joints and decreased range of motion  NEUROLOGICAL:  negative for memory problems, speech problems, visual disturbance, dysphagia, weakness and numbness      PHYSICAL EXAM:   CONSTITUTIONAL:  awake, alert, cooperative, no apparent distress, and appears stated age  EYES:  lids and lashes normal and pupils equal, round and reactive to light, anicteric sclerae  HEAD:  normocepalic, without obvious abnormality, atraumatic, pink, moist mucous membranes.   NECK:  Supple, symmetrical, trachea midline, no adenopathy, thyroid symmetric, not enlarged and no tenderness, skin normal  HEMATOLOGIC/LYMPHATICS:  no

## 2020-03-14 ENCOUNTER — APPOINTMENT (OUTPATIENT)
Dept: GENERAL RADIOLOGY | Age: 65
DRG: 193 | End: 2020-03-14
Payer: COMMERCIAL

## 2020-03-14 LAB
ANION GAP SERPL CALCULATED.3IONS-SCNC: 14 MMOL/L (ref 7–16)
BUN BLDV-MCNC: 20 MG/DL (ref 8–23)
CALCIUM SERPL-MCNC: 10 MG/DL (ref 8.6–10.2)
CHLORIDE BLD-SCNC: 100 MMOL/L (ref 98–107)
CO2: 26 MMOL/L (ref 22–29)
CREAT SERPL-MCNC: 0.7 MG/DL (ref 0.5–1)
GFR AFRICAN AMERICAN: >60
GFR NON-AFRICAN AMERICAN: >60 ML/MIN/1.73
GLUCOSE BLD-MCNC: 142 MG/DL (ref 74–99)
HCT VFR BLD CALC: 34.6 % (ref 34–48)
HEMOGLOBIN: 10.9 G/DL (ref 11.5–15.5)
MAGNESIUM: 2.1 MG/DL (ref 1.6–2.6)
MCH RBC QN AUTO: 24.1 PG (ref 26–35)
MCHC RBC AUTO-ENTMCNC: 31.5 % (ref 32–34.5)
MCV RBC AUTO: 76.5 FL (ref 80–99.9)
METER GLUCOSE: 153 MG/DL (ref 74–99)
METER GLUCOSE: 267 MG/DL (ref 74–99)
METER GLUCOSE: 286 MG/DL (ref 74–99)
METER GLUCOSE: 319 MG/DL (ref 74–99)
PDW BLD-RTO: 14.5 FL (ref 11.5–15)
PLATELET # BLD: 323 E9/L (ref 130–450)
PMV BLD AUTO: 9.4 FL (ref 7–12)
POTASSIUM REFLEX MAGNESIUM: 3.5 MMOL/L (ref 3.5–5)
RBC # BLD: 4.52 E12/L (ref 3.5–5.5)
SODIUM BLD-SCNC: 140 MMOL/L (ref 132–146)
WBC # BLD: 11 E9/L (ref 4.5–11.5)

## 2020-03-14 PROCEDURE — 94640 AIRWAY INHALATION TREATMENT: CPT

## 2020-03-14 PROCEDURE — 80048 BASIC METABOLIC PNL TOTAL CA: CPT

## 2020-03-14 PROCEDURE — 6370000000 HC RX 637 (ALT 250 FOR IP): Performed by: INTERNAL MEDICINE

## 2020-03-14 PROCEDURE — 1200000000 HC SEMI PRIVATE

## 2020-03-14 PROCEDURE — 82962 GLUCOSE BLOOD TEST: CPT

## 2020-03-14 PROCEDURE — 6360000002 HC RX W HCPCS: Performed by: NURSE PRACTITIONER

## 2020-03-14 PROCEDURE — 2580000003 HC RX 258: Performed by: NURSE PRACTITIONER

## 2020-03-14 PROCEDURE — 36415 COLL VENOUS BLD VENIPUNCTURE: CPT

## 2020-03-14 PROCEDURE — 94669 MECHANICAL CHEST WALL OSCILL: CPT

## 2020-03-14 PROCEDURE — 6370000000 HC RX 637 (ALT 250 FOR IP): Performed by: NURSE PRACTITIONER

## 2020-03-14 PROCEDURE — 71046 X-RAY EXAM CHEST 2 VIEWS: CPT

## 2020-03-14 PROCEDURE — 85027 COMPLETE CBC AUTOMATED: CPT

## 2020-03-14 PROCEDURE — 83735 ASSAY OF MAGNESIUM: CPT

## 2020-03-14 RX ORDER — FUROSEMIDE 40 MG/1
40 TABLET ORAL DAILY
Status: DISCONTINUED | OUTPATIENT
Start: 2020-03-15 | End: 2020-03-15 | Stop reason: HOSPADM

## 2020-03-14 RX ORDER — INSULIN GLARGINE 100 [IU]/ML
10 INJECTION, SOLUTION SUBCUTANEOUS NIGHTLY
Status: DISCONTINUED | OUTPATIENT
Start: 2020-03-14 | End: 2020-03-15 | Stop reason: HOSPADM

## 2020-03-14 RX ORDER — DOXYCYCLINE HYCLATE 100 MG/1
100 CAPSULE ORAL EVERY 12 HOURS SCHEDULED
Status: DISCONTINUED | OUTPATIENT
Start: 2020-03-14 | End: 2020-03-15 | Stop reason: HOSPADM

## 2020-03-14 RX ADMIN — POTASSIUM CHLORIDE 40 MEQ: 20 TABLET, EXTENDED RELEASE ORAL at 10:18

## 2020-03-14 RX ADMIN — BUDESONIDE 500 MCG: 0.5 INHALANT RESPIRATORY (INHALATION) at 07:04

## 2020-03-14 RX ADMIN — DOXYCYCLINE HYCLATE 100 MG: 100 CAPSULE ORAL at 12:05

## 2020-03-14 RX ADMIN — METFORMIN HYDROCHLORIDE 1000 MG: 1000 TABLET, FILM COATED ORAL at 18:08

## 2020-03-14 RX ADMIN — FERROUS SULFATE TAB 325 MG (65 MG ELEMENTAL FE) 325 MG: 325 (65 FE) TAB at 10:18

## 2020-03-14 RX ADMIN — PREDNISONE 20 MG: 20 TABLET ORAL at 10:18

## 2020-03-14 RX ADMIN — ENOXAPARIN SODIUM 40 MG: 40 INJECTION SUBCUTANEOUS at 10:18

## 2020-03-14 RX ADMIN — WATER 1 G: 1 INJECTION INTRAMUSCULAR; INTRAVENOUS; SUBCUTANEOUS at 02:31

## 2020-03-14 RX ADMIN — INSULIN LISPRO 8 UNITS: 100 INJECTION, SOLUTION INTRAVENOUS; SUBCUTANEOUS at 18:08

## 2020-03-14 RX ADMIN — Medication 400 MG: at 10:19

## 2020-03-14 RX ADMIN — Medication 10 ML: at 10:18

## 2020-03-14 RX ADMIN — INSULIN LISPRO 3 UNITS: 100 INJECTION, SOLUTION INTRAVENOUS; SUBCUTANEOUS at 22:21

## 2020-03-14 RX ADMIN — ASPIRIN 81 MG 81 MG: 81 TABLET ORAL at 10:19

## 2020-03-14 RX ADMIN — HYDROCHLOROTHIAZIDE 12.5 MG: 12.5 TABLET ORAL at 10:19

## 2020-03-14 RX ADMIN — BUDESONIDE 500 MCG: 0.5 INHALANT RESPIRATORY (INHALATION) at 19:00

## 2020-03-14 RX ADMIN — AMLODIPINE BESYLATE 10 MG: 10 TABLET ORAL at 10:18

## 2020-03-14 RX ADMIN — ARFORMOTEROL TARTRATE 15 MCG: 15 SOLUTION RESPIRATORY (INHALATION) at 07:04

## 2020-03-14 RX ADMIN — AZITHROMYCIN DIHYDRATE 500 MG: 500 INJECTION, POWDER, LYOPHILIZED, FOR SOLUTION INTRAVENOUS at 02:31

## 2020-03-14 RX ADMIN — DOXYCYCLINE HYCLATE 100 MG: 100 CAPSULE ORAL at 22:17

## 2020-03-14 RX ADMIN — FAMOTIDINE 20 MG: 20 TABLET, FILM COATED ORAL at 10:19

## 2020-03-14 RX ADMIN — INSULIN LISPRO 6 UNITS: 100 INJECTION, SOLUTION INTRAVENOUS; SUBCUTANEOUS at 12:08

## 2020-03-14 RX ADMIN — SITAGLIPTIN 100 MG: 50 TABLET, FILM COATED ORAL at 12:06

## 2020-03-14 RX ADMIN — FUROSEMIDE 40 MG: 10 INJECTION, SOLUTION INTRAMUSCULAR; INTRAVENOUS at 10:18

## 2020-03-14 RX ADMIN — INSULIN GLARGINE 10 UNITS: 100 INJECTION, SOLUTION SUBCUTANEOUS at 22:20

## 2020-03-14 RX ADMIN — LISINOPRIL 20 MG: 20 TABLET ORAL at 10:19

## 2020-03-14 RX ADMIN — METOPROLOL SUCCINATE 50 MG: 50 TABLET, EXTENDED RELEASE ORAL at 10:18

## 2020-03-14 RX ADMIN — FERROUS SULFATE TAB 325 MG (65 MG ELEMENTAL FE) 325 MG: 325 (65 FE) TAB at 18:08

## 2020-03-14 RX ADMIN — POTASSIUM CHLORIDE 40 MEQ: 20 TABLET, EXTENDED RELEASE ORAL at 22:17

## 2020-03-14 RX ADMIN — PRAVASTATIN SODIUM 40 MG: 20 TABLET ORAL at 22:17

## 2020-03-14 RX ADMIN — ARFORMOTEROL TARTRATE 15 MCG: 15 SOLUTION RESPIRATORY (INHALATION) at 19:00

## 2020-03-14 RX ADMIN — METFORMIN HYDROCHLORIDE 1000 MG: 1000 TABLET, FILM COATED ORAL at 12:06

## 2020-03-14 ASSESSMENT — PAIN SCALES - GENERAL
PAINLEVEL_OUTOF10: 0
PAINLEVEL_OUTOF10: 0

## 2020-03-14 NOTE — PLAN OF CARE
Problem: Pain:  Goal: Control of acute pain  Description: Control of acute pain  3/13/2020 2207 by Maya Galaviz RN  Outcome: Met This Shift  3/13/2020 1743 by Lennox Eaves, RN  Outcome: Met This Shift  Goal: Control of chronic pain  Description: Control of chronic pain  3/13/2020 2207 by Maya Galaviz RN  Outcome: Met This Shift  3/13/2020 1743 by Lennox Eaves, RN  Outcome: Met This Shift     Problem: Falls - Risk of:  Goal: Will remain free from falls  Description: Will remain free from falls  3/13/2020 2207 by Maya Galaviz RN  Outcome: Met This Shift  3/13/2020 1743 by Lennox Eaves, RN  Outcome: Met This Shift  Goal: Absence of physical injury  Description: Absence of physical injury  3/13/2020 2207 by Maya Galaviz RN  Outcome: Met This Shift  3/13/2020 1743 by Lennox Eaves, RN  Outcome: Met This Shift

## 2020-03-14 NOTE — PROGRESS NOTES
Internal Medicine Progress Note    Nicole Byrd. Brittany Bills., & [address] Brittany Bills., F.A.C.O.I. Chary Weaver D.O., F.A.C.O.I. Primary Care Physician: Eric Aldrigde MD   Admitting Physician:  Fco ArenasrDO  Admission date and time: 3/9/2020 10:26 PM    Room:  [address] [place] [address]    Patient Name: Ashutosh Omer  MRN: [id number]    Date of Service: 3/14/2020     Subjective:  Maureen Ojeda is seen and examined at bedside today. Overall she is feeling much better. She has been on room air for at least 48 hours and is tolerating well. She still has some dyspnea on moderate exertion and will work conditioning with therapy teams today as we will arrange for transition home. Review of System: HEENT:  Antolin ear pain, sore throat, sinus or eye problems  Cardiovascular:   Denies any chest pain, irregular heartbeats, or palpitations. Respiratory:   Continued coughing with less congestion. Denies sputum production, hemoptysis, or wheezing. Gastrointestinal:   Denies nausea, vomiting, diarrhea, or constipation. Denies any abdominal pain. Extremities:   Denies any lower extremity swelling or edema. Neurology:    Denies any headache or focal neurological deficits. No weakness or paresthesia. Derm:    Denies any rashes, ulcers, or excoriations. Denies bruising. Genitourinary:    Denies any urgency, frequency, hematuria. Voiding without difficulty. Musculoskeletal:  Denies myalgias, joint complaints or back pain      Physical Exam:  No intake/output data recorded. Blood pressure (!) 143/84, pulse 77, temperature 97.9 °F (36.6 °C), temperature source Oral, resp. rate 20, height 5' 3\" (1.6 m), weight 190 lb 6.4 oz (86.4 kg), last menstrual period 06/26/1951, SpO2 98 %. HEENT:    PERRLA. EOMI. Sclera clear. Buccal mucosa moist. Impaired vision. Neck:    Supple. Trachea midline. No thyromegaly. No JVD. No bruits. Heart:    Rhythm regular, rate controlled.   No Assessment    · Acute respiratory failure with hypoxia secondary to community-acquired pneumonia  · Acute exacerbation of COPD secondary to #1  · Remote, isolated period of Atrial fibrillation s/p cardioversion in 2002 with controlled ventricular response, [id number] score of 5. Not needing long term anticoagulation. · Acute on chronic stage I diastolic heart failure   · Poorly controlled DM type II w/o long term use of insulin, HbA1c 7.0%  · HLD  · Hypertension  · Iron deficiency anemia  · Bipolar disorder  · Hyperlipidemia  · Pulmonary hypertension-Mild  · Obesity secondary to excess caloric intake,     Plan:     · Respiratory status has improved and the patient is tolerating room air for at least 48 hours. She continues to have some exertional dyspnea and we have encouraged her to increase work with therapy teams. · Echocardiogram was reviewed but does show preserved ejection fraction. We will continue to adjust pulmonary and cardiac medication as indicated. · Repeat chest x-ray pending  · Blood sugars remain quite poorly controlled despite being on minimal corticosteroids. Basal insulin will be added and sliding scale will be increased to medium dose  · Electrolytes now stable on current replacement    More than 50% of my  time was spent at the bedside counseling/coordinating care with the patient and/or family with face to face contact. This time was spent reviewing notes and laboratory data as well as instructing and counseling the patient. Time I spent with the family or surrogate(s) is included only if the patient was incapable of providing the necessary information or participating in medical decisions. I also discussed the differential diagnosis and all of the proposed management plans with the patient and individuals accompanying the patient.       WILLIAM Dial CNP  3/14/2020  10:18 AM

## 2020-03-15 VITALS
SYSTOLIC BLOOD PRESSURE: 160 MMHG | OXYGEN SATURATION: 95 % | HEART RATE: 90 BPM | DIASTOLIC BLOOD PRESSURE: 82 MMHG | RESPIRATION RATE: 18 BRPM | TEMPERATURE: 98.2 F | WEIGHT: 190.4 LBS | HEIGHT: 63 IN | BODY MASS INDEX: 33.73 KG/M2

## 2020-03-15 LAB
ANION GAP SERPL CALCULATED.3IONS-SCNC: 14 MMOL/L (ref 7–16)
BUN BLDV-MCNC: 24 MG/DL (ref 8–23)
CALCIUM SERPL-MCNC: 10.4 MG/DL (ref 8.6–10.2)
CHLORIDE BLD-SCNC: 101 MMOL/L (ref 98–107)
CO2: 22 MMOL/L (ref 22–29)
CREAT SERPL-MCNC: 0.8 MG/DL (ref 0.5–1)
GFR AFRICAN AMERICAN: >60
GFR NON-AFRICAN AMERICAN: >60 ML/MIN/1.73
GLUCOSE BLD-MCNC: 175 MG/DL (ref 74–99)
HCT VFR BLD CALC: 35.9 % (ref 34–48)
HEMOGLOBIN: 11.1 G/DL (ref 11.5–15.5)
MCH RBC QN AUTO: 24 PG (ref 26–35)
MCHC RBC AUTO-ENTMCNC: 30.9 % (ref 32–34.5)
MCV RBC AUTO: 77.7 FL (ref 80–99.9)
METER GLUCOSE: 318 MG/DL (ref 74–99)
PDW BLD-RTO: 14.6 FL (ref 11.5–15)
PLATELET # BLD: 330 E9/L (ref 130–450)
PMV BLD AUTO: 9.6 FL (ref 7–12)
POTASSIUM REFLEX MAGNESIUM: 4.1 MMOL/L (ref 3.5–5)
RBC # BLD: 4.62 E12/L (ref 3.5–5.5)
SODIUM BLD-SCNC: 137 MMOL/L (ref 132–146)
WBC # BLD: 11 E9/L (ref 4.5–11.5)

## 2020-03-15 PROCEDURE — 6370000000 HC RX 637 (ALT 250 FOR IP): Performed by: INTERNAL MEDICINE

## 2020-03-15 PROCEDURE — 97530 THERAPEUTIC ACTIVITIES: CPT

## 2020-03-15 PROCEDURE — 82962 GLUCOSE BLOOD TEST: CPT

## 2020-03-15 PROCEDURE — 80048 BASIC METABOLIC PNL TOTAL CA: CPT

## 2020-03-15 PROCEDURE — 97161 PT EVAL LOW COMPLEX 20 MIN: CPT

## 2020-03-15 PROCEDURE — 6360000002 HC RX W HCPCS: Performed by: NURSE PRACTITIONER

## 2020-03-15 PROCEDURE — 36415 COLL VENOUS BLD VENIPUNCTURE: CPT

## 2020-03-15 PROCEDURE — 99232 SBSQ HOSP IP/OBS MODERATE 35: CPT | Performed by: INTERNAL MEDICINE

## 2020-03-15 PROCEDURE — 6370000000 HC RX 637 (ALT 250 FOR IP): Performed by: NURSE PRACTITIONER

## 2020-03-15 PROCEDURE — 85027 COMPLETE CBC AUTOMATED: CPT

## 2020-03-15 PROCEDURE — 94640 AIRWAY INHALATION TREATMENT: CPT

## 2020-03-15 RX ORDER — FUROSEMIDE 40 MG/1
40 TABLET ORAL DAILY
Qty: 30 TABLET | Refills: 0 | Status: SHIPPED | OUTPATIENT
Start: 2020-03-15 | End: 2020-03-24 | Stop reason: SDUPTHER

## 2020-03-15 RX ORDER — PREDNISONE 10 MG/1
10 TABLET ORAL DAILY
Qty: 5 TABLET | Refills: 0 | Status: SHIPPED | OUTPATIENT
Start: 2020-03-15 | End: 2020-03-20

## 2020-03-15 RX ORDER — DOXYCYCLINE HYCLATE 100 MG
100 TABLET ORAL 2 TIMES DAILY
Qty: 20 TABLET | Refills: 0 | Status: SHIPPED | OUTPATIENT
Start: 2020-03-15 | End: 2020-03-25

## 2020-03-15 RX ORDER — LISINOPRIL 20 MG/1
20 TABLET ORAL DAILY
Qty: 90 TABLET | Refills: 1 | Status: SHIPPED | OUTPATIENT
Start: 2020-03-15 | End: 2020-08-26 | Stop reason: SDUPTHER

## 2020-03-15 RX ADMIN — METOPROLOL SUCCINATE 50 MG: 50 TABLET, EXTENDED RELEASE ORAL at 09:08

## 2020-03-15 RX ADMIN — FAMOTIDINE 20 MG: 20 TABLET, FILM COATED ORAL at 09:08

## 2020-03-15 RX ADMIN — AMLODIPINE BESYLATE 10 MG: 10 TABLET ORAL at 09:07

## 2020-03-15 RX ADMIN — SITAGLIPTIN 100 MG: 50 TABLET, FILM COATED ORAL at 09:07

## 2020-03-15 RX ADMIN — INSULIN LISPRO 2 UNITS: 100 INJECTION, SOLUTION INTRAVENOUS; SUBCUTANEOUS at 09:06

## 2020-03-15 RX ADMIN — ENOXAPARIN SODIUM 40 MG: 40 INJECTION SUBCUTANEOUS at 09:06

## 2020-03-15 RX ADMIN — LISINOPRIL 20 MG: 20 TABLET ORAL at 09:08

## 2020-03-15 RX ADMIN — DOXYCYCLINE HYCLATE 100 MG: 100 CAPSULE ORAL at 09:08

## 2020-03-15 RX ADMIN — BUDESONIDE 500 MCG: 0.5 INHALANT RESPIRATORY (INHALATION) at 04:52

## 2020-03-15 RX ADMIN — ASPIRIN 81 MG 81 MG: 81 TABLET ORAL at 09:07

## 2020-03-15 RX ADMIN — METFORMIN HYDROCHLORIDE 1000 MG: 1000 TABLET, FILM COATED ORAL at 09:08

## 2020-03-15 RX ADMIN — ARFORMOTEROL TARTRATE 15 MCG: 15 SOLUTION RESPIRATORY (INHALATION) at 04:52

## 2020-03-15 RX ADMIN — PREDNISONE 20 MG: 20 TABLET ORAL at 09:08

## 2020-03-15 RX ADMIN — FUROSEMIDE 40 MG: 40 TABLET ORAL at 09:08

## 2020-03-15 RX ADMIN — POTASSIUM CHLORIDE 40 MEQ: 20 TABLET, EXTENDED RELEASE ORAL at 09:08

## 2020-03-15 RX ADMIN — Medication 400 MG: at 09:08

## 2020-03-15 RX ADMIN — FERROUS SULFATE TAB 325 MG (65 MG ELEMENTAL FE) 325 MG: 325 (65 FE) TAB at 09:08

## 2020-03-15 RX ADMIN — HYDROCHLOROTHIAZIDE 12.5 MG: 12.5 TABLET ORAL at 09:07

## 2020-03-15 ASSESSMENT — PAIN SCALES - GENERAL: PAINLEVEL_OUTOF10: 0

## 2020-03-15 NOTE — PROGRESS NOTES
 metFORMIN  1,000 mg Oral BID WC    potassium chloride  40 mEq Oral BID    predniSONE  20 mg Oral Daily    budesonide  0.5 mg Nebulization BID    Arformoterol Tartrate  15 mcg Nebulization BID    sodium chloride flush  10 mL Intravenous 2 times per day    enoxaparin  40 mg Subcutaneous Daily    aspirin  81 mg Oral Daily    amLODIPine  10 mg Oral Daily    famotidine  20 mg Oral Daily    ferrous sulfate  325 mg Oral BID WC    magnesium oxide  400 mg Oral Daily    metoprolol succinate  50 mg Oral Daily    pravastatin  40 mg Oral Nightly    lisinopril  20 mg Oral Daily    And    hydroCHLOROthiazide  12.5 mg Oral Daily       IV Infusions (if any):   dextrose           PHYSICAL EXAM:     CONSTITUTIONAL:   BP (!) 160/82   Pulse 90   Temp 98.2 °F (36.8 °C) (Oral)   Resp 18   Ht 5' 3\" (1.6 m)   Wt 190 lb 6.4 oz (86.4 kg)   LMP 1951   SpO2 95%   BMI 33.73 kg/m²   Pulse  Av  Min: 68  Max: 90  Systolic (57URT), WJQ:633 , Min:103 , BHX:784    Diastolic (09XSO), SBD:05, Min:57, Max:82    In general, this is a well developed, well nourished who appears stated age. awake, alert, cooperative, no apparent distress  HEENT: eyes -conjunctivae pink,  Throat - Oral mucosa pink and moist.   Neck-  no stridor, no carotid bruit. , no jugular venous distention   RESPIRATORY: Chest symmetrical and non-tender to palpation. No accessory muscle use. Lung auscultation - clear to auscultation except few rhonchi  CARDIOVASCULAR:     Heart Inspection shows no noted pulsations  Heart Ausculation - Regular rate and rhythm, 1/6 systolic murmur, No s3 or rub. No lower extremity edema, no varicosities. Distal pulses palpable, no clubbing or cyanosis   ABDOMEN: Soft, nontender, Bowel sounds present. MS: good muscle strength and tone.   : Deferred  Rectal Exam: Deferred  SKIN: warm and dry no statis dermatitis or ulcers   NEURO / PSYCH: oriented to person, place        ASSESSMENT/PLAN:      Acute respiratory failure with hypoxia (HCC) - Better      CAP (community acquired pneumonia)      Chronic HFpEF ([address] - Better, Continue PO LAsix      Type 2 diabetes mellitus       Essential hypertension - Controlled      Hx of Atrial fibrillation in 2002 - s/p DCCV - No recurrence      Mild Pulmonary HTN            Home today  F/y by Dr. Rosette Hernandez 2-4 weks      Electronically signed by Francisco Chu MD on 3/15/2020   [facility]) Cardiology

## 2020-03-15 NOTE — PROGRESS NOTES
Physical Therapy  Physical Therapy Initial Evaluation    Room #:  7855/1427-32  Patient Name: Jayme Land  YOB: 1955  MRN: [id number]    Referring Provider: Jayme Stephenson DO    Date of Service: 3/15/2020    Evaluating Physical Therapist:  Leonardo York, [address] [place] [address] [id number]    Diagnosis:   Acute respiratory failure with hypoxia ([address] [J96.01]       Patient Active Problem List   Diagnosis    Arthritis    Microcytic anemia    Bipolar disorder, current episode manic severe with psychotic features ([address]    Mixed hyperlipidemia    Type 2 diabetes mellitus without complication, without long-term current use of insulin (HCC)    Moderate obesity    Palpitations    Essential hypertension    Atypical chest pain    Cerebral atherosclerosis    Cognitive impairment    Hypercholesterolemia    Primary osteoarthritis of left hip    Primary localized osteoarthritis of left hip    Thyroid disease    Hypertension    Hyperlipidemia    Hx of blood clots    Chronic obstructive pulmonary disease (HCC)    DVT (deep venous thrombosis) (HCC)    Atrial fibrillation (HCC)    Anxiety    Obesity (BMI 30-39. 9)    Anal warts    Genital warts    Acute respiratory failure with hypoxia (HCC)    CAP (community acquired pneumonia)    Acute exacerbation of chronic obstructive pulmonary disease (COPD) ([address]       Tentative placement recommendation: Home  Equipment recommendation: None      Prior Level of Function: Patient ambulated independently   Rehab Potential: good  for baseline    Past medical history:   Past Medical History:   Diagnosis Date    A-fib ([address]     Anemia     Anxiety     Arthritis     Asthma     Atrial fibrillation ([address]     CAD (coronary artery disease)     af and heart murmur    COPD (chronic obstructive pulmonary disease) ([address]     DVT (deep venous thrombosis) ([address]     Emphysema of lung ([address]     Encounter for imaging of bilateral cephalic veins     History of bilateral cephalic vein thrombosis    Headache     History of blood transfusion     Hx of blood clots     Hyperlipidemia     Hypertension     Psychiatric problem     Seizures ([address]     last seizure  15 yrs ago had been on depakote    Thyroid disease     Type II or unspecified type diabetes mellitus without mention of complication, not stated as uncontrolled      Past Surgical History:   Procedure Laterality Date    CARDIAC CATHETERIZATION  12/01/2008    Normal Coronary arteries. Normal LV. Elevated left ventricular end diastolic pressure suggestive of impaired relaxatin and possible diastolic dysfunction    HYSTERECTOMY  2004    LEG SURGERY Right     right hip area, removed cyst iccf developed infection went to [facility]    KS SURG EXCISION OF ANAL LESION(S) N/A 1/25/2019    EXCISION PERIANAL WARTS performed by Michael Stewart MD at [address] Left 8/8/2018    LEFT HIP TOTAL ARTHROPLASTY (KYLIE) performed by Sandra Davenport DO at [address]       Precautions: falls ,      SUBJECTIVE:    Social history: Patient lives alone  in a ranch home  with 3 steps  to enter with [employer] owned: Wes Zavala and Sofi Rabago,      [place] [place] [id number]   21/24         Nursing cleared patient for PT evaluation. The admitting diagnosis and active problem list as listed above have been reviewed prior to the initiation of this evaluation. OBJECTIVE:   Initial Evaluation  Date: 3/15 Treatment Date:     Short Term/ Long Term   Goals   Was pt agreeable to Eval/treatment? Yes   To be met in 1 days   Pain level   0/10       Bed Mobility    Rolling: Independent   Supine to sit: Independent   Sit to supine: Independent   Scooting: Independent   Rolling: Independent   Supine to sit:  Independent   Sit to supine: Independent   Scooting: Independent    Transfers Sit to stand: Supervision    Sit to stand: Modified Independent    Ambulation    150 feet using  no device with Supervision    150+ feet using  no device with Independent   Stair negotiation: ascended and descended   Not assessed      3 steps    ROM Within functional limits       Strength BUE: 4/5  RLE:  4/5  LLE:  4/5  Increase strength in affected mm groups by 1/3 grade   Balance Sitting EOB:  good   Dynamic Standing:  good   Sitting EOB:  good   Dynamic Standing: good      Patient is Alert & Oriented x person, place, time and situation and follows directions   Sensation:  Patient denies numbness and tingling to extremities   Edema:  no   Endurance: good     Vitals:  Blood Pressure at rest - Blood Pressure during session -   Heart Rate at rest - Heart Rate during session -   SPO2 at rest 95% RA SPO2 during session 93%     Patient education  Patient educated on role of Physical Therapy, risks of immobility and plan of care  Also on energy conservation once home and need for consistent activity. Patient response to education:   Pt verbalized understanding Pt demonstrated skill Pt requires further education in this area   Yes Yes       ASSESSMENT: Patient exhibits decreased strength, balance, coordination impairing functional mobility. Therapist educated and facilitated patient on techniques to increase safety and independence with bed mobility, balance, functional transfers, and functional mobility. Treatment:  Patient practiced and was instructed in the following treatment: Sat edge of bed 5 minutes with Independent to increase dynamic sitting balance and activity tolerance. At end of session, patient in bed with  call light and phone within reach,  all lines and tubes intact, nursing notified. Patient would benefit from continued skilled Physical Therapy to improve functional independence and quality of life. Patient's/ family goals   home      Patient and or family understand(s) diagnosis, prognosis, and plan of care. PLAN:    Physical Therapy care will be provided in accordance with the objectives noted above.  Exercises and

## 2020-03-15 NOTE — PLAN OF CARE
Problem: Pain:  Goal: Pain level will decrease  Description: Pain level will decrease  Outcome: Met This Shift  Goal: Control of acute pain  Description: Control of acute pain  Outcome: Met This Shift  Goal: Control of chronic pain  Description: Control of chronic pain  Outcome: Met This Shift     Problem: Falls - Risk of:  Goal: Will remain free from falls  Description: Will remain free from falls  Outcome: Met This Shift  Goal: Absence of physical injury  Description: Absence of physical injury  Outcome: Met This Shift

## 2020-03-15 NOTE — DISCHARGE SUMMARY
Internal Medicine  Discharge Summary    NAME: Stanford Shelton  :  1955  MRN:  [id number]  Rebecca Quiroz MD  ADMITTED: 3/9/2020      DISCHARGED: 3/15/20    ADMITTING PHYSICIAN: Navarro Cunningham DO    CONSULTANT(S):   IP CONSULT TO CARDIOLOGY     ADMITTING DIAGNOSIS:   Acute respiratory failure with hypoxia (HCC) [J96.01]     DISCHARGE DIAGNOSES:   · Acute respiratory failure with hypoxia, resolved  · Community-acquired pneumonia,resolving  · Acute exacerbation of COPD, improved  · Remote, isolated period of Atrial fibrillation s/p cardioversion in  with controlled ventricular response, [id number] score of 5. Not needing long term anticoagulation per cardiology. · Acute on chronic stage I diastolic heart failure, stabilized  · Poorly controlled DM type II w/o long term use of insulin, HbA1c 7.0%  · HLD  · Hypertension  · Iron deficiency anemia  · Bipolar disorder  · Hyperlipidemia  · Pulmonary hypertension-Mild  · Obesity secondary to excess caloric intake,     BRIEF HISTORY OF PRESENT ILLNESS:   Fina Echevarria is a 55-year-old -American female who presented to [address] [place] [address] emergency department for evaluation of upper respiratory complaints. Symptoms onset within 48 hours. Duration has been constant. Nothing is been tried for relief. Nothing makes symptoms worse. She was complained of cough, congestion sputum production. She was also febrile at 102.1 °F.  Denies any chest pain or palpitations, lightheadedness or near syncope. Denies headache, neck pain or stiffness. Admits to nasal and sinus congestion but denies sore throat or ear pain. Denies abdominal pain, vomiting, bowel pattern changes. Denies urinary or genital complaints.     Sweetie's emergency department evaluation was remarkable for hypoxia. Imaging performed reveals pneumonia versus CHF.   BNP is without profound elevation, she exhibited no evidence of pulmonary edema associated with decompensated stage I diastolic heart failure. White count was elevated and pneumonia was favored. She was given respiratory treatments and started on empiric antibiotic therapy. It was determined that she would benefit from inpatient hospitalization before being transitioned home. LABS[de-identified]  Lab Results   Component Value Date    WBC 11.0 03/15/2020    HGB 11.1 (L) 03/15/2020    HCT 35.9 03/15/2020     03/15/2020     03/15/2020    K 4.1 03/15/2020     03/15/2020    CREATININE 0.8 03/15/2020    BUN 24 (H) 03/15/2020    CO2 22 03/15/2020    GLUCOSE 175 (H) 03/15/2020    ALT 10 11/28/2019    AST 12 11/28/2019    INR 1.0 11/21/2019     Lab Results   Component Value Date    INR 1.0 11/21/2019    INR 1.0 08/03/2018    INR 1.1 04/02/2016    PROTIME 11.1 11/21/2019    PROTIME 11.0 08/03/2018    PROTIME 11.4 04/02/2016      Lab Results   Component Value Date    TSH 0.907 03/13/2020     Lab Results   Component Value Date    TRIG 107 12/03/2018    TRIG 236 (H) 09/29/2018    TRIG 180 (H) 02/29/2016     Lab Results   Component Value Date    HDL 65 12/03/2018    HDL 66 09/29/2018    HDL 78 02/29/2016     Lab Results   Component Value Date    LDLCALC 41 12/03/2018    LDLCALC 41 09/29/2018    LDLCALC 73 02/29/2016     Lab Results   Component Value Date    LABA1C 7.0 (H) 03/10/2020       IMAGING:  Xr Chest Standard (2 Vw)    Result Date: 3/14/2020  LOCATION: 200 EXAM: XR CHEST (2 VW) COMPARISON: March 10, 2020 HISTORY: Shortness of breath TECHNIQUE: PA and lateral  views of the chest were obtained. FINDINGS:  SUPPORT DEVICES: None. LUNGS: Some improvement in lung parenchymal opacities from the previous study. No new parenchymal disease noted no effusions. PLEURA: No pneumothorax visualized. LUNG VOLUMES: Satisfactory inspirator effort. MEDIASTINAL STRUCTURES: No lymphadenopathy. Normal aortic contour. HEART SIZE: Normal. UPPER ABDOMEN: Unremarkable. BONES AND SOFT TISSUES: No fracture or soft tissue abnormality.      Improved aeration in the lungs thyromegaly. No JVD. No bruits. Heart:  Rhythm regular, rate controlled. No murmurs. Lungs:  Symmetrical.  Improved aeration throughout, now only mildly diminished. No wheezing, rales or rhonchi. Abdomen: Soft. BS.  Non-tender. Non-distended. Bowel sounds positive. No organomegaly or masses. No pain on palpation    Extremities:  Peripheral pulses present. No peripheral edema. No ulcers. Neurologic:  Alert x 3. No focal deficit. Cranial nerves grossly intact. Skin:  No petechia. No hemorrhage. No wounds. DISPOSITION:  The patient's condition is good. At this time the patient is without objective evidence of an acute process requiring continuing hospitalization or inpatient management. They are stable for discharge with outpatient follow-up. I have spoken with the patient and discussed the results of the current hospitalization, in addition to providing specific details for the plan of care and counseling regarding the diagnosis and prognosis. The plan has been discussed in detail and they are aware of the specific conditions for emergent return, as well as the importance of follow-up.   Their questions are answered at this time and they are agreeable with the plan for discharge to home    DISCHARGE MEDICATIONS:    Teo Kind   Home Medication Instructions FBB:[id number]    Printed on:03/15/20 7135   Medication Information                      albuterol sulfate HFA (PROVENTIL HFA) 108 (90 Base) MCG/ACT inhaler  Inhale 2 puffs into the lungs every 4 hours as needed for Wheezing             amLODIPine (NORVASC) 10 MG tablet  Take 1 tablet by mouth daily             ASPIRIN LOW DOSE 81 MG EC tablet  TAKE 1 TABLET BY MOUTH TWICE DAILY             Blood Glucose Monitoring Suppl (ONE TOUCH ULTRA 2) w/Device KIT  Check blood sugar bid             blood glucose test strips (ONE TOUCH ULTRA TEST) strip  Check blood sugar bid             docusate sodium (COLACE) 100 MG capsule  Take 1 capsule by mouth 2 times daily             doxycycline hyclate (VIBRA-TABS) 100 MG tablet  Take 1 tablet by mouth 2 times daily for 10 days             famotidine (PEPCID) 20 MG tablet  TAKE (1) TABLET BY MOUTH DAILY             ferrous sulfate 325 (65 Fe) MG tablet  Take 1 tablet by mouth 2 times daily (with meals)             furosemide (LASIX) 40 MG tablet  Take 1 tablet by mouth daily             Incontinence Supply Disposable (DEPEND UNDERWEAR LARGE) MISC  Wear daily             lisinopril (PRINIVIL;ZESTRIL) 20 MG tablet  Take 1 tablet by mouth daily             loperamide (IMODIUM) 2 MG capsule  TAKE ONE (1) CAPSULE BY MOUTH FOUR TIMES A DAY AS NEEDED FOR DIARRHEA             magnesium oxide (MAG-OX) 400 MG tablet  Take 1 tablet by mouth daily             metoprolol succinate (TOPROL XL) 50 MG extended release tablet  TAKE 1 TABLET BY MOUTH DAILY             [facility] [place]  Bedside commode             montelukast (SINGULAIR) 10 MG tablet  TAKE 1 TABLET BY MOUTH NIGHTLY             mupirocin (BACTROBAN) 2 % ointment  APPLY AS DIRECTED INTRANASALLY TWICE DAILY             ONE TOUCH LANCETS MISC  Check blood sugar bid             potassium chloride (KLOR-CON M) 10 MEQ extended release tablet  Take 1 tablet by mouth daily as needed (with Lasix)             pravastatin (PRAVACHOL) 40 MG tablet  Take 1 tablet by mouth nightly             predniSONE (DELTASONE) 10 MG tablet  Take 1 tablet by mouth daily for 5 days . Take with food. SITagliptin-metFORMIN (JANUMET XR)  MG TB24 per extended release tablet  TAKE TWO (2) TABLETS BY MOUTH IN THE MORNING             SYMBICORT 160-4.5 MCG/ACT AERO  INHALE TWO (2) PUFFS BY MOUTH TWICE DAILY             vitamin D (ERGOCALCIFEROL) 1.25 MG (00635 UT) CAPS capsule  TAKE 1 CAPSULE BY MOUTH ONCE WEEKLY                 FOLLOW UP/INSTRUCTIONS:  · This patient is instructed to follow-up with her primary care physician.   · Patient is instructed to follow-up with

## 2020-03-16 ENCOUNTER — TELEPHONE (OUTPATIENT)
Dept: ADMINISTRATIVE | Age: 65
End: 2020-03-16

## 2020-03-24 ENCOUNTER — OFFICE VISIT (OUTPATIENT)
Dept: FAMILY MEDICINE CLINIC | Age: 65
End: 2020-03-24
Payer: COMMERCIAL

## 2020-03-24 VITALS
WEIGHT: 181 LBS | SYSTOLIC BLOOD PRESSURE: 132 MMHG | DIASTOLIC BLOOD PRESSURE: 80 MMHG | HEIGHT: 63 IN | BODY MASS INDEX: 32.07 KG/M2 | RESPIRATION RATE: 20 BRPM | OXYGEN SATURATION: 97 % | HEART RATE: 84 BPM

## 2020-03-24 PROCEDURE — 99213 OFFICE O/P EST LOW 20 MIN: CPT | Performed by: FAMILY MEDICINE

## 2020-03-24 PROCEDURE — G8427 DOCREV CUR MEDS BY ELIG CLIN: HCPCS | Performed by: FAMILY MEDICINE

## 2020-03-24 PROCEDURE — 3017F COLORECTAL CA SCREEN DOC REV: CPT | Performed by: FAMILY MEDICINE

## 2020-03-24 PROCEDURE — G8400 PT W/DXA NO RESULTS DOC: HCPCS | Performed by: FAMILY MEDICINE

## 2020-03-24 PROCEDURE — G8484 FLU IMMUNIZE NO ADMIN: HCPCS | Performed by: FAMILY MEDICINE

## 2020-03-24 PROCEDURE — 1090F PRES/ABSN URINE INCON ASSESS: CPT | Performed by: FAMILY MEDICINE

## 2020-03-24 PROCEDURE — G8417 CALC BMI ABV UP PARAM F/U: HCPCS | Performed by: FAMILY MEDICINE

## 2020-03-24 PROCEDURE — 1123F ACP DISCUSS/DSCN MKR DOCD: CPT | Performed by: FAMILY MEDICINE

## 2020-03-24 PROCEDURE — 1036F TOBACCO NON-USER: CPT | Performed by: FAMILY MEDICINE

## 2020-03-24 PROCEDURE — 1111F DSCHRG MED/CURRENT MED MERGE: CPT | Performed by: FAMILY MEDICINE

## 2020-03-24 PROCEDURE — 4040F PNEUMOC VAC/ADMIN/RCVD: CPT | Performed by: FAMILY MEDICINE

## 2020-03-24 RX ORDER — ALBUTEROL SULFATE 2.5 MG/3ML
2.5 SOLUTION RESPIRATORY (INHALATION) EVERY 6 HOURS PRN
Qty: 120 EACH | Refills: 3 | Status: SHIPPED
Start: 2020-03-24 | End: 2020-06-20

## 2020-03-24 RX ORDER — FUROSEMIDE 40 MG/1
40 TABLET ORAL DAILY
Qty: 30 TABLET | Refills: 3 | Status: SHIPPED
Start: 2020-03-24 | End: 2020-06-20

## 2020-03-24 ASSESSMENT — ENCOUNTER SYMPTOMS
NAUSEA: 0
DIARRHEA: 0
VOMITING: 0
CHEST TIGHTNESS: 1
SHORTNESS OF BREATH: 0
WHEEZING: 1

## 2020-03-24 NOTE — PROGRESS NOTES
[address] [facility], [address]   [address] [facility]   Priya Spencer MD     Patient: Migdalia Baker Birth: 1955  Visit Date: 3/24/20    Sherita Goss is a 77y.o. year old female here today for   Chief Complaint   Patient presents with    Follow-Up from Hospital    Pneumonia       HPI  Patient was recently released from [address] [place], [place] [address] for hospitalization for pneumonia. Patient had been diagnosed with acute respiratory failure. Patient still finishing antibiotics--should finish today. Patient using albuterol sparingly. Patient using Symbicort twice/day. Patient still has slight wheezing but states breathing is much improved. Patient was also treated for CHF while hospitalized. Patient tolerating Lasix 40 mg.    Review of Systems   Eyes: Negative for visual disturbance. Respiratory: Positive for chest tightness (rarely) and wheezing (slight). Negative for shortness of breath. Cardiovascular: Positive for leg swelling (mild). Negative for chest pain and palpitations. Gastrointestinal: Negative for diarrhea, nausea and vomiting. Genitourinary: Negative for difficulty urinating, dysuria and frequency. Skin: Negative for rash. Past medical, surgical, social and/or family historyreviewed, updated as needed, and are non-contributory (unless otherwise stated). Medications, allergies, and problem list also reviewed and updated as needed in patient's record.      Current Outpatient Medications   Medication Sig Dispense Refill    furosemide (LASIX) 40 MG tablet Take 1 tablet by mouth daily 30 tablet 3    albuterol (PROVENTIL) (2.5 MG/3ML) 0.083% nebulizer solution Take 3 mLs by nebulization every 6 hours as needed for Wheezing 120 each 3    lisinopril (PRINIVIL;ZESTRIL) 20 MG tablet Take 1 tablet by mouth daily 90 tablet 1    ASPIRIN LOW DOSE 81 MG EC tablet TAKE 1 TABLET BY MOUTH TWICE DAILY 60 tablet 10    mupirocin (BACTROBAN) 2 % ointment APPLY AS DIRECTED INTRANASALLY TWICE DAILY 22 g 10    ONE TOUCH LANCETS MISC Check blood sugar bid 100 each 12    blood glucose test strips (ONE TOUCH ULTRA TEST) strip Check blood sugar bid 100 strip 12    vitamin D (ERGOCALCIFEROL) 1.25 MG (40533 UT) CAPS capsule TAKE 1 CAPSULE BY MOUTH ONCE WEEKLY (Patient taking differently: Indications: pt takes every wednesday ) 4 capsule 5    magnesium oxide (MAG-OX) 400 MG tablet Take 1 tablet by mouth daily 30 tablet 2    potassium chloride (KLOR-CON M) 10 MEQ extended release tablet Take 1 tablet by mouth daily as needed (with Lasix) 30 tablet 3    amLODIPine (NORVASC) 10 MG tablet Take 1 tablet by mouth daily 30 tablet 5    pravastatin (PRAVACHOL) 40 MG tablet Take 1 tablet by mouth nightly 30 tablet 5    SYMBICORT 160-4.5 MCG/ACT AERO INHALE TWO (2) PUFFS BY MOUTH TWICE DAILY  10    famotidine (PEPCID) 20 MG tablet TAKE (1) TABLET BY MOUTH DAILY 30 tablet 5    montelukast (SINGULAIR) 10 MG tablet TAKE 1 TABLET BY MOUTH NIGHTLY 30 tablet 3    albuterol sulfate HFA (PROVENTIL HFA) 108 (90 Base) MCG/ACT inhaler Inhale 2 puffs into the lungs every 4 hours as needed for Wheezing 1 Inhaler 1    Incontinence Supply Disposable (DEPEND UNDERWEAR LARGE) [facility] Wear daily 50 each 12    docusate sodium (COLACE) 100 MG capsule Take 1 capsule by mouth 2 times daily 60 capsule 1    ferrous sulfate 325 (65 Fe) MG tablet Take 1 tablet by mouth 2 times daily (with meals) 30 tablet 3    Blood Glucose Monitoring Suppl (ONE TOUCH ULTRA 2) w/Device KIT Check blood sugar bid 1 kit 0    [facility]  Devices [facility] Bedside commode 1 Device 0    SITagliptin-metFORMIN (JANUMET XR)  MG TB24 per extended release tablet TAKE TWO (2) TABLETS BY MOUTH EACH MORNING 60 tablet 10    metoprolol succinate (TOPROL XL) 50 MG extended release tablet TAKE 1 TABLET BY MOUTH ONCE DAILY 30 tablet 10    loperamide (IMODIUM) 2 MG capsule TAKE 1 CAPSULE BY MOUTH FOUR TIMES DAILY AS NEEDED FOR DIARRHEA 60 capsule 1    potassium chloride (KLOR-CON) 10 MEQ extended release tablet TAKE 1 TABLET BY MOUTH EVERY DAY AS NEEDED WITH LASIX 30 tablet 10     No current facility-administered medications for this visit. Wt Readings from Last 3 Encounters:   03/24/20 181 lb (82.1 kg)   03/14/20 190 lb 6.4 oz (86.4 kg)   02/04/20 181 lb (82.1 kg)                   Vitals:    03/24/20 1003   BP: 132/80   Pulse: 84   Resp: 20   SpO2: 97%        Physical Exam  Vitals signs reviewed. Constitutional:       General: She is not in acute distress. Appearance: She is well-developed. Neck:      Musculoskeletal: Neck supple. Vascular: No carotid bruit. Cardiovascular:      Rate and Rhythm: Normal rate and regular rhythm. Heart sounds: Normal heart sounds. No murmur. No gallop. Pulmonary:      Effort: Pulmonary effort is normal.      Breath sounds: Normal breath sounds. No wheezing or rales. Abdominal:      General: Bowel sounds are normal. There is no distension. Palpations: Abdomen is soft. Tenderness: There is no abdominal tenderness. Musculoskeletal:      Right lower leg: Edema (trace) present. Left lower leg: Edema (trace) present. Skin:     General: Skin is warm and dry. Neurological:      Mental Status: She is alert and oriented to person, place, and time. Results for orders placed or performed during the hospital encounter of 03/09/20   Rapid influenza A/B antigens   Result Value Ref Range    Influenza A by PCR Not Detected Not Detected    Influenza B by PCR Not Detected Not Detected   Legionella antigen, urine   Result Value Ref Range    L. pneumophila Serogp 1 Ur Ag       Presumptive Negative -suggesting no recent or current infections  with Legionella pneumophila serogroup 1.   Infection to Legionella cannot be ruled out since other serogroups  and species may cause infection, antigen may not be present in  early infection, or level of antigen may be below 4.0 3.5 - 5.0 mmol/L    Chloride 99 98 - 107 mmol/L    CO2 22 22 - 29 mmol/L    Anion Gap 16 7 - 16 mmol/L    Glucose 221 (H) 74 - 99 mg/dL    BUN 18 8 - 23 mg/dL    CREATININE 0.8 0.5 - 1.0 mg/dL    GFR Non-African American >60 >=60 mL/min/1.73    GFR African American >60     Calcium 10.2 8.6 - 10.2 mg/dL   CBC   Result Value Ref Range    WBC 7.6 4.5 - 11.5 E9/L    RBC 3.98 3.50 - 5.50 E12/L    Hemoglobin 9.6 (L) 11.5 - 15.5 g/dL    Hematocrit 31.0 (L) 34.0 - 48.0 %    MCV 77.9 (L) 80.0 - 99.9 fL    MCH 24.1 (L) 26.0 - 35.0 pg    MCHC 31.0 (L) 32.0 - 34.5 %    RDW 14.4 11.5 - 15.0 fL    Platelets 677 657 - 714 E9/L    MPV 10.4 7.0 - 12.0 fL   Basic Metabolic Panel w/ Reflex to MG   Result Value Ref Range    Sodium 137 132 - 146 mmol/L    Potassium reflex Magnesium 3.9 3.5 - 5.0 mmol/L    Chloride 98 98 - 107 mmol/L    CO2 22 22 - 29 mmol/L    Anion Gap 17 (H) 7 - 16 mmol/L    Glucose 295 (H) 74 - 99 mg/dL    BUN 29 (H) 8 - 23 mg/dL    CREATININE 0.9 0.5 - 1.0 mg/dL    GFR Non-African American >60 >=60 mL/min/1.73    GFR African American >60     Calcium 10.0 8.6 - 10.2 mg/dL   CBC   Result Value Ref Range    WBC 10.4 4.5 - 11.5 E9/L    RBC 4.65 3.50 - 5.50 E12/L    Hemoglobin 11.2 (L) 11.5 - 15.5 g/dL    Hematocrit 36.1 34.0 - 48.0 %    MCV 77.6 (L) 80.0 - 99.9 fL    MCH 24.1 (L) 26.0 - 35.0 pg    MCHC 31.0 (L) 32.0 - 34.5 %    RDW 14.6 11.5 - 15.0 fL    Platelets 640 474 - 632 E9/L    MPV 9.7 7.0 - 12.0 fL   Basic Metabolic Panel w/ Reflex to MG   Result Value Ref Range    Sodium 138 132 - 146 mmol/L    Potassium reflex Magnesium 2.9 (L) 3.5 - 5.0 mmol/L    Chloride 96 (L) 98 - 107 mmol/L    CO2 24 22 - 29 mmol/L    Anion Gap 18 (H) 7 - 16 mmol/L    Glucose 368 (H) 74 - 99 mg/dL    BUN 28 (H) 8 - 23 mg/dL    CREATININE 0.8 0.5 - 1.0 mg/dL    GFR Non-African American >60 >=60 mL/min/1.73    GFR African American >60     Calcium 9.6 8.6 - 10.2 mg/dL   CBC   Result Value Ref Range    WBC 12.1 (H) 4.5 - 11.5 E9/L    RBC 4. 63 3.50 - 5.50 E12/L    Hemoglobin 11.2 (L) 11.5 - 15.5 g/dL    Hematocrit 35.5 34.0 - 48.0 %    MCV 76.7 (L) 80.0 - 99.9 fL    MCH 24.2 (L) 26.0 - 35.0 pg    MCHC 31.5 (L) 32.0 - 34.5 %    RDW 14.3 11.5 - 15.0 fL    Platelets 978 925 - 948 E9/L    MPV 9.7 7.0 - 12.0 fL   TSH without Reflex   Result Value Ref Range    TSH 0.907 0.270 - 4.200 uIU/mL   Brain Natriuretic Peptide   Result Value Ref Range    Pro-BNP 57 0 - 125 pg/mL   Magnesium   Result Value Ref Range    Magnesium 1.8 1.6 - 2.6 mg/dL   Magnesium   Result Value Ref Range    Magnesium 1.9 1.6 - 2.6 mg/dL   Basic Metabolic Panel w/ Reflex to MG   Result Value Ref Range    Sodium 140 132 - 146 mmol/L    Potassium reflex Magnesium 3.5 3.5 - 5.0 mmol/L    Chloride 100 98 - 107 mmol/L    CO2 26 22 - 29 mmol/L    Anion Gap 14 7 - 16 mmol/L    Glucose 142 (H) 74 - 99 mg/dL    BUN 20 8 - 23 mg/dL    CREATININE 0.7 0.5 - 1.0 mg/dL    GFR Non-African American >60 >=60 mL/min/1.73    GFR African American >60     Calcium 10.0 8.6 - 10.2 mg/dL   CBC   Result Value Ref Range    WBC 11.0 4.5 - 11.5 E9/L    RBC 4.52 3.50 - 5.50 E12/L    Hemoglobin 10.9 (L) 11.5 - 15.5 g/dL    Hematocrit 34.6 34.0 - 48.0 %    MCV 76.5 (L) 80.0 - 99.9 fL    MCH 24.1 (L) 26.0 - 35.0 pg    MCHC 31.5 (L) 32.0 - 34.5 %    RDW 14.5 11.5 - 15.0 fL    Platelets 771 060 - 784 E9/L    MPV 9.4 7.0 - 12.0 fL   Magnesium   Result Value Ref Range    Magnesium 2.1 1.6 - 2.6 mg/dL   Basic Metabolic Panel w/ Reflex to MG   Result Value Ref Range    Sodium 137 132 - 146 mmol/L    Potassium reflex Magnesium 4.1 3.5 - 5.0 mmol/L    Chloride 101 98 - 107 mmol/L    CO2 22 22 - 29 mmol/L    Anion Gap 14 7 - 16 mmol/L    Glucose 175 (H) 74 - 99 mg/dL    BUN 24 (H) 8 - 23 mg/dL    CREATININE 0.8 0.5 - 1.0 mg/dL    GFR Non-African American >60 >=60 mL/min/1.73    GFR African American >60     Calcium 10.4 (H) 8.6 - 10.2 mg/dL   CBC   Result Value Ref Range    WBC 11.0 4.5 - 11.5 E9/L    RBC 4.62 3.50 - 5.50 E12/L Hemoglobin 11.1 (L) 11.5 - 15.5 g/dL    Hematocrit 35.9 34.0 - 48.0 %    MCV 77.7 (L) 80.0 - 99.9 fL    MCH 24.0 (L) 26.0 - 35.0 pg    MCHC 30.9 (L) 32.0 - 34.5 %    RDW 14.6 11.5 - 15.0 fL    Platelets 834 976 - 202 E9/L    MPV 9.6 7.0 - 12.0 fL   POCT Glucose   Result Value Ref Range    Meter Glucose 138 (H) 74 - 99 mg/dL   POCT Glucose   Result Value Ref Range    Meter Glucose 255 (H) 74 - 99 mg/dL   POCT Glucose   Result Value Ref Range    Meter Glucose 280 (H) 74 - 99 mg/dL   POCT Glucose   Result Value Ref Range    Meter Glucose 332 (H) 74 - 99 mg/dL   POCT Glucose   Result Value Ref Range    Meter Glucose 233 (H) 74 - 99 mg/dL   POCT Glucose   Result Value Ref Range    Meter Glucose 427 (H) 74 - 99 mg/dL   POCT Glucose   Result Value Ref Range    Meter Glucose 379 (H) 74 - 99 mg/dL   POCT Glucose   Result Value Ref Range    Meter Glucose 349 (H) 74 - 99 mg/dL   POCT Glucose   Result Value Ref Range    Meter Glucose 265 (H) 74 - 99 mg/dL   POCT Glucose   Result Value Ref Range    Meter Glucose 350 (H) 74 - 99 mg/dL   POCT Glucose   Result Value Ref Range    Meter Glucose 407 (H) 74 - 99 mg/dL   POCT Glucose   Result Value Ref Range    Meter Glucose 414 (H) 74 - 99 mg/dL   POCT Glucose   Result Value Ref Range    Meter Glucose 295 (H) 74 - 99 mg/dL   POCT Glucose   Result Value Ref Range    Meter Glucose 303 (H) 74 - 99 mg/dL   POCT Glucose   Result Value Ref Range    Meter Glucose 369 (H) 74 - 99 mg/dL   POCT Glucose   Result Value Ref Range    Meter Glucose 396 (H) 74 - 99 mg/dL   POCT Glucose   Result Value Ref Range    Meter Glucose 480 (H) 74 - 99 mg/dL   POCT Glucose   Result Value Ref Range    Meter Glucose 352 (H) 74 - 99 mg/dL   POCT Glucose   Result Value Ref Range    Meter Glucose 153 (H) 74 - 99 mg/dL   POCT Glucose   Result Value Ref Range    Meter Glucose 286 (H) 74 - 99 mg/dL   POCT Glucose   Result Value Ref Range    Meter Glucose 319 (H) 74 - 99 mg/dL   POCT Glucose   Result Value Ref Range Meter Glucose 267 (H) 74 - 99 mg/dL   POCT Glucose   Result Value Ref Range    Meter Glucose 318 (H) 74 - 99 mg/dL   EKG 12 Lead   Result Value Ref Range    Ventricular Rate 76 BPM    Atrial Rate 76 BPM    P-R Interval 160 ms    QRS Duration 80 ms    Q-T Interval 378 ms    QTc Calculation (Bazett) 425 ms    P Axis 62 degrees    R Axis 43 degrees    T Axis 50 degrees   Echo Complete   Result Value Ref Range    Left Ventricular Ejection Fraction 72     LVEF MODALITY ECHO        ASSESSMENT/PLAN  Maureen Ojeda was seen today for follow-up from hospital and pneumonia. Diagnoses and all orders for this visit:    Pneumonia of both lower lobes due to infectious organism ([facility] [address]  -     DME Order for Nebulizer as OP  -     albuterol (PROVENTIL) (2.5 MG/3ML) 0.083% nebulizer solution; Take 3 mLs by nebulization every 6 hours as needed for Wheezing    Acute diastolic congestive heart failure (HCC)  -     furosemide (LASIX) 40 MG tablet; Take 1 tablet by mouth daily          BMI was elevated today, and weight loss plan recommended is : conventional weight loss. Phone/MyChart follow up if tests abnormal.    Return in about 6 weeks (around 5/5/2020) for COPD. or sooner if necessary. I have reviewed my findings and recommendations with Maureen Ojeda.      Eric Aldridge M.D

## 2020-03-26 RX ORDER — POTASSIUM CHLORIDE 750 MG/1
TABLET, FILM COATED, EXTENDED RELEASE ORAL
Qty: 30 TABLET | Refills: 10 | Status: SHIPPED
Start: 2020-03-26 | End: 2020-08-06 | Stop reason: SDUPTHER

## 2020-03-26 RX ORDER — SITAGLIPTIN AND METFORMIN HYDROCHLORIDE 1000; 50 MG/1; MG/1
TABLET, FILM COATED, EXTENDED RELEASE ORAL
Qty: 60 TABLET | Refills: 10 | Status: SHIPPED
Start: 2020-03-26 | End: 2021-01-13 | Stop reason: SDUPTHER

## 2020-03-26 RX ORDER — METOPROLOL SUCCINATE 50 MG/1
TABLET, EXTENDED RELEASE ORAL
Qty: 30 TABLET | Refills: 10 | Status: SHIPPED
Start: 2020-03-26 | End: 2021-03-01

## 2020-03-26 RX ORDER — LOPERAMIDE HYDROCHLORIDE 2 MG/1
CAPSULE ORAL
Qty: 60 CAPSULE | Refills: 1 | Status: SHIPPED
Start: 2020-03-26 | End: 2020-05-19

## 2020-04-24 ENCOUNTER — HOSPITAL ENCOUNTER (OUTPATIENT)
Age: 65
Setting detail: OBSERVATION
Discharge: HOME | End: 2020-04-26
Attending: EMERGENCY MEDICINE | Admitting: INTERNAL MEDICINE
Payer: COMMERCIAL

## 2020-04-24 ENCOUNTER — APPOINTMENT (OUTPATIENT)
Dept: CT IMAGING | Age: 65
End: 2020-04-24
Payer: COMMERCIAL

## 2020-04-24 ENCOUNTER — APPOINTMENT (OUTPATIENT)
Dept: GENERAL RADIOLOGY | Age: 65
End: 2020-04-24
Payer: COMMERCIAL

## 2020-04-24 PROBLEM — R07.9 CHEST PAIN: Status: ACTIVE | Noted: 2020-04-24

## 2020-04-24 LAB
ANION GAP SERPL CALCULATED.3IONS-SCNC: 12 MMOL/L (ref 7–16)
BASOPHILS ABSOLUTE: 0.03 E9/L (ref 0–0.2)
BASOPHILS RELATIVE PERCENT: 0.4 % (ref 0–2)
BUN BLDV-MCNC: 25 MG/DL (ref 8–23)
CALCIUM SERPL-MCNC: 10.8 MG/DL (ref 8.6–10.2)
CHLORIDE BLD-SCNC: 99 MMOL/L (ref 98–107)
CO2: 27 MMOL/L (ref 22–29)
CREAT SERPL-MCNC: 0.9 MG/DL (ref 0.5–1)
EOSINOPHILS ABSOLUTE: 0.07 E9/L (ref 0.05–0.5)
EOSINOPHILS RELATIVE PERCENT: 1 % (ref 0–6)
GFR AFRICAN AMERICAN: >60
GFR NON-AFRICAN AMERICAN: >60 ML/MIN/1.73
GLUCOSE BLD-MCNC: 115 MG/DL (ref 74–99)
HCT VFR BLD CALC: 32.1 % (ref 34–48)
HEMOGLOBIN: 9.7 G/DL (ref 11.5–15.5)
IMMATURE GRANULOCYTES #: 0.04 E9/L
IMMATURE GRANULOCYTES %: 0.6 % (ref 0–5)
LYMPHOCYTES ABSOLUTE: 1.58 E9/L (ref 1.5–4)
LYMPHOCYTES RELATIVE PERCENT: 22.6 % (ref 20–42)
MCH RBC QN AUTO: 24.6 PG (ref 26–35)
MCHC RBC AUTO-ENTMCNC: 30.2 % (ref 32–34.5)
MCV RBC AUTO: 81.3 FL (ref 80–99.9)
MONOCYTES ABSOLUTE: 0.71 E9/L (ref 0.1–0.95)
MONOCYTES RELATIVE PERCENT: 10.2 % (ref 2–12)
NEUTROPHILS ABSOLUTE: 4.55 E9/L (ref 1.8–7.3)
NEUTROPHILS RELATIVE PERCENT: 65.2 % (ref 43–80)
PDW BLD-RTO: 15.5 FL (ref 11.5–15)
PLATELET # BLD: 237 E9/L (ref 130–450)
PMV BLD AUTO: 8.6 FL (ref 7–12)
POTASSIUM SERPL-SCNC: 4.2 MMOL/L (ref 3.5–5)
PRO-BNP: 34 PG/ML (ref 0–125)
PROCALCITONIN: 0.26 NG/ML (ref 0–0.08)
RBC # BLD: 3.95 E12/L (ref 3.5–5.5)
SARS-COV-2, NAAT: NOT DETECTED
SODIUM BLD-SCNC: 138 MMOL/L (ref 132–146)
TROPONIN: <0.01 NG/ML (ref 0–0.03)
WBC # BLD: 7 E9/L (ref 4.5–11.5)

## 2020-04-24 PROCEDURE — G0378 HOSPITAL OBSERVATION PER HR: HCPCS

## 2020-04-24 PROCEDURE — 84484 ASSAY OF TROPONIN QUANT: CPT

## 2020-04-24 PROCEDURE — 83880 ASSAY OF NATRIURETIC PEPTIDE: CPT

## 2020-04-24 PROCEDURE — 84145 PROCALCITONIN (PCT): CPT

## 2020-04-24 PROCEDURE — 85025 COMPLETE CBC W/AUTO DIFF WBC: CPT

## 2020-04-24 PROCEDURE — 99285 EMERGENCY DEPT VISIT HI MDM: CPT

## 2020-04-24 PROCEDURE — 71045 X-RAY EXAM CHEST 1 VIEW: CPT

## 2020-04-24 PROCEDURE — U0002 COVID-19 LAB TEST NON-CDC: HCPCS

## 2020-04-24 PROCEDURE — 6360000004 HC RX CONTRAST MEDICATION: Performed by: RADIOLOGY

## 2020-04-24 PROCEDURE — 71275 CT ANGIOGRAPHY CHEST: CPT

## 2020-04-24 PROCEDURE — 80048 BASIC METABOLIC PNL TOTAL CA: CPT

## 2020-04-24 PROCEDURE — 6370000000 HC RX 637 (ALT 250 FOR IP): Performed by: STUDENT IN AN ORGANIZED HEALTH CARE EDUCATION/TRAINING PROGRAM

## 2020-04-24 RX ORDER — ASPIRIN 81 MG/1
324 TABLET, CHEWABLE ORAL ONCE
Status: COMPLETED | OUTPATIENT
Start: 2020-04-24 | End: 2020-04-24

## 2020-04-24 RX ORDER — FUROSEMIDE 10 MG/ML
40 INJECTION INTRAMUSCULAR; INTRAVENOUS ONCE
Status: DISCONTINUED | OUTPATIENT
Start: 2020-04-24 | End: 2020-04-24

## 2020-04-24 RX ORDER — NITROGLYCERIN 0.4 MG/1
0.4 TABLET SUBLINGUAL EVERY 5 MIN PRN
Status: DISCONTINUED | OUTPATIENT
Start: 2020-04-24 | End: 2020-04-26 | Stop reason: HOSPADM

## 2020-04-24 RX ADMIN — ASPIRIN 81 MG 324 MG: 81 TABLET ORAL at 16:22

## 2020-04-24 RX ADMIN — NITROGLYCERIN 0.4 MG: 0.4 TABLET SUBLINGUAL at 16:23

## 2020-04-24 RX ADMIN — IOPAMIDOL 75 ML: 755 INJECTION, SOLUTION INTRAVENOUS at 18:08

## 2020-04-24 ASSESSMENT — ENCOUNTER SYMPTOMS
DIARRHEA: 0
WHEEZING: 0
EYE PAIN: 0
SINUS PRESSURE: 0
VOMITING: 0
ABDOMINAL DISTENTION: 0
SHORTNESS OF BREATH: 1
SORE THROAT: 0
COUGH: 0
EYE DISCHARGE: 0
BACK PAIN: 0
NAUSEA: 0
EYE REDNESS: 0

## 2020-04-24 ASSESSMENT — PAIN SCALES - GENERAL
PAINLEVEL_OUTOF10: 9
PAINLEVEL_OUTOF10: 0

## 2020-04-24 ASSESSMENT — PAIN DESCRIPTION - DESCRIPTORS: DESCRIPTORS: ACHING

## 2020-04-24 ASSESSMENT — PAIN DESCRIPTION - FREQUENCY: FREQUENCY: CONTINUOUS

## 2020-04-24 ASSESSMENT — PAIN DESCRIPTION - LOCATION: LOCATION: ARM

## 2020-04-24 ASSESSMENT — PAIN DESCRIPTION - ORIENTATION: ORIENTATION: LEFT

## 2020-04-24 NOTE — ED PROVIDER NOTES
Surgery (Right); Cardiac catheterization (12/01/2008); pr total hip arthroplasty (Left, 8/8/2018); Hysterectomy (2004); and pr surg excision of anal lesion(s) (N/A, 1/25/2019). Social History:  reports that she has never smoked. She has never used smokeless tobacco. She reports that she does not drink alcohol or use drugs. Family History: family history includes Diabetes in her mother; Stroke in her father. The patients home medications have been reviewed. Allergies: Atenolol; Codeine; Lipitor [atorvastatin]; Percocet [oxycodone-acetaminophen];  Vicodin [hydrocodone-acetaminophen]; and Other    -------------------------------------------------- RESULTS -------------------------------------------------    Lab  Results for orders placed or performed during the hospital encounter of 04/24/20   CBC auto differential   Result Value Ref Range    WBC 7.0 4.5 - 11.5 E9/L    RBC 3.95 3.50 - 5.50 E12/L    Hemoglobin 9.7 (L) 11.5 - 15.5 g/dL    Hematocrit 32.1 (L) 34.0 - 48.0 %    MCV 81.3 80.0 - 99.9 fL    MCH 24.6 (L) 26.0 - 35.0 pg    MCHC 30.2 (L) 32.0 - 34.5 %    RDW 15.5 (H) 11.5 - 15.0 fL    Platelets 579 047 - 135 E9/L    MPV 8.6 7.0 - 12.0 fL    Neutrophils % 65.2 43.0 - 80.0 %    Immature Granulocytes % 0.6 0.0 - 5.0 %    Lymphocytes % 22.6 20.0 - 42.0 %    Monocytes % 10.2 2.0 - 12.0 %    Eosinophils % 1.0 0.0 - 6.0 %    Basophils % 0.4 0.0 - 2.0 %    Neutrophils Absolute 4.55 1.80 - 7.30 E9/L    Immature Granulocytes # 0.04 E9/L    Lymphocytes Absolute 1.58 1.50 - 4.00 E9/L    Monocytes Absolute 0.71 0.10 - 0.95 E9/L    Eosinophils Absolute 0.07 0.05 - 0.50 E9/L    Basophils Absolute 0.03 0.00 - 0.20 P6/C   Basic metabolic panel   Result Value Ref Range    Sodium 138 132 - 146 mmol/L    Potassium 4.2 3.5 - 5.0 mmol/L    Chloride 99 98 - 107 mmol/L    CO2 27 22 - 29 mmol/L    Anion Gap 12 7 - 16 mmol/L    Glucose 115 (H) 74 - 99 mg/dL    BUN 25 (H) 8 - 23 mg/dL    CREATININE 0.9 0.5 - 1.0 mg/dL    GFR Non-African American >60 >=60 mL/min/1.73    GFR African American >60     Calcium 10.8 (H) 8.6 - 10.2 mg/dL   Troponin   Result Value Ref Range    Troponin <0.01 0.00 - 0.03 ng/mL   Brain Natriuretic Peptide   Result Value Ref Range    Pro-BNP 34 0 - 125 pg/mL       Radiology  CTA CHEST W CONTRAST   Final Result   No evidence of an acute pulmonary embolus. Mild bibasilar atelectasis. Lungs are otherwise clear. 2.6 cm left adrenal gland mass (Hounsfield units 13), likely an adenoma. Mass would be better characterized by a adrenal protocol CT or MRI. XR CHEST PORTABLE   Final Result   Enlarged cardiomediastinal silhouette with findings suggestive of pulmonary   interstitial edema. Probable trace bilateral pleural effusions. RECOMMENDATION:   Correlation with volume status and short interval follow-up examination.             ------------------------- NURSING NOTES AND VITALS REVIEWED ---------------------------  Date / Time Roomed:  4/24/2020  3:45 PM  ED Bed Assignment:  16/16    The nursing notes within the ED encounter and vital signs as below have been reviewed. Patient Vitals for the past 24 hrs:   BP Temp Temp src Pulse Resp SpO2 Height Weight   04/24/20 1720 116/75 -- -- 69 17 96 % -- --   04/24/20 1544 (!) 174/82 99.8 °F (37.7 °C) Oral 93 20 97 % 5' 2\" (1.575 m) 186 lb (84.4 kg)       Oxygen Saturation Interpretation: Normal      ------------------------------------------ PROGRESS NOTES ------------------------------------------  Re-evaluation(s):    I have spoken with the patient and discussed todays results, in addition to providing specific details for the plan of care and counseling regarding the diagnosis and prognosis. Their questions are answered at this time and they are agreeable with the plan.      --------------------------------- ADDITIONAL PROVIDER NOTES ---------------------------------  Consultations:  Spoke with Dr. Cody Oakes,  They will admit this patient.     This patient's ED course included: a personal history and physicial examination, re-evaluation prior to disposition, multiple bedside re-evaluations, IV medications, cardiac monitoring, continuous pulse oximetry and complex medical decision making and emergency management    This patient has been closely monitored during their ED course. Please note that the withdrawal or failure to initiate urgent interventions for this patient would likely result in a life threatening deterioration or permanent disability. Accordingly this patient received 0 minutes of critical care time, excluding separately billable procedures. Systems at risk for deterioration include: cardiovascular. Clinical Impression  1. Chest pain, unspecified type    2. Shortness of breath          Disposition  Patient's disposition: Admit to telemetry  Patient's condition is stable.          Lisa Bianchi DO  Resident  04/24/20 8613

## 2020-04-25 ENCOUNTER — APPOINTMENT (OUTPATIENT)
Dept: NUCLEAR MEDICINE | Age: 65
End: 2020-04-25
Payer: COMMERCIAL

## 2020-04-25 LAB
ALBUMIN SERPL-MCNC: 4.5 G/DL (ref 3.5–5.2)
CALCIUM IONIZED: 1.45 MMOL/L (ref 1.15–1.33)
CK MB: 1.1 NG/ML (ref 0–4.3)
CK MB: 1.3 NG/ML (ref 0–4.3)
CK MB: 1.5 NG/ML (ref 0–4.3)
LV EF: 66 %
LV EF: 68 %
LVEF MODALITY: NORMAL
LVEF MODALITY: NORMAL
METER GLUCOSE: 133 MG/DL (ref 74–99)
METER GLUCOSE: 148 MG/DL (ref 74–99)
METER GLUCOSE: 158 MG/DL (ref 74–99)
METER GLUCOSE: 162 MG/DL (ref 74–99)
METER GLUCOSE: 237 MG/DL (ref 74–99)
PARATHYROID HORMONE INTACT: 51 PG/ML (ref 15–65)
PROCALCITONIN: 0.22 NG/ML (ref 0–0.08)
TOTAL CK: 44 U/L (ref 20–180)
TOTAL CK: 48 U/L (ref 20–180)
TOTAL CK: 55 U/L (ref 20–180)
TROPONIN: <0.01 NG/ML (ref 0–0.03)
VITAMIN D 25-HYDROXY: 30 NG/ML (ref 30–100)

## 2020-04-25 PROCEDURE — 82550 ASSAY OF CK (CPK): CPT

## 2020-04-25 PROCEDURE — G0378 HOSPITAL OBSERVATION PER HR: HCPCS

## 2020-04-25 PROCEDURE — 6370000000 HC RX 637 (ALT 250 FOR IP): Performed by: INTERNAL MEDICINE

## 2020-04-25 PROCEDURE — 36415 COLL VENOUS BLD VENIPUNCTURE: CPT

## 2020-04-25 PROCEDURE — 78452 HT MUSCLE IMAGE SPECT MULT: CPT

## 2020-04-25 PROCEDURE — 82306 VITAMIN D 25 HYDROXY: CPT

## 2020-04-25 PROCEDURE — A9500 TC99M SESTAMIBI: HCPCS | Performed by: RADIOLOGY

## 2020-04-25 PROCEDURE — 6360000002 HC RX W HCPCS: Performed by: INTERNAL MEDICINE

## 2020-04-25 PROCEDURE — 84145 PROCALCITONIN (PCT): CPT

## 2020-04-25 PROCEDURE — 83970 ASSAY OF PARATHORMONE: CPT

## 2020-04-25 PROCEDURE — 94640 AIRWAY INHALATION TREATMENT: CPT

## 2020-04-25 PROCEDURE — 3430000000 HC RX DIAGNOSTIC RADIOPHARMACEUTICAL: Performed by: RADIOLOGY

## 2020-04-25 PROCEDURE — 82330 ASSAY OF CALCIUM: CPT

## 2020-04-25 PROCEDURE — 84484 ASSAY OF TROPONIN QUANT: CPT

## 2020-04-25 PROCEDURE — 94664 DEMO&/EVAL PT USE INHALER: CPT

## 2020-04-25 PROCEDURE — 82553 CREATINE MB FRACTION: CPT

## 2020-04-25 PROCEDURE — 82040 ASSAY OF SERUM ALBUMIN: CPT

## 2020-04-25 PROCEDURE — 93306 TTE W/DOPPLER COMPLETE: CPT

## 2020-04-25 PROCEDURE — 93017 CV STRESS TEST TRACING ONLY: CPT

## 2020-04-25 PROCEDURE — 82962 GLUCOSE BLOOD TEST: CPT

## 2020-04-25 PROCEDURE — 82652 VIT D 1 25-DIHYDROXY: CPT

## 2020-04-25 RX ORDER — METOPROLOL SUCCINATE 50 MG/1
50 TABLET, EXTENDED RELEASE ORAL DAILY
Status: DISCONTINUED | OUTPATIENT
Start: 2020-04-25 | End: 2020-04-26 | Stop reason: HOSPADM

## 2020-04-25 RX ORDER — LANOLIN ALCOHOL/MO/W.PET/CERES
400 CREAM (GRAM) TOPICAL DAILY
Status: DISCONTINUED | OUTPATIENT
Start: 2020-04-25 | End: 2020-04-26 | Stop reason: HOSPADM

## 2020-04-25 RX ORDER — AMLODIPINE BESYLATE 10 MG/1
10 TABLET ORAL DAILY
Status: DISCONTINUED | OUTPATIENT
Start: 2020-04-25 | End: 2020-04-26 | Stop reason: HOSPADM

## 2020-04-25 RX ORDER — FUROSEMIDE 40 MG/1
40 TABLET ORAL DAILY
Status: DISCONTINUED | OUTPATIENT
Start: 2020-04-25 | End: 2020-04-26 | Stop reason: HOSPADM

## 2020-04-25 RX ORDER — FAMOTIDINE 20 MG/1
20 TABLET, FILM COATED ORAL DAILY
Status: DISCONTINUED | OUTPATIENT
Start: 2020-04-25 | End: 2020-04-26 | Stop reason: HOSPADM

## 2020-04-25 RX ORDER — BUDESONIDE AND FORMOTEROL FUMARATE DIHYDRATE 160; 4.5 UG/1; UG/1
2 AEROSOL RESPIRATORY (INHALATION) 2 TIMES DAILY
Status: DISCONTINUED | OUTPATIENT
Start: 2020-04-25 | End: 2020-04-25 | Stop reason: CLARIF

## 2020-04-25 RX ORDER — ALBUTEROL SULFATE 2.5 MG/3ML
2.5 SOLUTION RESPIRATORY (INHALATION) EVERY 6 HOURS PRN
Status: DISCONTINUED | OUTPATIENT
Start: 2020-04-25 | End: 2020-04-25 | Stop reason: SDUPTHER

## 2020-04-25 RX ORDER — FERROUS SULFATE 325(65) MG
325 TABLET ORAL 2 TIMES DAILY WITH MEALS
Status: DISCONTINUED | OUTPATIENT
Start: 2020-04-25 | End: 2020-04-26 | Stop reason: HOSPADM

## 2020-04-25 RX ORDER — BUDESONIDE 0.5 MG/2ML
0.5 INHALANT ORAL 2 TIMES DAILY
Status: DISCONTINUED | OUTPATIENT
Start: 2020-04-25 | End: 2020-04-26 | Stop reason: HOSPADM

## 2020-04-25 RX ORDER — NICOTINE POLACRILEX 4 MG
15 LOZENGE BUCCAL PRN
Status: DISCONTINUED | OUTPATIENT
Start: 2020-04-25 | End: 2020-04-26 | Stop reason: HOSPADM

## 2020-04-25 RX ORDER — ERGOCALCIFEROL 1.25 MG/1
50000 CAPSULE ORAL WEEKLY
Status: DISCONTINUED | OUTPATIENT
Start: 2020-04-29 | End: 2020-04-25

## 2020-04-25 RX ORDER — ARFORMOTEROL TARTRATE 15 UG/2ML
15 SOLUTION RESPIRATORY (INHALATION) 2 TIMES DAILY
Status: DISCONTINUED | OUTPATIENT
Start: 2020-04-25 | End: 2020-04-26 | Stop reason: HOSPADM

## 2020-04-25 RX ORDER — ONDANSETRON 2 MG/ML
4 INJECTION INTRAMUSCULAR; INTRAVENOUS EVERY 6 HOURS PRN
Status: DISCONTINUED | OUTPATIENT
Start: 2020-04-25 | End: 2020-04-26 | Stop reason: HOSPADM

## 2020-04-25 RX ORDER — ALBUTEROL SULFATE 2.5 MG/3ML
2.5 SOLUTION RESPIRATORY (INHALATION) EVERY 6 HOURS PRN
Status: DISCONTINUED | OUTPATIENT
Start: 2020-04-25 | End: 2020-04-26 | Stop reason: HOSPADM

## 2020-04-25 RX ORDER — ASPIRIN 81 MG/1
81 TABLET ORAL DAILY
Status: DISCONTINUED | OUTPATIENT
Start: 2020-04-25 | End: 2020-04-26 | Stop reason: HOSPADM

## 2020-04-25 RX ORDER — MONTELUKAST SODIUM 10 MG/1
10 TABLET ORAL NIGHTLY
Status: DISCONTINUED | OUTPATIENT
Start: 2020-04-25 | End: 2020-04-26 | Stop reason: HOSPADM

## 2020-04-25 RX ORDER — DOCUSATE SODIUM 100 MG/1
100 CAPSULE, LIQUID FILLED ORAL 2 TIMES DAILY
Status: DISCONTINUED | OUTPATIENT
Start: 2020-04-25 | End: 2020-04-26 | Stop reason: HOSPADM

## 2020-04-25 RX ORDER — ACETAMINOPHEN 325 MG/1
650 TABLET ORAL EVERY 4 HOURS PRN
Status: DISCONTINUED | OUTPATIENT
Start: 2020-04-25 | End: 2020-04-26 | Stop reason: HOSPADM

## 2020-04-25 RX ORDER — DOXYCYCLINE HYCLATE 100 MG/1
100 CAPSULE ORAL EVERY 12 HOURS SCHEDULED
Status: DISCONTINUED | OUTPATIENT
Start: 2020-04-25 | End: 2020-04-26 | Stop reason: HOSPADM

## 2020-04-25 RX ORDER — LOPERAMIDE HYDROCHLORIDE 2 MG/1
2 CAPSULE ORAL 4 TIMES DAILY PRN
Status: DISCONTINUED | OUTPATIENT
Start: 2020-04-25 | End: 2020-04-26 | Stop reason: HOSPADM

## 2020-04-25 RX ORDER — DEXTROSE MONOHYDRATE 50 MG/ML
100 INJECTION, SOLUTION INTRAVENOUS PRN
Status: DISCONTINUED | OUTPATIENT
Start: 2020-04-25 | End: 2020-04-26 | Stop reason: HOSPADM

## 2020-04-25 RX ORDER — PRAVASTATIN SODIUM 20 MG
40 TABLET ORAL NIGHTLY
Status: DISCONTINUED | OUTPATIENT
Start: 2020-04-25 | End: 2020-04-26 | Stop reason: HOSPADM

## 2020-04-25 RX ORDER — DEXTROSE MONOHYDRATE 25 G/50ML
12.5 INJECTION, SOLUTION INTRAVENOUS PRN
Status: DISCONTINUED | OUTPATIENT
Start: 2020-04-25 | End: 2020-04-26 | Stop reason: HOSPADM

## 2020-04-25 RX ORDER — LISINOPRIL 20 MG/1
20 TABLET ORAL DAILY
Status: DISCONTINUED | OUTPATIENT
Start: 2020-04-25 | End: 2020-04-26 | Stop reason: HOSPADM

## 2020-04-25 RX ORDER — POTASSIUM CHLORIDE 750 MG/1
10 TABLET, EXTENDED RELEASE ORAL DAILY PRN
Status: DISCONTINUED | OUTPATIENT
Start: 2020-04-25 | End: 2020-04-26 | Stop reason: HOSPADM

## 2020-04-25 RX ADMIN — FUROSEMIDE 40 MG: 40 TABLET ORAL at 10:45

## 2020-04-25 RX ADMIN — MONTELUKAST 10 MG: 10 TABLET, FILM COATED ORAL at 00:46

## 2020-04-25 RX ADMIN — DOXYCYCLINE HYCLATE 100 MG: 100 CAPSULE ORAL at 14:24

## 2020-04-25 RX ADMIN — INSULIN LISPRO 2 UNITS: 100 INJECTION, SOLUTION INTRAVENOUS; SUBCUTANEOUS at 11:01

## 2020-04-25 RX ADMIN — FERROUS SULFATE TAB 325 MG (65 MG ELEMENTAL FE) 325 MG: 325 (65 FE) TAB at 16:45

## 2020-04-25 RX ADMIN — AMLODIPINE BESYLATE 10 MG: 10 TABLET ORAL at 10:45

## 2020-04-25 RX ADMIN — MONTELUKAST 10 MG: 10 TABLET, FILM COATED ORAL at 22:00

## 2020-04-25 RX ADMIN — DOCUSATE SODIUM 100 MG: 100 CAPSULE, LIQUID FILLED ORAL at 00:46

## 2020-04-25 RX ADMIN — FERROUS SULFATE TAB 325 MG (65 MG ELEMENTAL FE) 325 MG: 325 (65 FE) TAB at 10:45

## 2020-04-25 RX ADMIN — DOXYCYCLINE HYCLATE 100 MG: 100 CAPSULE ORAL at 22:01

## 2020-04-25 RX ADMIN — PRAVASTATIN SODIUM 40 MG: 20 TABLET ORAL at 00:46

## 2020-04-25 RX ADMIN — INSULIN LISPRO 4 UNITS: 100 INJECTION, SOLUTION INTRAVENOUS; SUBCUTANEOUS at 17:53

## 2020-04-25 RX ADMIN — METOPROLOL SUCCINATE 50 MG: 50 TABLET, EXTENDED RELEASE ORAL at 10:45

## 2020-04-25 RX ADMIN — ARFORMOTEROL TARTRATE 15 MCG: 15 SOLUTION RESPIRATORY (INHALATION) at 05:03

## 2020-04-25 RX ADMIN — ARFORMOTEROL TARTRATE 15 MCG: 15 SOLUTION RESPIRATORY (INHALATION) at 18:13

## 2020-04-25 RX ADMIN — BUDESONIDE 500 MCG: 0.5 INHALANT RESPIRATORY (INHALATION) at 18:13

## 2020-04-25 RX ADMIN — Medication 30 MILLICURIE: at 10:30

## 2020-04-25 RX ADMIN — BUDESONIDE 500 MCG: 0.5 INHALANT RESPIRATORY (INHALATION) at 05:02

## 2020-04-25 RX ADMIN — DOCUSATE SODIUM 100 MG: 100 CAPSULE, LIQUID FILLED ORAL at 22:01

## 2020-04-25 RX ADMIN — LISINOPRIL 20 MG: 20 TABLET ORAL at 10:45

## 2020-04-25 RX ADMIN — FAMOTIDINE 20 MG: 20 TABLET, FILM COATED ORAL at 10:45

## 2020-04-25 RX ADMIN — ASPIRIN 81 MG: 81 TABLET, COATED ORAL at 10:45

## 2020-04-25 RX ADMIN — REGADENOSON 0.4 MG: 0.08 INJECTION, SOLUTION INTRAVENOUS at 10:18

## 2020-04-25 RX ADMIN — DOCUSATE SODIUM 100 MG: 100 CAPSULE, LIQUID FILLED ORAL at 10:44

## 2020-04-25 RX ADMIN — PRAVASTATIN SODIUM 40 MG: 20 TABLET ORAL at 22:01

## 2020-04-25 RX ADMIN — Medication 10 MILLICURIE: at 07:41

## 2020-04-25 RX ADMIN — INSULIN LISPRO 1 UNITS: 100 INJECTION, SOLUTION INTRAVENOUS; SUBCUTANEOUS at 22:03

## 2020-04-25 RX ADMIN — Medication 400 MG: at 10:44

## 2020-04-25 ASSESSMENT — PAIN SCALES - GENERAL
PAINLEVEL_OUTOF10: 0

## 2020-04-25 NOTE — H&P
were unremarkable and BNP was without any elevation. Diagnostic testing failed to reveal any significant pulmonary vascular congestion or consolidative process. Novel coronavirus has been ruled out. She does have a history of COPD and will be started on aggressive respiratory regimen. Infectious evaluation will be undertaken as well with cultures pending. Chest pain will be investigated further with echocardiogram and stress testing. During her last hospitalization, she required an increase in her dose of diuretic and this will be continued with Lasix oral 40 mg. Intake and output will be monitored as well as daily weights. She is noted to have hypercalcemia, vitamin D supplementation will be held and this will be investigated further. Additional chronic comorbidities will be addressed during hospitalization, please see additional orders for details.     ANDREA Weathers  7:37 AM  4/25/2020    Electronically signed by WILLIAM Weathers CNP on 4/25/20 at 7:37 AM EDT

## 2020-04-26 VITALS
HEIGHT: 62 IN | DIASTOLIC BLOOD PRESSURE: 82 MMHG | WEIGHT: 186.1 LBS | TEMPERATURE: 98.3 F | OXYGEN SATURATION: 96 % | SYSTOLIC BLOOD PRESSURE: 167 MMHG | BODY MASS INDEX: 34.24 KG/M2 | HEART RATE: 88 BPM | RESPIRATION RATE: 18 BRPM

## 2020-04-26 LAB
ALBUMIN SERPL-MCNC: 4.2 G/DL (ref 3.5–5.2)
ALP BLD-CCNC: 69 U/L (ref 35–104)
ALT SERPL-CCNC: 16 U/L (ref 0–32)
ANION GAP SERPL CALCULATED.3IONS-SCNC: 12 MMOL/L (ref 7–16)
AST SERPL-CCNC: 19 U/L (ref 0–31)
BASOPHILS ABSOLUTE: 0.04 E9/L (ref 0–0.2)
BASOPHILS RELATIVE PERCENT: 0.6 % (ref 0–2)
BILIRUB SERPL-MCNC: 0.6 MG/DL (ref 0–1.2)
BUN BLDV-MCNC: 23 MG/DL (ref 8–23)
CALCIUM SERPL-MCNC: 10.2 MG/DL (ref 8.6–10.2)
CHLORIDE BLD-SCNC: 106 MMOL/L (ref 98–107)
CHOLESTEROL, TOTAL: 147 MG/DL (ref 0–199)
CO2: 26 MMOL/L (ref 22–29)
CREAT SERPL-MCNC: 1 MG/DL (ref 0.5–1)
EOSINOPHILS ABSOLUTE: 0.13 E9/L (ref 0.05–0.5)
EOSINOPHILS RELATIVE PERCENT: 2.1 % (ref 0–6)
GFR AFRICAN AMERICAN: >60
GFR NON-AFRICAN AMERICAN: >60 ML/MIN/1.73
GLUCOSE BLD-MCNC: 156 MG/DL (ref 74–99)
HBA1C MFR BLD: 7.1 % (ref 4–5.6)
HCT VFR BLD CALC: 32.8 % (ref 34–48)
HDLC SERPL-MCNC: 51 MG/DL
HEMOGLOBIN: 9.6 G/DL (ref 11.5–15.5)
IMMATURE GRANULOCYTES #: 0.05 E9/L
IMMATURE GRANULOCYTES %: 0.8 % (ref 0–5)
LDL CHOLESTEROL CALCULATED: 63 MG/DL (ref 0–99)
LYMPHOCYTES ABSOLUTE: 2.04 E9/L (ref 1.5–4)
LYMPHOCYTES RELATIVE PERCENT: 32.5 % (ref 20–42)
MCH RBC QN AUTO: 24.1 PG (ref 26–35)
MCHC RBC AUTO-ENTMCNC: 29.3 % (ref 32–34.5)
MCV RBC AUTO: 82.2 FL (ref 80–99.9)
METER GLUCOSE: 177 MG/DL (ref 74–99)
MONOCYTES ABSOLUTE: 0.86 E9/L (ref 0.1–0.95)
MONOCYTES RELATIVE PERCENT: 13.7 % (ref 2–12)
NEUTROPHILS ABSOLUTE: 3.15 E9/L (ref 1.8–7.3)
NEUTROPHILS RELATIVE PERCENT: 50.3 % (ref 43–80)
PDW BLD-RTO: 15.7 FL (ref 11.5–15)
PLATELET # BLD: 241 E9/L (ref 130–450)
PMV BLD AUTO: 9.5 FL (ref 7–12)
POTASSIUM SERPL-SCNC: 4.3 MMOL/L (ref 3.5–5)
RBC # BLD: 3.99 E12/L (ref 3.5–5.5)
SODIUM BLD-SCNC: 144 MMOL/L (ref 132–146)
TOTAL PROTEIN: 7.1 G/DL (ref 6.4–8.3)
TRIGL SERPL-MCNC: 165 MG/DL (ref 0–149)
TSH SERPL DL<=0.05 MIU/L-ACNC: 2.02 UIU/ML (ref 0.27–4.2)
VLDLC SERPL CALC-MCNC: 33 MG/DL
WBC # BLD: 6.3 E9/L (ref 4.5–11.5)

## 2020-04-26 PROCEDURE — 85025 COMPLETE CBC W/AUTO DIFF WBC: CPT

## 2020-04-26 PROCEDURE — 80061 LIPID PANEL: CPT

## 2020-04-26 PROCEDURE — 6370000000 HC RX 637 (ALT 250 FOR IP): Performed by: INTERNAL MEDICINE

## 2020-04-26 PROCEDURE — 83036 HEMOGLOBIN GLYCOSYLATED A1C: CPT

## 2020-04-26 PROCEDURE — G0378 HOSPITAL OBSERVATION PER HR: HCPCS

## 2020-04-26 PROCEDURE — 80053 COMPREHEN METABOLIC PANEL: CPT

## 2020-04-26 PROCEDURE — 36415 COLL VENOUS BLD VENIPUNCTURE: CPT

## 2020-04-26 PROCEDURE — 93005 ELECTROCARDIOGRAM TRACING: CPT

## 2020-04-26 PROCEDURE — 82962 GLUCOSE BLOOD TEST: CPT

## 2020-04-26 PROCEDURE — 84443 ASSAY THYROID STIM HORMONE: CPT

## 2020-04-26 PROCEDURE — 6360000002 HC RX W HCPCS: Performed by: INTERNAL MEDICINE

## 2020-04-26 PROCEDURE — 94640 AIRWAY INHALATION TREATMENT: CPT

## 2020-04-26 RX ADMIN — LISINOPRIL 20 MG: 20 TABLET ORAL at 09:21

## 2020-04-26 RX ADMIN — DOCUSATE SODIUM 100 MG: 100 CAPSULE, LIQUID FILLED ORAL at 09:21

## 2020-04-26 RX ADMIN — METOPROLOL SUCCINATE 50 MG: 50 TABLET, EXTENDED RELEASE ORAL at 09:21

## 2020-04-26 RX ADMIN — ARFORMOTEROL TARTRATE 15 MCG: 15 SOLUTION RESPIRATORY (INHALATION) at 05:14

## 2020-04-26 RX ADMIN — FUROSEMIDE 40 MG: 40 TABLET ORAL at 09:21

## 2020-04-26 RX ADMIN — INSULIN LISPRO 2 UNITS: 100 INJECTION, SOLUTION INTRAVENOUS; SUBCUTANEOUS at 09:24

## 2020-04-26 RX ADMIN — Medication 400 MG: at 09:21

## 2020-04-26 RX ADMIN — BUDESONIDE 500 MCG: 0.5 INHALANT RESPIRATORY (INHALATION) at 05:14

## 2020-04-26 RX ADMIN — FAMOTIDINE 20 MG: 20 TABLET, FILM COATED ORAL at 09:21

## 2020-04-26 RX ADMIN — DOXYCYCLINE HYCLATE 100 MG: 100 CAPSULE ORAL at 09:21

## 2020-04-26 RX ADMIN — FERROUS SULFATE TAB 325 MG (65 MG ELEMENTAL FE) 325 MG: 325 (65 FE) TAB at 09:21

## 2020-04-26 RX ADMIN — AMLODIPINE BESYLATE 10 MG: 10 TABLET ORAL at 09:21

## 2020-04-26 RX ADMIN — ASPIRIN 81 MG: 81 TABLET, COATED ORAL at 09:21

## 2020-04-26 NOTE — PLAN OF CARE
Problem: Discharge Planning:  Goal: Participates in care planning  Description: Participates in care planning  Outcome: Met This Shift  Goal: Discharged to appropriate level of care  Description: Discharged to appropriate level of care  Outcome: Met This Shift     Problem: Anxiety/Stress:  Goal: Level of anxiety will decrease  Description: Level of anxiety will decrease  Outcome: Met This Shift     Problem: Cardiac Output - Decreased:  Goal: Hemodynamic stability will improve  Description: Hemodynamic stability will improve  Outcome: Met This Shift     Problem: Fluid Volume - Imbalance:  Goal: Absence of imbalanced fluid volume signs and symptoms  Description: Absence of imbalanced fluid volume signs and symptoms  Outcome: Met This Shift     Problem:  Bowel/Gastric:  Goal: Will show no signs and symptoms of gastrointestinal bleeding  Description: Will show no signs and symptoms of gastrointestinal bleeding  Outcome: Met This Shift     Problem: Coping:  Goal: Ability to identify strategies to decrease anxiety will improve  Description: Ability to identify strategies to decrease anxiety will improve  Outcome: Met This Shift  Goal: Verbalizations of decreased anxiety will decrease  Description: Verbalizations of decreased anxiety will decrease  Outcome: Met This Shift     Problem: Nutritional:  Goal: Ability to chew and swallow food without choking will improve  Description: Ability to chew and swallow food without choking will improve  Outcome: Met This Shift  Goal: Ability to achieve adequate nutritional intake will improve  Description: Ability to achieve adequate nutritional intake will improve  Outcome: Met This Shift  Goal: Ability to maintain an optimal weight for height and age will improve  Description: Ability to maintain an optimal weight for height and age will improve  Outcome: Met This Shift  Goal: Maintenance of adequate nutrition will improve  Description: Maintenance of adequate nutrition will

## 2020-04-26 NOTE — DISCHARGE SUMMARY
11.0 08/03/2018    PROTIME 11.4 04/02/2016      Lab Results   Component Value Date    TSH 2.020 04/26/2020     Lab Results   Component Value Date    TRIG 165 (H) 04/26/2020    TRIG 107 12/03/2018    TRIG 236 (H) 09/29/2018     Lab Results   Component Value Date    HDL 51 04/26/2020    HDL 65 12/03/2018    HDL 66 09/29/2018     Lab Results   Component Value Date    LDLCALC 63 04/26/2020    LDLCALC 41 12/03/2018    LDLCALC 41 09/29/2018     Lab Results   Component Value Date    LABA1C 7.0 (H) 03/10/2020       IMAGING:  Xr Chest Portable    Result Date: 4/24/2020  EXAMINATION: ONE XRAY VIEW OF THE CHEST 4/24/2020 4:35 pm COMPARISON: March 14, 2020. HISTORY: ORDERING SYSTEM PROVIDED HISTORY: chest pain TECHNOLOGIST PROVIDED HISTORY: Reason for exam:->chest pain FINDINGS: Cardiac and mediastinal contours are enlarged but unchanged. Increased perihilar markings with prominence of interstitial lung markings bilaterally. Probable trace bilateral pleural effusions. No evidence of pneumothorax. No convincing evidence of consolidation. No evidence of acute osseous abnormalities. Enlarged cardiomediastinal silhouette with findings suggestive of pulmonary interstitial edema. Probable trace bilateral pleural effusions. RECOMMENDATION: Correlation with volume status and short interval follow-up examination. Cta Chest W Contrast    Result Date: 4/24/2020  EXAMINATION: CTA OF THE CHEST 4/24/2020 5:40 pm TECHNIQUE: CTA of the chest was performed after the administration of intravenous contrast.  Multiplanar reformatted images are provided for review. MIP images are provided for review. Dose modulation, iterative reconstruction, and/or weight based adjustment of the mA/kV was utilized to reduce the radiation dose to as low as reasonably achievable. COMPARISON: Portable chest x-ray 04/24/2020 at 1646 hours and chest x-ray 03/14/2020.  HISTORY: ORDERING SYSTEM PROVIDED HISTORY: r/o PE vs infectious process TECHNOLOGIST

## 2020-04-27 LAB — VITAMIN D 1,25-DIHYDROXY: 49.3 PG/ML (ref 19.9–79.3)

## 2020-04-30 LAB
EKG ATRIAL RATE: 84 BPM
EKG P AXIS: 59 DEGREES
EKG P-R INTERVAL: 166 MS
EKG Q-T INTERVAL: 372 MS
EKG QRS DURATION: 82 MS
EKG QTC CALCULATION (BAZETT): 439 MS
EKG R AXIS: 6 DEGREES
EKG T AXIS: 51 DEGREES
EKG VENTRICULAR RATE: 84 BPM

## 2020-05-13 ENCOUNTER — TELEPHONE (OUTPATIENT)
Dept: ADMINISTRATIVE | Age: 65
End: 2020-05-13

## 2020-05-13 NOTE — TELEPHONE ENCOUNTER
Pt called stating she didn't receive a call about yesterday's appt and forgot and wanted to reschedule it. I did for 5/27. When I advised to wear a mask and began asking the COVID ?'s she then stated she had been tested for COVID and in hospital but they told her it was negative. Then she stated she was still having chills sometimes though. When I asked her if she could change the OV to a VV she stated that she didn't have the capability and said she was \"just in there and they took temperature and let her see the doctor\". I'm not seeing that visit. Anyway, I wanted to mention all this prior to her 5/27 visit and see how it should be handled for safety of her as well as you all at the office. Please advise. Thank you.

## 2020-05-18 ENCOUNTER — HOSPITAL ENCOUNTER (OUTPATIENT)
Age: 65
Discharge: HOME | End: 2020-05-18
Payer: COMMERCIAL

## 2020-05-18 LAB
LITHIUM DOSE AMOUNT: ABNORMAL
LITHIUM LEVEL: 0.11 MMOL/L (ref 0.5–1.5)

## 2020-05-18 PROCEDURE — 36415 COLL VENOUS BLD VENIPUNCTURE: CPT

## 2020-05-18 PROCEDURE — 80178 ASSAY OF LITHIUM: CPT

## 2020-05-27 ENCOUNTER — OFFICE VISIT (OUTPATIENT)
Dept: FAMILY MEDICINE CLINIC | Age: 65
End: 2020-05-27
Payer: COMMERCIAL

## 2020-05-27 VITALS
DIASTOLIC BLOOD PRESSURE: 56 MMHG | RESPIRATION RATE: 20 BRPM | HEART RATE: 76 BPM | BODY MASS INDEX: 33.31 KG/M2 | HEIGHT: 62 IN | OXYGEN SATURATION: 98 % | SYSTOLIC BLOOD PRESSURE: 104 MMHG | WEIGHT: 181 LBS

## 2020-05-27 PROCEDURE — G8417 CALC BMI ABV UP PARAM F/U: HCPCS | Performed by: FAMILY MEDICINE

## 2020-05-27 PROCEDURE — 99214 OFFICE O/P EST MOD 30 MIN: CPT | Performed by: FAMILY MEDICINE

## 2020-05-27 PROCEDURE — 3017F COLORECTAL CA SCREEN DOC REV: CPT | Performed by: FAMILY MEDICINE

## 2020-05-27 PROCEDURE — 4040F PNEUMOC VAC/ADMIN/RCVD: CPT | Performed by: FAMILY MEDICINE

## 2020-05-27 PROCEDURE — 2022F DILAT RTA XM EVC RTNOPTHY: CPT | Performed by: FAMILY MEDICINE

## 2020-05-27 PROCEDURE — 1090F PRES/ABSN URINE INCON ASSESS: CPT | Performed by: FAMILY MEDICINE

## 2020-05-27 PROCEDURE — G8400 PT W/DXA NO RESULTS DOC: HCPCS | Performed by: FAMILY MEDICINE

## 2020-05-27 PROCEDURE — 1036F TOBACCO NON-USER: CPT | Performed by: FAMILY MEDICINE

## 2020-05-27 PROCEDURE — 3051F HG A1C>EQUAL 7.0%<8.0%: CPT | Performed by: FAMILY MEDICINE

## 2020-05-27 PROCEDURE — G8427 DOCREV CUR MEDS BY ELIG CLIN: HCPCS | Performed by: FAMILY MEDICINE

## 2020-05-27 PROCEDURE — 1123F ACP DISCUSS/DSCN MKR DOCD: CPT | Performed by: FAMILY MEDICINE

## 2020-05-27 NOTE — PROGRESS NOTES
OFFICE PROGRESS NOTE      SUBJECTIVE:        Patient ID:   Edgar Browne is a 77 y.o. female who presents for   Chief Complaint   Patient presents with    Diabetes     had a1c at hospital  7.1    Fatigue       HPI:   Patient is here to follow up on diabetes. Fasting blood sugars:200's Midday blood sugars: 130-250. Patient checks blood glucose 2 times per day. Patient is following diabetic diet. Patient is a nonsmoker. Last ophthalmology visit: 2/20. Patient is taking a daily statin. Patient has had fatigue since hospitalization last month. Was admitted for chest pain--has resolved. Patient doing well on current regimen for hypertension. Patient not taking medication for bipolar disorder. Refuses to accept her diagnosis. Patient verbally angry with me about completing her employment forms stating she has difficulty distinguishing reality from fiction. She has had multiple psychiatric hospital admissions, but is in complete denial to her condition. Prior to Admission medications    Medication Sig Start Date End Date Taking?  Authorizing Provider   loperamide (IMODIUM) 2 MG capsule TAKE ONE (1) CAPSULE BY MOUTH FOUR TIMES A DAY AS NEEDED FOR DIARRHEA 5/19/20  Yes Fernando Hylton MD   magnesium oxide (MAG-OX) 400 MG tablet TAKE 1 TABLET BY MOUTH DAILY 4/22/20  Yes Fernando Hylton MD   amLODIPine (NORVASC) 10 MG tablet TAKE (1) TABLET BY MOUTH DAILY 4/22/20  Yes Fernando Hylton MD   famotidine (PEPCID) 20 MG tablet TAKE 1 TABLET BY MOUTH ONCE DAILY 4/22/20  Yes Fernando Hylton MD   SITagliptin-metFORMIN (JANUMET XR)  MG TB24 per extended release tablet TAKE TWO (2) TABLETS BY MOUTH EACH MORNING 3/26/20  Yes Fernando Hylton MD   metoprolol succinate (TOPROL XL) 50 MG extended release tablet TAKE 1 TABLET BY MOUTH ONCE DAILY 3/26/20  Yes Fernando Hylton MD   potassium chloride (KLOR-CON) 10 MEQ extended release tablet TAKE 1 tablet 5    SYMBICORT 160-4.5 MCG/ACT AERO INHALE TWO (2) PUFFS BY MOUTH TWICE DAILY  10    montelukast (SINGULAIR) 10 MG tablet TAKE 1 TABLET BY MOUTH NIGHTLY 30 tablet 3    albuterol sulfate HFA (PROVENTIL HFA) 108 (90 Base) MCG/ACT inhaler Inhale 2 puffs into the lungs every 4 hours as needed for Wheezing 1 Inhaler 1    Incontinence Supply Disposable (DEPEND UNDERWEAR LARGE) MISC Wear daily 50 each 12    docusate sodium (COLACE) 100 MG capsule Take 1 capsule by mouth 2 times daily 60 capsule 1    ferrous sulfate 325 (65 Fe) MG tablet Take 1 tablet by mouth 2 times daily (with meals) 30 tablet 3    Blood Glucose Monitoring Suppl (ONE TOUCH ULTRA 2) w/Device KIT Check blood sugar bid 1 kit 0    [facility]. Devices [facility] Bedside commode 1 Device 0     No current facility-administered medications for this visit. Review Of Systems:    Review of Systems        OBJECTIVE:     VS:  Wt Readings from Last 3 Encounters:   05/27/20 181 lb (82.1 kg)   04/26/20 186 lb 1.6 oz (84.4 kg)   03/24/20 181 lb (82.1 kg)     Vitals:    05/27/20 1553   BP: (!) 104/56   Pulse: 76   Resp: 20   SpO2: 98%       Physical Exam  Vitals signs reviewed. Constitutional:       General: She is not in acute distress. Appearance: She is well-developed. Neck:      Musculoskeletal: Neck supple. Vascular: No carotid bruit. Cardiovascular:      Rate and Rhythm: Normal rate and regular rhythm. Heart sounds: Normal heart sounds. No murmur. No gallop. Pulmonary:      Effort: Pulmonary effort is normal.      Breath sounds: Normal breath sounds. No wheezing or rales. Abdominal:      General: Bowel sounds are normal. There is no distension. Palpations: Abdomen is soft. Tenderness: There is no abdominal tenderness. Skin:     General: Skin is warm and dry. Neurological:      Mental Status: She is alert and oriented to person, place, and time.          Results for orders placed or performed during the hospital encounter of 05/18/20   Lithium Level   Result Value Ref Range    Lithium Lvl 0.11 (L) 0.50 - 1.50 mmol/L    Lithium Dose Amount Unknown          Alexander Henry was seen today for diabetes and fatigue. Diagnoses and all orders for this visit:    Type 2 diabetes mellitus without complication, without long-term current use of insulin ([facility])        -     Continue Janumet    Essential hypertension        -     Continue antihypertensive regimen    Bipolar disorder, current episode manic severe with psychotic features ([facility] [address]        -     Patient refuses to seek treatment      BMI was elevated today, and weight loss plan recommended is : conventional weight loss. Phone/MyChart follow up if tests abnormal.    Return in about 3 months (around 8/27/2020) for diabetes. I have reviewed my findings and recommendations with Christelle Baker.     Norma Cassidy M.D

## 2020-05-27 NOTE — PATIENT INSTRUCTIONS
Patient Education        Learning About Type 2 Diabetes  What is type 2 diabetes? Insulin is a hormone that helps your body use sugar from your food as energy. Type 2 diabetes happens when your body can't use insulin the right way. Over time, the pancreas can't make enough insulin. If you don't have enough insulin, too much sugar stays in your blood. If you are overweight, get little or no exercise, or have type 2 diabetes in your family, you are more likely to have problems with the way insulin works in your body.  Americans, Hispanics, Native Americans,  Americans, and Pacific Islanders have a higher risk for type 2 diabetes. Type 2 diabetes can be prevented or delayed with a healthy lifestyle, which includes staying at a healthy weight, making smart food choices, and getting regular exercise. What can you expect with type 2 diabetes? Dee Trivedi keep hearing about how important it is to keep your blood sugar within a target range. That's because over time, high blood sugar can lead to serious problems. It can:  · Harm your eyes, nerves, and kidneys. · Damage your blood vessels, leading to heart disease and stroke. · Reduce blood flow and cause nerve damage to parts of your body, especially your feet. This can cause slow healing and pain when you walk. · Make your immune system weak and less able to fight infections. When people hear the word \"diabetes,\" they often think of problems like these. But daily care and treatment can help prevent or delay these problems. The goal is to keep your blood sugar in a target range. That's the best way to reduce your chance of having more problems from diabetes. What are the symptoms? Some people who have type 2 diabetes may not have any symptoms early on. Many people with the disease don't even know they have it at first. But with time, diabetes starts to cause symptoms.  You experience most symptoms of type 2 diabetes when your blood sugar is either too high or too low. The most common symptoms of high blood sugar include:  · Thirst.  · Frequent urination. · Weight loss. · Blurry vision. The symptoms of low blood sugar include:  · Sweating. · Shakiness. · Weakness. · Hunger. · Confusion. How can you prevent type 2 diabetes? The best way to prevent or delay type 2 diabetes is to adopt healthy habits, which include:  · Staying at a healthy weight. · Exercising regularly. · Eating healthy foods. How is type 2 diabetes treated? If you have type 2 diabetes, here are the most important things you can do. · Take your diabetes medicines. · Check your blood sugar as often as your doctor recommends. Also, get a hemoglobin A1c test at least every 6 months. · Try to eat a variety of foods and to spread carbohydrate throughout the day. Carbohydrate raises blood sugar higher and more quickly than any other nutrient does. Carbohydrate is found in sugar, breads and cereals, fruit, starchy vegetables such as potatoes and corn, and milk and yogurt. · Get at least 30 minutes of exercise on most days of the week. Walking is a good choice. You also may want to do other activities, such as running, swimming, cycling, or playing tennis or team sports. If your doctor says it's okay, do muscle-strengthening exercises at least 2 times a week. · See your doctor for checkups and tests on a regular schedule. · If you have high blood pressure or high cholesterol, take the medicines as prescribed by your doctor. · Do not smoke. Smoking can make health problems worse. This includes problems you might have with type 2 diabetes. If you need help quitting, talk to your doctor about stop-smoking programs and medicines. These can increase your chances of quitting for good. Follow-up care is a key part of your treatment and safety. Be sure to make and go to all appointments, and call your doctor if you are having problems.  It's also a good idea to know your test results and keep a list of the medicines you take. Where can you learn more? Go to [email or web address] and sign in to your [employer] account. Enter B132 in the [employer] box to learn more about \"Learning About Type 2 Diabetes. \"     If you do not have an account, please click on the \"Sign Up Now\" link. Current as of: December 20, 2019               Content Version: 12.5  © 8586-0258 Healthwise, Incorporated. Care instructions adapted under license by [facility] ([facility]). If you have questions about a medical condition or this instruction, always ask your healthcare professional. [address] any warranty or liability for your use of this information.

## 2020-05-29 PROBLEM — J96.01 ACUTE RESPIRATORY FAILURE WITH HYPOXIA (HCC): Status: RESOLVED | Noted: 2020-03-10 | Resolved: 2020-05-29

## 2020-05-29 PROBLEM — R07.89 ATYPICAL CHEST PAIN: Status: RESOLVED | Noted: 2017-06-14 | Resolved: 2020-05-29

## 2020-05-29 PROBLEM — J44.1 ACUTE EXACERBATION OF CHRONIC OBSTRUCTIVE PULMONARY DISEASE (COPD) (HCC): Status: RESOLVED | Noted: 2020-03-10 | Resolved: 2020-05-29

## 2020-05-29 PROBLEM — R07.9 CHEST PAIN: Status: RESOLVED | Noted: 2020-04-24 | Resolved: 2020-05-29

## 2020-05-29 PROBLEM — J18.9 CAP (COMMUNITY ACQUIRED PNEUMONIA): Status: RESOLVED | Noted: 2020-03-10 | Resolved: 2020-05-29

## 2020-06-18 ENCOUNTER — TELEPHONE (OUTPATIENT)
Dept: ADMINISTRATIVE | Age: 65
End: 2020-06-18

## 2020-06-18 NOTE — TELEPHONE ENCOUNTER
Pt is being discharged from [facility] 6/18 after admisstion for schizophrenia. Please call to schedule follow up appt.

## 2020-06-20 RX ORDER — PRAVASTATIN SODIUM 40 MG
40 TABLET ORAL NIGHTLY
Qty: 30 TABLET | Refills: 10 | Status: SHIPPED | OUTPATIENT
Start: 2020-06-20

## 2020-06-20 RX ORDER — ALBUTEROL SULFATE 2.5 MG/3ML
2.5 SOLUTION RESPIRATORY (INHALATION) EVERY 6 HOURS PRN
Qty: 360 ML | Refills: 10 | Status: SHIPPED
Start: 2020-06-20 | End: 2020-07-20

## 2020-06-20 RX ORDER — FUROSEMIDE 40 MG/1
40 TABLET ORAL DAILY
Qty: 30 TABLET | Refills: 10 | Status: SHIPPED | OUTPATIENT
Start: 2020-06-20

## 2020-06-23 ENCOUNTER — HOSPITAL ENCOUNTER (EMERGENCY)
Age: 65
Discharge: HOME | End: 2020-06-23
Payer: COMMERCIAL

## 2020-06-23 ENCOUNTER — APPOINTMENT (OUTPATIENT)
Dept: CT IMAGING | Age: 65
End: 2020-06-23
Payer: COMMERCIAL

## 2020-06-23 ENCOUNTER — APPOINTMENT (OUTPATIENT)
Dept: GENERAL RADIOLOGY | Age: 65
End: 2020-06-23
Payer: COMMERCIAL

## 2020-06-23 VITALS
TEMPERATURE: 98.8 F | WEIGHT: 181 LBS | OXYGEN SATURATION: 98 % | HEIGHT: 62 IN | HEART RATE: 73 BPM | SYSTOLIC BLOOD PRESSURE: 153 MMHG | DIASTOLIC BLOOD PRESSURE: 72 MMHG | BODY MASS INDEX: 33.31 KG/M2 | RESPIRATION RATE: 16 BRPM

## 2020-06-23 LAB
ANION GAP SERPL CALCULATED.3IONS-SCNC: 11 MMOL/L (ref 7–16)
BUN BLDV-MCNC: 18 MG/DL (ref 8–23)
CALCIUM SERPL-MCNC: 10.3 MG/DL (ref 8.6–10.2)
CHLORIDE BLD-SCNC: 103 MMOL/L (ref 98–107)
CO2: 22 MMOL/L (ref 22–29)
CREAT SERPL-MCNC: 0.8 MG/DL (ref 0.5–1)
GFR AFRICAN AMERICAN: >60
GFR NON-AFRICAN AMERICAN: >60 ML/MIN/1.73
GLUCOSE BLD-MCNC: 202 MG/DL (ref 74–99)
POTASSIUM SERPL-SCNC: 4.7 MMOL/L (ref 3.5–5)
SODIUM BLD-SCNC: 136 MMOL/L (ref 132–146)

## 2020-06-23 PROCEDURE — 73560 X-RAY EXAM OF KNEE 1 OR 2: CPT

## 2020-06-23 PROCEDURE — 6830039000 HC L3 TRAUMA ALERT

## 2020-06-23 PROCEDURE — 70450 CT HEAD/BRAIN W/O DYE: CPT

## 2020-06-23 PROCEDURE — 80048 BASIC METABOLIC PNL TOTAL CA: CPT

## 2020-06-23 PROCEDURE — 99285 EMERGENCY DEPT VISIT HI MDM: CPT

## 2020-06-23 PROCEDURE — 72125 CT NECK SPINE W/O DYE: CPT

## 2020-06-23 PROCEDURE — 6360000004 HC RX CONTRAST MEDICATION: Performed by: RADIOLOGY

## 2020-06-23 PROCEDURE — 71260 CT THORAX DX C+: CPT

## 2020-06-23 PROCEDURE — 74177 CT ABD & PELVIS W/CONTRAST: CPT

## 2020-06-23 RX ORDER — NAPROXEN 500 MG/1
500 TABLET ORAL 2 TIMES DAILY
Qty: 14 TABLET | Refills: 0 | Status: SHIPPED | OUTPATIENT
Start: 2020-06-23 | End: 2020-07-20

## 2020-06-23 RX ORDER — ORPHENADRINE CITRATE 100 MG/1
100 TABLET, EXTENDED RELEASE ORAL 2 TIMES DAILY
Qty: 20 TABLET | Refills: 0 | Status: SHIPPED | OUTPATIENT
Start: 2020-06-23 | End: 2020-07-03

## 2020-06-23 RX ADMIN — IOPAMIDOL 75 ML: 755 INJECTION, SOLUTION INTRAVENOUS at 17:15

## 2020-06-23 SDOH — HEALTH STABILITY: MENTAL HEALTH: HOW OFTEN DO YOU HAVE A DRINK CONTAINING ALCOHOL?: NEVER

## 2020-06-23 ASSESSMENT — PAIN SCALES - GENERAL: PAINLEVEL_OUTOF10: 8

## 2020-06-23 ASSESSMENT — PAIN DESCRIPTION - PAIN TYPE: TYPE: ACUTE PAIN

## 2020-06-23 ASSESSMENT — PAIN DESCRIPTION - FREQUENCY: FREQUENCY: CONTINUOUS

## 2020-06-23 ASSESSMENT — PAIN DESCRIPTION - DESCRIPTORS: DESCRIPTORS: SHARP

## 2020-06-23 ASSESSMENT — PAIN DESCRIPTION - ORIENTATION: ORIENTATION: RIGHT;LEFT
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
## 2020-06-30 ENCOUNTER — TELEPHONE (OUTPATIENT)
Dept: ADMINISTRATIVE | Age: 65
End: 2020-06-30

## 2020-06-30 NOTE — TELEPHONE ENCOUNTER
Pt called to cancel HFU w/ Dr Davida Day, needs to [facility], please call her at [phone]

## 2020-07-13 ENCOUNTER — HOSPITAL ENCOUNTER (EMERGENCY)
Age: 65
Discharge: HOME | End: 2020-07-13
Attending: EMERGENCY MEDICINE
Payer: COMMERCIAL

## 2020-07-13 ENCOUNTER — APPOINTMENT (OUTPATIENT)
Dept: GENERAL RADIOLOGY | Age: 65
End: 2020-07-13
Payer: COMMERCIAL

## 2020-07-13 VITALS
BODY MASS INDEX: 33.11 KG/M2 | SYSTOLIC BLOOD PRESSURE: 165 MMHG | HEART RATE: 78 BPM | TEMPERATURE: 98.7 F | OXYGEN SATURATION: 97 % | HEIGHT: 62 IN | RESPIRATION RATE: 18 BRPM | DIASTOLIC BLOOD PRESSURE: 89 MMHG

## 2020-07-13 LAB
ANION GAP SERPL CALCULATED.3IONS-SCNC: 10 MMOL/L (ref 7–16)
BASOPHILS ABSOLUTE: 0.02 E9/L (ref 0–0.2)
BASOPHILS RELATIVE PERCENT: 0.3 % (ref 0–2)
BUN BLDV-MCNC: 18 MG/DL (ref 8–23)
CALCIUM SERPL-MCNC: 10.3 MG/DL (ref 8.6–10.2)
CHLORIDE BLD-SCNC: 104 MMOL/L (ref 98–107)
CO2: 24 MMOL/L (ref 22–29)
CREAT SERPL-MCNC: 0.9 MG/DL (ref 0.5–1)
EOSINOPHILS ABSOLUTE: 0.07 E9/L (ref 0.05–0.5)
EOSINOPHILS RELATIVE PERCENT: 1.1 % (ref 0–6)
GFR AFRICAN AMERICAN: >60
GFR NON-AFRICAN AMERICAN: >60 ML/MIN/1.73
GLUCOSE BLD-MCNC: 117 MG/DL (ref 74–99)
HCT VFR BLD CALC: 33.2 % (ref 34–48)
HEMOGLOBIN: 10.2 G/DL (ref 11.5–15.5)
IMMATURE GRANULOCYTES #: 0.02 E9/L
IMMATURE GRANULOCYTES %: 0.3 % (ref 0–5)
LYMPHOCYTES ABSOLUTE: 1.28 E9/L (ref 1.5–4)
LYMPHOCYTES RELATIVE PERCENT: 19.8 % (ref 20–42)
MCH RBC QN AUTO: 24.9 PG (ref 26–35)
MCHC RBC AUTO-ENTMCNC: 30.7 % (ref 32–34.5)
MCV RBC AUTO: 81 FL (ref 80–99.9)
MONOCYTES ABSOLUTE: 0.68 E9/L (ref 0.1–0.95)
MONOCYTES RELATIVE PERCENT: 10.5 % (ref 2–12)
NEUTROPHILS ABSOLUTE: 4.39 E9/L (ref 1.8–7.3)
NEUTROPHILS RELATIVE PERCENT: 68 % (ref 43–80)
PDW BLD-RTO: 15.5 FL (ref 11.5–15)
PLATELET # BLD: 252 E9/L (ref 130–450)
PMV BLD AUTO: 9.1 FL (ref 7–12)
POTASSIUM SERPL-SCNC: 4 MMOL/L (ref 3.5–5)
PRO-BNP: 65 PG/ML (ref 0–125)
RBC # BLD: 4.1 E12/L (ref 3.5–5.5)
SODIUM BLD-SCNC: 138 MMOL/L (ref 132–146)
TROPONIN: <0.01 NG/ML (ref 0–0.03)
WBC # BLD: 6.5 E9/L (ref 4.5–11.5)

## 2020-07-13 PROCEDURE — 85025 COMPLETE CBC W/AUTO DIFF WBC: CPT

## 2020-07-13 PROCEDURE — 93005 ELECTROCARDIOGRAM TRACING: CPT | Performed by: EMERGENCY MEDICINE

## 2020-07-13 PROCEDURE — 80048 BASIC METABOLIC PNL TOTAL CA: CPT

## 2020-07-13 PROCEDURE — 71046 X-RAY EXAM CHEST 2 VIEWS: CPT

## 2020-07-13 PROCEDURE — 83880 ASSAY OF NATRIURETIC PEPTIDE: CPT

## 2020-07-13 PROCEDURE — 84484 ASSAY OF TROPONIN QUANT: CPT

## 2020-07-13 PROCEDURE — 99283 EMERGENCY DEPT VISIT LOW MDM: CPT

## 2020-07-13 ASSESSMENT — ENCOUNTER SYMPTOMS
COUGH: 0
SORE THROAT: 0
SINUS PRESSURE: 0
CHEST TIGHTNESS: 0
ABDOMINAL PAIN: 0
EYE PAIN: 0
BACK PAIN: 0
VOMITING: 0
NAUSEA: 0
ABDOMINAL DISTENTION: 0
DIARRHEA: 0
SHORTNESS OF BREATH: 0
WHEEZING: 0

## 2020-07-13 ASSESSMENT — PAIN SCALES - GENERAL: PAINLEVEL_OUTOF10: 5

## 2020-07-13 NOTE — ED PROVIDER NOTES
Chief complaint:  Multiple complaints    HPI history provided by the patient  Patient comes in with multiple complaints, she has gone back and forth several times during her exam with why she is here. She apparently is anxious and worked up because her sister recently . She also states her legs are more swollen than usual and her whole body hurts although she has no fevers, sweats or chills. She states that her  Primary doctor did an EKG in the office that was not normal for her so she wanted seen for that. She denies current chest pain or palpitations or shortness of breath. No lightheadedness or syncope. No abdominal pain. No particular nasal congestion. No ear pain. No headache. No stiff neck. No treatment for any of her symptoms prior to arrival.  Talking about her sister seems to make many of her symptoms worse. Review of Systems   Constitutional: Negative for chills, diaphoresis, fatigue and fever. HENT: Negative for congestion, sinus pressure and sore throat. Eyes: Negative for pain. Respiratory: Negative for cough, chest tightness, shortness of breath and wheezing. Cardiovascular: Positive for leg swelling. Negative for chest pain and palpitations. Gastrointestinal: Negative for abdominal distention, abdominal pain, diarrhea, nausea and vomiting. Genitourinary: Negative for dysuria, flank pain and frequency. Musculoskeletal: Positive for myalgias. Negative for arthralgias, back pain, gait problem, joint swelling, neck pain and neck stiffness. Skin: Negative for rash and wound. Neurological: Negative for dizziness, seizures, syncope, weakness, light-headedness, numbness and headaches. Hematological: Negative for adenopathy. Psychiatric/Behavioral: The patient is nervous/anxious. All other systems reviewed and are negative. Physical Exam  Vitals signs and nursing note reviewed. Constitutional:       General: She is not in acute distress.      Appearance: She time.      GCS: GCS eye subscore is 4. GCS verbal subscore is 5. GCS motor subscore is 6. Cranial Nerves: Cranial nerves are intact. No cranial nerve deficit. Sensory: Sensation is intact. Motor: Motor function is intact. Coordination: Coordination is intact. Coordination normal.   Psychiatric:         Mood and Affect: Mood is anxious. Procedures     MDM             EKG Interpretation    Interpreted by emergency department physician    Rhythm: normal sinus   Rate: 66  Axis: normal  Ectopy: none  Conduction: normal  ST Segments: no acute change  T Waves: no acute change  Q Waves: none    Clinical Impression: no acute changes    Korivonnie Brucer Cardinal           --------------------------------------------- PAST HISTORY ---------------------------------------------  Past Medical History:  has a past medical history of A-fib ([facility] [address] Acute exacerbation of chronic obstructive pulmonary disease (COPD) (HCC), Anemia, Anxiety, Arthritis, Arthritis, Asthma, Atrial fibrillation (HCC), CAD (coronary artery disease), Chronic obstructive pulmonary disease (HCC), COPD (chronic obstructive pulmonary disease) ([facility] [address] Diabetes mellitus ([facility] [address] DVT (deep venous thrombosis) ([facility] [address] Emphysema of lung ([facility] [address] Encounter for imaging of bilateral cephalic veins, H/O cardiovascular stress test, Headache, History of blood transfusion, Hx of blood clots, Hypercholesterolemia, Hyperlipidemia, Hyperlipidemia, Hypertension, Mixed hyperlipidemia, Primary localized osteoarthritis of left hip, Primary osteoarthritis of left hip, Psychiatric problem, Seizures ([address] Thyroid disease, Thyroid disease, and Type II or unspecified type diabetes mellitus without mention of complication, not stated as uncontrolled. Past Surgical History:  has a past surgical history that includes Leg Surgery (Right); Cardiac catheterization (12/01/2008); pr total hip arthroplasty (Left, 8/8/2018);  Hysterectomy (2004); pr surg excision of anal lesion(s) (N/A, 1/25/2019); and joint replacement. Social History:  reports that she has never smoked. She has never used smokeless tobacco. She reports that she does not drink alcohol or use drugs. Family History: family history includes Diabetes in her mother; Stroke in her father. The patients home medications have been reviewed. Allergies: Atenolol; Lipitor [atorvastatin]; Tylenol with codeine #3 [acetaminophen-codeine]; Atenolol; Codeine; Lipitor [atorvastatin]; Percocet [oxycodone-acetaminophen];  Vicodin [hydrocodone-acetaminophen]; and Other    -------------------------------------------------- RESULTS -------------------------------------------------  Labs:  Results for orders placed or performed during the hospital encounter of 07/13/20   CBC Auto Differential   Result Value Ref Range    WBC 6.5 4.5 - 11.5 E9/L    RBC 4.10 3.50 - 5.50 E12/L    Hemoglobin 10.2 (L) 11.5 - 15.5 g/dL    Hematocrit 33.2 (L) 34.0 - 48.0 %    MCV 81.0 80.0 - 99.9 fL    MCH 24.9 (L) 26.0 - 35.0 pg    MCHC 30.7 (L) 32.0 - 34.5 %    RDW 15.5 (H) 11.5 - 15.0 fL    Platelets 657 289 - 137 E9/L    MPV 9.1 7.0 - 12.0 fL    Neutrophils % 68.0 43.0 - 80.0 %    Immature Granulocytes % 0.3 0.0 - 5.0 %    Lymphocytes % 19.8 (L) 20.0 - 42.0 %    Monocytes % 10.5 2.0 - 12.0 %    Eosinophils % 1.1 0.0 - 6.0 %    Basophils % 0.3 0.0 - 2.0 %    Neutrophils Absolute 4.39 1.80 - 7.30 E9/L    Immature Granulocytes # 0.02 E9/L    Lymphocytes Absolute 1.28 (L) 1.50 - 4.00 E9/L    Monocytes Absolute 0.68 0.10 - 0.95 E9/L    Eosinophils Absolute 0.07 0.05 - 0.50 E9/L    Basophils Absolute 0.02 0.00 - 0.20 E6/L   Basic Metabolic Panel   Result Value Ref Range    Sodium 138 132 - 146 mmol/L    Potassium 4.0 3.5 - 5.0 mmol/L    Chloride 104 98 - 107 mmol/L    CO2 24 22 - 29 mmol/L    Anion Gap 10 7 - 16 mmol/L    Glucose 117 (H) 74 - 99 mg/dL    BUN 18 8 - 23 mg/dL    CREATININE 0.9 0.5 - 1.0 mg/dL    GFR Non-African American >60 >=60 mL/min/1.73    GFR African American >60     Calcium 10.3 (H) 8.6 - 10.2 mg/dL   Brain Natriuretic Peptide   Result Value Ref Range    Pro-BNP 65 0 - 125 pg/mL   Troponin   Result Value Ref Range    Troponin <0.01 0.00 - 0.03 ng/mL   EKG 12 Lead   Result Value Ref Range    Ventricular Rate 66 BPM    Atrial Rate 66 BPM    P-R Interval 168 ms    QRS Duration 76 ms    Q-T Interval 416 ms    QTc Calculation (Bazett) 436 ms    P Axis 55 degrees    R Axis -4 degrees    T Axis 29 degrees       Radiology:  XR CHEST STANDARD (2 VW)   Final Result   Stable borderline cardiomegaly. Clear lungs. ------------------------- NURSING NOTES AND VITALS REVIEWED ---------------------------  Date / Time Roomed:  7/13/2020  4:26 PM  ED Bed Assignment:  [facility]/    The nursing notes within the ED encounter and vital signs as below have been reviewed. BP (!) 165/89   Pulse 78   Temp 98.7 °F (37.1 °C) (Oral)   Resp 18   Ht 5' 2\" (1.575 m)   LMP 06/26/1953   SpO2 97%   BMI 33.11 kg/m²   Oxygen Saturation Interpretation: Normal      ------------------------------------------ PROGRESS NOTES ------------------------------------------  I have spoken with the patient and discussed todays results, in addition to providing specific details for the plan of care and counseling regarding the diagnosis and prognosis. Their questions are answered at this time and they are agreeable with the plan. I discussed at length with them reasons for immediate return here for re evaluation. They will followup with primary care by calling their office tomorrow. --------------------------------- ADDITIONAL PROVIDER NOTES ---------------------------------  At this time the patient is without objective evidence of an acute process requiring hospitalization or inpatient management. They have remained hemodynamically stable throughout their entire ED visit and are stable for discharge with outpatient follow-up.      The plan has been discussed in detail and they are aware of the specific conditions for emergent return, as well as the importance of follow-up. New Prescriptions    No medications on file       Diagnosis:  1. Peripheral edema    2. Myalgia        Disposition:  Patient's disposition: Discharge to home  Patient's condition is stable.          Timur Camacho, DO  07/13/20 [address] Maria Dubose, DO  07/13/20 4525

## 2020-07-14 ENCOUNTER — CARE COORDINATION (OUTPATIENT)
Dept: CARE COORDINATION | Age: 65
End: 2020-07-14

## 2020-07-14 LAB
EKG ATRIAL RATE: 66 BPM
EKG P AXIS: 55 DEGREES
EKG P-R INTERVAL: 168 MS
EKG Q-T INTERVAL: 416 MS
EKG QRS DURATION: 76 MS
EKG QTC CALCULATION (BAZETT): 436 MS
EKG R AXIS: -4 DEGREES
EKG T AXIS: 29 DEGREES
EKG VENTRICULAR RATE: 66 BPM

## 2020-07-14 PROCEDURE — 93010 ELECTROCARDIOGRAM REPORT: CPT | Performed by: INTERNAL MEDICINE

## 2020-07-14 NOTE — CARE COORDINATION
agrees to contact the COVID-19 hotline [phone] or PCP office for questions related to their healthcare. CTN/ACM provided contact information for future reference. From CDC: Are you at higher risk for severe illness?  Wash your hands often.  Avoid close contact (6 feet, which is about two arm lengths) with people who are sick.  Put distance between yourself and other people if COVID-19 is spreading in your community.  Clean and disinfect frequently touched surfaces.  Avoid all cruise travel and non-essential air travel.  Call your healthcare professional if you have concerns about COVID-19 and your underlying condition or if you are sick. For more information on steps you can take to protect yourself, see CDC's How to Protect Yourself    Pt will be further monitored by COVID Loop Team based on severity of symptoms and risk factors.

## 2020-07-20 ENCOUNTER — APPOINTMENT (OUTPATIENT)
Dept: CT IMAGING | Age: 65
End: 2020-07-20
Payer: COMMERCIAL

## 2020-07-20 ENCOUNTER — APPOINTMENT (OUTPATIENT)
Dept: GENERAL RADIOLOGY | Age: 65
End: 2020-07-20
Payer: COMMERCIAL

## 2020-07-20 ENCOUNTER — HOSPITAL ENCOUNTER (EMERGENCY)
Age: 65
Discharge: HOME | End: 2020-07-21
Attending: EMERGENCY MEDICINE
Payer: COMMERCIAL

## 2020-07-20 LAB
ALBUMIN SERPL-MCNC: 4 G/DL (ref 3.5–5.2)
ALP BLD-CCNC: 72 U/L (ref 35–104)
ALT SERPL-CCNC: 17 U/L (ref 0–32)
ANION GAP SERPL CALCULATED.3IONS-SCNC: 12 MMOL/L (ref 7–16)
AST SERPL-CCNC: 18 U/L (ref 0–31)
BASOPHILS ABSOLUTE: 0.03 E9/L (ref 0–0.2)
BASOPHILS RELATIVE PERCENT: 0.5 % (ref 0–2)
BILIRUB SERPL-MCNC: 0.4 MG/DL (ref 0–1.2)
BUN BLDV-MCNC: 14 MG/DL (ref 8–23)
CALCIUM SERPL-MCNC: 9.8 MG/DL (ref 8.6–10.2)
CHLORIDE BLD-SCNC: 100 MMOL/L (ref 98–107)
CO2: 24 MMOL/L (ref 22–29)
CREAT SERPL-MCNC: 0.7 MG/DL (ref 0.5–1)
EOSINOPHILS ABSOLUTE: 0.09 E9/L (ref 0.05–0.5)
EOSINOPHILS RELATIVE PERCENT: 1.4 % (ref 0–6)
GFR AFRICAN AMERICAN: >60
GFR NON-AFRICAN AMERICAN: >60 ML/MIN/1.73
GLUCOSE BLD-MCNC: 269 MG/DL (ref 74–99)
HCT VFR BLD CALC: 31.4 % (ref 34–48)
HEMOGLOBIN: 9.8 G/DL (ref 11.5–15.5)
IMMATURE GRANULOCYTES #: 0.04 E9/L
IMMATURE GRANULOCYTES %: 0.6 % (ref 0–5)
LACTIC ACID: 1.7 MMOL/L (ref 0.5–2.2)
LIPASE: 42 U/L (ref 13–60)
LYMPHOCYTES ABSOLUTE: 1.91 E9/L (ref 1.5–4)
LYMPHOCYTES RELATIVE PERCENT: 29.4 % (ref 20–42)
MAGNESIUM: 1.7 MG/DL (ref 1.6–2.6)
MCH RBC QN AUTO: 24.9 PG (ref 26–35)
MCHC RBC AUTO-ENTMCNC: 31.2 % (ref 32–34.5)
MCV RBC AUTO: 79.9 FL (ref 80–99.9)
MONOCYTES ABSOLUTE: 0.57 E9/L (ref 0.1–0.95)
MONOCYTES RELATIVE PERCENT: 8.8 % (ref 2–12)
NEUTROPHILS ABSOLUTE: 3.86 E9/L (ref 1.8–7.3)
NEUTROPHILS RELATIVE PERCENT: 59.3 % (ref 43–80)
PDW BLD-RTO: 15.4 FL (ref 11.5–15)
PLATELET # BLD: 261 E9/L (ref 130–450)
PMV BLD AUTO: 8.7 FL (ref 7–12)
POTASSIUM REFLEX MAGNESIUM: 3.3 MMOL/L (ref 3.5–5)
PRO-BNP: 22 PG/ML (ref 0–125)
RBC # BLD: 3.93 E12/L (ref 3.5–5.5)
SARS-COV-2, NAAT: NOT DETECTED
SODIUM BLD-SCNC: 136 MMOL/L (ref 132–146)
TOTAL PROTEIN: 6.9 G/DL (ref 6.4–8.3)
TROPONIN: <0.01 NG/ML (ref 0–0.03)
WBC # BLD: 6.5 E9/L (ref 4.5–11.5)

## 2020-07-20 PROCEDURE — 70450 CT HEAD/BRAIN W/O DYE: CPT

## 2020-07-20 PROCEDURE — 83735 ASSAY OF MAGNESIUM: CPT

## 2020-07-20 PROCEDURE — 36415 COLL VENOUS BLD VENIPUNCTURE: CPT

## 2020-07-20 PROCEDURE — 93005 ELECTROCARDIOGRAM TRACING: CPT | Performed by: EMERGENCY MEDICINE

## 2020-07-20 PROCEDURE — 96360 HYDRATION IV INFUSION INIT: CPT

## 2020-07-20 PROCEDURE — 85025 COMPLETE CBC W/AUTO DIFF WBC: CPT

## 2020-07-20 PROCEDURE — 71045 X-RAY EXAM CHEST 1 VIEW: CPT

## 2020-07-20 PROCEDURE — 80053 COMPREHEN METABOLIC PANEL: CPT

## 2020-07-20 PROCEDURE — 2580000003 HC RX 258: Performed by: EMERGENCY MEDICINE

## 2020-07-20 PROCEDURE — 6370000000 HC RX 637 (ALT 250 FOR IP): Performed by: EMERGENCY MEDICINE

## 2020-07-20 PROCEDURE — 83605 ASSAY OF LACTIC ACID: CPT

## 2020-07-20 PROCEDURE — 99284 EMERGENCY DEPT VISIT MOD MDM: CPT

## 2020-07-20 PROCEDURE — 84484 ASSAY OF TROPONIN QUANT: CPT

## 2020-07-20 PROCEDURE — U0002 COVID-19 LAB TEST NON-CDC: HCPCS

## 2020-07-20 PROCEDURE — 96361 HYDRATE IV INFUSION ADD-ON: CPT

## 2020-07-20 PROCEDURE — 83880 ASSAY OF NATRIURETIC PEPTIDE: CPT

## 2020-07-20 PROCEDURE — 83690 ASSAY OF LIPASE: CPT

## 2020-07-20 PROCEDURE — 87040 BLOOD CULTURE FOR BACTERIA: CPT

## 2020-07-20 RX ORDER — POTASSIUM CHLORIDE 20 MEQ/1
40 TABLET, EXTENDED RELEASE ORAL ONCE
Status: COMPLETED | OUTPATIENT
Start: 2020-07-20 | End: 2020-07-20

## 2020-07-20 RX ORDER — 0.9 % SODIUM CHLORIDE 0.9 %
1000 INTRAVENOUS SOLUTION INTRAVENOUS ONCE
Status: COMPLETED | OUTPATIENT
Start: 2020-07-20 | End: 2020-07-21

## 2020-07-20 RX ADMIN — POTASSIUM CHLORIDE 40 MEQ: 20 TABLET, EXTENDED RELEASE ORAL at 22:56

## 2020-07-20 RX ADMIN — SODIUM CHLORIDE 1000 ML: 9 INJECTION, SOLUTION INTRAVENOUS at 22:10

## 2020-07-21 ENCOUNTER — CARE COORDINATION (OUTPATIENT)
Dept: CARE COORDINATION | Age: 65
End: 2020-07-21

## 2020-07-21 VITALS
SYSTOLIC BLOOD PRESSURE: 140 MMHG | BODY MASS INDEX: 35.15 KG/M2 | RESPIRATION RATE: 16 BRPM | TEMPERATURE: 98 F | WEIGHT: 191 LBS | OXYGEN SATURATION: 98 % | HEART RATE: 70 BPM | HEIGHT: 62 IN | DIASTOLIC BLOOD PRESSURE: 80 MMHG

## 2020-07-21 LAB
BILIRUBIN URINE: NEGATIVE
BLOOD, URINE: NEGATIVE
CLARITY: CLEAR
COLOR: YELLOW
GLUCOSE URINE: NEGATIVE MG/DL
KETONES, URINE: NEGATIVE MG/DL
LEUKOCYTE ESTERASE, URINE: NEGATIVE
NITRITE, URINE: NEGATIVE
PH UA: 5.5 (ref 5–9)
PROTEIN UA: NEGATIVE MG/DL
SPECIFIC GRAVITY UA: 1.01 (ref 1–1.03)
UROBILINOGEN, URINE: 0.2 E.U./DL

## 2020-07-21 PROCEDURE — 81003 URINALYSIS AUTO W/O SCOPE: CPT

## 2020-07-21 PROCEDURE — 96361 HYDRATE IV INFUSION ADD-ON: CPT

## 2020-07-21 NOTE — ED PROVIDER NOTES
HPI:  7/20/20,   Time: 9:52 PM EDT       Alf Lizarraga is a 77 y.o. female presenting to the ED for generalized weakness, beginning 6 days ago. The complaint has been persistent, moderate in severity, and worsened by nothing. The patient states that over the last week she has been having generalized weakness. She states that she has been having diffuse body aches. She states that during this time she has had a nonproductive cough. She has had intermittent chest pain that she describes as sharp. Therefore she came to the ED to be evaluated. Review of Systems:   Pertinent positives and negatives are stated within HPI, all other systems reviewed and are negative.          --------------------------------------------- PAST HISTORY ---------------------------------------------  Past Medical History:  has a past medical history of A-fib ([address] Acute exacerbation of chronic obstructive pulmonary disease (COPD) ([address] Anemia, Anxiety, Arthritis, Arthritis, Asthma, Atrial fibrillation ([address] CAD (coronary artery disease), Chronic obstructive pulmonary disease ([address] COPD (chronic obstructive pulmonary disease) ([address] Diabetes mellitus ([address] DVT (deep venous thrombosis) ([address] Emphysema of lung ([address] Encounter for imaging of bilateral cephalic veins, H/O cardiovascular stress test, Headache, History of blood transfusion, Hx of blood clots, Hypercholesterolemia, Hyperlipidemia, Hyperlipidemia, Hypertension, Mixed hyperlipidemia, Primary localized osteoarthritis of left hip, Primary osteoarthritis of left hip, Psychiatric problem, Seizures ([address] Thyroid disease, Thyroid disease, and Type II or unspecified type diabetes mellitus without mention of complication, not stated as uncontrolled. Past Surgical History:  has a past surgical history that includes Leg Surgery (Right); Cardiac catheterization (12/01/2008); pr total hip arthroplasty (Left, 8/8/2018);  Hysterectomy (2004); pr surg excision of anal Results are listed below.      LABS:  Results for orders placed or performed during the hospital encounter of 07/20/20   CBC Auto Differential   Result Value Ref Range    WBC 6.5 4.5 - 11.5 E9/L    RBC 3.93 3.50 - 5.50 E12/L    Hemoglobin 9.8 (L) 11.5 - 15.5 g/dL    Hematocrit 31.4 (L) 34.0 - 48.0 %    MCV 79.9 (L) 80.0 - 99.9 fL    MCH 24.9 (L) 26.0 - 35.0 pg    MCHC 31.2 (L) 32.0 - 34.5 %    RDW 15.4 (H) 11.5 - 15.0 fL    Platelets 738 225 - 491 E9/L    MPV 8.7 7.0 - 12.0 fL    Neutrophils % 59.3 43.0 - 80.0 %    Immature Granulocytes % 0.6 0.0 - 5.0 %    Lymphocytes % 29.4 20.0 - 42.0 %    Monocytes % 8.8 2.0 - 12.0 %    Eosinophils % 1.4 0.0 - 6.0 %    Basophils % 0.5 0.0 - 2.0 %    Neutrophils Absolute 3.86 1.80 - 7.30 E9/L    Immature Granulocytes # 0.04 E9/L    Lymphocytes Absolute 1.91 1.50 - 4.00 E9/L    Monocytes Absolute 0.57 0.10 - 0.95 E9/L    Eosinophils Absolute 0.09 0.05 - 0.50 E9/L    Basophils Absolute 0.03 0.00 - 0.20 E9/L   Comprehensive Metabolic Panel w/ Reflex to MG   Result Value Ref Range    Sodium 136 132 - 146 mmol/L    Potassium reflex Magnesium 3.3 (L) 3.5 - 5.0 mmol/L    Chloride 100 98 - 107 mmol/L    CO2 24 22 - 29 mmol/L    Anion Gap 12 7 - 16 mmol/L    Glucose 269 (H) 74 - 99 mg/dL    BUN 14 8 - 23 mg/dL    CREATININE 0.7 0.5 - 1.0 mg/dL    GFR Non-African American >60 >=60 mL/min/1.73    GFR African American >60     Calcium 9.8 8.6 - 10.2 mg/dL    Total Protein 6.9 6.4 - 8.3 g/dL    Alb 4.0 3.5 - 5.2 g/dL    Total Bilirubin 0.4 0.0 - 1.2 mg/dL    Alkaline Phosphatase 72 35 - 104 U/L    ALT 17 0 - 32 U/L    AST 18 0 - 31 U/L   Lipase   Result Value Ref Range    Lipase 42 13 - 60 U/L   Troponin   Result Value Ref Range    Troponin <0.01 0.00 - 0.03 ng/mL   Brain Natriuretic Peptide   Result Value Ref Range    Pro-BNP 22 0 - 125 pg/mL   Urinalysis, reflex to microscopic   Result Value Ref Range    Color, UA Yellow Straw/Yellow    Clarity, UA Clear Clear    Glucose, Ur Negative Negative New Bag 7/20/20 2210)   potassium chloride (KLOR-CON M) extended release tablet 40 mEq (40 mEq Oral Given 7/20/20 2256)         ED COURSE:       Medical Decision Making: This is a 69-year-old female presented to the ED for generalized weakness. Patient underwent laboratory work-up which revealed a normal CBC except for an H&H of 9.8/31.4 with a normal hemoglobin of a baseline of 10.2. Chemistry unremarkable except for potassium of 3.3 and a glucose of 269. Potassium replaced in the emergency department. Troponin negative. Urinalysis negative. Lactic acid within normal limits. Co-swab negative. EKG showed no ischemic findings. Chest x-ray unremarkable. Head CT showed no findings. Patient given IV fluids and potassium in the emergency department. Upon reevaluation the patient states her symptoms have greatly improved. She ambulates without any difficulty here in the emergency department. Patient remains neurologically intact. She is steady on her feet tolerating p.o. Patient is safe for discharge home with close follow-up. Return precautions given. Patient agrees with plan. I, Dr. Davi Meyers, am the primary provider for this encounter    This patient's ED course included: a personal history and physicial examination, re-evaluation prior to disposition and multiple bedside re-evaluations    This patient has remained hemodynamically stable during their ED course. Re-Evaluations:             Re-evaluation. Patients symptoms are improving      Counseling: The emergency provider has spoken with the patient and discussed todays results, in addition to providing specific details for the plan of care and counseling regarding the diagnosis and prognosis. Questions are answered at this time and they are agreeable with the plan.       --------------------------------- IMPRESSION AND DISPOSITION ---------------------------------    IMPRESSION  1. Generalized weakness    2.  Hypokalemia

## 2020-07-21 NOTE — CARE COORDINATION
-ACM attempted to reach patient to follow up on recent ED visit on 7- for post ED COVID-19 Monitoring, however the phone immediately rings busy with no option of voice message. -ACM will attempt to outreach Pt on scheduled date.

## 2020-07-22 ENCOUNTER — CARE COORDINATION (OUTPATIENT)
Dept: CARE COORDINATION | Age: 65
End: 2020-07-22

## 2020-07-22 LAB
EKG ATRIAL RATE: 84 BPM
EKG P AXIS: 72 DEGREES
EKG P-R INTERVAL: 172 MS
EKG Q-T INTERVAL: 396 MS
EKG QRS DURATION: 80 MS
EKG QTC CALCULATION (BAZETT): 467 MS
EKG R AXIS: -3 DEGREES
EKG T AXIS: 62 DEGREES
EKG VENTRICULAR RATE: 84 BPM

## 2020-07-22 PROCEDURE — 93010 ELECTROCARDIOGRAM REPORT: CPT | Performed by: INTERNAL MEDICINE

## 2020-07-22 NOTE — CARE COORDINATION
Patient contacted regarding recent discharge and COVID-19 risk. Discussed COVID-19 related testing which was available at this time. Test results were negative. Patient informed of results, if available? Yes     Care Transition Nurse/ Ambulatory Care Manager contacted the patient by telephone to perform post discharge assessment. Verified name and  with patient as identifiers.     -Pt reports that she is feeling better. Pt reports having fever blisters on her nose and lips. Sore throat is better. Intermittent chest pressure and body aches unchanged. Pt said her legs are swollen. No fever, cough and SOB is baseline with her emphysema. -PCP appt . Pt wants antibiotics for her fever blisters. ACM asked if she called PCP office, she did not. Pt asked ACM to call. ACM said she would call with Pt tomorrow, she agreed. Pt no longer wants to talk. She wants to rest.    -ACM to call tomorrow, . Patient has following risk factors of: COPD, diabetes and HTN, Throid, A Fib, Bipolat, anxiety, obesity. CTN/ACM reviewed discharge instructions, medical action plan and red flags related to discharge diagnosis. Reviewed and educated them on any new and changed medications related to discharge diagnosis. Advised obtaining a 90-day supply of all daily and as-needed medications. Education provided regarding infection prevention, and signs and symptoms of COVID-19 and when to seek medical attention with patient who verbalized understanding. Discussed exposure protocols and quarantine from [address] you at higher risk for severe illness  and given an opportunity for questions and concerns. The patient agrees to contact the COVID-19 hotline [phone] or PCP office for questions related to their healthcare. CTN/ACM provided contact information for future reference. From CDC: Are you at higher risk for severe illness?  Wash your hands often.    Avoid close contact (6 feet, which is about two arm lengths) with people who are sick.  Put distance between yourself and other people if COVID-19 is spreading in your community.  Clean and disinfect frequently touched surfaces.  Avoid all cruise travel and non-essential air travel.  Call your healthcare professional if you have concerns about COVID-19 and your underlying condition or if you are sick. For more information on steps you can take to protect yourself, see CDC's How to [address] [place] [address] for follow-up call in 1-2 days based on severity of symptoms and risk factors.

## 2020-07-23 ENCOUNTER — CARE COORDINATION (OUTPATIENT)
Dept: CARE COORDINATION | Age: 65
End: 2020-07-23

## 2020-07-23 ENCOUNTER — TELEPHONE (OUTPATIENT)
Dept: ADMINISTRATIVE | Age: 65
End: 2020-07-23

## 2020-07-23 NOTE — CARE COORDINATION
--ACM reached patient to assist with scheduling an ED follow up  PCP appt. -ACM called Pre service, spoke to Roel. Roel said that  because Pt has flu like symptoms, she is going to send a message to PCP office with Pt's symptoms and concerns. PCP office is to call Pt with direction for a video visit if able or telephone visit with PCP due to Pt's symptoms. Pt understands that PCP office will be calling. Pt said she will answer the phone.

## 2020-07-26 LAB
BLOOD CULTURE, ROUTINE: NORMAL
CULTURE, BLOOD 2: NORMAL

## 2020-07-27 ENCOUNTER — CARE COORDINATION (OUTPATIENT)
Dept: CARE COORDINATION | Age: 65
End: 2020-07-27

## 2020-07-27 RX ORDER — AMOXICILLIN 500 MG/1
500 CAPSULE ORAL 2 TIMES DAILY
Qty: 20 CAPSULE | Refills: 0 | Status: SHIPPED | OUTPATIENT
Start: 2020-07-27 | End: 2020-08-06

## 2020-07-27 NOTE — TELEPHONE ENCOUNTER
I called patient and informed. Patient stated she does not have ability to do a VV.   Scheduled phone appt on 7/30/20 at 12:00pm.
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
## 2020-07-27 NOTE — PROGRESS NOTES
Script for Amoxicillin sent based on previous phone encounter.  Please contact patient to schedule a virtual visit (either phone call only or video) for f/u urgent care

## 2020-07-31 ENCOUNTER — TELEPHONE (OUTPATIENT)
Dept: ADMINISTRATIVE | Age: 65
End: 2020-07-31

## 2020-08-03 ENCOUNTER — CARE COORDINATION (OUTPATIENT)
Dept: CARE COORDINATION | Age: 65
End: 2020-08-03

## 2020-08-03 NOTE — CARE COORDINATION
-ACM reached patient to follow up on recent ED visit for post ED COVID-19 Monitoring, however Pt said she is on the phone with PCP office to adjust her PCP appt. Pt requests that ACM call back.  -Pt will attempt outreach again.

## 2020-08-04 ENCOUNTER — CARE COORDINATION (OUTPATIENT)
Dept: CARE COORDINATION | Age: 65
End: 2020-08-04

## 2020-08-06 ENCOUNTER — VIRTUAL VISIT (OUTPATIENT)
Dept: FAMILY MEDICINE CLINIC | Age: 65
End: 2020-08-06
Payer: COMMERCIAL

## 2020-08-06 PROCEDURE — 99443 PR PHYS/QHP TELEPHONE EVALUATION 21-30 MIN: CPT | Performed by: FAMILY MEDICINE

## 2020-08-06 RX ORDER — BUSPIRONE HYDROCHLORIDE 5 MG/1
TABLET ORAL
COMMUNITY
Start: 2020-06-22 | End: 2020-08-28

## 2020-08-06 RX ORDER — POTASSIUM CHLORIDE 1500 MG/1
TABLET, FILM COATED, EXTENDED RELEASE ORAL
Qty: 30 TABLET | Refills: 3 | Status: SHIPPED
Start: 2020-08-06 | End: 2020-08-28

## 2020-08-06 NOTE — PROGRESS NOTES
[address] [facility], [address]   [facility] [facility]   Chula Diaz MD     Patient: Main Thomson Birth: 1955  Visit Date: 20    Gee Adkins is a 77y.o. year old female here today for   Chief Complaint   [facility]   [facility]       HPI  Patient was seen in ED a few weeks ago. Patient was very weak and dehydrated. Had been diagnosed with hypokalemia. Patient has been taking double dose of potassium. Patient has seen , another PCP, who started her on new blood pressure medications that are making her dizzy. Patient wants to go to PT at [facility]. States she is too weak. This worsened when she went to ED and has not improved. Patient recently saw Dr. Bautista Henry. He ordered a CT of her neck. Patient states he told her to follow up with me regarding the results. I explained to patient that it is very confusing for her to keep seeing two primary care doctors. Patient started on new medications from psychiatrist. Was recently released from inpatient psychiatric admission. States she has been feeling really bad since one of her sisters . Review of Systems   Unable to perform ROS: Psychiatric disorder   Patient speaks with flight of ideas, pressured loud speech. Difficult to stop her and ask additional questions. Past medical, surgical, social and/or family historyreviewed, updated as needed, and are non-contributory (unless otherwise stated). Medications, allergies, and problem list also reviewed and updated as needed in patient's record.      Current Outpatient Medications   Medication Sig Dispense Refill    busPIRone (BUSPAR) 5 MG tablet       potassium chloride (KLOR-CON M) 20 MEQ TBCR extended release tablet TAKE 1 TABLET BY MOUTH EVERY DAY 30 tablet 3    vitamin D (ERGOCALCIFEROL) 1.25 MG (43683 UT) CAPS capsule TAKE 1 CAPSULE BY MOUTH ONCE WEEKLY 4 capsule 3    pravastatin (PRAVACHOL) 40 MG tablet TAKE 1 TABLET BY MOUTH NIGHTLY 30 tablet 10    furosemide (LASIX) 40 MG tablet TAKE 1 TABLET BY MOUTH DAILY 30 tablet 10    loperamide (IMODIUM) 2 MG capsule TAKE ONE (1) CAPSULE BY MOUTH FOUR TIMES A DAY AS NEEDED FOR DIARRHEA 60 capsule 3    magnesium oxide (MAG-OX) 400 MG tablet TAKE 1 TABLET BY MOUTH DAILY 30 tablet 5    amLODIPine (NORVASC) 10 MG tablet TAKE (1) TABLET BY MOUTH DAILY 30 tablet 5    famotidine (PEPCID) 20 MG tablet TAKE 1 TABLET BY MOUTH ONCE DAILY 30 tablet 5    SITagliptin-metFORMIN (JANUMET XR)  MG TB24 per extended release tablet TAKE TWO (2) TABLETS BY MOUTH EACH MORNING 60 tablet 10    metoprolol succinate (TOPROL XL) 50 MG extended release tablet TAKE 1 TABLET BY MOUTH ONCE DAILY 30 tablet 10    lisinopril (PRINIVIL;ZESTRIL) 20 MG tablet Take 1 tablet by mouth daily 90 tablet 1    ASPIRIN LOW DOSE 81 MG EC tablet TAKE 1 TABLET BY MOUTH TWICE DAILY 60 tablet 10    mupirocin (BACTROBAN) 2 % ointment APPLY AS DIRECTED INTRANASALLY TWICE DAILY 22 g 10    ONE TOUCH LANCETS MISC Check blood sugar bid 100 each 12    blood glucose test strips (ONE TOUCH ULTRA TEST) strip Check blood sugar bid 100 strip 12    potassium chloride (KLOR-CON M) 10 MEQ extended release tablet Take 1 tablet by mouth daily as needed (with Lasix) 30 tablet 3    SYMBICORT 160-4.5 MCG/ACT AERO INHALE TWO (2) PUFFS BY MOUTH TWICE DAILY  10    montelukast (SINGULAIR) 10 MG tablet TAKE 1 TABLET BY MOUTH NIGHTLY 30 tablet 3    Incontinence Supply Disposable (DEPEND UNDERWEAR LARGE) [facility] Wear daily 50 each 12    docusate sodium (COLACE) 100 MG capsule Take 1 capsule by mouth 2 times daily 60 capsule 1    ferrous sulfate 325 (65 Fe) MG tablet Take 1 tablet by mouth 2 times daily (with meals) 30 tablet 3    Blood Glucose Monitoring Suppl (ONE TOUCH ULTRA 2) w/Device KIT Check blood sugar bid 1 kit 0    [facility]  Devices [facility] Bedside commode 1 Device 0    albuterol sulfate HFA (PROVENTIL HFA) 108 (90 Base) MCG/ACT inhaler Inhale 2 puffs into the lungs every 4 hours as needed for Wheezing 1 Inhaler 1     No current facility-administered medications for this visit. Wt Readings from Last 3 Encounters:   07/20/20 191 lb (86.6 kg)   06/23/20 181 lb (82.1 kg)   05/27/20 181 lb (82.1 kg)                   There were no vitals filed for this visit. ASSESSMENT/PLAN  Renny Cortez was seen today for ed follow-up. Diagnoses and all orders for this visit:    Acute hypokalemia  -     potassium chloride (KLOR-CON M) 20 MEQ TBCR extended release tablet; TAKE 1 TABLET BY MOUTH EVERY DAY  -     Comprehensive Metabolic Panel; Future    Weakness of both lower extremities  -     External Referral To Physical Therapy    Slurred speech  -     External Referral To Speech Therapy    Thyroid nodule  -     External Referral To ENT: Dr. Alexus Rodas    Bipolar disorder, current episode manic severe with psychotic features [facility])         -     Patient encouraged to follow up with psychiatrist regularly        Phone/MyChart follow up if tests abnormal.    Return for scheduled appointment. or sooner if necessary. TELEPHONE VISIT    Consent:  He and/or health care decision maker is aware that that he may receive a bill for this telephone service, depending on his insurance coverage, and has provided verbal consent to proceed: Yes      Documentation:  I communicated with the patient and/or health care decision maker about weakness and hypokalemia. Details of this discussion including any medical advice provided: see above documentation      I affirm this is a Patient Initiated Episode with a Patient who has not had a related appointment within my department in the past 7 days or scheduled within the next 24 hours. Patient's location: \"home address in [place]\",\"other address in [facility]  Physician  location other address in [facility]   Other people involved in call N/A.           Total Time: minutes: 21-30 minutes        I have reviewed my findings and recommendations with Amaris Phillip.      Wandalee Boxer, M.D

## 2020-08-25 ENCOUNTER — TELEPHONE (OUTPATIENT)
Dept: ADMINISTRATIVE | Age: 65
End: 2020-08-25

## 2020-08-25 NOTE — TELEPHONE ENCOUNTER
Pt called to check on status of PT and OT referral to [facility], pt has an upcoming appt w/ Dr Godinez Haakon on 8/26, [facility] told pt a msg can be sent to office, she can check with  at visit.

## 2020-08-26 ENCOUNTER — OFFICE VISIT (OUTPATIENT)
Dept: FAMILY MEDICINE CLINIC | Age: 65
End: 2020-08-26
Payer: COMMERCIAL

## 2020-08-26 VITALS
OXYGEN SATURATION: 97 % | DIASTOLIC BLOOD PRESSURE: 70 MMHG | BODY MASS INDEX: 34.04 KG/M2 | HEIGHT: 62 IN | SYSTOLIC BLOOD PRESSURE: 128 MMHG | WEIGHT: 185 LBS | HEART RATE: 83 BPM | RESPIRATION RATE: 20 BRPM

## 2020-08-26 LAB — HBA1C MFR BLD: 6.9 %

## 2020-08-26 PROCEDURE — 1036F TOBACCO NON-USER: CPT | Performed by: FAMILY MEDICINE

## 2020-08-26 PROCEDURE — 3017F COLORECTAL CA SCREEN DOC REV: CPT | Performed by: FAMILY MEDICINE

## 2020-08-26 PROCEDURE — 1090F PRES/ABSN URINE INCON ASSESS: CPT | Performed by: FAMILY MEDICINE

## 2020-08-26 PROCEDURE — G8400 PT W/DXA NO RESULTS DOC: HCPCS | Performed by: FAMILY MEDICINE

## 2020-08-26 PROCEDURE — 2022F DILAT RTA XM EVC RTNOPTHY: CPT | Performed by: FAMILY MEDICINE

## 2020-08-26 PROCEDURE — 4040F PNEUMOC VAC/ADMIN/RCVD: CPT | Performed by: FAMILY MEDICINE

## 2020-08-26 PROCEDURE — 1123F ACP DISCUSS/DSCN MKR DOCD: CPT | Performed by: FAMILY MEDICINE

## 2020-08-26 PROCEDURE — G8417 CALC BMI ABV UP PARAM F/U: HCPCS | Performed by: FAMILY MEDICINE

## 2020-08-26 PROCEDURE — 99214 OFFICE O/P EST MOD 30 MIN: CPT | Performed by: FAMILY MEDICINE

## 2020-08-26 PROCEDURE — G8427 DOCREV CUR MEDS BY ELIG CLIN: HCPCS | Performed by: FAMILY MEDICINE

## 2020-08-26 PROCEDURE — 83036 HEMOGLOBIN GLYCOSYLATED A1C: CPT | Performed by: FAMILY MEDICINE

## 2020-08-26 PROCEDURE — 3044F HG A1C LEVEL LT 7.0%: CPT | Performed by: FAMILY MEDICINE

## 2020-08-26 RX ORDER — FAMOTIDINE 20 MG/1
TABLET, FILM COATED ORAL
Qty: 30 TABLET | Refills: 5 | Status: SHIPPED
Start: 2020-08-26 | End: 2020-09-22

## 2020-08-26 RX ORDER — TERAZOSIN 2 MG/1
CAPSULE ORAL
COMMUNITY
Start: 2020-08-05 | End: 2020-08-28

## 2020-08-26 RX ORDER — HALOPERIDOL 5 MG
TABLET ORAL
COMMUNITY
Start: 2020-08-04 | End: 2020-08-28

## 2020-08-26 RX ORDER — AMLODIPINE BESYLATE 10 MG/1
TABLET ORAL
Qty: 30 TABLET | Refills: 5 | Status: ON HOLD
Start: 2020-08-26 | End: 2021-07-14

## 2020-08-26 RX ORDER — LISINOPRIL 20 MG/1
20 TABLET ORAL DAILY
Qty: 30 TABLET | Refills: 5 | Status: SHIPPED
Start: 2020-08-26 | End: 2020-08-28

## 2020-08-26 RX ORDER — LITHIUM CARBONATE 150 MG/1
CAPSULE ORAL
Status: ON HOLD | COMMUNITY
Start: 2020-08-05 | End: 2021-07-16 | Stop reason: HOSPADM

## 2020-08-26 RX ORDER — MAGNESIUM OXIDE 400 MG/1
400 TABLET ORAL DAILY
Qty: 30 TABLET | Refills: 5 | Status: SHIPPED | OUTPATIENT
Start: 2020-08-26

## 2020-08-26 ASSESSMENT — ENCOUNTER SYMPTOMS
VOMITING: 0
DIARRHEA: 0
NAUSEA: 0
SHORTNESS OF BREATH: 0

## 2020-08-26 NOTE — PATIENT INSTRUCTIONS

## 2020-08-26 NOTE — PROGRESS NOTES
OFFICE PROGRESS NOTE      SUBJECTIVE:        Patient ID:   Maribel Matos is a 77 y.o. female whopresents for   Chief Complaint   Patient presents with    Diabetes         HPI:   Patient is here to follow up on diabetes. Fasting blood sugars:131-218 Midday blood sugars: not checking. Patient checks blood glucose 1 times per day. Patient is following diabetic diet. Patient is a nonsmoker. Last ophthalmology visit: 1/2020. Patient is unable to tolerate daily statin. Patient was diagnosed with thyroid nodules. Patient had additional testing at [address] [place] [address] but does not know who ordered it. Has not seen Dr. Norma Otero yet. Patient doing well on current regimen for hypertension. Prior to Admission medications    Medication Sig Start Date End Date Taking?  Authorizing Provider   lithium 150 MG capsule TAKE 3 CAPSULES BY MOUTH TWICE DAILY FOR 14 DAYS 8/5/20  Yes Historical Provider, MD   magnesium oxide (MAG-OX) 400 MG tablet Take 1 tablet by mouth daily 8/26/20  Yes Catrachito Khanna MD   amLODIPine (NORVASC) 10 MG tablet TAKE (1) TABLET BY MOUTH DAILY 8/26/20  Yes Catrachito Khanna MD   famotidine (PEPCID) 20 MG tablet TAKE 1 TABLET BY MOUTH ONCE DAILY 8/26/20  Yes Catrachito Khanna MD   vitamin D (ERGOCALCIFEROL) 1.25 MG (51966 UT) CAPS capsule TAKE 1 CAPSULE BY MOUTH ONCE WEEKLY 7/21/20  Yes Catrachito Khanna MD   pravastatin (PRAVACHOL) 40 MG tablet TAKE 1 TABLET BY MOUTH NIGHTLY 6/20/20  Yes Catrachito Khanna MD   furosemide (LASIX) 40 MG tablet TAKE 1 TABLET BY MOUTH DAILY 6/20/20  Yes Catrachito Khanna MD   loperamide (IMODIUM) 2 MG capsule TAKE ONE (1) CAPSULE BY MOUTH FOUR TIMES A DAY AS NEEDED FOR DIARRHEA 5/19/20  Yes Catrachito Khanna MD   SITagliptin-metFORMIN (JANUMET XR)  MG TB24 per extended release tablet TAKE TWO (2) TABLETS BY MOUTH EACH MORNING 3/26/20  Yes Catrachito Khanna MD   metoprolol succinate (TOPROL XL) 50 MG extended release tablet TAKE 1 TABLET BY MOUTH ONCE DAILY 3/26/20  Yes Nicola Du MD   ASPIRIN LOW DOSE 81 MG EC tablet TAKE 1 TABLET BY MOUTH TWICE DAILY 2/21/20  Yes Nicola Du MD   mupirocin (BACTROBAN) 2 % ointment APPLY AS DIRECTED INTRANASALLY TWICE DAILY 2/21/20  Yes Nicola Du MD   [facility] LANCETS MISC Check blood sugar bid 2/13/20  Yes Nicola Du MD   blood glucose test strips (ONE TOUCH ULTRA TEST) strip Check blood sugar bid 2/13/20  Yes Nicola Du MD   potassium chloride (KLOR-CON M) 10 MEQ extended release tablet Take 1 tablet by mouth daily as needed (with Lasix)  Patient taking differently: Take 10 mEq by mouth daily  12/2/19  Yes Nicola Du MD   SYMBICORT 160-4.5 MCG/ACT AERO INHALE TWO (2) PUFFS BY MOUTH TWICE DAILY 9/30/19  Yes Historical Provider, MD   montelukast (SINGULAIR) 10 MG tablet TAKE 1 TABLET BY MOUTH NIGHTLY 9/3/19  Yes Nicola Du MD   Incontinence Supply Disposable (DEPEND UNDERWEAR LARGE) MISC Wear daily 2/11/19  Yes Nicola Du MD   docusate sodium (COLACE) 100 MG capsule Take 1 capsule by mouth 2 times daily 1/25/19  Yes Irais Saenz DO   ferrous sulfate 325 (65 Fe) MG tablet Take 1 tablet by mouth 2 times daily (with meals) 8/10/18  Yes Oswaldo Kanner, DO   Blood Glucose Monitoring Suppl (ONE TOUCH ULTRA 2) w/Device KIT Check blood sugar bid 7/26/18  Yes Nicola Du MD   [facility].  Devices [facility] Bedside commode 7/25/16  Yes Nicola Du MD   promethazine (PHENERGAN) 25 MG tablet Take 25 mg by mouth every 6 hours as needed for Nausea    Historical Provider, MD   lisinopril-hydroCHLOROthiazide (PRINZIDE;ZESTORETIC) 20-12.5 MG per tablet Take 1 tablet by mouth daily    Historical Provider, MD   cetirizine (ZYRTEC) 10 MG tablet Take 10 mg by mouth daily    Historical Provider, MD   latanoprost (XALATAN) 0.005 % ophthalmic solution 1 drop nightly    Historical Provider, MD   fluticasone (FLONASE) 50 MCG/ACT nasal spray 1 spray by Each Nostril route daily    Historical Provider, MD   albuterol (PROVENTIL) (2.5 MG/3ML) 0.083% nebulizer solution Take 2.5 mg by nebulization every 6 hours as needed for Wheezing    Historical Provider, MD   mupirocin (BACTROBAN NASAL) 2 % nasal ointment Take by Nasal route 2 times daily. 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   Social Needs    Financial resource strain: None    Food insecurity     Worry: None     Inability: None    Transportation needs     Medical: None     Non-medical: None   Tobacco Use    Smoking status: Never Smoker    Smokeless tobacco: Never Used   Substance and Sexual Activity    Alcohol use: Never     Frequency: Never     Comment: occasional diet pepsi    Drug use: Never    Sexual activity: Yes   Lifestyle    Physical activity     Days per week: None     Minutes per session: None    Stress: None   Relationships    Social connections     Talks on phone: None     Gets together: None     Attends [employer] service: None     Active member of club or organization: None     Attends meetings of clubs or organizations: None     Relationship status: None    Intimate partner violence     Fear of current or ex partner: None     Emotionally abused: None     Physically abused: None     Forced sexual activity: None   Other Topics Concern    None   Social History Narrative    ** Merged History Encounter ** ** Merged History Encounter **          I have reviewed Sweetie's allergies, medications, problem list, medical, social and family history and have updated as needed in the electronic medical record    Current Outpatient Medications   Medication Sig Dispense Refill    lithium 150 MG capsule TAKE 3 CAPSULES BY MOUTH TWICE DAILY FOR 14 DAYS      magnesium oxide (MAG-OX) 400 MG tablet Take 1 tablet by mouth daily 30 tablet 5    amLODIPine (NORVASC) 10 MG tablet TAKE (1) TABLET BY MOUTH DAILY 30 tablet 5    famotidine (PEPCID) 20 MG tablet TAKE 1 TABLET BY MOUTH ONCE DAILY 30 tablet 5    vitamin D (ERGOCALCIFEROL) 1.25 MG (11719 UT) CAPS capsule TAKE 1 CAPSULE BY MOUTH ONCE WEEKLY 4 capsule 3    pravastatin (PRAVACHOL) 40 MG tablet TAKE 1 TABLET BY MOUTH NIGHTLY 30 tablet 10    furosemide (LASIX) 40 MG tablet TAKE 1 TABLET BY MOUTH DAILY 30 tablet 10    loperamide (IMODIUM) 2 MG capsule TAKE ONE (1) CAPSULE BY MOUTH FOUR TIMES A DAY AS NEEDED FOR DIARRHEA 60 capsule 3    SITagliptin-metFORMIN (JANUMET XR)  MG TB24 per extended release tablet TAKE TWO (2) TABLETS BY MOUTH EACH MORNING 60 tablet 10    metoprolol succinate (TOPROL XL) 50 MG extended release tablet TAKE 1 TABLET BY MOUTH ONCE DAILY 30 tablet 10    ASPIRIN LOW DOSE 81 MG EC tablet TAKE 1 TABLET BY MOUTH TWICE DAILY 60 tablet 10    mupirocin (BACTROBAN) 2 % ointment APPLY AS DIRECTED INTRANASALLY TWICE DAILY 22 g 10    ONE TOUCH LANCETS MISC Check blood sugar bid 100 each 12    blood glucose test strips (ONE TOUCH ULTRA TEST) strip Check blood sugar bid 100 strip 12    potassium chloride (KLOR-CON M) 10 MEQ extended release tablet Take 1 tablet by mouth daily as needed (with Lasix) (Patient taking differently: Take 10 mEq by mouth daily ) 30 tablet 3    SYMBICORT 160-4.5 MCG/ACT AERO INHALE TWO (2) PUFFS BY MOUTH TWICE DAILY  10    montelukast (SINGULAIR) 10 MG tablet TAKE 1 TABLET BY MOUTH NIGHTLY 30 tablet 3    Incontinence Supply Disposable (DEPEND UNDERWEAR LARGE) MISC Wear daily 50 each 12    docusate sodium (COLACE) 100 MG capsule Take 1 capsule by mouth 2 times daily 60 capsule 1    ferrous sulfate 325 (65 Fe) MG tablet Take 1 tablet by mouth 2 times daily (with meals) 30 tablet 3    Blood Glucose Monitoring Suppl (ONE TOUCH ULTRA 2) w/Device KIT Check blood sugar bid 1 kit 0    Misc. Devices MISC Bedside commode 1 Device 0    promethazine (PHENERGAN) 25 MG tablet Take 25 mg by mouth every 6 hours as needed for Nausea      lisinopril-hydroCHLOROthiazide (PRINZIDE;ZESTORETIC) 20-12.5 MG per tablet Take 1 tablet by mouth daily      cetirizine (ZYRTEC) 10 MG tablet Take 10 mg by mouth daily      latanoprost (XALATAN) 0.005 % ophthalmic solution 1 drop nightly      fluticasone (FLONASE) 50 MCG/ACT nasal spray 1 spray by Each Nostril route daily      albuterol (PROVENTIL) (2.5 MG/3ML) 0.083% nebulizer solution Take 2.5 mg by nebulization every 6 hours as needed for Wheezing      albuterol sulfate HFA (PROVENTIL HFA) 108 (90 Base) MCG/ACT inhaler Inhale 2 puffs into the lungs every 4 hours as needed for Wheezing 1 Inhaler 1     No current facility-administered medications for this visit. Review Of Systems:    Review of Systems   Eyes: Negative for visual disturbance. Respiratory: Negative for shortness of breath. Cardiovascular: Negative for chest pain, palpitations and leg swelling. Gastrointestinal: Negative for diarrhea, nausea and vomiting. Genitourinary: Negative for difficulty urinating, dysuria and frequency. Skin: Negative for rash. Psychiatric/Behavioral: Negative for dysphoric mood. OBJECTIVE:     VS:  Wt Readings from Last 3 Encounters:   08/28/20 189 lb (85.7 kg)   08/26/20 185 lb (83.9 kg)   07/20/20 191 lb (86.6 kg)     Vitals:    08/26/20 1432   BP: 128/70   Pulse: 83   Resp: 20   SpO2: 97%       Physical Exam  Vitals signs reviewed.    Constitutional:       General: She is not in acute distress. Appearance: She is well-developed. Neck:      Musculoskeletal: Neck supple. Vascular: No carotid bruit. Cardiovascular:      Rate and Rhythm: Normal rate and regular rhythm. Heart sounds: Normal heart sounds. No murmur. No gallop. Pulmonary:      Effort: Pulmonary effort is normal.      Breath sounds: Normal breath sounds. No wheezing or rales. Abdominal:      General: Bowel sounds are normal. There is no distension. Palpations: Abdomen is soft. Tenderness: There is no abdominal tenderness. Musculoskeletal:      Right lower leg: No edema. Left lower leg: No edema. Skin:     General: Skin is warm and dry. Neurological:      Mental Status: She is alert and oriented to person, place, and time. Results for orders placed or performed in visit on 08/26/20   POCT glycosylated hemoglobin (Hb A1C)   Result Value Ref Range    Hemoglobin A1C 6.9 %         Wong Victoria was seen today for diabetes. Diagnoses and all orders for this visit:    Type 2 diabetes mellitus without complication, without long-term current use of insulin ([facility])  -     POCT glycosylated hemoglobin (Hb A1C)        -     Continue Janumet XR    Essential hypertension  -     amLODIPine (NORVASC) 10 MG tablet; TAKE (1) TABLET BY MOUTH DAILY        -     Continue lisinopril/hctz    Thyroid nodule        -     Continue follow up with ENT          BMI was elevated today, and weight loss plan recommended is : conventional weight loss. Phone/MyChart follow up if tests abnormal.    Return in about 3 months (around 11/26/2020) for PHONE ONLY Virtual visit, diabetes. I have reviewed my findings and recommendations with Yaa Worthington.     Courtney Rubi M.D

## 2020-08-28 ENCOUNTER — OFFICE VISIT (OUTPATIENT)
Dept: CARDIOLOGY CLINIC | Age: 65
End: 2020-08-28
Payer: COMMERCIAL

## 2020-08-28 VITALS
WEIGHT: 189 LBS | HEIGHT: 62 IN | DIASTOLIC BLOOD PRESSURE: 68 MMHG | SYSTOLIC BLOOD PRESSURE: 132 MMHG | OXYGEN SATURATION: 97 % | RESPIRATION RATE: 16 BRPM | BODY MASS INDEX: 34.78 KG/M2 | HEART RATE: 82 BPM

## 2020-08-28 PROBLEM — E04.1 THYROID NODULE: Status: ACTIVE | Noted: 2020-08-28

## 2020-08-28 PROCEDURE — G8427 DOCREV CUR MEDS BY ELIG CLIN: HCPCS | Performed by: INTERNAL MEDICINE

## 2020-08-28 PROCEDURE — 1036F TOBACCO NON-USER: CPT | Performed by: INTERNAL MEDICINE

## 2020-08-28 PROCEDURE — 1123F ACP DISCUSS/DSCN MKR DOCD: CPT | Performed by: INTERNAL MEDICINE

## 2020-08-28 PROCEDURE — 99215 OFFICE O/P EST HI 40 MIN: CPT | Performed by: INTERNAL MEDICINE

## 2020-08-28 PROCEDURE — 3017F COLORECTAL CA SCREEN DOC REV: CPT | Performed by: INTERNAL MEDICINE

## 2020-08-28 PROCEDURE — G8400 PT W/DXA NO RESULTS DOC: HCPCS | Performed by: INTERNAL MEDICINE

## 2020-08-28 PROCEDURE — 4040F PNEUMOC VAC/ADMIN/RCVD: CPT | Performed by: INTERNAL MEDICINE

## 2020-08-28 PROCEDURE — 1090F PRES/ABSN URINE INCON ASSESS: CPT | Performed by: INTERNAL MEDICINE

## 2020-08-28 PROCEDURE — 93000 ELECTROCARDIOGRAM COMPLETE: CPT | Performed by: INTERNAL MEDICINE

## 2020-08-28 PROCEDURE — G8417 CALC BMI ABV UP PARAM F/U: HCPCS | Performed by: INTERNAL MEDICINE

## 2020-08-28 RX ORDER — PROMETHAZINE HYDROCHLORIDE 25 MG/1
25 TABLET ORAL EVERY 6 HOURS PRN
COMMUNITY
End: 2020-10-12 | Stop reason: SDUPTHER

## 2020-08-28 RX ORDER — FLUTICASONE PROPIONATE 50 MCG
1 SPRAY, SUSPENSION (ML) NASAL DAILY
COMMUNITY

## 2020-08-28 RX ORDER — ALBUTEROL SULFATE 2.5 MG/3ML
2.5 SOLUTION RESPIRATORY (INHALATION) EVERY 6 HOURS PRN
COMMUNITY

## 2020-08-28 RX ORDER — LATANOPROST 50 UG/ML
1 SOLUTION/ DROPS OPHTHALMIC NIGHTLY
COMMUNITY

## 2020-08-28 RX ORDER — LISINOPRIL AND HYDROCHLOROTHIAZIDE 20; 12.5 MG/1; MG/1
1 TABLET ORAL DAILY
Status: ON HOLD | COMMUNITY
End: 2021-07-14

## 2020-08-28 RX ORDER — CETIRIZINE HYDROCHLORIDE 10 MG/1
10 TABLET ORAL DAILY
Status: ON HOLD | COMMUNITY
End: 2021-07-16 | Stop reason: HOSPADM

## 2020-08-28 NOTE — PROGRESS NOTES
Chief Complaint   Patient presents with    Heart Problem       Patient Active Problem List    Diagnosis Date Noted    Anal warts 01/25/2019    Genital warts     Obesity (BMI 30-39.9) 09/28/2018    Thyroid disease     Hypertension     Hyperlipidemia     Hx of blood clots     Chronic obstructive pulmonary disease (HCC)     DVT (deep venous thrombosis) (HCC)     Atrial fibrillation ([facility]     Anxiety     Primary localized osteoarthritis of left hip 08/08/2018    Primary osteoarthritis of left hip 07/30/2018    Cerebral atherosclerosis 03/28/2018    Cognitive impairment 03/28/2018    Hypercholesterolemia 03/28/2018    Essential hypertension 01/31/2017    Type 2 diabetes mellitus without complication, without long-term current use of insulin ([facility] 07/25/2016    Mixed hyperlipidemia 04/26/2016    Bipolar disorder, current episode manic severe with psychotic features ([facility] 03/05/2016    Microcytic anemia 10/21/2015    Arthritis 06/02/2015       Current Outpatient Medications   Medication Sig Dispense Refill    promethazine (PHENERGAN) 25 MG tablet Take 25 mg by mouth every 6 hours as needed for Nausea      lisinopril-hydroCHLOROthiazide (PRINZIDE;ZESTORETIC) 20-12.5 MG per tablet Take 1 tablet by mouth daily      cetirizine (ZYRTEC) 10 MG tablet Take 10 mg by mouth daily      latanoprost (XALATAN) 0.005 % ophthalmic solution 1 drop nightly      fluticasone (FLONASE) 50 MCG/ACT nasal spray 1 spray by Each Nostril route daily      albuterol (PROVENTIL) (2.5 MG/3ML) 0.083% nebulizer solution Take 2.5 mg by nebulization every 6 hours as needed for Wheezing      lithium 150 MG capsule TAKE 3 CAPSULES BY MOUTH TWICE DAILY FOR 14 DAYS      magnesium oxide (MAG-OX) 400 MG tablet Take 1 tablet by mouth daily 30 tablet 5    amLODIPine (NORVASC) 10 MG tablet TAKE (1) TABLET BY MOUTH DAILY 30 tablet 5    famotidine (PEPCID) 20 MG tablet TAKE 1 TABLET BY MOUTH ONCE DAILY 30 tablet 5    vitamin D (ERGOCALCIFEROL) 1.25 MG (86358 UT) CAPS capsule TAKE 1 CAPSULE BY MOUTH ONCE WEEKLY 4 capsule 3    pravastatin (PRAVACHOL) 40 MG tablet TAKE 1 TABLET BY MOUTH NIGHTLY 30 tablet 10    furosemide (LASIX) 40 MG tablet TAKE 1 TABLET BY MOUTH DAILY 30 tablet 10    loperamide (IMODIUM) 2 MG capsule TAKE ONE (1) CAPSULE BY MOUTH FOUR TIMES A DAY AS NEEDED FOR DIARRHEA 60 capsule 3    SITagliptin-metFORMIN (JANUMET XR)  MG TB24 per extended release tablet TAKE TWO (2) TABLETS BY MOUTH EACH MORNING 60 tablet 10    metoprolol succinate (TOPROL XL) 50 MG extended release tablet TAKE 1 TABLET BY MOUTH ONCE DAILY 30 tablet 10    ASPIRIN LOW DOSE 81 MG EC tablet TAKE 1 TABLET BY MOUTH TWICE DAILY 60 tablet 10    mupirocin (BACTROBAN) 2 % ointment APPLY AS DIRECTED INTRANASALLY TWICE DAILY 22 g 10    ONE TOUCH LANCETS MISC Check blood sugar bid 100 each 12    blood glucose test strips (ONE TOUCH ULTRA TEST) strip Check blood sugar bid 100 strip 12    potassium chloride (KLOR-CON M) 10 MEQ extended release tablet Take 1 tablet by mouth daily as needed (with Lasix) (Patient taking differently: Take 10 mEq by mouth daily ) 30 tablet 3    SYMBICORT 160-4.5 MCG/ACT AERO INHALE TWO (2) PUFFS BY MOUTH TWICE DAILY  10    montelukast (SINGULAIR) 10 MG tablet TAKE 1 TABLET BY MOUTH NIGHTLY 30 tablet 3    albuterol sulfate HFA (PROVENTIL HFA) 108 (90 Base) MCG/ACT inhaler Inhale 2 puffs into the lungs every 4 hours as needed for Wheezing 1 Inhaler 1    Incontinence Supply Disposable (DEPEND UNDERWEAR LARGE) [facility] Wear daily 50 each 12    docusate sodium (COLACE) 100 MG capsule Take 1 capsule by mouth 2 times daily 60 capsule 1    ferrous sulfate 325 (65 Fe) MG tablet Take 1 tablet by mouth 2 times daily (with meals) 30 tablet 3    Blood Glucose Monitoring Suppl (ONE TOUCH ULTRA 2) w/Device KIT Check blood sugar bid 1 kit 0    [facility].  Devices [facility] Bedside commode 1 Device 0     No current facility-administered medications for this visit. Allergies   Allergen Reactions    Atenolol Palpitations    Lipitor [Atorvastatin] Palpitations    Tylenol With Codeine #3 [Acetaminophen-Codeine] Itching    Atenolol      increased heart beat    Codeine     Lipitor [Atorvastatin]     Percocet [Oxycodone-Acetaminophen]     Vicodin [Hydrocodone-Acetaminophen]     Other Other (See Comments)     Anti-depressants/Anti-psychotic: Causes hallucinations    Allergic to all pain meds can take extra strenghth tylenol         Vitals:    08/28/20 1134   BP: 132/68   Site: Right Upper Arm   Position: Sitting   Cuff Size: Large Adult   Pulse: 82   Resp: 16   SpO2: 97%   Weight: 189 lb (85.7 kg)   Height: 5' 2\" (1.575 m)       Social History     Socioeconomic History    Marital status:       Spouse name: Not on file    Number of children: 2    Years of education: 21    Highest education level: Not on file   Occupational History    Occupation: retired     Employer: UNEMPLOYED     Comment: PT IS A [address] [place] [address] resource strain: Not on file    Food insecurity     Worry: Not on file     Inability: Not on file   [employer] needs     Medical: Not on file     Non-medical: Not on file   Tobacco Use    Smoking status: Never Smoker    Smokeless tobacco: Never Used   Substance and Sexual Activity    Alcohol use: Never     Frequency: Never     Comment: occasional diet pepsi    Drug use: Never    Sexual activity: Yes   Lifestyle    Physical activity     Days per week: Not on file     Minutes per session: Not on file    Stress: Not on file   Relationships    Social connections     Talks on phone: Not on file     Gets together: Not on file     Attends Latter day service: Not on file     Active member of club or organization: Not on file     Attends meetings of clubs or organizations: Not on file     Relationship status: Not on file    Intimate partner violence     Fear of current or ex partner: no friction rub. No murmur heard. Pulmonary/Chest: Effort normal and breath sounds normal. No respiratory distress. No wheezes. No rales. No tenderness. Abdominal: Soft. Bowel sounds are normal. No distension and no mass. No tenderness. No rebound and no guarding. Musculoskeletal: Normal range of motion. No edema and no tenderness. Neurological: Alert and oriented to person, place, and time. Skin: Skin is warm and dry. No rash noted. Not diaphoretic. No erythema. Psychiatric: Normal mood and affect. Behavior is normal.     EKG:  normal EKG, normal sinus rhythm, unchanged from previous tracings. ASSESSMENT AND PLAN:  Patient Active Problem List   Diagnosis    Arthritis    Microcytic anemia    Bipolar disorder, current episode manic severe with psychotic features ([address]    Mixed hyperlipidemia    Type 2 diabetes mellitus without complication, without long-term current use of insulin ([address]    Essential hypertension    Cerebral atherosclerosis    Cognitive impairment    Hypercholesterolemia    Primary osteoarthritis of left hip    Primary localized osteoarthritis of left hip    Thyroid disease    Hypertension    Hyperlipidemia    Hx of blood clots    Chronic obstructive pulmonary disease (HCC)    DVT (deep venous thrombosis) (HCC)    Atrial fibrillation (HCC)    Anxiety    Obesity (BMI 30-39. 9)    Anal warts    Genital warts     Multiple somatic complaints with no clinical suspicion or objective evidence of chf, ischemic heart disease, rhythm disturbance etc  She has had multiple non invasive tests and even a cath over the years          The patient was educated as to the symptoms that would require a prompt return to our office. Return visit in prn WITH DR. AGUAYO.     Amilcar Quiñonez M.D  [facility]   t

## 2020-09-03 ENCOUNTER — HOSPITAL ENCOUNTER (OUTPATIENT)
Age: 65
Discharge: HOME | End: 2020-09-03
Payer: COMMERCIAL

## 2020-09-03 ENCOUNTER — HOSPITAL ENCOUNTER (EMERGENCY)
Age: 65
End: 2020-09-03
Payer: COMMERCIAL

## 2020-09-03 ENCOUNTER — TELEPHONE (OUTPATIENT)
Dept: FAMILY MEDICINE CLINIC | Age: 65
End: 2020-09-03

## 2020-09-03 PROCEDURE — 80053 COMPREHEN METABOLIC PANEL: CPT

## 2020-09-03 PROCEDURE — 36415 COLL VENOUS BLD VENIPUNCTURE: CPT

## 2020-09-03 NOTE — TELEPHONE ENCOUNTER
Earvin Pilon called from [facility] and states [place] came to them for speech therapy and  States patient try's to   miniplate   Her way in but they do not think she needs speech pt more of mental issue  If you have any question please call Stacy Ricci [phone]  She might give her 1 tx but not take her on

## 2020-09-04 LAB
ALBUMIN SERPL-MCNC: 4.5 G/DL (ref 3.5–5.2)
ALP BLD-CCNC: 76 U/L (ref 35–104)
ALT SERPL-CCNC: 20 U/L (ref 0–32)
ANION GAP SERPL CALCULATED.3IONS-SCNC: 18 MMOL/L (ref 7–16)
AST SERPL-CCNC: 18 U/L (ref 0–31)
BILIRUB SERPL-MCNC: 0.4 MG/DL (ref 0–1.2)
BUN BLDV-MCNC: 17 MG/DL (ref 8–23)
CALCIUM SERPL-MCNC: 10.5 MG/DL (ref 8.6–10.2)
CHLORIDE BLD-SCNC: 97 MMOL/L (ref 98–107)
CO2: 24 MMOL/L (ref 22–29)
CREAT SERPL-MCNC: 0.9 MG/DL (ref 0.5–1)
GFR AFRICAN AMERICAN: >60
GFR NON-AFRICAN AMERICAN: >60 ML/MIN/1.73
GLUCOSE BLD-MCNC: 141 MG/DL (ref 74–99)
POTASSIUM SERPL-SCNC: 4 MMOL/L (ref 3.5–5)
SODIUM BLD-SCNC: 139 MMOL/L (ref 132–146)
TOTAL PROTEIN: 8 G/DL (ref 6.4–8.3)

## 2020-09-16 RX ORDER — LOPERAMIDE HYDROCHLORIDE 2 MG/1
CAPSULE ORAL
Qty: 60 CAPSULE | Refills: 10 | Status: SHIPPED | OUTPATIENT
Start: 2020-09-16

## 2020-09-17 ENCOUNTER — TELEPHONE (OUTPATIENT)
Dept: FAMILY MEDICINE CLINIC | Age: 65
End: 2020-09-17

## 2020-09-17 NOTE — TELEPHONE ENCOUNTER
[facility]       Last seen 8/26/2020  [facility]

## 2020-09-21 ENCOUNTER — TELEPHONE (OUTPATIENT)
Dept: FAMILY MEDICINE CLINIC | Age: 65
End: 2020-09-21

## 2020-09-21 NOTE — TELEPHONE ENCOUNTER
Patient called stating Promethazine needs Prior Auth.       Last seen 8/26/2020  Next appt 11/19/2020  [facility]

## 2020-09-22 RX ORDER — CIMETIDINE 400 MG/1
400 TABLET, FILM COATED ORAL 2 TIMES DAILY
Qty: 60 TABLET | Refills: 3 | Status: SHIPPED
Start: 2020-09-22 | End: 2021-02-01 | Stop reason: SDUPTHER

## 2020-09-22 NOTE — TELEPHONE ENCOUNTER
Correct insurance information required before prior authorization can be done. Currently says [employer].

## 2020-09-24 ENCOUNTER — HOSPITAL ENCOUNTER (OUTPATIENT)
Age: 65
Discharge: HOME | End: 2020-09-24
Payer: COMMERCIAL

## 2020-09-24 LAB
ALBUMIN SERPL-MCNC: 3.9 G/DL (ref 3.5–5.2)
ALP BLD-CCNC: 84 U/L (ref 35–104)
ALT SERPL-CCNC: 17 U/L (ref 0–32)
ANION GAP SERPL CALCULATED.3IONS-SCNC: 15 MMOL/L (ref 7–16)
AST SERPL-CCNC: 13 U/L (ref 0–31)
BILIRUB SERPL-MCNC: 0.3 MG/DL (ref 0–1.2)
BUN BLDV-MCNC: 13 MG/DL (ref 8–23)
CALCIUM SERPL-MCNC: 10 MG/DL (ref 8.6–10.2)
CHLORIDE BLD-SCNC: 102 MMOL/L (ref 98–107)
CO2: 22 MMOL/L (ref 22–29)
CREAT SERPL-MCNC: 0.6 MG/DL (ref 0.5–1)
GFR AFRICAN AMERICAN: >60
GFR NON-AFRICAN AMERICAN: >60 ML/MIN/1.73
GLUCOSE BLD-MCNC: 300 MG/DL (ref 74–99)
LITHIUM DOSE AMOUNT: ABNORMAL
LITHIUM LEVEL: 0.15 MMOL/L (ref 0.5–1.5)
POTASSIUM SERPL-SCNC: 4.4 MMOL/L (ref 3.5–5)
SODIUM BLD-SCNC: 139 MMOL/L (ref 132–146)
TOTAL PROTEIN: 7.4 G/DL (ref 6.4–8.3)
TSH SERPL DL<=0.05 MIU/L-ACNC: 1.43 UIU/ML (ref 0.27–4.2)

## 2020-09-24 PROCEDURE — 84443 ASSAY THYROID STIM HORMONE: CPT

## 2020-09-24 PROCEDURE — 80178 ASSAY OF LITHIUM: CPT

## 2020-09-24 PROCEDURE — 80053 COMPREHEN METABOLIC PANEL: CPT

## 2020-09-24 PROCEDURE — 36415 COLL VENOUS BLD VENIPUNCTURE: CPT

## 2020-10-13 RX ORDER — PROMETHAZINE HYDROCHLORIDE 25 MG/1
25 TABLET ORAL EVERY 6 HOURS PRN
Qty: 90 TABLET | Refills: 2 | Status: SHIPPED
Start: 2020-10-13 | End: 2020-12-08 | Stop reason: SDUPTHER

## 2020-10-14 ENCOUNTER — HOSPITAL ENCOUNTER (OUTPATIENT)
Dept: ULTRASOUND IMAGING | Age: 65
Discharge: HOME | End: 2020-10-14
Payer: COMMERCIAL

## 2020-10-14 ENCOUNTER — HOSPITAL ENCOUNTER (OUTPATIENT)
Age: 65
Discharge: HOME | End: 2020-10-14
Payer: COMMERCIAL

## 2020-10-14 LAB
BUN BLDV-MCNC: 15 MG/DL (ref 8–23)
CREAT SERPL-MCNC: 0.8 MG/DL (ref 0.5–1)
GFR AFRICAN AMERICAN: >60
GFR NON-AFRICAN AMERICAN: >60 ML/MIN/1.73

## 2020-10-14 PROCEDURE — 84520 ASSAY OF UREA NITROGEN: CPT

## 2020-10-14 PROCEDURE — 82565 ASSAY OF CREATININE: CPT

## 2020-10-14 PROCEDURE — 76536 US EXAM OF HEAD AND NECK: CPT

## 2020-10-14 PROCEDURE — 36415 COLL VENOUS BLD VENIPUNCTURE: CPT

## 2020-11-03 PROBLEM — I10 ESSENTIAL HYPERTENSION: Status: RESOLVED | Noted: 2017-01-31 | Resolved: 2020-11-03

## 2020-11-13 ENCOUNTER — TELEPHONE (OUTPATIENT)
Dept: FAMILY MEDICINE CLINIC | Age: 65
End: 2020-11-13

## 2020-11-13 NOTE — TELEPHONE ENCOUNTER
Patient called refill line and was asking for a refill on a medication that Dr Vanessa Walters did not pescribe, I could not find that medication in her chart. I called and talked to patient about the medication and I advised her to when she speaks to Dr Vanessa Walters on 11/9/2020 for her visit, she can ask here then. Then she stated that she may have been exposed to covid-19 and wanted tested. I told her that I will put in a message for Dr Vanessa Walters to order the test but then she stated that she wanted testing now and does not have a ride and if she calls the ambulance to take her to the hospital that she will get arrested. She asked if [facility] does test and I told her that she will have to call. Then she stated that she can not take a cab there because she does not have the money. I advised her not to take cab and if she is not feeling well don't got out. That if she calls [facility] she can find out how there Covid testing is done. She started arguing with me that I have to call and that it is my job to help her. I said I having nothing to do with [facility] or their policies. She got up set and staid that she was done with me and that I am not good. She hung up the phone.

## 2020-11-19 ENCOUNTER — HOSPITAL ENCOUNTER (EMERGENCY)
Age: 65
Discharge: HOME | End: 2020-11-19
Attending: EMERGENCY MEDICINE
Payer: COMMERCIAL

## 2020-11-19 ENCOUNTER — APPOINTMENT (OUTPATIENT)
Dept: GENERAL RADIOLOGY | Age: 65
End: 2020-11-19
Payer: COMMERCIAL

## 2020-11-19 ENCOUNTER — TELEPHONE (OUTPATIENT)
Dept: ADMINISTRATIVE | Age: 65
End: 2020-11-19

## 2020-11-19 VITALS
RESPIRATION RATE: 20 BRPM | BODY MASS INDEX: 35.88 KG/M2 | SYSTOLIC BLOOD PRESSURE: 165 MMHG | TEMPERATURE: 98.6 F | HEART RATE: 72 BPM | WEIGHT: 195 LBS | DIASTOLIC BLOOD PRESSURE: 70 MMHG | HEIGHT: 62 IN | OXYGEN SATURATION: 98 %

## 2020-11-19 LAB
ANION GAP SERPL CALCULATED.3IONS-SCNC: 13 MMOL/L (ref 7–16)
BUN BLDV-MCNC: 21 MG/DL (ref 8–23)
CALCIUM SERPL-MCNC: 9.8 MG/DL (ref 8.6–10.2)
CHLORIDE BLD-SCNC: 101 MMOL/L (ref 98–107)
CO2: 23 MMOL/L (ref 22–29)
CREAT SERPL-MCNC: 1.1 MG/DL (ref 0.5–1)
EKG ATRIAL RATE: 66 BPM
EKG P AXIS: 71 DEGREES
EKG P-R INTERVAL: 170 MS
EKG Q-T INTERVAL: 406 MS
EKG QRS DURATION: 74 MS
EKG QTC CALCULATION (BAZETT): 425 MS
EKG R AXIS: 14 DEGREES
EKG T AXIS: 51 DEGREES
EKG VENTRICULAR RATE: 66 BPM
GFR AFRICAN AMERICAN: 60
GFR NON-AFRICAN AMERICAN: 60 ML/MIN/1.73
GLUCOSE BLD-MCNC: 258 MG/DL (ref 74–99)
HCT VFR BLD CALC: 30.6 % (ref 34–48)
HEMOGLOBIN: 9.6 G/DL (ref 11.5–15.5)
MCH RBC QN AUTO: 24.3 PG (ref 26–35)
MCHC RBC AUTO-ENTMCNC: 31.4 % (ref 32–34.5)
MCV RBC AUTO: 77.5 FL (ref 80–99.9)
PDW BLD-RTO: 15.2 FL (ref 11.5–15)
PLATELET # BLD: 247 E9/L (ref 130–450)
PMV BLD AUTO: 9.7 FL (ref 7–12)
POTASSIUM SERPL-SCNC: 4.3 MMOL/L (ref 3.5–5)
RBC # BLD: 3.95 E12/L (ref 3.5–5.5)
SODIUM BLD-SCNC: 137 MMOL/L (ref 132–146)
TROPONIN: <0.01 NG/ML (ref 0–0.03)
WBC # BLD: 7 E9/L (ref 4.5–11.5)

## 2020-11-19 PROCEDURE — 85027 COMPLETE CBC AUTOMATED: CPT

## 2020-11-19 PROCEDURE — U0003 INFECTIOUS AGENT DETECTION BY NUCLEIC ACID (DNA OR RNA); SEVERE ACUTE RESPIRATORY SYNDROME CORONAVIRUS 2 (SARS-COV-2) (CORONAVIRUS DISEASE [COVID-19]), AMPLIFIED PROBE TECHNIQUE, MAKING USE OF HIGH THROUGHPUT TECHNOLOGIES AS DESCRIBED BY CMS-2020-01-R: HCPCS

## 2020-11-19 PROCEDURE — 71046 X-RAY EXAM CHEST 2 VIEWS: CPT

## 2020-11-19 PROCEDURE — 80048 BASIC METABOLIC PNL TOTAL CA: CPT

## 2020-11-19 PROCEDURE — 93010 ELECTROCARDIOGRAM REPORT: CPT | Performed by: INTERNAL MEDICINE

## 2020-11-19 PROCEDURE — 93005 ELECTROCARDIOGRAM TRACING: CPT | Performed by: EMERGENCY MEDICINE

## 2020-11-19 PROCEDURE — 6360000002 HC RX W HCPCS: Performed by: EMERGENCY MEDICINE

## 2020-11-19 PROCEDURE — 96375 TX/PRO/DX INJ NEW DRUG ADDON: CPT

## 2020-11-19 PROCEDURE — 96374 THER/PROPH/DIAG INJ IV PUSH: CPT

## 2020-11-19 PROCEDURE — 84484 ASSAY OF TROPONIN QUANT: CPT

## 2020-11-19 PROCEDURE — 99283 EMERGENCY DEPT VISIT LOW MDM: CPT

## 2020-11-19 RX ORDER — DIPHENHYDRAMINE HYDROCHLORIDE 50 MG/ML
25 INJECTION INTRAMUSCULAR; INTRAVENOUS ONCE
Status: COMPLETED | OUTPATIENT
Start: 2020-11-19 | End: 2020-11-19

## 2020-11-19 RX ORDER — KETOROLAC TROMETHAMINE 15 MG/ML
15 INJECTION, SOLUTION INTRAMUSCULAR; INTRAVENOUS ONCE
Status: COMPLETED | OUTPATIENT
Start: 2020-11-19 | End: 2020-11-19

## 2020-11-19 RX ORDER — METOCLOPRAMIDE HYDROCHLORIDE 5 MG/ML
10 INJECTION INTRAMUSCULAR; INTRAVENOUS ONCE
Status: COMPLETED | OUTPATIENT
Start: 2020-11-19 | End: 2020-11-19

## 2020-11-19 RX ADMIN — METOCLOPRAMIDE HYDROCHLORIDE 10 MG: 5 INJECTION INTRAMUSCULAR; INTRAVENOUS at 01:37

## 2020-11-19 RX ADMIN — KETOROLAC TROMETHAMINE 15 MG: 15 INJECTION, SOLUTION INTRAMUSCULAR; INTRAVENOUS at 01:36

## 2020-11-19 RX ADMIN — DIPHENHYDRAMINE HYDROCHLORIDE 25 MG: 50 INJECTION, SOLUTION INTRAMUSCULAR; INTRAVENOUS at 01:36

## 2020-11-19 ASSESSMENT — ENCOUNTER SYMPTOMS
EYE DISCHARGE: 0
ABDOMINAL DISTENTION: 0
WHEEZING: 0
SINUS PRESSURE: 0
NAUSEA: 0
VOMITING: 0
EYE PAIN: 0
COUGH: 0
ABDOMINAL PAIN: 0
DIARRHEA: 0
EYE REDNESS: 0
SORE THROAT: 0
BACK PAIN: 0
SHORTNESS OF BREATH: 0

## 2020-11-19 ASSESSMENT — PAIN SCALES - GENERAL: PAINLEVEL_OUTOF10: 9

## 2020-11-19 NOTE — ED PROVIDER NOTES
Patient is a 76 y/o female who presents to the ED via EMS with a headache, body aches and generalized malaise. Patient states this began 3 weeks ago. Her headache is frontal. She reports feeling chills but has not checked her temperature. She denies having a cough but has had \"hacking. \" She denies any chest pain or shortness of breath. She denies any abdominal pain, nausea, vomiting, diarrhea or dysuria. She states that she has an appointment with her PCP tomorrow, but she is not going. She wants all the tests performed tonight. Review of Systems   Constitutional: Positive for chills. Negative for fever. HENT: Negative for ear pain, sinus pressure and sore throat. Eyes: Negative for pain, discharge and redness. Respiratory: Negative for cough, shortness of breath and wheezing. Cardiovascular: Negative for chest pain. Gastrointestinal: Negative for abdominal distention, abdominal pain, diarrhea, nausea and vomiting. Genitourinary: Negative for dysuria and frequency. Musculoskeletal: Positive for arthralgias. Negative for back pain. Skin: Negative for rash and wound. Neurological: Positive for headaches. Negative for weakness. Hematological: Negative for adenopathy. All other systems reviewed and are negative. Physical Exam  Vitals signs and nursing note reviewed. Constitutional:       General: She is not in acute distress. Appearance: She is obese. HENT:      Head: Normocephalic and atraumatic. Right Ear: External ear normal.      Left Ear: External ear normal.      Nose: Nose normal.      Mouth/Throat:      Mouth: Mucous membranes are moist.   Eyes:      Conjunctiva/sclera: Conjunctivae normal.      Pupils: Pupils are equal, round, and reactive to light. Neck:      Musculoskeletal: Normal range of motion and neck supple. Comments: No meningeal signs. Cardiovascular:      Rate and Rhythm: Normal rate and regular rhythm. Heart sounds: No murmur. hip arthroplasty (Left, 8/8/2018); Hysterectomy (2004); pr surg excision of anal lesion(s) (N/A, 1/25/2019); and joint replacement. Social History:  reports that she has never smoked. She has never used smokeless tobacco. She reports that she does not drink alcohol or use drugs. Family History: family history includes Diabetes in her mother; Stroke in her father. The patients home medications have been reviewed. Allergies: Atenolol; Lipitor [atorvastatin]; Tylenol with codeine #3 [acetaminophen-codeine]; Atenolol; Codeine; Lipitor [atorvastatin]; Percocet [oxycodone-acetaminophen];  Vicodin [hydrocodone-acetaminophen]; and Other    -------------------------------------------------- RESULTS -------------------------------------------------  Labs:  Results for orders placed or performed during the hospital encounter of 11/27/57   Basic metabolic panel   Result Value Ref Range    Sodium 137 132 - 146 mmol/L    Potassium 4.3 3.5 - 5.0 mmol/L    Chloride 101 98 - 107 mmol/L    CO2 23 22 - 29 mmol/L    Anion Gap 13 7 - 16 mmol/L    Glucose 258 (H) 74 - 99 mg/dL    BUN 21 8 - 23 mg/dL    CREATININE 1.1 (H) 0.5 - 1.0 mg/dL    GFR Non-African American 60 >=60 mL/min/1.73    GFR African American 60     Calcium 9.8 8.6 - 10.2 mg/dL   CBC   Result Value Ref Range    WBC 7.0 4.5 - 11.5 E9/L    RBC 3.95 3.50 - 5.50 E12/L    Hemoglobin 9.6 (L) 11.5 - 15.5 g/dL    Hematocrit 30.6 (L) 34.0 - 48.0 %    MCV 77.5 (L) 80.0 - 99.9 fL    MCH 24.3 (L) 26.0 - 35.0 pg    MCHC 31.4 (L) 32.0 - 34.5 %    RDW 15.2 (H) 11.5 - 15.0 fL    Platelets 753 092 - 937 E9/L    MPV 9.7 7.0 - 12.0 fL   Troponin   Result Value Ref Range    Troponin <0.01 0.00 - 0.03 ng/mL   Covid-19 Ambulatory   Result Value Ref Range    Source NP swab    EKG 12 Lead   Result Value Ref Range    Ventricular Rate 66 BPM    Atrial Rate 66 BPM    P-R Interval 170 ms    QRS Duration 74 ms    Q-T Interval 406 ms    QTc Calculation (Bazett) 425 ms    P Axis 71 degrees R Axis 14 degrees    T Axis 51 degrees       Radiology:  XR CHEST (2 VW)   Final Result   No acute disease.          ------------------------- NURSING NOTES AND VITALS REVIEWED ---------------------------  Date / Time Roomed:  11/19/2020 12:55 AM  ED Bed Assignment:  HALL/H1    The nursing notes within the ED encounter and vital signs as below have been reviewed. BP (!) 165/70   Pulse 72   Temp 98.6 °F (37 °C)   Resp 20   Ht 5' 2\" (1.575 m)   Wt 195 lb (88.5 kg)   LMP 06/26/1981   SpO2 98%   BMI 35.67 kg/m²   Oxygen Saturation Interpretation: Normal      ------------------------------------------ PROGRESS NOTES ------------------------------------------  I have spoken with the patient and discussed todays results, in addition to providing specific details for the plan of care and counseling regarding the diagnosis and prognosis. Their questions are answered at this time and they are agreeable with the plan. I discussed at length with them reasons for immediate return here for re evaluation. They will followup with primary care by calling their office tomorrow. --------------------------------- ADDITIONAL PROVIDER NOTES ---------------------------------  At this time the patient is without objective evidence of an acute process requiring hospitalization or inpatient management. They have remained hemodynamically stable throughout their entire ED visit and are stable for discharge with outpatient follow-up. The plan has been discussed in detail and they are aware of the specific conditions for emergent return, as well as the importance of follow-up. New Prescriptions    No medications on file       Diagnosis:  1. Acute nonintractable headache, unspecified headache type    2. Viral syndrome        Disposition:  Patient's disposition: Discharge to home  Patient's condition is stable.          Billy Duane,   11/19/20 0994

## 2020-11-19 NOTE — ED NOTES
Bed: H1  Expected date:   Expected time:   Means of arrival:   Comments:  liam Chris RN  11/19/20 9742

## 2020-11-19 NOTE — TELEPHONE ENCOUNTER
Pt was tested today for COVID exposure and will be told within 3 days of results. She states she does feel rather tired and ill. She also asked if Dr Michael Ho could refer her to a psychiatrist, she's feeling really depressed. Please contact her at her sister's cell # [phone]. Thank you in advance.

## 2020-11-20 ENCOUNTER — CARE COORDINATION (OUTPATIENT)
Dept: CARE COORDINATION | Age: 65
End: 2020-11-20

## 2020-11-21 LAB
SARS-COV-2: NOT DETECTED
SOURCE: NORMAL

## 2020-11-30 RX ORDER — ERGOCALCIFEROL 1.25 MG/1
CAPSULE ORAL
Qty: 4 CAPSULE | Refills: 3 | Status: SHIPPED | OUTPATIENT
Start: 2020-11-30

## 2020-12-09 RX ORDER — PROMETHAZINE HYDROCHLORIDE 25 MG/1
25 TABLET ORAL EVERY 6 HOURS PRN
Qty: 90 TABLET | Refills: 2 | Status: SHIPPED | OUTPATIENT
Start: 2020-12-09

## 2020-12-22 ENCOUNTER — TELEPHONE (OUTPATIENT)
Dept: FAMILY MEDICINE CLINIC | Age: 65
End: 2020-12-22

## 2020-12-23 ENCOUNTER — TELEPHONE (OUTPATIENT)
Dept: FAMILY MEDICINE CLINIC | Age: 65
End: 2020-12-23

## 2020-12-23 NOTE — TELEPHONE ENCOUNTER
Javi called. The script for the prenatal 1 is not longer a available . I tried to give a verbal to change to what is covered by insurance but the pharmacist is not available.  Please send in a script for another generic brand

## 2020-12-29 RX ORDER — VITAMIN A, VITAMIN C, VITAMIN D-3, VITAMIN E, VITAMIN B-1, VITAMIN B-2, NIACIN, VITAMIN B-6, CALCIUM, IRON, ZINC, COPPER 4000; 120; 400; 22; 1.84; 3; 20; 10; 1; 12; 200; 27; 25; 2 [IU]/1; MG/1; [IU]/1; MG/1; MG/1; MG/1; MG/1; MG/1; MG/1; UG/1; MG/1; MG/1; MG/1; MG/1
1 TABLET ORAL DAILY
Qty: 30 TABLET | Refills: 5 | Status: SHIPPED | OUTPATIENT
Start: 2020-12-29

## 2020-12-31 DIAGNOSIS — J34.89 NASAL MUCOSA DRY: ICD-10-CM

## 2020-12-31 DIAGNOSIS — E11.9 TYPE 2 DIABETES MELLITUS WITHOUT COMPLICATION, WITHOUT LONG-TERM CURRENT USE OF INSULIN (HCC): ICD-10-CM

## 2021-01-06 RX ORDER — ASPIRIN 81 MG/1
TABLET, COATED ORAL
Qty: 60 TABLET | Refills: 10 | Status: ON HOLD
Start: 2021-01-06 | End: 2021-07-16 | Stop reason: HOSPADM

## 2021-01-06 RX ORDER — BLOOD SUGAR DIAGNOSTIC
STRIP MISCELLANEOUS
Qty: 100 EACH | Refills: 10 | Status: SHIPPED | OUTPATIENT
Start: 2021-01-06

## 2021-01-13 ENCOUNTER — OFFICE VISIT (OUTPATIENT)
Dept: FAMILY MEDICINE CLINIC | Age: 66
End: 2021-01-13
Payer: COMMERCIAL

## 2021-01-13 VITALS
TEMPERATURE: 97.7 F | DIASTOLIC BLOOD PRESSURE: 78 MMHG | WEIGHT: 194 LBS | SYSTOLIC BLOOD PRESSURE: 130 MMHG | HEART RATE: 100 BPM | BODY MASS INDEX: 35.7 KG/M2 | OXYGEN SATURATION: 97 % | HEIGHT: 62 IN | RESPIRATION RATE: 20 BRPM

## 2021-01-13 DIAGNOSIS — E11.9 TYPE 2 DIABETES MELLITUS WITHOUT COMPLICATION, WITHOUT LONG-TERM CURRENT USE OF INSULIN (HCC): Primary | ICD-10-CM

## 2021-01-13 DIAGNOSIS — I10 ESSENTIAL HYPERTENSION: ICD-10-CM

## 2021-01-13 DIAGNOSIS — F31.2 BIPOLAR DISORDER, CURRENT EPISODE MANIC SEVERE WITH PSYCHOTIC FEATURES (HCC): ICD-10-CM

## 2021-01-13 PROCEDURE — G8400 PT W/DXA NO RESULTS DOC: HCPCS | Performed by: FAMILY MEDICINE

## 2021-01-13 PROCEDURE — G8484 FLU IMMUNIZE NO ADMIN: HCPCS | Performed by: FAMILY MEDICINE

## 2021-01-13 PROCEDURE — 3046F HEMOGLOBIN A1C LEVEL >9.0%: CPT | Performed by: FAMILY MEDICINE

## 2021-01-13 PROCEDURE — 1036F TOBACCO NON-USER: CPT | Performed by: FAMILY MEDICINE

## 2021-01-13 PROCEDURE — 4040F PNEUMOC VAC/ADMIN/RCVD: CPT | Performed by: FAMILY MEDICINE

## 2021-01-13 PROCEDURE — 1123F ACP DISCUSS/DSCN MKR DOCD: CPT | Performed by: FAMILY MEDICINE

## 2021-01-13 PROCEDURE — G8417 CALC BMI ABV UP PARAM F/U: HCPCS | Performed by: FAMILY MEDICINE

## 2021-01-13 PROCEDURE — 3017F COLORECTAL CA SCREEN DOC REV: CPT | Performed by: FAMILY MEDICINE

## 2021-01-13 PROCEDURE — 2022F DILAT RTA XM EVC RTNOPTHY: CPT | Performed by: FAMILY MEDICINE

## 2021-01-13 PROCEDURE — 1090F PRES/ABSN URINE INCON ASSESS: CPT | Performed by: FAMILY MEDICINE

## 2021-01-13 PROCEDURE — 99214 OFFICE O/P EST MOD 30 MIN: CPT | Performed by: FAMILY MEDICINE

## 2021-01-13 PROCEDURE — G8427 DOCREV CUR MEDS BY ELIG CLIN: HCPCS | Performed by: FAMILY MEDICINE

## 2021-01-13 RX ORDER — LITHIUM CARBONATE 300 MG
TABLET ORAL
Status: ON HOLD | COMMUNITY
Start: 2020-12-13 | End: 2021-07-16 | Stop reason: HOSPADM

## 2021-01-13 RX ORDER — TRIAMCINOLONE ACETONIDE 1 MG/G
CREAM TOPICAL
Qty: 80 G | Refills: 3 | Status: SHIPPED | OUTPATIENT
Start: 2021-01-13

## 2021-01-13 RX ORDER — BUDESONIDE AND FORMOTEROL FUMARATE DIHYDRATE 160; 4.5 UG/1; UG/1
AEROSOL RESPIRATORY (INHALATION)
Qty: 1 INHALER | Refills: 10 | Status: SHIPPED | OUTPATIENT
Start: 2021-01-13

## 2021-01-13 RX ORDER — TERAZOSIN 2 MG/1
CAPSULE ORAL
COMMUNITY
Start: 2020-12-24

## 2021-01-13 RX ORDER — MESALAMINE 1.2 G/1
TABLET, DELAYED RELEASE ORAL
Status: ON HOLD | COMMUNITY
Start: 2020-12-30 | End: 2021-07-16 | Stop reason: HOSPADM

## 2021-01-13 RX ORDER — BLOOD-GLUCOSE METER
EACH MISCELLANEOUS
Qty: 1 KIT | Refills: 0 | Status: SHIPPED | OUTPATIENT
Start: 2021-01-13

## 2021-01-13 RX ORDER — SITAGLIPTIN AND METFORMIN HYDROCHLORIDE 1000; 50 MG/1; MG/1
TABLET, FILM COATED, EXTENDED RELEASE ORAL
Qty: 60 TABLET | Refills: 10 | Status: SHIPPED | OUTPATIENT
Start: 2021-01-13

## 2021-01-13 ASSESSMENT — ENCOUNTER SYMPTOMS
DIARRHEA: 0
VOMITING: 1
SHORTNESS OF BREATH: 1
NAUSEA: 1

## 2021-01-13 NOTE — PATIENT INSTRUCTIONS
· Write down your questions about using the plate format. Talk to your doctor, a dietitian, or a diabetes educator about your concerns. Carbohydrate counting  With carbohydrate counting, you plan meals based on the amount of carbohydrate in each food. Carbohydrate raises blood sugar higher and more quickly than any other nutrient. It is found in desserts, breads and cereals, and fruit. It's also found in starchy vegetables such as potatoes and corn, grains such as rice and pasta, and milk and yogurt. Spreading carbohydrate throughout the day helps keep your blood sugar levels within your target range. Your daily amount depends on several things, including your weight, how active you are, which diabetes medicines you take, and what your goals are for your blood sugar levels. A registered dietitian or diabetes educator can help you plan how much carbohydrate to include in each meal and snack. A guideline for your daily amount of carbohydrate is:  · 45 to 60 grams at each meal. That's about the same as 3 to 4 carbohydrate servings. · 15 to 20 grams at each snack. That's about the same as 1 carbohydrate serving. The Nutrition Facts label on packaged foods tells you how much carbohydrate is in a serving of the food. First, look at the serving size on the food label. Is that the amount you eat in a serving? All of the nutrition information on a food label is based on that serving size. So if you eat more or less than that, you'll need to adjust the other numbers. Total carbohydrate is the next thing you need to look for on the label. If you count carbohydrate servings, one serving of carbohydrate is 15 grams. For foods that don't come with labels, such as fresh fruits and vegetables, you'll need a guide that lists carbohydrate in these foods. Ask your doctor, dietitian, or diabetes educator about books or other nutrition guides you can use. If you take insulin, you need to know how many grams of carbohydrate are in a meal. This lets you know how much rapid-acting insulin to take before you eat. If you use an insulin pump, you get a constant rate of insulin during the day. So the pump must be programmed at meals to give you extra insulin to cover the rise in blood sugar after meals. When you know how much carbohydrate you will eat, you can take the right amount of insulin. Or, if you always use the same amount of insulin, you need to make sure that you eat the same amount of carbohydrate at meals. If you need more help to understand carbohydrate counting and food labels, ask your doctor, dietitian, or diabetes educator. How do you get started with meal planning? Here are some tips to get started:  · Plan your meals a week at a time. Don't forget to include snacks too. · Use cookbooks or online recipes to plan several main meals. Plan some quick meals for busy nights. You also can double some recipes that freeze well. Then you can save half for other busy nights when you don't have time to cook. · Make sure you have the ingredients you need for your recipes. If you're running low on basic items, put these items on your shopping list too. · List foods that you use to make breakfasts, lunches, and snacks. List plenty of fruits and vegetables. · Post this list on the refrigerator. Add to it as you think of more things you need. · Take the list to the store to do your weekly shopping. Follow-up care is a key part of your treatment and safety. Be sure to make and go to all appointments, and call your doctor if you are having problems. It's also a good idea to know your test results and keep a list of the medicines you take. Where can you learn more? Go to [email or web address] and sign in to your [employer] account. Enter T104 in the [facility] box to learn more about \"Learning About Meal Planning for Diabetes. \" If you do not have an account, please click on the \"Sign Up Now\" link. Current as of: December 20, 2019               Content Version: 12.6  © 5020-1013 [employer]. Care instructions adapted under license by [facility] ([facility]). If you have questions about a medical condition or this instruction, always ask your healthcare professional. [address] any warranty or liability for your use of this information.

## 2021-01-13 NOTE — PROGRESS NOTES
mupirocin (BACTROBAN) 2 % ointment APPLY INTRANASALLY TWICE DAILY AS DIRECTED 1/6/21  Yes Oneil Aaron MD   Prenatal Vit-Fe Fumarate-FA (PRENATAL VITAMIN PLUS LOW IRON) 27-1 MG TABS Take 1 tablet by mouth daily 12/29/20  Yes Oneil Aaron MD   Lancets (ONETOUCH DELICA PLUS [id number]) MISC USE TO TEST BLOOD SUGAR TWICE DAILY 12/22/20  Yes Oneil Aaron MD   promethazine (PHENERGAN) 25 MG tablet Take 1 tablet by mouth every 6 hours as needed for Nausea 12/9/20  Yes Oneil Aaron MD   vitamin D (ERGOCALCIFEROL) 1.25 MG (12066 UT) CAPS capsule TAKE 1 CAPSULE BY MOUTH ONCE WEEKLY 11/30/20  Yes Elodia Mccracken PA-C   cimetidine (TAGAMET) 400 MG tablet Take 1 tablet by mouth 2 times daily 9/22/20  Yes Oneil Aaron MD   loperamide (IMODIUM) 2 MG capsule TAKE 1 CAPSULE BY MOUTH FOUR TIMES DAILY AS NEEDED FOR DIARRHEA 9/16/20  Yes Oneil Aaron MD   lisinopril-hydroCHLOROthiazide (PRINZIDE;ZESTORETIC) 20-12.5 MG per tablet Take 1 tablet by mouth daily   Yes Historical Provider, MD   cetirizine (ZYRTEC) 10 MG tablet Take 10 mg by mouth daily   Yes Historical Provider, MD   latanoprost (XALATAN) 0.005 % ophthalmic solution 1 drop nightly   Yes Historical Provider, MD   fluticasone (FLONASE) 50 MCG/ACT nasal spray 1 spray by Each Nostril route daily   Yes Historical Provider, MD   albuterol (PROVENTIL) (2.5 MG/3ML) 0.083% nebulizer solution Take 2.5 mg by nebulization every 6 hours as needed for Wheezing   Yes Historical Provider, MD   lithium 150 MG capsule TAKE 3 CAPSULES BY MOUTH TWICE DAILY FOR 14 DAYS 8/5/20  Yes Historical Provider, MD   magnesium oxide (MAG-OX) 400 MG tablet Take 1 tablet by mouth daily 8/26/20  Yes Oneil Aaron MD   amLODIPine (NORVASC) 10 MG tablet TAKE (1) TABLET BY MOUTH DAILY 8/26/20  Yes Oneil Aaron MD  Transportation needs     Medical: None     Non-medical: None   Tobacco Use    Smoking status: Never Smoker    Smokeless tobacco: Never Used   Substance and Sexual Activity    Alcohol use: Never     Frequency: Never     Comment: occasional diet pepsi    Drug use: Never    Sexual activity: Yes   Lifestyle    Physical activity     Days per week: None     Minutes per session: None    Stress: None   Relationships    Social connections     Talks on phone: None     Gets together: None     Attends Judaism service: None     Active member of club or organization: None     Attends meetings of clubs or organizations: None     Relationship status: None    Intimate partner violence     Fear of current or ex partner: None     Emotionally abused: None     Physically abused: None     Forced sexual activity: None   Other Topics Concern    None   Social History Narrative    ** Merged History Encounter **         ** Merged History Encounter **          I have reviewed Sweetie's allergies, medications, problem list, medical, social and family history and have updated as needed in the electronic medical record    Current Outpatient Medications   Medication Sig Dispense Refill    terazosin (HYTRIN) 2 MG capsule take 1 capsule by mouth once daily      lithium 300 MG tablet       triamcinolone (KENALOG) 0.1 % cream Apply topically 2 times daily for up to 2 weeks then as needed for flareups.  80 g 3    Blood Glucose Monitoring Suppl (ONE TOUCH ULTRA 2) w/Device KIT Check blood sugar qam 1 kit 0    SITagliptin-metFORMIN (JANUMET XR)  MG TB24 per extended release tablet TAKE TWO (2) TABLETS BY MOUTH EACH MORNING 60 tablet 10    SYMBICORT 160-4.5 MCG/ACT AERO INHALE TWO (2) PUFFS BY MOUTH TWICE DAILY 1 Inhaler 10    ASPIRIN LOW DOSE 81 MG EC tablet TAKE 1 TABLET BY MOUTH TWICE DAILY 60 tablet 10    blood glucose test strips (ONETOUCH ULTRA) strip USE TO TEST BLOOD SUGAR TWICE DAILY 100 each 10  mupirocin (BACTROBAN) 2 % ointment APPLY INTRANASALLY TWICE DAILY AS DIRECTED 22 g 10    Prenatal Vit-Fe Fumarate-FA (PRENATAL VITAMIN PLUS LOW IRON) 27-1 MG TABS Take 1 tablet by mouth daily 30 tablet 5    Lancets (ONETOUCH DELICA PLUS [id number]) MISC USE TO TEST BLOOD SUGAR TWICE DAILY 100 each 12    promethazine (PHENERGAN) 25 MG tablet Take 1 tablet by mouth every 6 hours as needed for Nausea 90 tablet 2    vitamin D (ERGOCALCIFEROL) 1.25 MG (31588 UT) CAPS capsule TAKE 1 CAPSULE BY MOUTH ONCE WEEKLY 4 capsule 3    cimetidine (TAGAMET) 400 MG tablet Take 1 tablet by mouth 2 times daily 60 tablet 3    loperamide (IMODIUM) 2 MG capsule TAKE 1 CAPSULE BY MOUTH FOUR TIMES DAILY AS NEEDED FOR DIARRHEA 60 capsule 10    lisinopril-hydroCHLOROthiazide (PRINZIDE;ZESTORETIC) 20-12.5 MG per tablet Take 1 tablet by mouth daily      cetirizine (ZYRTEC) 10 MG tablet Take 10 mg by mouth daily      latanoprost (XALATAN) 0.005 % ophthalmic solution 1 drop nightly      fluticasone (FLONASE) 50 MCG/ACT nasal spray 1 spray by Each Nostril route daily      albuterol (PROVENTIL) (2.5 MG/3ML) 0.083% nebulizer solution Take 2.5 mg by nebulization every 6 hours as needed for Wheezing      lithium 150 MG capsule TAKE 3 CAPSULES BY MOUTH TWICE DAILY FOR 14 DAYS      magnesium oxide (MAG-OX) 400 MG tablet Take 1 tablet by mouth daily 30 tablet 5    amLODIPine (NORVASC) 10 MG tablet TAKE (1) TABLET BY MOUTH DAILY 30 tablet 5    pravastatin (PRAVACHOL) 40 MG tablet TAKE 1 TABLET BY MOUTH NIGHTLY 30 tablet 10    furosemide (LASIX) 40 MG tablet TAKE 1 TABLET BY MOUTH DAILY 30 tablet 10    metoprolol succinate (TOPROL XL) 50 MG extended release tablet TAKE 1 TABLET BY MOUTH ONCE DAILY 30 tablet 10    potassium chloride (KLOR-CON M) 10 MEQ extended release tablet Take 1 tablet by mouth daily as needed (with Lasix) (Patient taking differently: Take 10 mEq by mouth daily ) 30 tablet 3  montelukast (SINGULAIR) 10 MG tablet TAKE 1 TABLET BY MOUTH NIGHTLY 30 tablet 3    Incontinence Supply Disposable (DEPEND UNDERWEAR LARGE) MISC Wear daily 50 each 12    docusate sodium (COLACE) 100 MG capsule Take 1 capsule by mouth 2 times daily 60 capsule 1    ferrous sulfate 325 (65 Fe) MG tablet Take 1 tablet by mouth 2 times daily (with meals) 30 tablet 3    Misc. Devices MISC Bedside commode 1 Device 0    mesalamine (LIALDA) 1.2 g EC tablet take 2 tablets by mouth once daily      albuterol sulfate HFA (PROVENTIL HFA) 108 (90 Base) MCG/ACT inhaler Inhale 2 puffs into the lungs every 4 hours as needed for Wheezing 1 Inhaler 1     No current facility-administered medications for this visit. Review Of Systems:    Review of Systems   Eyes: Negative for visual disturbance. Respiratory: Positive for shortness of breath (comes and goes). Cardiovascular: Negative for chest pain, palpitations and leg swelling. Gastrointestinal: Positive for nausea (comes and goes) and vomiting. Negative for diarrhea. Genitourinary: Negative for difficulty urinating, dysuria and frequency. Skin: Positive for rash (rectal). Psychiatric/Behavioral: Negative for dysphoric mood. The patient is nervous/anxious. OBJECTIVE:     VS:  Wt Readings from Last 3 Encounters:   01/13/21 194 lb (88 kg)   11/19/20 195 lb (88.5 kg)   08/28/20 189 lb (85.7 kg)     Vitals:    01/13/21 0805   BP: 130/78   Pulse: 100   Resp: 20   Temp: 97.7 °F (36.5 °C)   SpO2: 97%       Physical Exam  Vitals signs reviewed. Constitutional:       General: She is not in acute distress. Appearance: She is well-developed. Neck:      Musculoskeletal: Neck supple. Vascular: No carotid bruit. Cardiovascular:      Rate and Rhythm: Normal rate and regular rhythm. Heart sounds: Normal heart sounds. No murmur. No gallop.     Pulmonary:      Effort: Pulmonary effort is normal. Breath sounds: Normal breath sounds. No wheezing or rales. Abdominal:      General: Bowel sounds are normal. There is no distension. Palpations: Abdomen is soft. Tenderness: There is no abdominal tenderness. Musculoskeletal:      Right lower leg: No edema. Left lower leg: No edema. Skin:     General: Skin is warm and dry. Neurological:      Mental Status: She is alert and oriented to person, place, and time. Psychiatric:         Speech: Speech is rapid and pressured. Behavior: Behavior is agitated. Thought Content: Thought content is delusional.         Judgment: Judgment is impulsive and inappropriate.          Results for orders placed or performed during the hospital encounter of 13/65/86   Basic metabolic panel   Result Value Ref Range    Sodium 137 132 - 146 mmol/L    Potassium 4.3 3.5 - 5.0 mmol/L    Chloride 101 98 - 107 mmol/L    CO2 23 22 - 29 mmol/L    Anion Gap 13 7 - 16 mmol/L    Glucose 258 (H) 74 - 99 mg/dL    BUN 21 8 - 23 mg/dL    CREATININE 1.1 (H) 0.5 - 1.0 mg/dL    GFR Non-African American 60 >=60 mL/min/1.73    GFR African American 60     Calcium 9.8 8.6 - 10.2 mg/dL   CBC   Result Value Ref Range    WBC 7.0 4.5 - 11.5 E9/L    RBC 3.95 3.50 - 5.50 E12/L    Hemoglobin 9.6 (L) 11.5 - 15.5 g/dL    Hematocrit 30.6 (L) 34.0 - 48.0 %    MCV 77.5 (L) 80.0 - 99.9 fL    MCH 24.3 (L) 26.0 - 35.0 pg    MCHC 31.4 (L) 32.0 - 34.5 %    RDW 15.2 (H) 11.5 - 15.0 fL    Platelets 825 667 - 080 E9/L    MPV 9.7 7.0 - 12.0 fL   Troponin   Result Value Ref Range    Troponin <0.01 0.00 - 0.03 ng/mL   Covid-19 Ambulatory   Result Value Ref Range    SARS-CoV-2 Not Detected Not Detected    Source NP swab    EKG 12 Lead   Result Value Ref Range    Ventricular Rate 66 BPM    Atrial Rate 66 BPM    P-R Interval 170 ms    QRS Duration 74 ms    Q-T Interval 406 ms    QTc Calculation (Bazett) 425 ms    P Axis 71 degrees    R Axis 14 degrees    T Axis 51 degrees Ewa Gamez was seen today for diabetes. Diagnoses and all orders for this visit:    Type 2 diabetes mellitus without complication, without long-term current use of insulin (HCC)  -     Blood Glucose Monitoring Suppl (ONE TOUCH ULTRA 2) w/Device KIT; Check blood sugar qam  -     SITagliptin-metFORMIN (JANUMET XR)  MG TB24 per extended release tablet; TAKE TWO (2) TABLETS BY MOUTH EACH MORNING    Essential hypertension   -     Continue antihypertensive regimen    Bipolar disorder, current episode manic severe with psychotic features ([facility] [address]         -     Continue Lithium; patient too agitated to listen to me about seeing psychiatrist      BMI was elevated today, and weight loss plan recommended is : conventional weight loss. Phone/MyChart follow up if tests abnormal.    Return in about 6 months (around 7/13/2021) for diabetes. I have reviewed my findings and recommendations with Rubina Sheikh M.D

## 2021-02-01 RX ORDER — CIMETIDINE 400 MG/1
400 TABLET, FILM COATED ORAL 2 TIMES DAILY
Qty: 60 TABLET | Refills: 3 | Status: SHIPPED | OUTPATIENT
Start: 2021-02-01

## 2021-02-15 ENCOUNTER — TELEPHONE (OUTPATIENT)
Dept: ADMINISTRATIVE | Age: 66
End: 2021-02-15

## 2021-02-15 NOTE — TELEPHONE ENCOUNTER
I called Korin/[facility] and was informed patient needs appt for diabetes F/U.  Korin stated patient is being d'chgd today and will be going to [place]. Korin requested Hospital F/U within the month.   Appt made 2/25/21 at 11:15 am.

## 2021-02-26 DIAGNOSIS — I10 ESSENTIAL HYPERTENSION: ICD-10-CM

## 2021-02-26 NOTE — TELEPHONE ENCOUNTER
Pt needs her potassium 20 mg sent in to [facility]

## 2021-03-01 RX ORDER — METOPROLOL SUCCINATE 50 MG/1
TABLET, EXTENDED RELEASE ORAL
Qty: 30 TABLET | Refills: 10 | Status: ON HOLD
Start: 2021-03-01 | End: 2021-07-16 | Stop reason: HOSPADM

## 2021-03-01 RX ORDER — POTASSIUM CHLORIDE 750 MG/1
TABLET, FILM COATED, EXTENDED RELEASE ORAL
Qty: 30 TABLET | Refills: 10 | Status: SHIPPED | OUTPATIENT
Start: 2021-03-01

## 2021-03-05 ENCOUNTER — TELEPHONE (OUTPATIENT)
Dept: ADMINISTRATIVE | Age: 66
End: 2021-03-05

## 2021-03-05 NOTE — TELEPHONE ENCOUNTER
Pt was wondering if office has contacted Carlita for her bloodwork for her Mon appt. Also states they told her there was something wrong with it but she didn't understand. Also states she was given Kenalog cream and doesn't know what it's to be used for. Please contact and advise. Thank you in advance.

## 2021-03-05 NOTE — TELEPHONE ENCOUNTER
Faxed request for labs to [facility]. I called patient and informed Kenalog is to be used when she has flare ups.

## 2021-03-15 DIAGNOSIS — E55.9 VITAMIN D DEFICIENCY: ICD-10-CM

## 2021-03-15 RX ORDER — ERGOCALCIFEROL 1.25 MG/1
CAPSULE ORAL
Qty: 4 CAPSULE | Refills: 3 | OUTPATIENT
Start: 2021-03-15

## 2021-03-25 ENCOUNTER — HOSPITAL ENCOUNTER (EMERGENCY)
Age: 66
Discharge: LEFT BEFORE DISCHARGE | End: 2021-03-26
Attending: EMERGENCY MEDICINE
Payer: COMMERCIAL

## 2021-03-25 DIAGNOSIS — R06.00 DYSPNEA, UNSPECIFIED TYPE: Primary | ICD-10-CM

## 2021-03-25 PROCEDURE — 99283 EMERGENCY DEPT VISIT LOW MDM: CPT

## 2021-03-26 ENCOUNTER — HOSPITAL ENCOUNTER (EMERGENCY)
Age: 66
Discharge: HOME | End: 2021-03-26
Attending: EMERGENCY MEDICINE
Payer: COMMERCIAL

## 2021-03-26 VITALS
TEMPERATURE: 99.5 F | DIASTOLIC BLOOD PRESSURE: 79 MMHG | RESPIRATION RATE: 20 BRPM | HEIGHT: 62 IN | WEIGHT: 185 LBS | SYSTOLIC BLOOD PRESSURE: 167 MMHG | OXYGEN SATURATION: 97 % | BODY MASS INDEX: 34.04 KG/M2 | HEART RATE: 98 BPM

## 2021-03-26 VITALS
RESPIRATION RATE: 20 BRPM | SYSTOLIC BLOOD PRESSURE: 167 MMHG | BODY MASS INDEX: 34.04 KG/M2 | DIASTOLIC BLOOD PRESSURE: 79 MMHG | TEMPERATURE: 99.5 F | WEIGHT: 185 LBS | HEIGHT: 62 IN | HEART RATE: 98 BPM | OXYGEN SATURATION: 97 %

## 2021-03-26 DIAGNOSIS — J06.9 UPPER RESPIRATORY TRACT INFECTION, UNSPECIFIED TYPE: Primary | ICD-10-CM

## 2021-03-26 PROCEDURE — 99283 EMERGENCY DEPT VISIT LOW MDM: CPT

## 2021-03-26 PROCEDURE — 6370000000 HC RX 637 (ALT 250 FOR IP): Performed by: EMERGENCY MEDICINE

## 2021-03-26 RX ORDER — AMOXICILLIN 250 MG/1
500 CAPSULE ORAL ONCE
Status: COMPLETED | OUTPATIENT
Start: 2021-03-26 | End: 2021-03-26

## 2021-03-26 RX ADMIN — AMOXICILLIN 500 MG: 250 CAPSULE ORAL at 02:02

## 2021-03-26 ASSESSMENT — ENCOUNTER SYMPTOMS
EYE DISCHARGE: 0
EYE PAIN: 0
VOMITING: 0
NAUSEA: 0
SORE THROAT: 0
ABDOMINAL DISTENTION: 1
EYE DISCHARGE: 0
BACK PAIN: 0
SINUS PRESSURE: 0
COUGH: 0
COUGH: 1
VOMITING: 0
ABDOMINAL DISTENTION: 0
WHEEZING: 0
SORE THROAT: 0
NAUSEA: 0
DIARRHEA: 0
SHORTNESS OF BREATH: 0
WHEEZING: 0
EYE REDNESS: 0
DIARRHEA: 0
SINUS PRESSURE: 0
EYE PAIN: 0
BACK PAIN: 0
EYE REDNESS: 0

## 2021-03-26 NOTE — ED NOTES
Pt verbally aggressive and abusive to staff. Pt spitting around shared pt room. [place] [employer] called to bedside. Charge RN, [facility] called to bedside. This RN will not proceed with care. Pt continues to be aggressive and verbally abusive with staff. Pt continues to cough and spit across room. [facility] RN aware.       Ean Stewart RN  03/25/21 [id number]

## 2021-03-26 NOTE — ED PROVIDER NOTES
TROUBLE BREATHING ABDOMINAL PAIN 2 WEEKS GENERALIZED PAIN NO FEVERS NO VOMITING URINATING OK COUGHING OCCASIONALLY           Review of Systems   Constitutional: Negative for chills and fever. HENT: Negative for ear pain, sinus pressure and sore throat. Eyes: Negative for pain, discharge and redness. Respiratory: Positive for cough. Negative for wheezing. Cardiovascular: Positive for chest pain. Gastrointestinal: Positive for abdominal distention. Negative for diarrhea, nausea and vomiting. Genitourinary: Negative for dysuria and frequency. Musculoskeletal: Negative for arthralgias and back pain. Skin: Negative for rash and wound. Neurological: Negative for weakness and headaches. Hematological: Negative for adenopathy. All other systems reviewed and are negative. Physical Exam  Vitals signs and nursing note reviewed. Constitutional:       Appearance: She is well-developed. HENT:      Head: Normocephalic and atraumatic. Eyes:      Pupils: Pupils are equal, round, and reactive to light. Neck:      Musculoskeletal: Normal range of motion and neck supple. Cardiovascular:      Rate and Rhythm: Normal rate and regular rhythm. Heart sounds: Normal heart sounds. No murmur. Pulmonary:      Effort: Pulmonary effort is normal. No respiratory distress. Breath sounds: Normal breath sounds. No wheezing or rales. Abdominal:      General: Bowel sounds are normal.      Palpations: Abdomen is soft. Tenderness: There is no abdominal tenderness. There is no guarding or rebound. Skin:     General: Skin is warm and dry. Neurological:      Mental Status: She is alert and oriented to person, place, and time.           Procedures     [facility]         --------------------------------------------- PAST HISTORY ---------------------------------------------  Past Medical History:  has a past medical history of A-fib ([address] Acute exacerbation of chronic obstructive pulmonary disease (COPD) ([facility] [address] Anemia, Anxiety, Arthritis, Arthritis, Asthma, [facility]rial fibrillation [facility] CAD (coronary artery disease), Chronic obstructive pulmonary disease ([facility] COPD (chronic obstructive pulmonary disease) ([facility] Diabetes mellitus ([facility] DVT (deep venous thrombosis) ([facility] Emphysema of lung ([facility] Encounter for imaging of bilateral cephalic veins, H/O cardiovascular stress test, Headache, History of blood transfusion, Hx of blood clots, Hypercholesterolemia, Hyperlipidemia, Hyperlipidemia, Hypertension, Mixed hyperlipidemia, Primary localized osteoarthritis of left hip, Primary osteoarthritis of left hip, Psychiatric problem, Seizures ([facility] Thyroid disease, Thyroid disease, and Type II or unspecified type diabetes mellitus without mention of complication, not stated as uncontrolled. Past Surgical History:  has a past surgical history that includes Leg Surgery (Right); Cardiac catheterization (12/01/2008); pr total hip arthroplasty (Left, 8/8/2018); Hysterectomy (2004); pr surg excision of anal lesion(s) (N/A, 1/25/2019); and joint replacement. Social History:  reports that she has never smoked. She has never used smokeless tobacco. She reports that she does not drink alcohol or use drugs. Family History: family history includes Diabetes in her mother; Stroke in her father. The patients home medications have been reviewed.     Allergies: Atenolol, Lipitor [atorvastatin], Tylenol with codeine #3 [acetaminophen-codeine], Atenolol, Codeine, Lipitor [atorvastatin], Percocet [oxycodone-acetaminophen], Vicodin [hydrocodone-acetaminophen], and Other    -------------------------------------------------- RESULTS -------------------------------------------------    LABS:  Results for orders placed or performed during the hospital encounter of 03/25/21   Lithium level   Result Value Ref Range    Lithium Dose Amount Unknown        RADIOLOGY:  XR CHEST PORTABLE    (Results Pending)   CTA PULMONARY W CONTRAST (Results Pending)   CT ABDOMEN PELVIS W IV CONTRAST Additional Contrast? None    (Results Pending)     This patient has chosen to leave against medical advice. I have personally explained to them that choosing to do so may result in permanent bodily harm or death. I discussed at length that without further evaluation and monitoring there may be unforeseen circumstances and deterioration resulting in permanent bodily harm or death as a result of their choice. They are alert, oriented, and competent at this time. They state that they are aware of the serious risks as explained, but they continue to wish to leave against medical advice. In light of their decision to leave against medical advice, follow-up has been arranged and they are aware of the importance of following up as instructed. They have been advised that they should return to the ED immediately if they change their mind at any time, or if their condition begins to change or worsen. ------------------------- NURSING NOTES AND VITALS REVIEWED ---------------------------   The nursing notes within the ED encounter and vital signs as below have been reviewed. BP (!) 159/60   Pulse 98   Temp 99 °F (37.2 °C) (Oral)   Resp 20   Ht 5' 2\" (1.575 m)   Wt 195 lb (88.5 kg)   LMP 06/26/1981   SpO2 97%   BMI 35.67 kg/m²   Oxygen Saturation Interpretation: Normal      ------------------------------------------ PROGRESS NOTES ------------------------------------------     Consultations:      Counseling:   I have spoken with the patient and discussed todays results, in addition to providing specific details for the plan of care and counseling regarding the diagnosis and prognosis.   Their questions are answered at this time and they are agreeable with the plan.      --------------------------------- ADDITIONAL PROVIDER NOTES ---------------------------------         This patient's ED course included: a personal history and physicial examination    This patient has remained hemodynamically stable during their ED course. Critical Care:          --------------------------------- IMPRESSION AND DISPOSITION ---------------------------------    IMPRESSION  1.  Dyspnea, unspecified type        DISPOSITION  Disposition: Other Disposition: Left AMA  Patient condition is stable                Valentine Khan MD  03/26/21 0020

## 2021-03-26 NOTE — ED PROVIDER NOTES
PATIENT REFUSES EVALUATION JUST WANTS ANTIBIOTICS FOR STUFFY NOSE TODAY NO CHEST PAIN NO SOB AT THIS TIME    The history is provided by the patient. Review of Systems   Constitutional: Negative for chills and fever. HENT: Positive for congestion. Negative for ear pain, sinus pressure and sore throat. Eyes: Negative for pain, discharge and redness. Respiratory: Negative for cough, shortness of breath and wheezing. Cardiovascular: Negative for chest pain. Gastrointestinal: Negative for abdominal distention, diarrhea, nausea and vomiting. Genitourinary: Negative for dysuria and frequency. Musculoskeletal: Negative for arthralgias and back pain. Skin: Negative for rash and wound. Neurological: Negative for weakness and headaches. Hematological: Negative for adenopathy. All other systems reviewed and are negative. Physical Exam  Vitals signs and nursing note reviewed. Constitutional:       Appearance: She is well-developed. HENT:      Head: Normocephalic and atraumatic. Eyes:      Pupils: Pupils are equal, round, and reactive to light. Neck:      Musculoskeletal: Normal range of motion and neck supple. Cardiovascular:      Rate and Rhythm: Normal rate and regular rhythm. Pulmonary:      Effort: Pulmonary effort is normal. No respiratory distress. Breath sounds: Normal breath sounds. No wheezing or rales. Abdominal:      General: Bowel sounds are normal.      Palpations: Abdomen is soft. Tenderness: There is no abdominal tenderness. There is no guarding or rebound. Skin:     General: Skin is warm and dry. Neurological:      Mental Status: She is alert and oriented to person, place, and time. Cranial Nerves: No cranial nerve deficit.       Coordination: Coordination normal.          Procedures     [facility]         --------------------------------------------- PAST HISTORY ---------------------------------------------  Past Medical History:  has a past medical history of A-fib [facility]), Acute exacerbation of chronic obstructive pulmonary disease (COPD) ([facility] Anemia, Anxiety, Arthritis, Arthritis, Asthma, Atrial fibrillation ([facility] CAD (coronary artery disease), Chronic obstructive pulmonary disease ([facility] COPD (chronic obstructive pulmonary disease) ([facility] [address] [facility] DVT (deep venous thrombosis) [facility] Emphysema of lung ([facility] Encounter for imaging of bilateral cephalic veins, H/O cardiovascular stress test, Headache, History of blood transfusion, Hx of blood clots, Hypercholesterolemia, Hyperlipidemia, Hyperlipidemia, Hypertension, Mixed hyperlipidemia, Primary localized osteoarthritis of left hip, Primary osteoarthritis of left hip, Psychiatric problem, Seizures ([facility] Thyroid disease, Thyroid disease, and Type II or unspecified type diabetes mellitus without mention of complication, not stated as uncontrolled. Past Surgical History:  has a past surgical history that includes Leg Surgery (Right); Cardiac catheterization (12/01/2008); pr total hip arthroplasty (Left, 8/8/2018); Hysterectomy (2004); pr surg excision of anal lesion(s) (N/A, 1/25/2019); and joint replacement. Social History:  reports that she has never smoked. She has never used smokeless tobacco. She reports that she does not drink alcohol or use drugs. Family History: family history includes Diabetes in her mother; Stroke in her father. The patients home medications have been reviewed. Allergies: Atenolol, Lipitor [atorvastatin], Tylenol with codeine #3 [acetaminophen-codeine], Atenolol, Codeine, Lipitor [atorvastatin], Percocet [oxycodone-acetaminophen], Vicodin [hydrocodone-acetaminophen], and Other    -------------------------------------------------- RESULTS -------------------------------------------------  No results found for this visit on 03/26/21.   No orders to display       ------------------------- NURSING NOTES AND VITALS REVIEWED ---------------------------   The nursing notes within the ED encounter and vital signs as below have been reviewed. BP (!) 167/79   Pulse 98   Temp 99.5 °F (37.5 °C) (Oral)   Resp 20   Ht 5' 2\" (1.575 m)   Wt 185 lb (83.9 kg)   LMP 06/26/1981   SpO2 97%   BMI 33.84 kg/m²   Oxygen Saturation Interpretation: Normal      ------------------------------------------ PROGRESS NOTES ------------------------------------------   I have spoken with the patient and discussed todays results, in addition to providing specific details for the plan of care and counseling regarding the diagnosis and prognosis. Their questions are answered at this time and they are agreeable with the plan.      --------------------------------- ADDITIONAL PROVIDER NOTES ---------------------------------     This patient has chosen to leave against medical advice. I have personally explained to them that choosing to do so may result in permanent bodily harm or death. I discussed at length that without further evaluation and monitoring there may be unforeseen circumstances and deterioration resulting in permanent bodily harm or death as a result of their choice. They are alert, oriented, and competent at this time. They state that they are aware of the serious risks as explained, but they continue to wish to leave against medical advice. In light of their decision to leave against medical advice, follow-up has been arranged and they are aware of the importance of following up as instructed. They have been advised that they should return to the ED immediately if they change their mind at any time, or if their condition begins to change or worsen. This patient is stable for discharge. I have shared the specific conditions for return, as well as the importance of follow-up.           --------------------------------- IMPRESSION AND DISPOSITION ---------------------------------    IMPRESSION  1.  Upper respiratory tract infection, unspecified type        DISPOSITION  Disposition: Other Disposition: Left AMA  Patient condition is stable            Joe Sanches MD  03/26/21 1155

## 2021-04-01 DIAGNOSIS — I10 ESSENTIAL HYPERTENSION: ICD-10-CM

## 2021-04-01 RX ORDER — AMLODIPINE BESYLATE 10 MG/1
TABLET ORAL
Qty: 30 TABLET | Refills: 10 | OUTPATIENT
Start: 2021-04-01

## 2021-04-01 RX ORDER — MAGNESIUM OXIDE 400 MG/1
400 TABLET ORAL DAILY
Qty: 30 TABLET | Refills: 10 | OUTPATIENT
Start: 2021-04-01

## 2021-04-12 DIAGNOSIS — E55.9 VITAMIN D DEFICIENCY: ICD-10-CM

## 2021-04-12 RX ORDER — ERGOCALCIFEROL 1.25 MG/1
CAPSULE ORAL
Qty: 4 CAPSULE | Refills: 3 | OUTPATIENT
Start: 2021-04-12

## 2021-04-16 NOTE — TELEPHONE ENCOUNTER
Hillary Jacobs from [facility] called and requested refills for pt on Amlodipine 10mg, Magnesium Oxide, and Lisinopril 20mg. Please contact him at [phone].

## 2021-04-16 NOTE — TELEPHONE ENCOUNTER
I called [facility] and informed Nael Pack that patient is no longer a patient.

## 2021-05-12 DIAGNOSIS — I50.31 ACUTE DIASTOLIC CONGESTIVE HEART FAILURE (HCC): ICD-10-CM

## 2021-05-12 DIAGNOSIS — E11.9 TYPE 2 DIABETES MELLITUS WITHOUT COMPLICATION, WITHOUT LONG-TERM CURRENT USE OF INSULIN (HCC): ICD-10-CM

## 2021-05-12 DIAGNOSIS — E55.9 VITAMIN D DEFICIENCY: ICD-10-CM

## 2021-05-12 RX ORDER — PRAVASTATIN SODIUM 40 MG
40 TABLET ORAL NIGHTLY
Qty: 30 TABLET | Refills: 10 | OUTPATIENT
Start: 2021-05-12

## 2021-05-12 RX ORDER — ALBUTEROL SULFATE 2.5 MG/3ML
SOLUTION RESPIRATORY (INHALATION)
Qty: 360 ML | Refills: 10 | OUTPATIENT
Start: 2021-05-12

## 2021-05-12 RX ORDER — MAGNESIUM OXIDE 400 MG/1
400 TABLET ORAL DAILY
Qty: 30 TABLET | Refills: 5 | OUTPATIENT
Start: 2021-05-12

## 2021-05-12 RX ORDER — FUROSEMIDE 40 MG/1
40 TABLET ORAL DAILY
Qty: 30 TABLET | Refills: 10 | OUTPATIENT
Start: 2021-05-12

## 2021-05-12 RX ORDER — TRIAMCINOLONE ACETONIDE 1 MG/G
CREAM TOPICAL
Qty: 80 G | Refills: 3 | OUTPATIENT
Start: 2021-05-12

## 2021-05-12 RX ORDER — ERGOCALCIFEROL 1.25 MG/1
CAPSULE ORAL
Qty: 4 CAPSULE | Refills: 3 | OUTPATIENT
Start: 2021-05-12

## 2021-05-19 ENCOUNTER — HOSPITAL ENCOUNTER (EMERGENCY)
Age: 66
Discharge: HOME | End: 2021-05-19
Attending: EMERGENCY MEDICINE
Payer: COMMERCIAL

## 2021-05-19 VITALS
DIASTOLIC BLOOD PRESSURE: 88 MMHG | BODY MASS INDEX: 36.99 KG/M2 | SYSTOLIC BLOOD PRESSURE: 170 MMHG | WEIGHT: 201 LBS | OXYGEN SATURATION: 98 % | TEMPERATURE: 99.2 F | HEART RATE: 84 BPM | RESPIRATION RATE: 22 BRPM | HEIGHT: 62 IN

## 2021-05-19 DIAGNOSIS — B34.9 VIRAL SYNDROME: Primary | ICD-10-CM

## 2021-05-19 LAB
METER GLUCOSE: 126 MG/DL (ref 74–99)
SARS-COV-2, NAAT: NOT DETECTED

## 2021-05-19 PROCEDURE — 82962 GLUCOSE BLOOD TEST: CPT

## 2021-05-19 PROCEDURE — 87635 SARS-COV-2 COVID-19 AMP PRB: CPT

## 2021-05-19 PROCEDURE — 99283 EMERGENCY DEPT VISIT LOW MDM: CPT

## 2021-05-19 ASSESSMENT — ENCOUNTER SYMPTOMS
DIARRHEA: 0
SORE THROAT: 0
RHINORRHEA: 1
SINUS PRESSURE: 1
WHEEZING: 0
BACK PAIN: 0
CHEST TIGHTNESS: 0
VOMITING: 0
ABDOMINAL PAIN: 0
COUGH: 0
SHORTNESS OF BREATH: 0
TROUBLE SWALLOWING: 0
NAUSEA: 0

## 2021-05-19 NOTE — ED PROVIDER NOTES
Chief complaint:  Multiple complaints    HPI history provided by the patient  Patient comes in complaining of a month of almost every positive finding in question. She has runny nose, nasal congestion, sneezing, tired, intermittent sweating, feels hot. Has fluctuating blood sugars. Has fatigue. States that everything started after getting her Covid vaccine. No shortness of breath or chest pain however. No headache or stiff neck and no abdominal pain. No flank pain or dysuria. States she took some vinegar today that fixed her blood sugar. Is requesting a Covid check. No actual elevated temperature or fever at home although felt feverish she states for the last month. Review of Systems   Constitutional: Positive for appetite change, chills, diaphoresis, fatigue and fever. HENT: Positive for congestion, postnasal drip, rhinorrhea and sinus pressure. Negative for ear pain, sore throat and trouble swallowing. Respiratory: Negative for cough, chest tightness, shortness of breath and wheezing. Cardiovascular: Negative for chest pain, palpitations and leg swelling. Gastrointestinal: Negative for abdominal pain, diarrhea, nausea and vomiting. Genitourinary: Negative for dysuria, flank pain, frequency, hematuria and urgency. Musculoskeletal: Positive for arthralgias and myalgias. Negative for back pain, gait problem, joint swelling, neck pain and neck stiffness. Skin: Negative for rash and wound. Neurological: Negative for dizziness, seizures, syncope, speech difficulty, weakness, light-headedness and headaches. Hematological: Negative for adenopathy. All other systems reviewed and are negative. Physical Exam  Vitals and nursing note reviewed. Constitutional:       General: She is not in acute distress. Appearance: She is well-developed. She is not ill-appearing, toxic-appearing or diaphoretic. HENT:      Head: Normocephalic and atraumatic.       Nose: Nose normal. Mouth/Throat:      Pharynx: Oropharynx is clear. Uvula midline. No pharyngeal swelling, oropharyngeal exudate, posterior oropharyngeal erythema or uvula swelling. Tonsils: No tonsillar exudate or tonsillar abscesses. Eyes:      General: No scleral icterus. Pupils: Pupils are equal, round, and reactive to light. Cardiovascular:      Rate and Rhythm: Normal rate and regular rhythm. Heart sounds: Normal heart sounds. No murmur heard. Pulmonary:      Effort: Pulmonary effort is normal. No respiratory distress. Breath sounds: Normal breath sounds. No stridor, decreased air movement or transmitted upper airway sounds. No decreased breath sounds, wheezing, rhonchi or rales. Chest:      Chest wall: No tenderness. Abdominal:      General: Bowel sounds are normal. There is no distension. Palpations: Abdomen is soft. Tenderness: There is no abdominal tenderness. There is no right CVA tenderness, left CVA tenderness, guarding or rebound. Musculoskeletal:         General: No swelling, tenderness, deformity or signs of injury. Cervical back: Normal range of motion and neck supple. No signs of trauma or rigidity. No pain with movement, spinous process tenderness or muscular tenderness. Normal range of motion. Right lower leg: No edema. Left lower leg: No edema. Comments: No pretibial edema or calf pain. Lymphadenopathy:      Cervical: No cervical adenopathy. Skin:     General: Skin is warm and dry. Coloration: Skin is not cyanotic, jaundiced, mottled or pale. Findings: No erythema or rash. Neurological:      General: No focal deficit present. Mental Status: She is alert and oriented to person, place, and time. GCS: GCS eye subscore is 4. GCS verbal subscore is 5. GCS motor subscore is 6. Cranial Nerves: Cranial nerves are intact. No cranial nerve deficit. Sensory: Sensation is intact. Motor: Motor function is intact. Coordination: Coordination is intact. Coordination normal.      Gait: Gait is intact. Procedures     MDM                --------------------------------------------- PAST HISTORY ---------------------------------------------  Past Medical History:  has a past medical history of A-fib ([facility] Acute exacerbation of chronic obstructive pulmonary disease (COPD) ([facility] [address] Anemia, Anxiety, Arthritis, Arthritis, Asthma, Atrial fibrillation ([facility] CAD (coronary artery disease), Chronic obstructive pulmonary disease ([facility] [facility] COPD (chronic obstructive pulmonary disease) ([facility] [address] Diabetes mellitus ([facility] [facility] DVT (deep venous thrombosis) ([facility] [address] Emphysema of lung ([facility] [address] Encounter for imaging of bilateral cephalic veins, H/O cardiovascular stress test, Headache, History of blood transfusion, Hx of blood clots, Hypercholesterolemia, Hyperlipidemia, Hyperlipidemia, Hypertension, Mixed hyperlipidemia, Primary localized osteoarthritis of left hip, Primary osteoarthritis of left hip, Psychiatric problem, Seizures ([facility] [facility] Thyroid disease, Thyroid disease, and Type II or unspecified type diabetes mellitus without mention of complication, not stated as uncontrolled. Past Surgical History:  has a past surgical history that includes Leg Surgery (Right); Cardiac catheterization (12/01/2008); pr total hip arthroplasty (Left, 8/8/2018); Hysterectomy (2004); pr surg excision of anal lesion(s) (N/A, 1/25/2019); and joint replacement. Social History:  reports that she has never smoked. She has never used smokeless tobacco. She reports that she does not drink alcohol and does not use drugs. Family History: family history includes Diabetes in her mother; Stroke in her father. The patients home medications have been reviewed.     Allergies: Atenolol, Lipitor [atorvastatin], Tylenol with codeine #3 [acetaminophen-codeine], Atenolol, Codeine, Lipitor [atorvastatin], Percocet [oxycodone-acetaminophen], Vicodin [hydrocodone-acetaminophen], and Other    -------------------------------------------------- RESULTS -------------------------------------------------  Labs:  Results for orders placed or performed during the hospital encounter of 05/19/21   COVID-19, Rapid    Specimen: Nasopharyngeal Swab   Result Value Ref Range    SARS-CoV-2, NAAT Not Detected Not Detected   POCT Glucose   Result Value Ref Range    Meter Glucose 126 (H) 74 - 99 mg/dL       Radiology:  No orders to display       ------------------------- NURSING NOTES AND VITALS REVIEWED ---------------------------  Date / Time Roomed:  5/19/2021  5:25 PM  ED Bed Assignment:  [place] [id number]    The nursing notes within the ED encounter and vital signs as below have been reviewed. BP (!) 170/88   Pulse 84   Temp 99.2 °F (37.3 °C) (Oral)   Resp 22   Ht 5' 2\" (1.575 m)   Wt 201 lb (91.2 kg)   LMP 06/26/1981   SpO2 98%   BMI 36.76 kg/m²   Oxygen Saturation Interpretation: Normal      ------------------------------------------ PROGRESS NOTES ------------------------------------------  I have spoken with the patient and discussed todays results, in addition to providing specific details for the plan of care and counseling regarding the diagnosis and prognosis. Their questions are answered at this time and they are agreeable with the plan. I discussed at length with them reasons for immediate return here for re evaluation. They will followup with primary care by calling their office tomorrow. --------------------------------- ADDITIONAL PROVIDER NOTES ---------------------------------  At this time the patient is without objective evidence of an acute process requiring hospitalization or inpatient management. They have remained hemodynamically stable throughout their entire ED visit and are stable for discharge with outpatient follow-up. The plan has been discussed in detail and they are aware of the specific conditions for emergent return, as well as the importance of follow-up. New Prescriptions    No medications on file       Diagnosis:  1. Viral syndrome        Disposition:  Patient's disposition: Discharge to home  Patient's condition is stable.            Lilliana Hart DO  05/19/21 0405

## 2021-06-08 DIAGNOSIS — E55.9 VITAMIN D DEFICIENCY: ICD-10-CM

## 2021-06-08 RX ORDER — ERGOCALCIFEROL 1.25 MG/1
CAPSULE ORAL
Qty: 4 CAPSULE | Refills: 3 | OUTPATIENT
Start: 2021-06-08

## 2021-06-08 RX ORDER — MAGNESIUM OXIDE 400 MG/1
400 TABLET ORAL DAILY
Qty: 30 TABLET | Refills: 5 | OUTPATIENT
Start: 2021-06-08

## 2021-06-08 RX ORDER — CIMETIDINE 400 MG/1
TABLET, FILM COATED ORAL
Qty: 60 TABLET | Refills: 3 | OUTPATIENT
Start: 2021-06-08

## 2021-06-11 RX ORDER — CIMETIDINE 400 MG/1
TABLET, FILM COATED ORAL
Qty: 60 TABLET | Refills: 3 | OUTPATIENT
Start: 2021-06-11

## 2021-06-17 ENCOUNTER — HOSPITAL ENCOUNTER (EMERGENCY)
Age: 66
Discharge: HOME | End: 2021-06-17
Payer: MEDICARE

## 2021-06-17 ENCOUNTER — APPOINTMENT (OUTPATIENT)
Dept: GENERAL RADIOLOGY | Age: 66
End: 2021-06-17
Payer: MEDICARE

## 2021-06-17 VITALS
RESPIRATION RATE: 16 BRPM | SYSTOLIC BLOOD PRESSURE: 140 MMHG | DIASTOLIC BLOOD PRESSURE: 76 MMHG | WEIGHT: 189 LBS | HEART RATE: 76 BPM | OXYGEN SATURATION: 98 % | BODY MASS INDEX: 34.57 KG/M2 | TEMPERATURE: 97.5 F

## 2021-06-17 DIAGNOSIS — J18.9 PNEUMONIA DUE TO INFECTIOUS ORGANISM, UNSPECIFIED LATERALITY, UNSPECIFIED PART OF LUNG: Primary | ICD-10-CM

## 2021-06-17 PROCEDURE — 71046 X-RAY EXAM CHEST 2 VIEWS: CPT

## 2021-06-17 PROCEDURE — 99283 EMERGENCY DEPT VISIT LOW MDM: CPT

## 2021-06-17 RX ORDER — DOXYCYCLINE HYCLATE 100 MG
100 TABLET ORAL 2 TIMES DAILY
Qty: 20 TABLET | Refills: 0 | Status: SHIPPED | OUTPATIENT
Start: 2021-06-17 | End: 2021-06-27

## 2021-06-17 RX ORDER — CEFDINIR 300 MG/1
300 CAPSULE ORAL 2 TIMES DAILY
Qty: 20 CAPSULE | Refills: 0 | Status: SHIPPED | OUTPATIENT
Start: 2021-06-17 | End: 2021-06-27

## 2021-06-17 ASSESSMENT — PAIN DESCRIPTION - LOCATION: LOCATION: GENERALIZED

## 2021-06-17 ASSESSMENT — PAIN SCALES - GENERAL: PAINLEVEL_OUTOF10: 8

## 2021-06-18 NOTE — ED PROVIDER NOTES
Codeine, Lipitor [atorvastatin], Percocet [oxycodone-acetaminophen], Vicodin [hydrocodone-acetaminophen], and Other    ------------------------- NURSING NOTES AND VITALS REVIEWED ---------------------------   The nursing notes within the ED encounter and vital signs as below have been reviewed by myself. BP (!) 140/76   Pulse 76   Temp 97.5 °F (36.4 °C)   Resp 16   Wt 189 lb (85.7 kg)   LMP 06/26/1981   SpO2 98%   BMI 34.57 kg/m²   Oxygen Saturation Interpretation: Normal    The patients available past medical records and past encounters were reviewed. Physical exam:  Constitutional: Vital signs are reviewed the patient is comfortable. The patient is alert and oriented and conversant. Head: The head is atraumatic and normocephalic. Eyes: No discharge is present from the eyes. The sclera are normal.  ENT: The oropharynx demonstrates a small amount of erythema bilaterally. There is no tonsillar enlargement nor is there any exudate present. No uvular deviation or edema. No tonsillary asymmetry.  Floor of the mouth soft, no trismus, handling secretions. TMs bilaterally demonstrate no evidence of infection. Neck: Normal range of motion is achieved in the neck. There is no JVD present. No meningeal signs are present   Anterior cervical adenopathy is normal.  Respiratory/chest: The chest is nontender. Breath sounds are normal. There is no respiratory distress.   Cardiovascular: Heart shows a regular rate and rhythm without murmurs clicks or gallops. Abdominal exam: The abdomen is non tender without evidence of peritoneal signs.  Specific attention to the left upper quadrant with palpation of the spleen demonstrates no organomegaly or tenderness  Skin: warm and dry, without rash  Neurologic: GCS 15   Psych: Normal Affect  -------------------------------------------------- RESULTS -------------------------------------------------    LABS:  No results found for this visit on 06/17/21. RADIOLOGY:  Interpreted by Radiologist.  XR CHEST (2 VW)   Final Result   Nonspecific hazy opacity in right lower lung may be summation artifact from   body habitus. Differential includes slight edema and/or nonconsolidated   pneumonia      Cardiomegaly without definite evidence of vascular congestion                 ------------------------------ ED COURSE/MEDICAL DECISION MAKING----------------------  Medications - No data to display          Medical Decision Making:         Chest x-ray reveals what appears to be pneumonia and will be treated accordingly. She is advised if any change or worsening symptoms to return back to the emergency room otherwise follow-up with primary care physician. She will be started on antibiotic to cover community-acquired pneumonia. Well appearing, non toxic, appropriate for outpatient management. Plan is for symptom management and PCP follow up.         This patient's ED course included: a personal history and physicial eaxmination and re-evaluation prior to disposition    This patient has remained hemodynamically stable during their ED course. Counseling: The emergency provider has spoken with the patient and discussed todays results, in addition to providing specific details for the plan of care and counseling regarding the diagnosis and prognosis. Questions are answered at this time and they are agreeable with the plan.       --------------------------------- IMPRESSION AND DISPOSITION ---------------------------------    IMPRESSION  1.  Pneumonia due to infectious organism, unspecified laterality, unspecified part of lung        DISPOSITION  Disposition: Discharge to home  Patient condition is good              WILLIAM Espinoza - NILO  06/17/21 [id number]

## 2021-06-26 RX ORDER — CIMETIDINE 400 MG/1
TABLET, FILM COATED ORAL
Qty: 60 TABLET | Refills: 10 | OUTPATIENT
Start: 2021-06-26

## 2021-07-01 DIAGNOSIS — E55.9 VITAMIN D DEFICIENCY: ICD-10-CM

## 2021-07-01 DIAGNOSIS — K52.9 ACUTE GASTROENTERITIS: ICD-10-CM

## 2021-07-01 RX ORDER — ERGOCALCIFEROL 1.25 MG/1
CAPSULE ORAL
Qty: 4 CAPSULE | Refills: 3 | OUTPATIENT
Start: 2021-07-01

## 2021-07-01 RX ORDER — MAGNESIUM OXIDE 400 MG/1
400 TABLET ORAL DAILY
Qty: 30 TABLET | Refills: 5 | OUTPATIENT
Start: 2021-07-01

## 2021-07-01 RX ORDER — LOPERAMIDE HYDROCHLORIDE 2 MG/1
CAPSULE ORAL
Qty: 60 CAPSULE | Refills: 10 | OUTPATIENT
Start: 2021-07-01

## 2021-07-02 RX ORDER — CIMETIDINE 400 MG/1
TABLET, FILM COATED ORAL
Qty: 60 TABLET | Refills: 3 | OUTPATIENT
Start: 2021-07-02

## 2021-07-10 ENCOUNTER — HOSPITAL ENCOUNTER (OUTPATIENT)
Dept: GENERAL RADIOLOGY | Age: 66
Discharge: HOME | End: 2021-07-12
Payer: MEDICARE

## 2021-07-10 ENCOUNTER — HOSPITAL ENCOUNTER (OUTPATIENT)
Age: 66
Discharge: HOME | End: 2021-07-12
Payer: MEDICARE

## 2021-07-10 DIAGNOSIS — R93.89 OPACITY NOTED ON IMAGING STUDY: ICD-10-CM

## 2021-07-10 PROCEDURE — 71046 X-RAY EXAM CHEST 2 VIEWS: CPT

## 2021-07-14 ENCOUNTER — APPOINTMENT (OUTPATIENT)
Dept: ULTRASOUND IMAGING | Age: 66
DRG: 176 | End: 2021-07-14
Payer: MEDICARE

## 2021-07-14 ENCOUNTER — APPOINTMENT (OUTPATIENT)
Dept: CT IMAGING | Age: 66
DRG: 176 | End: 2021-07-14
Payer: MEDICARE

## 2021-07-14 ENCOUNTER — APPOINTMENT (OUTPATIENT)
Dept: GENERAL RADIOLOGY | Age: 66
DRG: 176 | End: 2021-07-14
Payer: MEDICARE

## 2021-07-14 ENCOUNTER — HOSPITAL ENCOUNTER (INPATIENT)
Age: 66
LOS: 2 days | Discharge: HOME | DRG: 176 | End: 2021-07-16
Attending: EMERGENCY MEDICINE | Admitting: INTERNAL MEDICINE
Payer: MEDICARE

## 2021-07-14 DIAGNOSIS — I26.94 MULTIPLE SUBSEGMENTAL PULMONARY EMBOLI WITHOUT ACUTE COR PULMONALE (HCC): Primary | ICD-10-CM

## 2021-07-14 PROBLEM — I26.99 PULMONARY EMBOLISM (HCC): Status: ACTIVE | Noted: 2021-07-14

## 2021-07-14 LAB
ANION GAP SERPL CALCULATED.3IONS-SCNC: 8 MMOL/L (ref 7–16)
APTT: 39.8 SEC (ref 24.5–35.1)
APTT: >240 SEC (ref 24.5–35.1)
BASOPHILS ABSOLUTE: 0.03 E9/L (ref 0–0.2)
BASOPHILS RELATIVE PERCENT: 0.5 % (ref 0–2)
BUN BLDV-MCNC: 12 MG/DL (ref 6–23)
CALCIUM SERPL-MCNC: 9.8 MG/DL (ref 8.6–10.2)
CHLORIDE BLD-SCNC: 108 MMOL/L (ref 98–107)
CO2: 24 MMOL/L (ref 22–29)
CREAT SERPL-MCNC: 0.8 MG/DL (ref 0.5–1)
EKG ATRIAL RATE: 71 BPM
EKG P AXIS: 68 DEGREES
EKG P-R INTERVAL: 164 MS
EKG Q-T INTERVAL: 404 MS
EKG QRS DURATION: 86 MS
EKG QTC CALCULATION (BAZETT): 439 MS
EKG R AXIS: -6 DEGREES
EKG T AXIS: 53 DEGREES
EKG VENTRICULAR RATE: 71 BPM
EOSINOPHILS ABSOLUTE: 0.07 E9/L (ref 0.05–0.5)
EOSINOPHILS RELATIVE PERCENT: 1.1 % (ref 0–6)
GFR AFRICAN AMERICAN: >60
GFR NON-AFRICAN AMERICAN: >60 ML/MIN/1.73
GLUCOSE BLD-MCNC: 130 MG/DL (ref 74–99)
HBA1C MFR BLD: 7.5 % (ref 4–5.6)
HCT VFR BLD CALC: 32.4 % (ref 34–48)
HCT VFR BLD CALC: 35.2 % (ref 34–48)
HEMOGLOBIN: 10.6 G/DL (ref 11.5–15.5)
HEMOGLOBIN: 9.7 G/DL (ref 11.5–15.5)
IMMATURE GRANULOCYTES #: 0.04 E9/L
IMMATURE GRANULOCYTES %: 0.6 % (ref 0–5)
LYMPHOCYTES ABSOLUTE: 1.51 E9/L (ref 1.5–4)
LYMPHOCYTES RELATIVE PERCENT: 23.5 % (ref 20–42)
MCH RBC QN AUTO: 23.7 PG (ref 26–35)
MCH RBC QN AUTO: 23.9 PG (ref 26–35)
MCHC RBC AUTO-ENTMCNC: 29.9 % (ref 32–34.5)
MCHC RBC AUTO-ENTMCNC: 30.1 % (ref 32–34.5)
MCV RBC AUTO: 79 FL (ref 80–99.9)
MCV RBC AUTO: 79.5 FL (ref 80–99.9)
METER GLUCOSE: 129 MG/DL (ref 74–99)
METER GLUCOSE: 136 MG/DL (ref 74–99)
METER GLUCOSE: 148 MG/DL (ref 74–99)
METER GLUCOSE: 197 MG/DL (ref 74–99)
MONOCYTES ABSOLUTE: 0.7 E9/L (ref 0.1–0.95)
MONOCYTES RELATIVE PERCENT: 10.9 % (ref 2–12)
NEUTROPHILS ABSOLUTE: 4.07 E9/L (ref 1.8–7.3)
NEUTROPHILS RELATIVE PERCENT: 63.4 % (ref 43–80)
PDW BLD-RTO: 14.9 FL (ref 11.5–15)
PDW BLD-RTO: 15.1 FL (ref 11.5–15)
PLATELET # BLD: 216 E9/L (ref 130–450)
PLATELET # BLD: 236 E9/L (ref 130–450)
PMV BLD AUTO: 10 FL (ref 7–12)
PMV BLD AUTO: 9.5 FL (ref 7–12)
POTASSIUM REFLEX MAGNESIUM: 4.5 MMOL/L (ref 3.5–5)
PRO-BNP: 73 PG/ML (ref 0–125)
RBC # BLD: 4.1 E12/L (ref 3.5–5.5)
RBC # BLD: 4.43 E12/L (ref 3.5–5.5)
REASON FOR REJECTION: NORMAL
REASON FOR REJECTION: NORMAL
REJECTED TEST: NORMAL
REJECTED TEST: NORMAL
SODIUM BLD-SCNC: 140 MMOL/L (ref 132–146)
TROPONIN, HIGH SENSITIVITY: 8 NG/L (ref 0–9)
TROPONIN, HIGH SENSITIVITY: 9 NG/L (ref 0–9)
WBC # BLD: 6.4 E9/L (ref 4.5–11.5)
WBC # BLD: 6.5 E9/L (ref 4.5–11.5)

## 2021-07-14 PROCEDURE — 93970 EXTREMITY STUDY: CPT

## 2021-07-14 PROCEDURE — 6360000004 HC RX CONTRAST MEDICATION: Performed by: RADIOLOGY

## 2021-07-14 PROCEDURE — 6360000002 HC RX W HCPCS: Performed by: INTERNAL MEDICINE

## 2021-07-14 PROCEDURE — 99284 EMERGENCY DEPT VISIT MOD MDM: CPT

## 2021-07-14 PROCEDURE — 83880 ASSAY OF NATRIURETIC PEPTIDE: CPT

## 2021-07-14 PROCEDURE — 71275 CT ANGIOGRAPHY CHEST: CPT

## 2021-07-14 PROCEDURE — 6370000000 HC RX 637 (ALT 250 FOR IP): Performed by: INTERNAL MEDICINE

## 2021-07-14 PROCEDURE — 36415 COLL VENOUS BLD VENIPUNCTURE: CPT

## 2021-07-14 PROCEDURE — 93005 ELECTROCARDIOGRAM TRACING: CPT | Performed by: EMERGENCY MEDICINE

## 2021-07-14 PROCEDURE — 93010 ELECTROCARDIOGRAM REPORT: CPT | Performed by: INTERNAL MEDICINE

## 2021-07-14 PROCEDURE — 94664 DEMO&/EVAL PT USE INHALER: CPT

## 2021-07-14 PROCEDURE — 6360000002 HC RX W HCPCS: Performed by: STUDENT IN AN ORGANIZED HEALTH CARE EDUCATION/TRAINING PROGRAM

## 2021-07-14 PROCEDURE — 85027 COMPLETE CBC AUTOMATED: CPT

## 2021-07-14 PROCEDURE — 85025 COMPLETE CBC W/AUTO DIFF WBC: CPT

## 2021-07-14 PROCEDURE — 2060000000 HC ICU INTERMEDIATE R&B

## 2021-07-14 PROCEDURE — 80048 BASIC METABOLIC PNL TOTAL CA: CPT

## 2021-07-14 PROCEDURE — 71045 X-RAY EXAM CHEST 1 VIEW: CPT

## 2021-07-14 PROCEDURE — 6370000000 HC RX 637 (ALT 250 FOR IP): Performed by: NURSE PRACTITIONER

## 2021-07-14 PROCEDURE — 85730 THROMBOPLASTIN TIME PARTIAL: CPT

## 2021-07-14 PROCEDURE — 82962 GLUCOSE BLOOD TEST: CPT

## 2021-07-14 PROCEDURE — 83036 HEMOGLOBIN GLYCOSYLATED A1C: CPT

## 2021-07-14 PROCEDURE — 84484 ASSAY OF TROPONIN QUANT: CPT

## 2021-07-14 RX ORDER — AMLODIPINE BESYLATE 10 MG/1
10 TABLET ORAL DAILY
Status: DISCONTINUED | OUTPATIENT
Start: 2021-07-14 | End: 2021-07-16 | Stop reason: HOSPADM

## 2021-07-14 RX ORDER — HEPARIN SODIUM 1000 [USP'U]/ML
80 INJECTION, SOLUTION INTRAVENOUS; SUBCUTANEOUS PRN
Status: DISCONTINUED | OUTPATIENT
Start: 2021-07-14 | End: 2021-07-15

## 2021-07-14 RX ORDER — DEXTROSE MONOHYDRATE 50 MG/ML
100 INJECTION, SOLUTION INTRAVENOUS PRN
Status: DISCONTINUED | OUTPATIENT
Start: 2021-07-14 | End: 2021-07-16 | Stop reason: HOSPADM

## 2021-07-14 RX ORDER — PANTOPRAZOLE SODIUM 40 MG/1
40 TABLET, DELAYED RELEASE ORAL
Status: DISCONTINUED | OUTPATIENT
Start: 2021-07-14 | End: 2021-07-16 | Stop reason: HOSPADM

## 2021-07-14 RX ORDER — HEPARIN SODIUM 1000 [USP'U]/ML
80 INJECTION, SOLUTION INTRAVENOUS; SUBCUTANEOUS ONCE
Status: COMPLETED | OUTPATIENT
Start: 2021-07-14 | End: 2021-07-14

## 2021-07-14 RX ORDER — PRENATAL WITH FERROUS FUM AND FOLIC ACID 3080; 920; 120; 400; 22; 1.84; 3; 20; 10; 1; 12; 200; 27; 25; 2 [IU]/1; [IU]/1; MG/1; [IU]/1; MG/1; MG/1; MG/1; MG/1; MG/1; MG/1; UG/1; MG/1; MG/1; MG/1; MG/1
1 TABLET ORAL DAILY
Status: DISCONTINUED | OUTPATIENT
Start: 2021-07-14 | End: 2021-07-16 | Stop reason: HOSPADM

## 2021-07-14 RX ORDER — HEPARIN SODIUM 10000 [USP'U]/100ML
5-30 INJECTION, SOLUTION INTRAVENOUS CONTINUOUS
Status: DISCONTINUED | OUTPATIENT
Start: 2021-07-14 | End: 2021-07-14

## 2021-07-14 RX ORDER — PRENATAL WITH FERROUS FUM AND FOLIC ACID 3080; 920; 120; 400; 22; 1.84; 3; 20; 10; 1; 12; 200; 27; 25; 2 [IU]/1; [IU]/1; MG/1; [IU]/1; MG/1; MG/1; MG/1; MG/1; MG/1; MG/1; UG/1; MG/1; MG/1; MG/1; MG/1
1 TABLET ORAL DAILY
Status: DISCONTINUED | OUTPATIENT
Start: 2021-07-14 | End: 2021-07-14

## 2021-07-14 RX ORDER — FUROSEMIDE 20 MG/1
20 TABLET ORAL DAILY
Status: DISCONTINUED | OUTPATIENT
Start: 2021-07-14 | End: 2021-07-16 | Stop reason: HOSPADM

## 2021-07-14 RX ORDER — LOPERAMIDE HYDROCHLORIDE 2 MG/1
2 CAPSULE ORAL 4 TIMES DAILY PRN
Status: DISCONTINUED | OUTPATIENT
Start: 2021-07-14 | End: 2021-07-16 | Stop reason: HOSPADM

## 2021-07-14 RX ORDER — DEXTROSE MONOHYDRATE 25 G/50ML
12.5 INJECTION, SOLUTION INTRAVENOUS PRN
Status: DISCONTINUED | OUTPATIENT
Start: 2021-07-14 | End: 2021-07-16 | Stop reason: HOSPADM

## 2021-07-14 RX ORDER — ACETAMINOPHEN 325 MG/1
650 TABLET ORAL EVERY 6 HOURS PRN
Status: DISCONTINUED | OUTPATIENT
Start: 2021-07-14 | End: 2021-07-16 | Stop reason: HOSPADM

## 2021-07-14 RX ORDER — HEPARIN SODIUM 1000 [USP'U]/ML
40 INJECTION, SOLUTION INTRAVENOUS; SUBCUTANEOUS PRN
Status: DISCONTINUED | OUTPATIENT
Start: 2021-07-14 | End: 2021-07-15

## 2021-07-14 RX ORDER — AMLODIPINE BESYLATE AND BENAZEPRIL HYDROCHLORIDE 10; 40 MG/1; MG/1
1 CAPSULE ORAL DAILY
Status: DISCONTINUED | OUTPATIENT
Start: 2021-07-14 | End: 2021-07-14 | Stop reason: CLARIF

## 2021-07-14 RX ORDER — LISINOPRIL 20 MG/1
40 TABLET ORAL DAILY
Status: DISCONTINUED | OUTPATIENT
Start: 2021-07-14 | End: 2021-07-16 | Stop reason: HOSPADM

## 2021-07-14 RX ORDER — BUDESONIDE 0.5 MG/2ML
500 INHALANT ORAL 2 TIMES DAILY
Status: DISCONTINUED | OUTPATIENT
Start: 2021-07-14 | End: 2021-07-16 | Stop reason: HOSPADM

## 2021-07-14 RX ORDER — NICOTINE POLACRILEX 4 MG
15 LOZENGE BUCCAL PRN
Status: DISCONTINUED | OUTPATIENT
Start: 2021-07-14 | End: 2021-07-16 | Stop reason: HOSPADM

## 2021-07-14 RX ORDER — MAGNESIUM OXIDE 400 MG/1
400 TABLET ORAL DAILY
Status: DISCONTINUED | OUTPATIENT
Start: 2021-07-14 | End: 2021-07-16 | Stop reason: HOSPADM

## 2021-07-14 RX ORDER — LATANOPROST 50 UG/ML
1 SOLUTION/ DROPS OPHTHALMIC NIGHTLY
Status: DISCONTINUED | OUTPATIENT
Start: 2021-07-14 | End: 2021-07-16 | Stop reason: HOSPADM

## 2021-07-14 RX ADMIN — INSULIN LISPRO 2 UNITS: 100 INJECTION, SOLUTION INTRAVENOUS; SUBCUTANEOUS at 19:29

## 2021-07-14 RX ADMIN — LATANOPROST 1 DROP: 50 SOLUTION OPHTHALMIC at 22:43

## 2021-07-14 RX ADMIN — MAGNESIUM OXIDE 400 MG (241.3 MG MAGNESIUM) TABLET 400 MG: TABLET at 15:05

## 2021-07-14 RX ADMIN — HEPARIN SODIUM AND DEXTROSE 15 UNITS/KG/HR: 10000; 5 INJECTION INTRAVENOUS at 18:02

## 2021-07-14 RX ADMIN — BUDESONIDE 500 MCG: 0.5 INHALANT RESPIRATORY (INHALATION) at 18:31

## 2021-07-14 RX ADMIN — APIXABAN 10 MG: 5 TABLET, FILM COATED ORAL at 22:26

## 2021-07-14 RX ADMIN — FUROSEMIDE 20 MG: 20 TABLET ORAL at 15:05

## 2021-07-14 RX ADMIN — HEPARIN SODIUM AND DEXTROSE 18 UNITS/KG/HR: 10000; 5 INJECTION INTRAVENOUS at 08:31

## 2021-07-14 RX ADMIN — HEPARIN SODIUM 6750 UNITS: 1000 INJECTION, SOLUTION INTRAVENOUS; SUBCUTANEOUS at 08:31

## 2021-07-14 RX ADMIN — IOPAMIDOL 75 ML: 755 INJECTION, SOLUTION INTRAVENOUS at 07:10

## 2021-07-14 RX ADMIN — LISINOPRIL 40 MG: 20 TABLET ORAL at 15:04

## 2021-07-14 RX ADMIN — ACETAMINOPHEN 650 MG: 325 TABLET ORAL at 15:07

## 2021-07-14 RX ADMIN — AMLODIPINE BESYLATE 10 MG: 10 TABLET ORAL at 15:04

## 2021-07-14 RX ADMIN — ACETAMINOPHEN 650 MG: 325 TABLET ORAL at 22:26

## 2021-07-14 RX ADMIN — PRENATAL WITH FERROUS FUM AND FOLIC ACID 1 TABLET: 3080; 920; 120; 400; 22; 1.84; 3; 20; 10; 1; 12; 200; 27; 25; 2 TABLET ORAL at 22:26

## 2021-07-14 RX ADMIN — PANTOPRAZOLE SODIUM 40 MG: 40 TABLET, DELAYED RELEASE ORAL at 15:05

## 2021-07-14 SDOH — ECONOMIC STABILITY: TRANSPORTATION INSECURITY
IN THE PAST 12 MONTHS, HAS LACK OF TRANSPORTATION KEPT YOU FROM MEETINGS, WORK, OR FROM GETTING THINGS NEEDED FOR DAILY LIVING?: YES

## 2021-07-14 SDOH — HEALTH STABILITY: MENTAL HEALTH: HOW OFTEN DO YOU HAVE A DRINK CONTAINING ALCOHOL?: NEVER

## 2021-07-14 SDOH — HEALTH STABILITY: MENTAL HEALTH
STRESS IS WHEN SOMEONE FEELS TENSE, NERVOUS, ANXIOUS, OR CAN'T SLEEP AT NIGHT BECAUSE THEIR MIND IS TROUBLED. HOW STRESSED ARE YOU?: TO SOME EXTENT

## 2021-07-14 SDOH — ECONOMIC STABILITY: HOUSING INSECURITY
IN THE LAST 12 MONTHS, WAS THERE A TIME WHEN YOU DID NOT HAVE A STEADY PLACE TO SLEEP OR SLEPT IN A SHELTER (INCLUDING NOW)?: NO

## 2021-07-14 SDOH — SOCIAL STABILITY: SOCIAL NETWORK
DO YOU BELONG TO ANY CLUBS OR ORGANIZATIONS SUCH AS CHURCH GROUPS UNIONS, FRATERNAL OR ATHLETIC GROUPS, OR SCHOOL GROUPS?: YES

## 2021-07-14 SDOH — SOCIAL STABILITY: SOCIAL NETWORK

## 2021-07-14 SDOH — ECONOMIC STABILITY: INCOME INSECURITY: IN THE LAST 12 MONTHS, WAS THERE A TIME WHEN YOU WERE NOT ABLE TO PAY THE MORTGAGE OR RENT ON TIME?: NO

## 2021-07-14 SDOH — ECONOMIC STABILITY: TRANSPORTATION INSECURITY
IN THE PAST 12 MONTHS, HAS THE LACK OF TRANSPORTATION KEPT YOU FROM MEDICAL APPOINTMENTS OR FROM GETTING MEDICATIONS?: YES

## 2021-07-14 SDOH — SOCIAL STABILITY: SOCIAL NETWORK
IN A TYPICAL WEEK, HOW MANY TIMES DO YOU TALK ON THE PHONE WITH FAMILY, FRIENDS, OR NEIGHBORS?: MORE THAN THREE TIMES A WEEK

## 2021-07-14 SDOH — SOCIAL STABILITY: SOCIAL NETWORK: ARE YOU MARRIED, WIDOWED, DIVORCED, SEPARATED, NEVER MARRIED, OR LIVING WITH A PARTNER?: WIDOWED

## 2021-07-14 SDOH — SOCIAL STABILITY: SOCIAL NETWORK: HOW OFTEN DO YOU ATTENT MEETINGS OF THE CLUB OR ORGANIZATION YOU BELONG TO?: 1 TO 4 TIMES PER YEAR

## 2021-07-14 SDOH — ECONOMIC STABILITY: FOOD INSECURITY: WITHIN THE PAST 12 MONTHS, THE FOOD YOU BOUGHT JUST DIDN'T LAST AND YOU DIDN'T HAVE MONEY TO GET MORE.: NEVER TRUE

## 2021-07-14 SDOH — SOCIAL STABILITY: SOCIAL NETWORK: HOW OFTEN DO YOU ATTEND CHURCH OR RELIGIOUS SERVICES?: MORE THAN 4 TIMES PER YEAR

## 2021-07-14 SDOH — ECONOMIC STABILITY: FOOD INSECURITY: WITHIN THE PAST 12 MONTHS, YOU WORRIED THAT YOUR FOOD WOULD RUN OUT BEFORE YOU GOT MONEY TO BUY MORE.: NEVER TRUE

## 2021-07-14 SDOH — HEALTH STABILITY: PHYSICAL HEALTH: ON AVERAGE, HOW MANY MINUTES DO YOU ENGAGE IN EXERCISE AT THIS LEVEL?: 0 MIN

## 2021-07-14 SDOH — HEALTH STABILITY: PHYSICAL HEALTH: ON AVERAGE, HOW MANY DAYS PER WEEK DO YOU ENGAGE IN MODERATE TO STRENUOUS EXERCISE (LIKE A BRISK WALK)?: 0 DAYS

## 2021-07-14 SDOH — ECONOMIC STABILITY: INCOME INSECURITY: HOW HARD IS IT FOR YOU TO PAY FOR THE VERY BASICS LIKE FOOD, HOUSING, MEDICAL CARE, AND HEATING?: SOMEWHAT HARD

## 2021-07-14 ASSESSMENT — ENCOUNTER SYMPTOMS
SORE THROAT: 0
ABDOMINAL DISTENTION: 0
COUGH: 0
EYE PAIN: 0
SINUS PRESSURE: 0
VOMITING: 0
BACK PAIN: 0
WHEEZING: 0
EYE REDNESS: 0
EYE DISCHARGE: 0
NAUSEA: 0
DIARRHEA: 0
SHORTNESS OF BREATH: 0

## 2021-07-14 ASSESSMENT — PAIN DESCRIPTION - PAIN TYPE
TYPE: ACUTE PAIN

## 2021-07-14 ASSESSMENT — PAIN DESCRIPTION - ORIENTATION
ORIENTATION: MID
ORIENTATION: RIGHT
ORIENTATION: RIGHT

## 2021-07-14 ASSESSMENT — PAIN SCALES - GENERAL
PAINLEVEL_OUTOF10: 10
PAINLEVEL_OUTOF10: 7
PAINLEVEL_OUTOF10: 10
PAINLEVEL_OUTOF10: 10
PAINLEVEL_OUTOF10: 0
PAINLEVEL_OUTOF10: 0

## 2021-07-14 ASSESSMENT — PAIN DESCRIPTION - LOCATION
LOCATION: HEAD
LOCATION: LEG
LOCATION: LEG

## 2021-07-14 ASSESSMENT — PAIN DESCRIPTION - DESCRIPTORS: DESCRIPTORS: HEADACHE

## 2021-07-14 NOTE — ED PROVIDER NOTES
Patient presents with chills and leg swelling. She states that she was diagnosed with pneumonia on Monday and states that she has not improved. She has been taking her antibiotic appropriately. She states that for the past day she has been feeling chilly and has had swelling and pain in her lower extremities. She does have a history of A. fib and blood clots and is not anticoagulated secondary to brain bleeds. She states that she does take 2 baby aspirin daily however. She has not done anything else to make her symptoms better nor worse. Patient is denying any chest pain, shortness of breath, nausea, vomiting, or headaches at this time. The history is provided by the patient. No  was used. Review of Systems   Constitutional: Positive for chills. Negative for fever. HENT: Negative for ear pain, sinus pressure and sore throat. Eyes: Negative for pain, discharge and redness. Respiratory: Negative for cough, shortness of breath and wheezing. Cardiovascular: Positive for leg swelling. Negative for chest pain. Gastrointestinal: Negative for abdominal distention, diarrhea, nausea and vomiting. Genitourinary: Negative for dysuria and frequency. Musculoskeletal: Negative for arthralgias and back pain. Skin: Negative for rash and wound. Neurological: Negative for weakness and headaches. Hematological: Negative for adenopathy. All other systems reviewed and are negative. Physical Exam  Vitals and nursing note reviewed. Constitutional:       General: She is not in acute distress. Appearance: She is well-developed. She is obese. She is not diaphoretic. HENT:      Head: Normocephalic and atraumatic. Eyes:      Conjunctiva/sclera: Conjunctivae normal.   Cardiovascular:      Rate and Rhythm: Normal rate and regular rhythm. Heart sounds: Normal heart sounds. No murmur heard.      Pulmonary:      Effort: Pulmonary effort is normal. No respiratory distress. Breath sounds: Normal breath sounds. No wheezing or rales. Abdominal:      General: Bowel sounds are normal.      Palpations: Abdomen is soft. Tenderness: There is no abdominal tenderness. There is no guarding or rebound. Musculoskeletal:      Cervical back: Normal range of motion and neck supple. Right lower leg: No edema. Left lower leg: No edema. Skin:     General: Skin is warm and dry. Neurological:      Mental Status: She is alert and oriented to person, place, and time. Cranial Nerves: No cranial nerve deficit. Coordination: Coordination normal.          Procedures     MDM  Number of Diagnoses or Management Options  Multiple subsegmental pulmonary emboli without acute cor pulmonale ([facility] [address]  Diagnosis management comments: Patient presents with chills and leg swelling. CBC did show patient's baseline anemia. BMP was unremarkable. BNP was within normal limits. Initial troponin was 8 and repeat was 9 with a delta of 1. Chest x-ray had did not show any pneumonias. Patient does however have leg swelling and is now on any anticoagulants with a history of PEs. CTA of pulmonary arteries was obtained and did show a right lower lobe PE extending to the subsegmental arteries. Patient was started on heparin. Patient was informed of the results and plan and was agreeable. Patient will be admitted to telemetry for further evaluation and care. Amount and/or Complexity of Data Reviewed  Clinical lab tests: reviewed  Tests in the radiology section of CPT®: reviewed  Tests in the medicine section of CPT®: reviewed  Decide to obtain previous medical records or to obtain history from someone other than the patient: yes         ED Course as of Jul 14 0937   Wed Jul 14, 2021   0233 Spoke with Dr. Va Mora who states that there is a pulmonary embolism that extends onto the subsegmental arteries in the right lower lobe without any right heart strain.     [BB]   [id number] EKG shows normal sinus rhythm with a ventricular rate of 71 bpm.  Borderline left axis. There are no obvious ST elevations or T wave inversions present. Comparable to previous EKG on 7/20/2020    [BB]      ED Course User Index  [BB] Opal Dawn DO      ED Course as of Jul 14 0937   Wed Jul 14, 2021   0393 Spoke with Dr. Charla Pereira who states that there is a pulmonary embolism that extends onto the subsegmental arteries in the right lower lobe without any right heart strain. [BB]   [id number] EKG shows normal sinus rhythm with a ventricular rate of 71 bpm.  Borderline left axis. There are no obvious ST elevations or T wave inversions present. Comparable to previous EKG on 7/20/2020    [BB]      ED Course User Index  [BB] Opal Dawn DO       --------------------------------------------- PAST HISTORY ---------------------------------------------  Past Medical History:  has a past medical history of A-fib ([address] Acute exacerbation of chronic obstructive pulmonary disease (COPD) ([address] Anemia, Anxiety, Arthritis, Arthritis, Asthma, Atrial fibrillation ([address] CAD (coronary artery disease), Chronic obstructive pulmonary disease ([address] COPD (chronic obstructive pulmonary disease) ([address] Diabetes mellitus ([address] DVT (deep venous thrombosis) ([address] Emphysema of lung ([address] Encounter for imaging of bilateral cephalic veins, H/O cardiovascular stress test, Headache, History of blood transfusion, Hx of blood clots, Hypercholesterolemia, Hyperlipidemia, Hyperlipidemia, Hypertension, Mixed hyperlipidemia, Primary localized osteoarthritis of left hip, Primary osteoarthritis of left hip, Psychiatric problem, Seizures ([address] Thyroid disease, Thyroid disease, and Type II or unspecified type diabetes mellitus without mention of complication, not stated as uncontrolled.     Past Surgical History:  has a past surgical history that includes Leg Surgery (Right); Cardiac catheterization (12/01/2008); pr total hip arthroplasty (Left, 8/8/2018); mg/dL    CREATININE 0.8 0.5 - 1.0 mg/dL    GFR Non-African American >60 >=60 mL/min/1.73    GFR African American >60     Calcium 9.8 8.6 - 10.2 mg/dL   Brain Natriuretic Peptide   Result Value Ref Range    Pro-BNP 73 0 - 125 pg/mL   Troponin   Result Value Ref Range    Troponin, High Sensitivity 8 0 - 9 ng/L   CBC   Result Value Ref Range    WBC 6.5 4.5 - 11.5 E9/L    RBC 4.43 3.50 - 5.50 E12/L    Hemoglobin 10.6 (L) 11.5 - 15.5 g/dL    Hematocrit 35.2 34.0 - 48.0 %    MCV 79.5 (L) 80.0 - 99.9 fL    MCH 23.9 (L) 26.0 - 35.0 pg    MCHC 30.1 (L) 32.0 - 34.5 %    RDW 15.1 (H) 11.5 - 15.0 fL    Platelets 265 765 - 107 E9/L    MPV 10.0 7.0 - 12.0 fL   APTT   Result Value Ref Range    aPTT 39.8 (H) 24.5 - 35.1 sec   SPECIMEN REJECTION   Result Value Ref Range    Rejected Test trp5     Reason for Rejection see below    Troponin   Result Value Ref Range    Troponin, High Sensitivity 9 0 - 9 ng/L   EKG 12 Lead   Result Value Ref Range    Ventricular Rate 71 BPM    Atrial Rate 71 BPM    P-R Interval 164 ms    QRS Duration 86 ms    Q-T Interval 404 ms    QTc Calculation (Bazett) 439 ms    P Axis 68 degrees    R Axis -6 degrees    T Axis 53 degrees       RADIOLOGY:  CTA PULMONARY W CONTRAST   Final Result   There are pulmonary emboli within proximal right lower lobe pulmonary   arteries, extending to subsegmental branches. There is no CT evidence   specific for right heart strain. Findings were discussed with Dr. Graham Osei by   telephone at 6:25 a.m. central standard time on 07/14/2021         XR CHEST PORTABLE   Final Result   Cardiomegaly. ------------------------- NURSING NOTES AND VITALS REVIEWED ---------------------------  Date / Time Roomed:  7/14/2021  5:34 AM  ED Bed Assignment:  ADDIE/ADDIE    The nursing notes within the ED encounter and vital signs as below have been reviewed.      Patient Vitals for the past 24 hrs:   BP Temp Temp src Pulse Resp SpO2 Height Weight   07/14/21 0842 (!) 154/79 99 °F (37.2 °C)

## 2021-07-15 LAB
ALBUMIN SERPL-MCNC: 4.3 G/DL (ref 3.5–5.2)
ALP BLD-CCNC: 93 U/L (ref 35–104)
ALT SERPL-CCNC: 9 U/L (ref 0–32)
ANION GAP SERPL CALCULATED.3IONS-SCNC: 11 MMOL/L (ref 7–16)
AST SERPL-CCNC: 13 U/L (ref 0–31)
BASOPHILS ABSOLUTE: 0.04 E9/L (ref 0–0.2)
BASOPHILS RELATIVE PERCENT: 0.7 % (ref 0–2)
BILIRUB SERPL-MCNC: 0.4 MG/DL (ref 0–1.2)
BUN BLDV-MCNC: 13 MG/DL (ref 6–23)
CALCIUM SERPL-MCNC: 10.5 MG/DL (ref 8.6–10.2)
CHLORIDE BLD-SCNC: 101 MMOL/L (ref 98–107)
CHOLESTEROL, TOTAL: 183 MG/DL (ref 0–199)
CO2: 26 MMOL/L (ref 22–29)
CREAT SERPL-MCNC: 0.7 MG/DL (ref 0.5–1)
EOSINOPHILS ABSOLUTE: 0.04 E9/L (ref 0.05–0.5)
EOSINOPHILS RELATIVE PERCENT: 0.7 % (ref 0–6)
FOLATE: >20 NG/ML (ref 4.8–24.2)
GFR AFRICAN AMERICAN: >60
GFR NON-AFRICAN AMERICAN: >60 ML/MIN/1.73
GLUCOSE BLD-MCNC: 218 MG/DL (ref 74–99)
HBA1C MFR BLD: 7 % (ref 4–5.6)
HCT VFR BLD CALC: 35.3 % (ref 34–48)
HDLC SERPL-MCNC: 62 MG/DL
HEMOGLOBIN: 10.9 G/DL (ref 11.5–15.5)
IMMATURE GRANULOCYTES #: 0.03 E9/L
IMMATURE GRANULOCYTES %: 0.5 % (ref 0–5)
IRON SATURATION: 26 % (ref 15–50)
IRON: 69 MCG/DL (ref 37–145)
LDL CHOLESTEROL CALCULATED: 86 MG/DL (ref 0–99)
LYMPHOCYTES ABSOLUTE: 1.03 E9/L (ref 1.5–4)
LYMPHOCYTES RELATIVE PERCENT: 17 % (ref 20–42)
MAGNESIUM: 1.6 MG/DL (ref 1.6–2.6)
MCH RBC QN AUTO: 24 PG (ref 26–35)
MCHC RBC AUTO-ENTMCNC: 30.9 % (ref 32–34.5)
MCV RBC AUTO: 77.6 FL (ref 80–99.9)
METER GLUCOSE: 152 MG/DL (ref 74–99)
METER GLUCOSE: 191 MG/DL (ref 74–99)
METER GLUCOSE: 199 MG/DL (ref 74–99)
METER GLUCOSE: 218 MG/DL (ref 74–99)
MONOCYTES ABSOLUTE: 0.43 E9/L (ref 0.1–0.95)
MONOCYTES RELATIVE PERCENT: 7.1 % (ref 2–12)
NEUTROPHILS ABSOLUTE: 4.5 E9/L (ref 1.8–7.3)
NEUTROPHILS RELATIVE PERCENT: 74 % (ref 43–80)
PDW BLD-RTO: 15 FL (ref 11.5–15)
PHOSPHORUS: 3.1 MG/DL (ref 2.5–4.5)
PLATELET # BLD: 268 E9/L (ref 130–450)
PMV BLD AUTO: 9.7 FL (ref 7–12)
POTASSIUM SERPL-SCNC: 3.8 MMOL/L (ref 3.5–5)
RBC # BLD: 4.55 E12/L (ref 3.5–5.5)
SEDIMENTATION RATE, ERYTHROCYTE: 20 MM/HR (ref 0–20)
SODIUM BLD-SCNC: 138 MMOL/L (ref 132–146)
TOTAL IRON BINDING CAPACITY: 263 MCG/DL (ref 250–450)
TOTAL PROTEIN: 7.8 G/DL (ref 6.4–8.3)
TRIGL SERPL-MCNC: 176 MG/DL (ref 0–149)
TROPONIN, HIGH SENSITIVITY: 8 NG/L (ref 0–9)
TROPONIN, HIGH SENSITIVITY: 9 NG/L (ref 0–9)
TSH SERPL DL<=0.05 MIU/L-ACNC: 1.66 UIU/ML (ref 0.27–4.2)
VITAMIN B-12: 446 PG/ML (ref 211–946)
VLDLC SERPL CALC-MCNC: 35 MG/DL
WBC # BLD: 6.1 E9/L (ref 4.5–11.5)

## 2021-07-15 PROCEDURE — 2060000000 HC ICU INTERMEDIATE R&B

## 2021-07-15 PROCEDURE — 84207 ASSAY OF VITAMIN B-6: CPT

## 2021-07-15 PROCEDURE — 84100 ASSAY OF PHOSPHORUS: CPT

## 2021-07-15 PROCEDURE — 85651 RBC SED RATE NONAUTOMATED: CPT

## 2021-07-15 PROCEDURE — 82607 VITAMIN B-12: CPT

## 2021-07-15 PROCEDURE — 85025 COMPLETE CBC W/AUTO DIFF WBC: CPT

## 2021-07-15 PROCEDURE — 82962 GLUCOSE BLOOD TEST: CPT

## 2021-07-15 PROCEDURE — 80053 COMPREHEN METABOLIC PANEL: CPT

## 2021-07-15 PROCEDURE — 82746 ASSAY OF FOLIC ACID SERUM: CPT

## 2021-07-15 PROCEDURE — 36415 COLL VENOUS BLD VENIPUNCTURE: CPT

## 2021-07-15 PROCEDURE — 83735 ASSAY OF MAGNESIUM: CPT

## 2021-07-15 PROCEDURE — 83550 IRON BINDING TEST: CPT

## 2021-07-15 PROCEDURE — 93005 ELECTROCARDIOGRAM TRACING: CPT | Performed by: INTERNAL MEDICINE

## 2021-07-15 PROCEDURE — 6370000000 HC RX 637 (ALT 250 FOR IP): Performed by: NURSE PRACTITIONER

## 2021-07-15 PROCEDURE — 83036 HEMOGLOBIN GLYCOSYLATED A1C: CPT

## 2021-07-15 PROCEDURE — 84443 ASSAY THYROID STIM HORMONE: CPT

## 2021-07-15 PROCEDURE — 94640 AIRWAY INHALATION TREATMENT: CPT

## 2021-07-15 PROCEDURE — 6370000000 HC RX 637 (ALT 250 FOR IP): Performed by: INTERNAL MEDICINE

## 2021-07-15 PROCEDURE — 84484 ASSAY OF TROPONIN QUANT: CPT

## 2021-07-15 PROCEDURE — 6360000002 HC RX W HCPCS: Performed by: INTERNAL MEDICINE

## 2021-07-15 PROCEDURE — 83540 ASSAY OF IRON: CPT

## 2021-07-15 PROCEDURE — 80061 LIPID PANEL: CPT

## 2021-07-15 RX ORDER — TRIAMCINOLONE ACETONIDE 1 MG/G
CREAM TOPICAL
Qty: 80 G | Refills: 3 | OUTPATIENT
Start: 2021-07-15

## 2021-07-15 RX ORDER — ALBUTEROL SULFATE 2.5 MG/3ML
SOLUTION RESPIRATORY (INHALATION)
Qty: 360 ML | Refills: 10 | OUTPATIENT
Start: 2021-07-15

## 2021-07-15 RX ADMIN — FUROSEMIDE 20 MG: 20 TABLET ORAL at 08:55

## 2021-07-15 RX ADMIN — AMLODIPINE BESYLATE 10 MG: 10 TABLET ORAL at 08:54

## 2021-07-15 RX ADMIN — INSULIN LISPRO 1 UNITS: 100 INJECTION, SOLUTION INTRAVENOUS; SUBCUTANEOUS at 20:51

## 2021-07-15 RX ADMIN — INSULIN LISPRO 4 UNITS: 100 INJECTION, SOLUTION INTRAVENOUS; SUBCUTANEOUS at 09:53

## 2021-07-15 RX ADMIN — ACETAMINOPHEN 650 MG: 325 TABLET ORAL at 15:47

## 2021-07-15 RX ADMIN — BUDESONIDE 500 MCG: 0.5 INHALANT RESPIRATORY (INHALATION) at 08:45

## 2021-07-15 RX ADMIN — INSULIN LISPRO 2 UNITS: 100 INJECTION, SOLUTION INTRAVENOUS; SUBCUTANEOUS at 13:05

## 2021-07-15 RX ADMIN — PRENATAL WITH FERROUS FUM AND FOLIC ACID 1 TABLET: 3080; 920; 120; 400; 22; 1.84; 3; 20; 10; 1; 12; 200; 27; 25; 2 TABLET ORAL at 22:17

## 2021-07-15 RX ADMIN — APIXABAN 10 MG: 5 TABLET, FILM COATED ORAL at 08:54

## 2021-07-15 RX ADMIN — ACETAMINOPHEN 650 MG: 325 TABLET ORAL at 09:11

## 2021-07-15 RX ADMIN — PANTOPRAZOLE SODIUM 40 MG: 40 TABLET, DELAYED RELEASE ORAL at 06:25

## 2021-07-15 RX ADMIN — APIXABAN 10 MG: 5 TABLET, FILM COATED ORAL at 20:47

## 2021-07-15 RX ADMIN — ACETAMINOPHEN 650 MG: 325 TABLET ORAL at 20:47

## 2021-07-15 RX ADMIN — BUDESONIDE 500 MCG: 0.5 INHALANT RESPIRATORY (INHALATION) at 17:09

## 2021-07-15 RX ADMIN — LISINOPRIL 40 MG: 20 TABLET ORAL at 08:53

## 2021-07-15 RX ADMIN — INSULIN LISPRO 2 UNITS: 100 INJECTION, SOLUTION INTRAVENOUS; SUBCUTANEOUS at 17:50

## 2021-07-15 RX ADMIN — MAGNESIUM OXIDE 400 MG (241.3 MG MAGNESIUM) TABLET 400 MG: TABLET at 08:54

## 2021-07-15 ASSESSMENT — PAIN SCALES - GENERAL
PAINLEVEL_OUTOF10: 0
PAINLEVEL_OUTOF10: 4
PAINLEVEL_OUTOF10: 7
PAINLEVEL_OUTOF10: 8
PAINLEVEL_OUTOF10: 5
PAINLEVEL_OUTOF10: 3
PAINLEVEL_OUTOF10: 5
PAINLEVEL_OUTOF10: 0

## 2021-07-15 ASSESSMENT — PAIN DESCRIPTION - LOCATION
LOCATION: KNEE;LEG
LOCATION: KNEE
LOCATION: LEG
LOCATION: KNEE;LEG

## 2021-07-15 ASSESSMENT — PAIN DESCRIPTION - FREQUENCY: FREQUENCY: INTERMITTENT

## 2021-07-15 ASSESSMENT — PAIN DESCRIPTION - DESCRIPTORS
DESCRIPTORS: ACHING;NUMBNESS
DESCRIPTORS: ACHING
DESCRIPTORS: NUMBNESS;ACHING
DESCRIPTORS: TIGHTNESS

## 2021-07-15 ASSESSMENT — PAIN DESCRIPTION - ORIENTATION
ORIENTATION: RIGHT

## 2021-07-15 ASSESSMENT — PAIN DESCRIPTION - PAIN TYPE
TYPE: ACUTE PAIN
TYPE: CHRONIC PAIN
TYPE: CHRONIC PAIN
TYPE: ACUTE PAIN

## 2021-07-15 ASSESSMENT — PULMONARY FUNCTION TESTS: PEFR_L/MIN: 16

## 2021-07-15 NOTE — CARE COORDINATION
7-15-Cm note: ( no covid testing- vaccinated) met with pt for transition of care needs, she lives alone, has friends assist her with errands, states she drives also at times, pt asking me to call her son Olive Clark [phone], attempted but no answer, left message. Pt has a Rollator and a cane at home, she is fixated on her son coming from [place] to see her, PT/OT may be helpful in determining dc needs, pt denies any needs, she has been to [place] in the past for outpt physical therapy, she denies any issues with getting her medications, however, she told her nurse she does have trouble getting her medications. CM/SS will follow  for dc needs.  Electronically signed by Darrion Hayes RN on 7/15/2021 at 11:27 AM

## 2021-07-15 NOTE — PROGRESS NOTES
Internal Medicine Progress Note    [facility]    Margapeter Buenodann. Gael Plasencia., F.A.C.O.I. Jenny Knox D.O., JUANJOOADDY Nuñez D.O. Chen Andersen, MSN, APRN, NP-C  Rande Blizzard. Mariposa Valiente, MSN, APRN-CNP     Primary Care Physician: Pepe Osuna MD   Admitting Physician:  Angy Stewart DO  Admission date and time: 7/14/2021  5:34 AM    Room:  [address] [place] [address]  Admitting diagnosis: Pulmonary embolism, unspecified chronicity, unspecified pulmonary embolism type, unspecified whether acute cor pulmonale present [facility]) [I26.99]    Patient Name: Wyona Hodgkins  MRN: [id number]    Date of Service: 7/15/2021     Subjective:  Lizbeth Koroma is a 74 y.o. female who was seen and examined today,7/15/2021, at the bedside. Sitting up in the chair. States that she is in \"good spirits\" but doesn't feel as good as she normally does. Has complaint of knee pain. No family present during my examination. Review of System:   Constitutional:   Denies fever or chills, weight loss or gain, fatigue or malaise. HEENT:   Denies ear pain, sore throat, sinus or eye problems. Cardiovascular:   Denies any chest pain, irregular heartbeats, or palpitations. Respiratory:   Denies shortness of breath, coughing, sputum production, hemoptysis, or wheezing. Gastrointestinal:   Denies nausea, vomiting, diarrhea, or constipation. Denies any abdominal pain. Genitourinary:    Denies any urgency, frequency, hematuria. Voiding  without difficulty. Extremities:   Denies lower extremity swelling, edema or cyanosis. Neurology:    Denies any headache or focal neurological deficits, Denies generalized weakness or memory difficulty. Psch:   Denies being anxious or depressed. Musculoskeletal:    Denies  myalgias, joint complaints or back pain. Complaint of knee pain. Integumentary:   Denies any rashes, ulcers, or excoriations. Denies bruising.   Hematologic/Lymphatic:  Denies bruising or bleeding. Physical Exam:  No intake/output data recorded. Intake/Output Summary (Last 24 hours) at 7/15/2021 1624  Last data filed at 7/15/2021 1232  Gross per 24 hour   Intake 845.15 ml   Output 3761 ml   Net -2915.85 ml   I/O last 3 completed shifts: In: 845.2 [P.O.:720; I.V.:125.2]  Out: 6211 [Urine:3761]  Patient Vitals for the past 96 hrs (Last 3 readings):   Weight   07/14/21 0530 186 lb (84.4 kg)     Vital Signs:   Blood pressure 135/74, pulse 93, temperature 99.3 °F (37.4 °C), temperature source Oral, resp. rate 16, height 5' 2\" (1.575 m), weight 186 lb (84.4 kg), last menstrual period 06/26/1981, SpO2 97 %. General appearance:  Alert, responsive, oriented to person, place, and time. Well preserved, alert, no distress. Head:  Normocephalic. No masses, lesions or tenderness. Eyes:  PERRLA. EOMI. Sclera clear. Buccal mucosa moist.  ENT:  Ears normal. Mucosa normal.  Neck:    Supple. Trachea midline. No thyromegaly. No JVD. No bruits. Heart:    Rhythm regular. Rate controlled. No murmurs. Lungs:    Symmetrical. Clear to auscultation bilaterally. No wheezes. No rhonchi. No rales. Abdomen:   Soft. Obese. Non-tender. Non-distended. Bowel sounds positive. No organomegaly or masses. No pain on palpation. Extremities:    Peripheral pulses present. Nonpitting edema of the lower extremities. No ulcers. No cyanosis. No clubbing. Neurologic:    Alert x 3. No focal deficit. Cranial nerves grossly intact. No focal weakness. Psych:   Behavior is normal. Mood appears normal. Speech is not rapid and/or pressured. Musculoskeletal:   Spine ROM normal. Muscular strength intact. Gait not assessed. Integumentary:  No rashes  Skin normal color and texture.   Genitalia/Breast:  Deferred    Medication:  Scheduled Meds:   magnesium oxide  400 mg Oral Daily    latanoprost  1 drop Both Eyes Nightly    furosemide  20 mg Oral Daily    pantoprazole  40 mg Oral QAM AC    insulin lispro  0-12 Units Subcutaneous TID WC    insulin lispro  0-6 Units Subcutaneous Nightly    budesonide  500 mcg Nebulization BID    amLODIPine  10 mg Oral Daily    And    lisinopril  40 mg Oral Daily    apixaban  10 mg Oral BID    prenatal vitamin  1 tablet Oral Daily     Continuous Infusions:   dextrose         Objective Data:  CBC with Differential:    Lab Results   Component Value Date    WBC 6.1 07/15/2021    RBC 4.55 07/15/2021    HGB 10.9 07/15/2021    HCT 35.3 07/15/2021     07/15/2021    MCV 77.6 07/15/2021    MCH 24.0 07/15/2021    MCHC 30.9 07/15/2021    RDW 15.0 07/15/2021    NRBC 1 10/30/2015    LYMPHOPCT 17.0 07/15/2021    MONOPCT 7.1 07/15/2021    BASOPCT 0.7 07/15/2021    MONOSABS 0.43 07/15/2021    LYMPHSABS 1.03 07/15/2021    EOSABS 0.04 07/15/2021    BASOSABS 0.04 07/15/2021     CMP:    Lab Results   Component Value Date     07/15/2021    K 3.8 07/15/2021    K 4.5 07/14/2021     07/15/2021    CO2 26 07/15/2021    BUN 13 07/15/2021    CREATININE 0.7 07/15/2021    GFRAA >60 07/15/2021    LABGLOM >60 07/15/2021    GLUCOSE 218 07/15/2021    GLUCOSE 103 01/27/2011    PROT 7.8 07/15/2021    LABALBU 4.3 07/15/2021    CALCIUM 10.5 07/15/2021    BILITOT 0.4 07/15/2021    ALKPHOS 93 07/15/2021    AST 13 07/15/2021    ALT 9 07/15/2021       Wound Documentation:    None    Assessment:  Acute pulmonary embolism involving the right lobe pulmonary artery. Mild microcytic anemia. Type 2 diabetes mellitus. Essential hypertension. Baker's cyst  Gastroesophageal reflux disease. History of chronic diarrhea. History of stroke with mild ambulatory dysfunction. Glaucoma. Plan:   US of the bilateral lower extremities was negative for DVT. Of note patient was found to have a 3.2 cm Baker's cyst.  Heparin was discontinued and the patient was initiated on Eliquis. Patient's blood pressure much improved. Most recent reading was 135/74. We will continue to monitor and adjust treatment as needed.   Encouraged patient to increase activity.  to assist with discharge planning. Spoke with the care coordinator inform patient does have a Rollator and a cane at home. PT/OT to evaluate and treat. Continue current therapies. Patient has requested that we call her son Marianne Flores at [phone] which was done. Apprise Marianne Flores of test results to date and current medication regimen. Discussed discharge planning for his mother as well. Patient was satisfied with discussion and answers. Please see orders for further plan of care. More than 50% of my  time was spent at the bedside counseling/coordinating care with the patient and/or family with face to face contact. This time was spent reviewing notes and laboratory data as well as instructing and counseling the patient. Time I spent with the family or surrogate(s) is included only if the patient was incapable of providing the necessary information or participating in medical decisions. I also discussed the differential diagnosis and all of the proposed management plans with the patient and individuals accompanying the patient. Colin Ibarra requires this high level of physician care and nursing on the IMC/Telemetry unit due the complexity of decision management and chance of rapid decline or death. Continued cardiac monitoring and higher level of nursing are required. I am readily available for any further decision-making and intervention. The patient was seen, examined and then discussed with Dr. Barbie Landon. Giacomo Fleischer, WILLIAM - CNP  7/15/2021  4:24 PM         I saw and evaluated the patient. I agree with the findings and the plan of care as documented in Giacomo Fleischer NP-C's  note.     Keyon Tinoco DO, D.O., Akila Narvaez  5:20 PM  7/15/2021

## 2021-07-15 NOTE — PROGRESS NOTES
Pt had heparin drip running. Order was discontinued and verified at 1830. This RN was unaware, drip stopped, notified Dr. Mitzi Jain because pt also received eliquis. No new orders, will cont to monitor pt.

## 2021-07-15 NOTE — H&P
[address]                              HISTORY AND PHYSICAL    PATIENT NAME: Tyree Chaparro                :        1955  MED REC NO:   [id number]                            ROOM:       0532  ACCOUNT NO:   [de-identified]                           ADMIT DATE: 2021  PROVIDER:     Carline Noe DO    CHIEF COMPLAINT AND HISTORY OF PRESENT ILLNESS:  This is a pleasant  68-year-old Afro American female admitted to [address] [place] [address]. The  patient presented to the hospital here on 2021. The patient  presented to the hospital here for what appeared to be some chest pain  and shortness of breath. The patient states on 2021 she received  her second COVID vaccine. On 2021, she was diagnosed to have  pneumonia. Apparently, over the past several days, she had increasing  amount of shortness of breath. This got worse since Monday which she  has presented to the hospital here complaining of increasing amount of  dyspnea and difficulty breathing. The patient's troponin level at that  time was normal.  CTA of the lungs obtained at that time did show  evidence of what appeared to be a small pulmonary embolism in the  proximal right lower lobe. She at that time did not show any evidence  of right heart strain. She was not hypoxic. She was instituted on  heparin and admitted to the hospital to the immediate care unit under  the service of Dr. Elida Guadalupe and Dr. Liyah Sanches. From a cardiac standpoint view,  the patient does have a history of cardiac dysrhythmia and atrial  fibrillation in the past.  She denies any symptoms of chest pain at the  present time. Her EKG does show normal sinus rhythm. Respiratory cabrera,  she never smoked. She denies any productive cough. She denies any  hemoptysis. She did admit to having some mild exertional dyspnea with  some mild sputum production.   Gastrointestinal wise, she denies any  recent change to her bowel habits, hematochezia, rectal bleeding,  nausea, vomiting, or constipation. The patient did complain of  occasional diarrhea. Genitourinary wise, she does admit to having  frequency. She denies any dysuria or hematuria. Neurologically, she  does have a history of stroke in the past.  She does ambulate with a  cane. Otherwise, she is alert and responsive. Her only other complaint  at the present time is of some occasional leg swelling. ALLERGIES:  She has multiple allergies including LIPITOR, ATENOLOL,  TYLENOL WITH CODEINE, PERCOCET, and VICODIN. MEDICATIONS:  Her medications prior to admission include the following;  potassium 10 daily, Toprol-XL 50 daily, Tagamet 400 b.i.d., Hytrin 2 mg  daily, Lialda 1.2 daily, Janumet extended release  daily,  Symbicort 16/4.5 b.i.d., prenatal vitamins, Phenergan p.r.n., vitamin D  50,000 units weekly, Imodium p.r.n., Xalatan eye drop 0.005% daily, and  Flonase. PAST MEDICAL HISTORY:  Positive for a history of DVT in the past, atrial  fibrillation but currently in sinus rhythm, thyroid disorder, anxiety,  type 2 diabetes mellitus, history of depression, arthritis, seizures,  emphysema of the lung, headaches, coronary artery disease, anemia,  asthma, hypertension, hyperlipidemia, chronic obstructive pulmonary  disease, and stroke. PAST SURGICAL HISTORY:  Include cardiac catheterization in 2008 with  normal coronary, hysterectomy, joint replacement, leg surgery on the  right hip, and right total hip arthroplasty in 2018. FAMILY HISTORY:  Parents are . SOCIAL HISTORY:  The patient does not smoke or drink. Currently  , mother of two children. REVIEW OF CHART:  Glucose 129. EKG does show normal sinus rhythm. Troponin level was normal.  Hemoglobin and hematocrit 10.6 and 35.2,  respectively. Microcytic indices; white count 6000, platelet count  adequate.   CTA of the lung did show evidence of a pulmonary embolism,  right lower lobe. PHYSICAL EXAMINATION:  VITAL SIGNS:  Show height to be 5 feet 2 inches, weight 186, BMI 34.02.   Blood pressure 197/72, pulse 80, respirations 18. GENERAL APPEARANCE:  She is a pleasant 67-year-old [address] female  who is alert, responsive, and oriented to person, place, and time. HEENT:  Normal to gross visual inspection. NECK:  With adequate carotid upstrokes without associated bruits. Trachea midline. Thyroid palpable. LUNGS:  Clear without any rales, wheezes, or rhonchi. HEART:  Appeared to be fairly regular without significant ectopy. No  S3. No S4. Diminished at the bases. ABDOMEN:  Soft. EXTREMITIES:  Without any cyanosis or edema. Slight swelling of the  right lower extremity. NEURO:  Grossly intact. The patient did have some slight occasional  stuttering of her speech. Moving all extremities. BREASTS:  Not performed. RECTAL:  Not performed. GENITAL:  Not performed. IMPRESSION:  1. Acute pulmonary embolism involving the right lobe pulmonary artery. 2.  Mild microcytic anemia. 3.  Type 2 diabetes mellitus. 4.  Essential hypertension. 5.  Gastroesophageal reflux disease. 6.  History of chronic diarrhea. 7.  History of stroke with mild ambulatory dysfunction. 8.  Glaucoma. PLAN AND RECOMMENDATIONS:  At this time, currently the patient  clinically appeared to have a DVT. The patient has been instituted on a  heparin infusion at this time. This was confirmed by CTA. No evidence  of right heart strain is present. Venous duplex study will be obtained  of the lower extremities and echocardiogram.  The patient will be  switched over to probable Eliquis at the present time. We will cycle  cardiac enzymes and make further recommendation. The patient appeared  to be hemodynamically stable at the present time. We will deal with the  other chronic comorbidities.         Ketty Lutz DO    D: 07/14/2021 15:36:47       T: 07/14/2021 15:43:05     [email or web address]  Job#: [id number]     Doc#: [id number]    CC:

## 2021-07-15 NOTE — PROGRESS NOTES
Pt had episode of high BP, but trending downward. Pt was up and walking at the time. Physician notified. No new orders, continue with scheduled blood pressure medications.

## 2021-07-16 ENCOUNTER — TELEPHONE (OUTPATIENT)
Dept: OTHER | Facility: CLINIC | Age: 66
End: 2021-07-16

## 2021-07-16 ENCOUNTER — HOSPITAL ENCOUNTER (EMERGENCY)
Age: 66
Discharge: HOME | End: 2021-07-17
Attending: EMERGENCY MEDICINE
Payer: MEDICARE

## 2021-07-16 ENCOUNTER — APPOINTMENT (OUTPATIENT)
Dept: GENERAL RADIOLOGY | Age: 66
End: 2021-07-16
Payer: MEDICARE

## 2021-07-16 VITALS
HEIGHT: 62 IN | OXYGEN SATURATION: 98 % | WEIGHT: 193.5 LBS | DIASTOLIC BLOOD PRESSURE: 74 MMHG | TEMPERATURE: 98 F | SYSTOLIC BLOOD PRESSURE: 140 MMHG | RESPIRATION RATE: 20 BRPM | HEART RATE: 86 BPM | BODY MASS INDEX: 35.61 KG/M2

## 2021-07-16 DIAGNOSIS — I26.99 OTHER ACUTE PULMONARY EMBOLISM WITHOUT ACUTE COR PULMONALE (HCC): ICD-10-CM

## 2021-07-16 DIAGNOSIS — R68.83 CHILLS: Primary | ICD-10-CM

## 2021-07-16 LAB
ALBUMIN SERPL-MCNC: 3.8 G/DL (ref 3.5–5.2)
ALP BLD-CCNC: 78 U/L (ref 35–104)
ALT SERPL-CCNC: 11 U/L (ref 0–32)
ANION GAP SERPL CALCULATED.3IONS-SCNC: 11 MMOL/L (ref 7–16)
ANION GAP SERPL CALCULATED.3IONS-SCNC: 12 MMOL/L (ref 7–16)
AST SERPL-CCNC: 14 U/L (ref 0–31)
BASOPHILS ABSOLUTE: 0.03 E9/L (ref 0–0.2)
BASOPHILS RELATIVE PERCENT: 0.5 % (ref 0–2)
BILIRUB SERPL-MCNC: 0.3 MG/DL (ref 0–1.2)
BILIRUBIN URINE: NEGATIVE
BLOOD, URINE: NEGATIVE
BUN BLDV-MCNC: 14 MG/DL (ref 6–23)
BUN BLDV-MCNC: 16 MG/DL (ref 6–23)
CALCIUM SERPL-MCNC: 10 MG/DL (ref 8.6–10.2)
CALCIUM SERPL-MCNC: 9.8 MG/DL (ref 8.6–10.2)
CHLORIDE BLD-SCNC: 101 MMOL/L (ref 98–107)
CHLORIDE BLD-SCNC: 103 MMOL/L (ref 98–107)
CLARITY: CLEAR
CO2: 23 MMOL/L (ref 22–29)
CO2: 26 MMOL/L (ref 22–29)
COLOR: YELLOW
CREAT SERPL-MCNC: 0.7 MG/DL (ref 0.5–1)
CREAT SERPL-MCNC: 0.7 MG/DL (ref 0.5–1)
EKG ATRIAL RATE: 79 BPM
EKG P AXIS: 65 DEGREES
EKG P-R INTERVAL: 150 MS
EKG Q-T INTERVAL: 412 MS
EKG QRS DURATION: 86 MS
EKG QTC CALCULATION (BAZETT): 472 MS
EKG R AXIS: -24 DEGREES
EKG T AXIS: 63 DEGREES
EKG VENTRICULAR RATE: 79 BPM
EOSINOPHILS ABSOLUTE: 0.05 E9/L (ref 0.05–0.5)
EOSINOPHILS RELATIVE PERCENT: 0.9 % (ref 0–6)
GFR AFRICAN AMERICAN: >60
GFR AFRICAN AMERICAN: >60
GFR NON-AFRICAN AMERICAN: >60 ML/MIN/1.73
GFR NON-AFRICAN AMERICAN: >60 ML/MIN/1.73
GLUCOSE BLD-MCNC: 161 MG/DL (ref 74–99)
GLUCOSE BLD-MCNC: 166 MG/DL (ref 74–99)
GLUCOSE URINE: NEGATIVE MG/DL
HCT VFR BLD CALC: 31.3 % (ref 34–48)
HCT VFR BLD CALC: 34 % (ref 34–48)
HEMOGLOBIN: 10.5 G/DL (ref 11.5–15.5)
HEMOGLOBIN: 9.6 G/DL (ref 11.5–15.5)
IMMATURE GRANULOCYTES #: 0.05 E9/L
IMMATURE GRANULOCYTES %: 0.9 % (ref 0–5)
KETONES, URINE: NEGATIVE MG/DL
LACTIC ACID: 1.6 MMOL/L (ref 0.5–2.2)
LEUKOCYTE ESTERASE, URINE: NEGATIVE
LV EF: 53 %
LVEF MODALITY: NORMAL
LYMPHOCYTES ABSOLUTE: 1.72 E9/L (ref 1.5–4)
LYMPHOCYTES RELATIVE PERCENT: 29.6 % (ref 20–42)
MCH RBC QN AUTO: 23.5 PG (ref 26–35)
MCH RBC QN AUTO: 24 PG (ref 26–35)
MCHC RBC AUTO-ENTMCNC: 30.7 % (ref 32–34.5)
MCHC RBC AUTO-ENTMCNC: 30.9 % (ref 32–34.5)
MCV RBC AUTO: 76.7 FL (ref 80–99.9)
MCV RBC AUTO: 77.8 FL (ref 80–99.9)
METER GLUCOSE: 165 MG/DL (ref 74–99)
METER GLUCOSE: 208 MG/DL (ref 74–99)
MONOCYTES ABSOLUTE: 0.59 E9/L (ref 0.1–0.95)
MONOCYTES RELATIVE PERCENT: 10.2 % (ref 2–12)
NEUTROPHILS ABSOLUTE: 3.37 E9/L (ref 1.8–7.3)
NEUTROPHILS RELATIVE PERCENT: 57.9 % (ref 43–80)
NITRITE, URINE: NEGATIVE
PDW BLD-RTO: 14.7 FL (ref 11.5–15)
PDW BLD-RTO: 14.7 FL (ref 11.5–15)
PH UA: 6 (ref 5–9)
PLATELET # BLD: 208 E9/L (ref 130–450)
PLATELET # BLD: 257 E9/L (ref 130–450)
PMV BLD AUTO: 9.2 FL (ref 7–12)
PMV BLD AUTO: 9.4 FL (ref 7–12)
POTASSIUM SERPL-SCNC: 4 MMOL/L (ref 3.5–5)
POTASSIUM SERPL-SCNC: 4.4 MMOL/L (ref 3.5–5)
PROTEIN UA: NEGATIVE MG/DL
RBC # BLD: 4.08 E12/L (ref 3.5–5.5)
RBC # BLD: 4.37 E12/L (ref 3.5–5.5)
SODIUM BLD-SCNC: 138 MMOL/L (ref 132–146)
SODIUM BLD-SCNC: 138 MMOL/L (ref 132–146)
SPECIFIC GRAVITY UA: 1.01 (ref 1–1.03)
TOTAL PROTEIN: 7.1 G/DL (ref 6.4–8.3)
TROPONIN, HIGH SENSITIVITY: 7 NG/L (ref 0–9)
TROPONIN, HIGH SENSITIVITY: 8 NG/L (ref 0–9)
UROBILINOGEN, URINE: 0.2 E.U./DL
WBC # BLD: 4.5 E9/L (ref 4.5–11.5)
WBC # BLD: 5.8 E9/L (ref 4.5–11.5)

## 2021-07-16 PROCEDURE — 2580000003 HC RX 258: Performed by: EMERGENCY MEDICINE

## 2021-07-16 PROCEDURE — 97530 THERAPEUTIC ACTIVITIES: CPT

## 2021-07-16 PROCEDURE — 94640 AIRWAY INHALATION TREATMENT: CPT

## 2021-07-16 PROCEDURE — 80048 BASIC METABOLIC PNL TOTAL CA: CPT

## 2021-07-16 PROCEDURE — 36415 COLL VENOUS BLD VENIPUNCTURE: CPT

## 2021-07-16 PROCEDURE — 82962 GLUCOSE BLOOD TEST: CPT

## 2021-07-16 PROCEDURE — 93005 ELECTROCARDIOGRAM TRACING: CPT | Performed by: EMERGENCY MEDICINE

## 2021-07-16 PROCEDURE — 85027 COMPLETE CBC AUTOMATED: CPT

## 2021-07-16 PROCEDURE — 93306 TTE W/DOPPLER COMPLETE: CPT

## 2021-07-16 PROCEDURE — 99283 EMERGENCY DEPT VISIT LOW MDM: CPT

## 2021-07-16 PROCEDURE — 84484 ASSAY OF TROPONIN QUANT: CPT

## 2021-07-16 PROCEDURE — 81003 URINALYSIS AUTO W/O SCOPE: CPT

## 2021-07-16 PROCEDURE — 6370000000 HC RX 637 (ALT 250 FOR IP): Performed by: INTERNAL MEDICINE

## 2021-07-16 PROCEDURE — 80053 COMPREHEN METABOLIC PANEL: CPT

## 2021-07-16 PROCEDURE — 6360000002 HC RX W HCPCS: Performed by: INTERNAL MEDICINE

## 2021-07-16 PROCEDURE — 97165 OT EVAL LOW COMPLEX 30 MIN: CPT

## 2021-07-16 PROCEDURE — 97535 SELF CARE MNGMENT TRAINING: CPT

## 2021-07-16 PROCEDURE — 71046 X-RAY EXAM CHEST 2 VIEWS: CPT

## 2021-07-16 PROCEDURE — 85025 COMPLETE CBC W/AUTO DIFF WBC: CPT

## 2021-07-16 PROCEDURE — 83605 ASSAY OF LACTIC ACID: CPT

## 2021-07-16 RX ORDER — IPRATROPIUM BROMIDE AND ALBUTEROL SULFATE 2.5; .5 MG/3ML; MG/3ML
1 SOLUTION RESPIRATORY (INHALATION)
Status: DISCONTINUED | OUTPATIENT
Start: 2021-07-16 | End: 2021-07-16

## 2021-07-16 RX ORDER — AMLODIPINE BESYLATE AND BENAZEPRIL HYDROCHLORIDE 10; 40 MG/1; MG/1
1 CAPSULE ORAL DAILY
Qty: 30 CAPSULE | Refills: 3 | COMMUNITY
Start: 2021-07-16

## 2021-07-16 RX ORDER — 0.9 % SODIUM CHLORIDE 0.9 %
1000 INTRAVENOUS SOLUTION INTRAVENOUS ONCE
Status: COMPLETED | OUTPATIENT
Start: 2021-07-16 | End: 2021-07-17

## 2021-07-16 RX ADMIN — SODIUM CHLORIDE 1000 ML: 9 INJECTION, SOLUTION INTRAVENOUS at 21:33

## 2021-07-16 RX ADMIN — FUROSEMIDE 20 MG: 20 TABLET ORAL at 08:34

## 2021-07-16 RX ADMIN — APIXABAN 10 MG: 5 TABLET, FILM COATED ORAL at 08:33

## 2021-07-16 RX ADMIN — AMLODIPINE BESYLATE 10 MG: 10 TABLET ORAL at 08:33

## 2021-07-16 RX ADMIN — MAGNESIUM OXIDE 400 MG (241.3 MG MAGNESIUM) TABLET 400 MG: TABLET at 08:34

## 2021-07-16 RX ADMIN — BUDESONIDE 500 MCG: 0.5 INHALANT RESPIRATORY (INHALATION) at 18:03

## 2021-07-16 RX ADMIN — LISINOPRIL 40 MG: 20 TABLET ORAL at 08:33

## 2021-07-16 RX ADMIN — BUDESONIDE 500 MCG: 0.5 INHALANT RESPIRATORY (INHALATION) at 06:19

## 2021-07-16 RX ADMIN — INSULIN LISPRO 4 UNITS: 100 INJECTION, SOLUTION INTRAVENOUS; SUBCUTANEOUS at 11:46

## 2021-07-16 RX ADMIN — INSULIN LISPRO 2 UNITS: 100 INJECTION, SOLUTION INTRAVENOUS; SUBCUTANEOUS at 08:36

## 2021-07-16 RX ADMIN — PANTOPRAZOLE SODIUM 40 MG: 40 TABLET, DELAYED RELEASE ORAL at 05:54

## 2021-07-16 ASSESSMENT — PAIN DESCRIPTION - DESCRIPTORS
DESCRIPTORS: DISCOMFORT
DESCRIPTORS: ACHING;NUMBNESS

## 2021-07-16 ASSESSMENT — PAIN DESCRIPTION - PAIN TYPE
TYPE: CHRONIC PAIN
TYPE: CHRONIC PAIN

## 2021-07-16 ASSESSMENT — PAIN DESCRIPTION - FREQUENCY: FREQUENCY: INTERMITTENT

## 2021-07-16 ASSESSMENT — ENCOUNTER SYMPTOMS
SHORTNESS OF BREATH: 1
COUGH: 1
ABDOMINAL PAIN: 0

## 2021-07-16 ASSESSMENT — PAIN SCALES - GENERAL
PAINLEVEL_OUTOF10: 3
PAINLEVEL_OUTOF10: 3

## 2021-07-16 ASSESSMENT — PAIN DESCRIPTION - LOCATION: LOCATION: KNEE;LEG;ARM

## 2021-07-16 NOTE — CARE COORDINATION
7/16/21 discharging to home. IMM letter given and signed by patient. Copy in soft blue chart.  Electronically signed by Sly Goldstein RN-BC on 7/16/2021 at 2:34 PM

## 2021-07-16 NOTE — CARE COORDINATION
SS NOTE: SW spoke with pt today after she was requesting an homemaker from [address] [place] [address]. Pt relates that she has spoken to the toll free medicare number and they advised her to call [employer] for homemaking and medicare would pay for this service. SW advised pt that this was no the case and shared with her the phone number for [employer]. Pt relates that her son Sary Zapien will be here from [place] soon to assist her with her recuperation from this hospital stay. She relates that there is no one else involved with her at home. Pt relates that she is dched and will drive herself home today- car is in the parking lot here. Later this SW recd a call from [place] at [facility] [phone]- She related that this pt was going to be dropped from their services since they believe that she needs a \". ..higher level of care. Joanna Lam \". Paoli relates that pt was recieveing psychiatry and case management from them at the [facility]. - Dr Nora Arauz- for Bipolar Disorder 1 and Generalized Anxiety Disorder. Pt was rxed Lithium 300 mg twice daily and Invega 3 mg daily. However, she has been non compliant with these meds and follow up appts. She relates that Adult Protective Services have been involved (KHANH Peralta there). KHANH contacted Kathryn and she relates that they have had an opened case on this pt numerous times- since she does not like to leave her home and does no follow up with care or need appts. Most recently the case was closed in March 2021. Apparently pt had been pink slipped and refused to be hospitalized- thus the court got involved. KHANH did reach out to [address] [facility] [address]- [phone] to see if she can enlighten case management here of the implications of the above shared information or the part that this hospital has to play in [facility] with these stated issues. KHANH continues to await her contact. EMELINA Sands.7/16/2021.1:53 PM.

## 2021-07-16 NOTE — CARE COORDINATION
7/16/21 No covid testing Cm transition of care: pt continues care in PCU. Pt/ot notes pending, echo pending results. Has two brothers that live locally. She has been to [facility] in the past. She has Medicare coverage and informed CM she has no problems obtaining her medications. CM/SS to continue to follow.  Electronically signed by PRANAV Sanchez on 7/16/2021 at 9:29 AM

## 2021-07-16 NOTE — PROGRESS NOTES
[address]  [facility]. [place]        Date:2021                                                  Patient Name: Lien Serrano    MRN: [id number]    : 1955    Room: [address] [place] [address]-      Evaluating OT: Sindhu Mildred OTR/L #[id number]     Referring Provider and Specific Provider Orders/Date:      21  OT eval and treat Start: 21, End: 21, ONE TIME, Standing Count: 1 Occurrences, R   Comments: Evaluate and treat for dischar. .. Marcus Camacho,         Placement Recommendation: HHOT   Comment: Pt is interested in increased supports at home, specifically for cleaning/laundry. CM informed/aware of patient's preference. Diagnosis:   1.  Multiple subsegmental pulmonary emboli without acute cor pulmonale (HCC)         Surgery: none      Pertinent Medical History:       Past Medical History:   Diagnosis Date    [facility]     Acute exacerbation of chronic obstructive pulmonary disease [facility]COPD) ([facility]     Anemia     Anxiety     Arthritis     Arthritis     Asthma     Atrial fibrillation ([address]     CAD (coronary artery disease)     af and heart murmur    Chronic obstructive pulmonary disease (HCC)     COPD (chronic obstructive pulmonary disease) ([address]     Diabetes mellitus ([address]     DVT (deep venous thrombosis) ([address]     Emphysema lung (HCC)     Emphysema of lung ([facility]     Encounter for imaging of bilateral cephalic veins     History of bilateral cephalic vein thrombosis    H/O cardiovascular stress test 2020    Lexiscan    Headache     History of blood transfusion     Hx of blood clots     Hypercholesterolemia     Hyperlipidemia     Hyperlipidemia     Hypertension     Mixed hyperlipidemia     Primary localized osteoarthritis of left hip     Primary osteoarthritis of left hip     Psychiatric problem     Seizures ([address]     last seizure  15 yrs ago had been on depakote    Thyroid disease     Thyroid disease     Type II or unspecified type diabetes mellitus without mention of complication, not stated as uncontrolled          Past Surgical History:   Procedure Laterality Date    CARDIAC CATHETERIZATION  12/01/2008    Normal Coronary arteries. Normal LV. Elevated left ventricular end diastolic pressure suggestive of impaired relaxatin and possible diastolic dysfunction    HYSTERECTOMY  2004    JOINT REPLACEMENT      LEG SURGERY Right     right hip area, removed cyst iccf developed infection went to [facility]    IN SURG EXCISION OF ANAL LESION(S) N/A 1/25/2019    EXCISION PERIANAL WARTS performed by Raman Lang MD at [address] Left 8/8/2018    LEFT HIP TOTAL ARTHROPLASTY (KYLIE) performed by Justina Dakins, DO at [address]      Precautions:  Fall Risk, decreased vision, impulsive. Pt declining chair alarm - RN aware.       Assessment of current deficits    [x] Functional mobility  [x]ADLs  [x] Strength               [x]Cognition    [x] Functional transfers   [x] IADLs         [x] Safety Awareness   [x]Endurance    [] Fine Coordination              [x] Balance      [x] Vision/perception   []Sensation     []Gross Motor Coordination  [] ROM  [] Delirium                   [] Motor Control     OT PLAN OF CARE   OT POC based on physician orders, patient diagnosis and results of clinical assessment    Frequency/Duration 1-3 days/wk for 2 weeks PRN     Specific OT Treatment Interventions to include:   * Instruction/training on adapted ADL techniques and AE recommendations to increase functional independence within precautions       * Training on energy conservation strategies, correct breathing pattern and techniques to improve independence/tolerance for self-care routine  * Functional transfer/mobility training/DME recommendations for increased independence, safety, and fall prevention  * Patient/Family education to increase follow through with safety techniques and functional independence  * Recommendation of environmental modifications for increased safety with functional transfers/mobility and ADLs  * Cognitive retraining/development of therapeutic activities to improve problem solving, judgement, memory, and attention for increased safety/participation in ADL/IADL tasks  * Visual-perceptual training to improve environmental scanning, visual attention/focus, and oculomotor skills for increased safety/independence with functional transfers/mobility and ADLs  * Therapeutic exercise to improve motor endurance, ROM, and functional strength for ADLs/functional transfers  * Therapeutic activities to facilitate/challenge dynamic balance, stand tolerance for increased safety and independence with ADLs  * Therapeutic activities to facilitate gross/fine motor skills for increased independence with ADLs    Recommended Adaptive Equipment: none     Home Living: alone; single family home, 1 story, 2 steps to enter no hand rail, tub shower. Equipment owned: reacher, sock aide, rollator, single point cane, bedside commode, shower chair/bench, grab bars in shower, hand-held shower head. Prior Level of Function: Modified Independent with ADLs , Independent with IADLs. Pt dependent on [employer] transportation to grocery store. Pt states she has a neighbor that assists with grocery shopping occasionally. Pt does her own laundry and cleaning, however, pt states she has been having increased difficulty doing it on her own; pt has been ambulating with use of single point cane for approx. x1 month, per pt's report. Driving: rarely; dependent on transportation. Pain Level: 3/10 pain in L arm; Nursing notified, cold pack applied.      Cognition: A&O: 4/4; Follows 2 step directions   Memory: fair    Sequencing: fair    Problem solving: fair    Judgement/safety: poor    [facility]   AM-PAC Daily Activity Inpatient   How much help for putting on and taking off regular lower body clothing?: A Little  How much help for Bathing?: A Little  How much help for Toileting?: A Little  How much help for putting on and taking off regular upper body clothing?: A Little  How much help for taking care of personal grooming?: A Little  How much help for eating meals?: None  AM-PAC Inpatient Daily Activity Raw Score: 19  AM-PAC Inpatient ADL T-Scale Score : 40.22  ADL Inpatient CMS 0-100% Score: 42.8  ADL Inpatient CMS G-Code Modifier : CK     Functional Assessment:    Initial Eval Status  Date: 7/16/21 Treatment Status  Date: STGs = LTGs  Time frame: 10-14 days   Feeding Independent   Independent    Grooming Supervision for hand hygiene standing at bathroom sink with F balance. Independent    UB Dressing Supervision   Independent    LB Dressing Supervision   Pt donned undergarments over hips while standing unsupported. Pt doffed/donned B socks seated in chair via cross-leg technique with increased time/effort needed. Independent    Bathing Supervision   Independent    Toileting Supervision   Pt completed posterior pericare while standing with close supervision for safety. Pt utilizing grab bar for steadying support during hygiene. Independent    Bed Mobility  Supine to sit: not tested as patient was seem up in bathroom upon OT arrival.  Sit to supine: Supervision for overall safety. Increased time needed  Supine to sit: Independent   Sit to supine: Independent    Functional Transfers Supervision for sit <> stand transfer at commode, chair, and edge of bed. Transfer training with verbal cues for hand placement throughout session to improve safety. Moderate [place]    Functional Mobility Supervision with use of cane to improve balance, verbal cues for cane sequence and safety.    Moderate [place]    Balance Sitting:     Static: fair     Dynamic: fair   Standing: fair  with use of cane for steadying support  Sitting:     Static: good     Dynamic: good Standing: good     Activity Tolerance fair activity/standing tolerance with ADLs. Standing tolerance up to x1-2 minutes. Increase standing tolerance >5 minutes for improved engagement with functional transfers and indep in ADLs   Visual/  Perceptual Glasses: yes, readers. Reports changes in vision since admission: No. Pt has a h/o glaucoma, per patient's report. NA            Hand Dominance: right     AROM (PROM) Strength Additional Info:    RUE  WFL 4-/5 good  and wfl FMC/dexterity noted during ADL tasks     LUE WFL 4-/5 good  and wfl FMC/dexterity noted during ADL tasks       Hearing: WFL   Sensation:  No c/o numbness or tingling  Tone: WFL   Edema: none observed. Comments: Upon arrival the patient was standing at commode. Pt limited by deficits in strength, balance, activity tolerance, endurance, and overall safety awareness. At end of session, patient was supine in bed with call light and phone within reach, all lines and tubes intact. Bed alarm on, RN informed/aware. Overall patient demonstrated decreased independence and safety during completion of ADL/functional transfer/mobility tasks. Pt would benefit from continued skilled OT to increase safety and independence with completion of ADL/IADL tasks for functional independence and quality of life. Treatment: OT treatment provided this date includes:    Instruction/training on safety and adapted techniques for completion of ADLs; LB dressing, toileting, grooming    Instruction/training on safe functional mobility/transfer techniques with use of cane    Instruction/training on energy conservation/work simplification for completion of ADLs    Proper Positioning/Alignment; body mechanics with LB dressing    Facilitation of Visual Perceptual Skills for increased safety and independence with ADLs    Rehab Potential: Good for established goals. Patient / Family Goal: return home with increased supports and therapy services. Patient and/or family were instructed on functional diagnosis, prognosis/goals and OT plan of care. Demonstrated good understanding. Eval Complexity: Low    Time In: 9:25  Time Out: 10:00   Total Treatment Time: 15      Min Units   OT Eval Low 97165  X  1    OT Eval Medium 46387      OT Eval High 44822      OT Re-Eval 37288            ADL/Self Care 02736 8 1   Therapeutic Activities 74154  7     Therapeutic Ex 80204       Orthotic Management 00623       Manual 55584     Neuro Re-Ed 23903       Non-Billable Time        Evaluation Time additionally includes thorough review of current medical information, gathering information on past medical history/social history and prior level of function, interpretation of standardized testing/informal observation of tasks, assessment of data and development of plan of care and goals.         Evaluating OT: David Ordoñez OTR/L #[id number]

## 2021-07-16 NOTE — PROGRESS NOTES
CLINICAL PHARMACY NOTE: MEDS TO BEDS    Total # of Prescriptions Filled: 1   The following medications were delivered to the patient:  · Eliquis 5 starter pack    Additional Documentation:

## 2021-07-16 NOTE — ED PROVIDER NOTES
exacerbation of chronic obstructive pulmonary disease (COPD) ([facility] [address] Anemia, Anxiety, Arthritis, Arthritis, Asthma, Atrial fibrillation ([facility] CAD (coronary artery disease), Chronic obstructive pulmonary disease ([facility] COPD (chronic obstructive pulmonary disease) ([facility] Diabetes mellitus ([facility] DVT (deep venous thrombosis) ([facility] Emphysema lung ([facility] Emphysema of lung ([facility] Encounter for imaging of bilateral cephalic veins, H/O cardiovascular stress test, Headache, History of blood transfusion, Hx of blood clots, Hypercholesterolemia, Hyperlipidemia, Hyperlipidemia, Hypertension, Mixed hyperlipidemia, Primary localized osteoarthritis of left hip, Primary osteoarthritis of left hip, Psychiatric problem, Seizures ([facility] Thyroid disease, Thyroid disease, and Type II or unspecified type diabetes mellitus without mention of complication, not stated as uncontrolled. Past Surgical History:  has a past surgical history that includes Leg Surgery (Right); Cardiac catheterization (12/01/2008); pr total hip arthroplasty (Left, 8/8/2018); Hysterectomy (2004); pr surg excision of anal lesion(s) (N/A, 1/25/2019); and joint replacement. Social History:  reports that she has never smoked. She has never used smokeless tobacco. She reports that she does not drink alcohol and does not use drugs. Family History: family history includes Cancer in her mother; Diabetes in her mother; Heart Attack in her mother; Hypertension in her mother; Kidney Disease in her mother; Stroke in her father. The patients home medications have been reviewed.     Allergies: Atenolol, Lipitor [atorvastatin], Tylenol with codeine #3 [acetaminophen-codeine], Atenolol, Codeine, Lipitor [atorvastatin], Percocet [oxycodone-acetaminophen], Vicodin [hydrocodone-acetaminophen], and Other    ------------------------------------------------ RESULTS ---------------------------------------------------    Labs:  Results for orders placed or performed during the hospital encounter of 07/16/21   CBC auto differential   Result Value Ref Range    WBC 5.8 4.5 - 11.5 E9/L    RBC 4.08 3.50 - 5.50 E12/L    Hemoglobin 9.6 (L) 11.5 - 15.5 g/dL    Hematocrit 31.3 (L) 34.0 - 48.0 %    MCV 76.7 (L) 80.0 - 99.9 fL    MCH 23.5 (L) 26.0 - 35.0 pg    MCHC 30.7 (L) 32.0 - 34.5 %    RDW 14.7 11.5 - 15.0 fL    Platelets 135 750 - 093 E9/L    MPV 9.4 7.0 - 12.0 fL    Neutrophils % 57.9 43.0 - 80.0 %    Immature Granulocytes % 0.9 0.0 - 5.0 %    Lymphocytes % 29.6 20.0 - 42.0 %    Monocytes % 10.2 2.0 - 12.0 %    Eosinophils % 0.9 0.0 - 6.0 %    Basophils % 0.5 0.0 - 2.0 %    Neutrophils Absolute 3.37 1.80 - 7.30 E9/L    Immature Granulocytes # 0.05 E9/L    Lymphocytes Absolute 1.72 1.50 - 4.00 E9/L    Monocytes Absolute 0.59 0.10 - 0.95 E9/L    Eosinophils Absolute 0.05 0.05 - 0.50 E9/L    Basophils Absolute 0.03 0.00 - 0.20 U1/Q   Basic metabolic panel   Result Value Ref Range    Sodium 138 132 - 146 mmol/L    Potassium 4.4 3.5 - 5.0 mmol/L    Chloride 101 98 - 107 mmol/L    CO2 26 22 - 29 mmol/L    Anion Gap 11 7 - 16 mmol/L    Glucose 161 (H) 74 - 99 mg/dL    BUN 16 6 - 23 mg/dL    CREATININE 0.7 0.5 - 1.0 mg/dL    GFR Non-African American >60 >=60 mL/min/1.73    GFR African American >60     Calcium 9.8 8.6 - 10.2 mg/dL   Urinalysis   Result Value Ref Range    Color, UA Yellow Straw/Yellow    Clarity, UA Clear Clear    Glucose, Ur Negative Negative mg/dL    Bilirubin Urine Negative Negative    Ketones, Urine Negative Negative mg/dL    Specific Gravity, UA 1.010 1.005 - 1.030    Blood, Urine Negative Negative    pH, UA 6.0 5.0 - 9.0    Protein, UA Negative Negative mg/dL    Urobilinogen, Urine 0.2 <2.0 E.U./dL    Nitrite, Urine Negative Negative    Leukocyte Esterase, Urine Negative Negative   LACTIC ACID, PLASMA   Result Value Ref Range    Lactic Acid 1.6 0.5 - 2.2 mmol/L   Troponin   Result Value Ref Range    Troponin, High Sensitivity 7 0 - 9 ng/L   Troponin   Result Value Ref Range Troponin, High Sensitivity 8 0 - 9 ng/L     Imaging: All Radiology results interpreted by Radiologist unless otherwise noted. XR CHEST (2 VW)   Final Result   Chronic interstitial changes in lung bases notable on the right. No evidence   of pneumonia or pleural effusion. EKG:  This EKG is signed and interpreted by ED Physician. Time:  2014   Rate: 88  Rhythm: Sinus. Interpretation: no acute changes. ---------------------------- NURSING NOTES AND VITALS REVIEWED -------------------------   The nursing notes within the ED encounter and vital signs as below have been reviewed. BP (!) 179/95   Pulse 93   Temp 96 °F (35.6 °C) (Infrared)   Resp 16   Ht 5' 2\" (1.575 m)   Wt 186 lb (84.4 kg)   LMP 06/26/1981   SpO2 95%   BMI 34.02 kg/m²   Oxygen Saturation Interpretation: Normal      ------------------------------------------PROGRESS NOTES -------------------------------------------    ED COURSE MEDICATIONS:                Medications   0.9 % sodium chloride bolus (1,000 mLs Intravenous New Bag 7/16/21 4950)       RE-Evaluation(s):    Time: 0115   Patients symptoms are improving. Repeat physical examination has improved. She appears more comfortable, no new complaints, no distress, has been stable here. Vitals stable and her work-up has been unremarkable from an acute standpoint. I discussed this with her and she feels comfortable now going home and following up with her physician. In fact she plans to go to his office tomorrow to discuss her further medical management. She does indicate that she has her Eliquis to treat her for pulmonary embolism. She is not short of breath, has no chest pain, no hypoxia at time of disposition and is appropriate for further care at home. CONSULTATIONS:            I discussed the patient's care with Dr. Scott Goss who had admitted her prior to her discharge earlier today.   He concurs with sending her home after work-up today assuming it does not reveal any new acute findings. .      COUNSELING:   I have spoken with the patient and discussed todays results, in addition to providing specific details for the plan of care and counseling regarding the diagnosis and prognosis.     --------------------------------------- IMPRESSION & DISPOSITION --------------------------------     IMPRESSION(s):  1. Chills    2. Other acute pulmonary embolism without acute cor pulmonale ([facility] [address]        This patient's ED course included: a personal history and physicial examination, re-evaluation prior to disposition and multiple bedside re-evaluations    This patient has remained hemodynamically stable during their ED course. DISPOSITION:  Disposition: Discharge to home. Patient condition is stable. END OF PROVIDER NOTE.           Byron Walters DO  07/17/21 0131

## 2021-07-16 NOTE — DISCHARGE INSTR - DIET

## 2021-07-17 VITALS
HEART RATE: 93 BPM | SYSTOLIC BLOOD PRESSURE: 179 MMHG | OXYGEN SATURATION: 95 % | BODY MASS INDEX: 34.23 KG/M2 | WEIGHT: 186 LBS | RESPIRATION RATE: 16 BRPM | DIASTOLIC BLOOD PRESSURE: 95 MMHG | HEIGHT: 62 IN | TEMPERATURE: 96 F

## 2021-07-17 LAB
EKG ATRIAL RATE: 88 BPM
EKG P AXIS: 65 DEGREES
EKG P-R INTERVAL: 158 MS
EKG Q-T INTERVAL: 386 MS
EKG QRS DURATION: 78 MS
EKG QTC CALCULATION (BAZETT): 467 MS
EKG R AXIS: -14 DEGREES
EKG T AXIS: 58 DEGREES
EKG VENTRICULAR RATE: 88 BPM

## 2021-07-17 PROCEDURE — 93010 ELECTROCARDIOGRAM REPORT: CPT | Performed by: INTERNAL MEDICINE

## 2021-07-17 NOTE — TELEPHONE ENCOUNTER
Writer contacted Dr. Andria Courtney to inform of 30 day readmission risk. Dr. Andria Courtney informed writer there is no decision on disposition at this time.

## 2021-07-18 LAB — VITAMIN B6: 191.4 NMOL/L (ref 20–125)

## 2021-07-19 ENCOUNTER — TELEPHONE (OUTPATIENT)
Dept: OTHER | Facility: CLINIC | Age: 66
End: 2021-07-19

## 2021-07-19 NOTE — DISCHARGE SUMMARY
[address]                               DISCHARGE SUMMARY    PATIENT NAME: Kait Muller                :        1955  MED REC NO:   [id number]                            ROOM:       0532  ACCOUNT NO:   [de-identified]                           ADMIT DATE: 2021  PROVIDER:     Jigna Arreola DO                  DISCHARGE DATE:  2021      ADMITTING DIAGNOSIS:  Dyspnea. FINAL DISCHARGE DIAGNOSIS:  Acute pulmonary embolism involving the right  lobe of pulmonary artery without right heart strain with hypoxemia. SECONDARY DISCHARGE DIAGNOSES:  1.  Mild microcytic anemia. 2.  Type 2 diabetes mellitus. 3.  Essential hypertension. 4.  Baker cyst.  5.  Gastroesophageal reflux disease. 6.  History of chronic diarrhea. 7.  History of stroke with mild ambulatory dysfunction with use of cane. 8.  Glaucoma. COMPLICATIONS:  No complications. OPERATIONS:  No operation. CONSULTATION OBTAINED:  With no one. ADMITTING PHYSICIANS:  Dr. Nancy Mason and Dr. Kylee Almendarez. CHIEF COMPLAINT AND HISTORY OF CHIEF COMPLAINT:  A pleasant 67-year-old  Afro American female who was admitted to [address] [place] [address]. The  patient presented to the hospital here on 2021. The patient  presented to the hospital here for what appeared to be some chest pain  and shortness of breath. The patient was seen and evaluated in the  emergency room. Diagnosed at this time having a pulmonary embolism. Troponin level and further lab work was satisfactory. She was admitted  to the hospital to the immediate care unit.     PAST MEDICAL HISTORY:  Positive for history of childhood disease,  history of DVT in the past, atrial fibrillation currently in sinus  rhythm, thyroid disorder, anxiety, type 2 diabetes mellitus, history of  depression, arthritis, seizure, emphysema of the lung, headaches,  coronary artery disease, anemia, asthma, hypertension, hyperlipidemia,  chronic obstructive pulmonary disease, and history of prior stroke. PHYSICAL EXAMINATION:  VITAL SIGNS:  Show height to be 5 feet 2 inches, weight 186, BMI 34.02.   Blood pressure 197/72, pulse 80, respirations 18. GENERAL APPEARANCE:  She is a pleasant 53-year-old [facility] female  who is alert, responsive. HEENT:  Normal.  NECK:  With adequate carotid upstrokes without bruits. LUNGS:  Course, but no wheeze. HEART:  Fairly regular. No S3. No S4.  ABDOMEN:  Soft. EXTREMITIES:  Without any cyanosis or edema. The patient ambulates with  the use of cane. While the patient was in the hospital, she had the following laboratory  studies performed. At the time of discharge, her glucose is 208, BUN  and creatinine were 14 and 0.7, respectively. Electrolytes were  satisfactory. CBC shows stable hemoglobin and hematocrit were 10.5 and  34 respectively. Microcytic indices. Troponin level is negative. For  lab, please refer to the chart. HOSPITAL COURSE OF STAY:  The patient was admitted directly to the  hospital to the immediate care unit. The patient initially presented  here to the hospital.  Workup at this time included venous duplex study  showed no evidence of DVT. Did do a CTA which did show pulmonary  embolism of the right lower lobe without other acute pathology. Chest  x-ray at this time was unremarkable. The patient at this time was  initially placed on heparin. This was switched over to Eliquis. The  patient remained relatively stable. At this time, no dysrhythmia. Repeat lab work was satisfactory. She was allowed to ambulate. No  evidence of hypoxemia was noted with stabilization of condition. No  evidence of right heart strain was present and the patient was felt  stable enough to be discharged home by a private car on 07/16.     At time of discharge, her blood pressure was 140/74, pulse 86,  respirations 20, height 5 feet 2 inches,

## 2021-07-19 NOTE — TELEPHONE ENCOUNTER
[facility] attempted to contact Dr. Eli Milligan multiple times with line being busy. Will follow up.

## 2021-07-20 ENCOUNTER — APPOINTMENT (OUTPATIENT)
Dept: CT IMAGING | Age: 66
DRG: 551 | End: 2021-07-20
Payer: MEDICARE

## 2021-07-20 ENCOUNTER — TELEPHONE (OUTPATIENT)
Dept: OTHER | Facility: CLINIC | Age: 66
End: 2021-07-20

## 2021-07-20 ENCOUNTER — APPOINTMENT (OUTPATIENT)
Dept: GENERAL RADIOLOGY | Age: 66
DRG: 551 | End: 2021-07-20
Payer: MEDICARE

## 2021-07-20 ENCOUNTER — HOSPITAL ENCOUNTER (INPATIENT)
Age: 66
LOS: 2 days | Discharge: NURSING, REHAB OR LONG-TERM CARE | DRG: 551 | End: 2021-07-22
Attending: EMERGENCY MEDICINE | Admitting: INTERNAL MEDICINE
Payer: MEDICARE

## 2021-07-20 DIAGNOSIS — S22.020A CLOSED WEDGE COMPRESSION FRACTURE OF T2 VERTEBRA, INITIAL ENCOUNTER (HCC): Primary | ICD-10-CM

## 2021-07-20 LAB
ALBUMIN SERPL-MCNC: 3.3 G/DL (ref 3.5–5.2)
ALP BLD-CCNC: 72 U/L (ref 35–104)
ALT SERPL-CCNC: 12 U/L (ref 0–32)
ANION GAP SERPL CALCULATED.3IONS-SCNC: 9 MMOL/L (ref 7–16)
APTT: 46.9 SEC (ref 24.5–35.1)
AST SERPL-CCNC: 14 U/L (ref 0–31)
BASOPHILS ABSOLUTE: 0.02 E9/L (ref 0–0.2)
BASOPHILS RELATIVE PERCENT: 0.3 % (ref 0–2)
BILIRUB SERPL-MCNC: 0.3 MG/DL (ref 0–1.2)
BUN BLDV-MCNC: 11 MG/DL (ref 6–23)
CALCIUM SERPL-MCNC: 8.9 MG/DL (ref 8.6–10.2)
CHLORIDE BLD-SCNC: 111 MMOL/L (ref 98–107)
CO2: 19 MMOL/L (ref 22–29)
CREAT SERPL-MCNC: 0.7 MG/DL (ref 0.5–1)
EOSINOPHILS ABSOLUTE: 0.08 E9/L (ref 0.05–0.5)
EOSINOPHILS RELATIVE PERCENT: 1.2 % (ref 0–6)
GFR AFRICAN AMERICAN: >60
GFR NON-AFRICAN AMERICAN: >60 ML/MIN/1.73
GLUCOSE BLD-MCNC: 101 MG/DL (ref 74–99)
HCT VFR BLD CALC: 29.5 % (ref 34–48)
HEMOGLOBIN: 9.3 G/DL (ref 11.5–15.5)
IMMATURE GRANULOCYTES #: 0.04 E9/L
IMMATURE GRANULOCYTES %: 0.6 % (ref 0–5)
INR BLD: 1.3
LACTIC ACID: 0.9 MMOL/L (ref 0.5–2.2)
LIPASE: 26 U/L (ref 13–60)
LYMPHOCYTES ABSOLUTE: 1.77 E9/L (ref 1.5–4)
LYMPHOCYTES RELATIVE PERCENT: 26.5 % (ref 20–42)
MAGNESIUM: 1.8 MG/DL (ref 1.6–2.6)
MCH RBC QN AUTO: 23.9 PG (ref 26–35)
MCHC RBC AUTO-ENTMCNC: 31.5 % (ref 32–34.5)
MCV RBC AUTO: 75.8 FL (ref 80–99.9)
MONOCYTES ABSOLUTE: 0.56 E9/L (ref 0.1–0.95)
MONOCYTES RELATIVE PERCENT: 8.4 % (ref 2–12)
NEUTROPHILS ABSOLUTE: 4.21 E9/L (ref 1.8–7.3)
NEUTROPHILS RELATIVE PERCENT: 63 % (ref 43–80)
PDW BLD-RTO: 15.1 FL (ref 11.5–15)
PLATELET # BLD: 264 E9/L (ref 130–450)
PMV BLD AUTO: 9.1 FL (ref 7–12)
POTASSIUM SERPL-SCNC: 3.8 MMOL/L (ref 3.5–5)
PROTHROMBIN TIME: 14.9 SEC (ref 9.3–12.4)
RBC # BLD: 3.89 E12/L (ref 3.5–5.5)
SODIUM BLD-SCNC: 139 MMOL/L (ref 132–146)
TOTAL PROTEIN: 6.4 G/DL (ref 6.4–8.3)
WBC # BLD: 6.7 E9/L (ref 4.5–11.5)

## 2021-07-20 PROCEDURE — 73560 X-RAY EXAM OF KNEE 1 OR 2: CPT

## 2021-07-20 PROCEDURE — 83690 ASSAY OF LIPASE: CPT

## 2021-07-20 PROCEDURE — 74177 CT ABD & PELVIS W/CONTRAST: CPT

## 2021-07-20 PROCEDURE — 70450 CT HEAD/BRAIN W/O DYE: CPT

## 2021-07-20 PROCEDURE — 83735 ASSAY OF MAGNESIUM: CPT

## 2021-07-20 PROCEDURE — 85730 THROMBOPLASTIN TIME PARTIAL: CPT

## 2021-07-20 PROCEDURE — 72128 CT CHEST SPINE W/O DYE: CPT

## 2021-07-20 PROCEDURE — 71260 CT THORAX DX C+: CPT

## 2021-07-20 PROCEDURE — 1200000000 HC SEMI PRIVATE

## 2021-07-20 PROCEDURE — 83605 ASSAY OF LACTIC ACID: CPT

## 2021-07-20 PROCEDURE — 99284 EMERGENCY DEPT VISIT MOD MDM: CPT

## 2021-07-20 PROCEDURE — 6360000004 HC RX CONTRAST MEDICATION: Performed by: RADIOLOGY

## 2021-07-20 PROCEDURE — 72125 CT NECK SPINE W/O DYE: CPT

## 2021-07-20 PROCEDURE — 85025 COMPLETE CBC W/AUTO DIFF WBC: CPT

## 2021-07-20 PROCEDURE — 36415 COLL VENOUS BLD VENIPUNCTURE: CPT

## 2021-07-20 PROCEDURE — 73521 X-RAY EXAM HIPS BI 2 VIEWS: CPT

## 2021-07-20 PROCEDURE — 80053 COMPREHEN METABOLIC PANEL: CPT

## 2021-07-20 PROCEDURE — 6370000000 HC RX 637 (ALT 250 FOR IP): Performed by: EMERGENCY MEDICINE

## 2021-07-20 PROCEDURE — 85610 PROTHROMBIN TIME: CPT

## 2021-07-20 PROCEDURE — 72131 CT LUMBAR SPINE W/O DYE: CPT

## 2021-07-20 RX ORDER — ALBUTEROL SULFATE 2.5 MG/3ML
2.5 SOLUTION RESPIRATORY (INHALATION) EVERY 6 HOURS PRN
Status: DISCONTINUED | OUTPATIENT
Start: 2021-07-20 | End: 2021-07-22 | Stop reason: HOSPADM

## 2021-07-20 RX ORDER — BUDESONIDE AND FORMOTEROL FUMARATE DIHYDRATE 160; 4.5 UG/1; UG/1
2 AEROSOL RESPIRATORY (INHALATION) 2 TIMES DAILY
Status: DISCONTINUED | OUTPATIENT
Start: 2021-07-20 | End: 2021-07-20 | Stop reason: CLARIF

## 2021-07-20 RX ORDER — POTASSIUM CHLORIDE 750 MG/1
10 TABLET, EXTENDED RELEASE ORAL DAILY
Status: DISCONTINUED | OUTPATIENT
Start: 2021-07-21 | End: 2021-07-22 | Stop reason: HOSPADM

## 2021-07-20 RX ORDER — ACETAMINOPHEN 650 MG/1
650 SUPPOSITORY RECTAL EVERY 6 HOURS PRN
Status: DISCONTINUED | OUTPATIENT
Start: 2021-07-20 | End: 2021-07-22 | Stop reason: HOSPADM

## 2021-07-20 RX ORDER — BUDESONIDE 0.5 MG/2ML
0.5 INHALANT ORAL 2 TIMES DAILY
Status: DISCONTINUED | OUTPATIENT
Start: 2021-07-20 | End: 2021-07-22 | Stop reason: HOSPADM

## 2021-07-20 RX ORDER — ACETAMINOPHEN 500 MG
1000 TABLET ORAL ONCE
Status: COMPLETED | OUTPATIENT
Start: 2021-07-20 | End: 2021-07-20

## 2021-07-20 RX ORDER — POLYETHYLENE GLYCOL 3350 17 G/17G
17 POWDER, FOR SOLUTION ORAL DAILY PRN
Status: DISCONTINUED | OUTPATIENT
Start: 2021-07-20 | End: 2021-07-22 | Stop reason: HOSPADM

## 2021-07-20 RX ORDER — PROMETHAZINE HYDROCHLORIDE 25 MG/1
25 TABLET ORAL EVERY 6 HOURS PRN
Status: DISCONTINUED | OUTPATIENT
Start: 2021-07-20 | End: 2021-07-22 | Stop reason: HOSPADM

## 2021-07-20 RX ORDER — ARFORMOTEROL TARTRATE 15 UG/2ML
15 SOLUTION RESPIRATORY (INHALATION) 2 TIMES DAILY
Status: DISCONTINUED | OUTPATIENT
Start: 2021-07-20 | End: 2021-07-22 | Stop reason: HOSPADM

## 2021-07-20 RX ORDER — DOCUSATE SODIUM 100 MG/1
100 CAPSULE, LIQUID FILLED ORAL 2 TIMES DAILY
Status: DISCONTINUED | OUTPATIENT
Start: 2021-07-20 | End: 2021-07-22 | Stop reason: HOSPADM

## 2021-07-20 RX ORDER — SODIUM CHLORIDE 0.9 % (FLUSH) 0.9 %
5-40 SYRINGE (ML) INJECTION PRN
Status: DISCONTINUED | OUTPATIENT
Start: 2021-07-20 | End: 2021-07-22 | Stop reason: HOSPADM

## 2021-07-20 RX ORDER — DOXAZOSIN 2 MG/1
2 TABLET ORAL DAILY
Status: DISCONTINUED | OUTPATIENT
Start: 2021-07-21 | End: 2021-07-22 | Stop reason: HOSPADM

## 2021-07-20 RX ORDER — SODIUM CHLORIDE 0.9 % (FLUSH) 0.9 %
5-40 SYRINGE (ML) INJECTION EVERY 12 HOURS SCHEDULED
Status: DISCONTINUED | OUTPATIENT
Start: 2021-07-20 | End: 2021-07-22 | Stop reason: HOSPADM

## 2021-07-20 RX ORDER — FAMOTIDINE 20 MG/1
40 TABLET, FILM COATED ORAL NIGHTLY
Refills: 3 | Status: DISCONTINUED | OUTPATIENT
Start: 2021-07-20 | End: 2021-07-22 | Stop reason: HOSPADM

## 2021-07-20 RX ORDER — FLUTICASONE PROPIONATE 50 MCG
1 SPRAY, SUSPENSION (ML) NASAL DAILY
Status: DISCONTINUED | OUTPATIENT
Start: 2021-07-21 | End: 2021-07-22 | Stop reason: HOSPADM

## 2021-07-20 RX ORDER — DEXTROSE MONOHYDRATE 50 MG/ML
100 INJECTION, SOLUTION INTRAVENOUS PRN
Status: DISCONTINUED | OUTPATIENT
Start: 2021-07-20 | End: 2021-07-22 | Stop reason: HOSPADM

## 2021-07-20 RX ORDER — NICOTINE POLACRILEX 4 MG
15 LOZENGE BUCCAL PRN
Status: DISCONTINUED | OUTPATIENT
Start: 2021-07-20 | End: 2021-07-22 | Stop reason: HOSPADM

## 2021-07-20 RX ORDER — ACETAMINOPHEN 325 MG/1
650 TABLET ORAL EVERY 6 HOURS PRN
Status: DISCONTINUED | OUTPATIENT
Start: 2021-07-20 | End: 2021-07-22 | Stop reason: HOSPADM

## 2021-07-20 RX ORDER — SODIUM CHLORIDE 9 MG/ML
25 INJECTION, SOLUTION INTRAVENOUS PRN
Status: DISCONTINUED | OUTPATIENT
Start: 2021-07-20 | End: 2021-07-22 | Stop reason: HOSPADM

## 2021-07-20 RX ORDER — DEXTROSE MONOHYDRATE 25 G/50ML
12.5 INJECTION, SOLUTION INTRAVENOUS PRN
Status: DISCONTINUED | OUTPATIENT
Start: 2021-07-20 | End: 2021-07-22 | Stop reason: HOSPADM

## 2021-07-20 RX ORDER — LATANOPROST 50 UG/ML
1 SOLUTION/ DROPS OPHTHALMIC NIGHTLY
Status: DISCONTINUED | OUTPATIENT
Start: 2021-07-20 | End: 2021-07-22 | Stop reason: HOSPADM

## 2021-07-20 RX ADMIN — IOPAMIDOL 75 ML: 755 INJECTION, SOLUTION INTRAVENOUS at 19:08

## 2021-07-20 RX ADMIN — ACETAMINOPHEN 1000 MG: 500 TABLET, FILM COATED ORAL at 21:11

## 2021-07-20 ASSESSMENT — ENCOUNTER SYMPTOMS
RHINORRHEA: 0
WHEEZING: 0
FACIAL SWELLING: 0
BACK PAIN: 1
COLOR CHANGE: 0
ABDOMINAL PAIN: 1
SHORTNESS OF BREATH: 0
STRIDOR: 0
NAUSEA: 0
VOMITING: 0
EYE PAIN: 0

## 2021-07-20 ASSESSMENT — PAIN SCALES - GENERAL
PAINLEVEL_OUTOF10: 0
PAINLEVEL_OUTOF10: 8

## 2021-07-20 NOTE — ED NOTES
Name: Nadia Bergman  : 1955  MRN: [id number]    Date: 2021    Benefits of immediately proceeding with Radiology exam outweigh the risks and therefore the following is being waived:      [] Pregnancy test    [] Protocol for Iodine allergy    [] MRI questionnaire    [x] BUN/Creatinine        MD Tano Johnson MD  21 [id number]

## 2021-07-20 NOTE — ED PROVIDER NOTES
ED PROVIDER NOTE    Chief Complaint   Patient presents with    Fall     fall in the parking lot over uneven pavement - loc and did not hit her head on thinners    Fall     everything on the left side hurts     Fall     neck pain       HPI:  7/20/21,   Time: 5:43 PM EDT       Brittney Bray is a 74 y.o. female presenting to the ED for fall. Acute onset immediately prior to arrival. Was leaving her doctor's office and tripped on the uneven pavement in the parking lot. Ana Maria Lofts onto her left side. No head trauma or LOC. Is on anticoagulation for afib and VTE. Since the fall diffuse left sided pain of neck, back, chest, abdomen, hip and bilateral knees. Constant, throbbing, worse w/ movement. Chart review: hx of bipolar disorder, DM, HTN, HLD, VTE, afib, on apixaban, COPD    Review of Systems:     Review of Systems   Constitutional: Negative for diaphoresis. HENT: Negative for ear pain, facial swelling, nosebleeds and rhinorrhea. Eyes: Negative for pain and visual disturbance. Respiratory: Negative for shortness of breath, wheezing and stridor. Cardiovascular: Positive for chest pain. Gastrointestinal: Positive for abdominal pain. Negative for nausea and vomiting. Genitourinary: Negative for flank pain and pelvic pain. Musculoskeletal: Positive for arthralgias, back pain, myalgias and neck pain. Negative for joint swelling and neck stiffness. Skin: Negative for color change and wound. Neurological: Negative for dizziness, syncope, weakness, light-headedness, numbness and headaches.    Hematological: Does not bruise/bleed easily.         --------------------------------------------- PAST HISTORY ---------------------------------------------  Past Medical History:   Past Medical History:   Diagnosis Date    A-fib ([facility]     Acute exacerbation of chronic obstructive pulmonary disease (COPD) (HCC)     Anemia     Anxiety     Arthritis     Arthritis     Asthma     Atrial fibrillation ([facility]  CAD (coronary artery disease)     af and heart murmur    Chronic obstructive pulmonary disease (HCC)     COPD (chronic obstructive pulmonary disease) (HCC)     Diabetes mellitus ([facility]     DVT (deep venous thrombosis) (HCC)     Emphysema lung (HCC)     Emphysema of lung ([facility]     Encounter for imaging of bilateral cephalic veins     History of bilateral cephalic vein thrombosis    H/O cardiovascular stress test 04/25/2020    Lexiscan    Headache     History of blood transfusion     Hx of blood clots     Hypercholesterolemia     Hyperlipidemia     Hyperlipidemia     Hypertension     Mixed hyperlipidemia     Primary localized osteoarthritis of left hip     Primary osteoarthritis of left hip     Psychiatric problem     Seizures ([facility]     last seizure  15 yrs ago had been on depakote    Thyroid disease     Thyroid disease     Type II or unspecified type diabetes mellitus without mention of complication, not stated as uncontrolled        Past Surgical History:   Past Surgical History:   Procedure Laterality Date    CARDIAC CATHETERIZATION  12/01/2008    Normal Coronary arteries. Normal LV. Elevated left ventricular end diastolic pressure suggestive of impaired relaxatin and possible diastolic dysfunction    HYSTERECTOMY  2004    JOINT REPLACEMENT      LEG SURGERY Right     right hip area, removed cyst iccf developed infection went to [place]    AR SURG EXCISION OF ANAL LESION(S) N/A 1/25/2019    EXCISION PERIANAL WARTS performed by Yessenia Farr MD at [address] Left 8/8/2018    LEFT HIP TOTAL ARTHROPLASTY (KYLIE) performed by Marjorie Nuñez DO at [address] History:   Social History     Socioeconomic History    Marital status:       Spouse name: None    Number of children: 2    Years of education: 21    Highest education level: None   Occupational History    Occupation: retired     Employer: UNEMPLOYED     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   Tobacco Use    Smoking status: Never Smoker    Smokeless tobacco: Never Used   Vaping Use    Vaping Use: Never used   Substance and Sexual Activity    Alcohol use: Never     Comment: occasional diet pepsi    Drug use: Never    Sexual activity: Never   Other Topics Concern    None   Social History Narrative    ** Merged History Encounter **         ** Merged History Encounter **        Social Determinants of Health     Financial Resource Strain: Medium Risk    Difficulty of Paying Living Expenses: Somewhat hard   Food Insecurity: No Food Insecurity    Worried About Running Out of Food in the Last Year: Never true    Nika of Food in the Last Year: Never true   Transportation Needs: Unmet Transportation Needs    Lack of Transportation (Medical): Yes    Lack of Transportation (Non-Medical): Yes   Physical Activity: Inactive    Days of Exercise per Week: 0 days    Minutes of Exercise per Session: 0 min   Stress: Stress Concern Present    Feeling of Stress : To some extent   Social Connections: Moderately Integrated    Frequency of Communication with Friends and Family: More than three times a week    Frequency of Social Gatherings with Friends and Family: Not on file    Attends Alevism Services: More than 4 times per year    Active Member of [employer] or Organizations: Yes    Attends Club or Organization Meetings: 1 to 4 times per year    Marital Status:    Intimate Partner Violence:     Fear of Current or Ex-Partner:     Emotionally Abused:     Physically Abused:     Sexually Abused:        Family History:   Family History   Problem Relation Age of Onset    Diabetes Mother          76    Kidney Disease Mother     Heart Attack Mother     Hypertension Mother     Cancer Mother    Allyson Begum Stroke Father          age 47       The patients home medications have been reviewed. Allergies:    Allergies   Allergen Reactions    Atenolol Palpitations    Lipitor [Atorvastatin] Palpitations    Tylenol With Codeine #3 [Acetaminophen-Codeine] Itching    Atenolol      increased heart beat    Codeine     Lipitor [Atorvastatin]     Percocet [Oxycodone-Acetaminophen]     Vicodin [Hydrocodone-Acetaminophen]     Other Other (See Comments)     Anti-depressants/Anti-psychotic: Causes hallucinations    Allergic to all pain meds can take extra strenghth tylenol             ---------------------------------------------------PHYSICAL EXAM--------------------------------------    BP (!) 156/72   Pulse 76   Temp 97.6 °F (36.4 °C) (Oral)   Resp 18   Wt 180 lb (81.6 kg)   LMP 06/26/1981   SpO2 96%   BMI 32.92 kg/m²     Physical Exam  Vitals and nursing note reviewed. Constitutional:       General: She is not in acute distress. Appearance: She is not toxic-appearing. HENT:      Head: Normocephalic and atraumatic. Mouth/Throat:      Mouth: Mucous membranes are moist.   Eyes:      General: No scleral icterus. Extraocular Movements: Extraocular movements intact. Pupils: Pupils are equal, round, and reactive to light. Neck:      Comments: Collar in place. +midline ttp. No stepoff/deformity  Cardiovascular:      Rate and Rhythm: Normal rate and regular rhythm. Pulses: Normal pulses. Heart sounds: Normal heart sounds. No murmur heard. Pulmonary:      Effort: Pulmonary effort is normal. No respiratory distress. Breath sounds: Normal breath sounds. No wheezing or rales. Chest:      Chest wall: Tenderness (left sided. no deformity) present. Abdominal:      General: There is no distension. Palpations: Abdomen is soft. Tenderness: There is abdominal tenderness (left sided and left flank). There is no guarding or rebound. Musculoskeletal:      Comments: Thoracolumbar midline ttp. Bilateral hips ttp. Bilateral knees ttp. Left knee abrasion. Radial, DP, and PT pulses 2+ bilaterally. Skin:     General: Skin is warm and dry.    Neurological: Mental Status: She is alert and oriented to person, place, and time. Comments: Global weakness. Strength 4/5 and sensation grossly intact to light touch and equal bilaterally throughout all extremities             -------------------------------------------------- RESULTS -------------------------------------------------  I have personally reviewed all laboratory and imaging results for this patient. Results are listed below. LABS:  Labs Reviewed   CBC WITH AUTO DIFFERENTIAL - Abnormal; Notable for the following components:       Result Value    Hemoglobin 9.3 (*)     Hematocrit 29.5 (*)     MCV 75.8 (*)     MCH 23.9 (*)     MCHC 31.5 (*)     RDW 15.1 (*)     All other components within normal limits   COMPREHENSIVE METABOLIC PANEL - Abnormal; Notable for the following components:    Chloride 111 (*)     CO2 19 (*)     Glucose 101 (*)     Albumin 3.3 (*)     All other components within normal limits   APTT - Abnormal; Notable for the following components:    aPTT 46.9 (*)     All other components within normal limits   PROTIME-INR - Abnormal; Notable for the following components:    Protime 14.9 (*)     All other components within normal limits   MAGNESIUM   LIPASE   LACTIC ACID, PLASMA   TSH WITHOUT REFLEX   PHOSPHORUS   MAGNESIUM   COMPREHENSIVE METABOLIC PANEL   CBC WITH AUTO DIFFERENTIAL   URINALYSIS WITH MICROSCOPIC   POCT GLUCOSE   POCT GLUCOSE       RADIOLOGY:  Interpreted personally and by Radiologist.  XR HIP BILATERAL W AP PELVIS (2 VIEWS)   Final Result   No evidence of acute trauma. Significant degenerative changes right hip joint unchanged compared to   10/02/2019. XR KNEE RIGHT (1-2 VIEWS)   Final Result   No acute displaced fracture. Moderate tricompartmental degenerative joint disease with joint effusion. XR KNEE LEFT (1-2 VIEWS)   Final Result   Narrowing of the lateral compartment. Superior and inferior patella spurs.          CT ABDOMEN PELVIS W IV CONTRAST Additional Contrast? None   Final Result   No acute intracranial abnormality. There is age-appropriate atrophy and   small-vessel ischemic disease. Mucosal thickening in the right maxillary sinuses. .      CT cervical spine. There is no acute displaced fracture in the cervical spine. The prevertebral   soft tissues are normal.  Diffuse degenerative changes are identified from   C3-T1 with osteophytes and multilevel disc bulges. Impression      No acute displaced fracture. Mild diffuse degenerative changes from C3-T1. CT chest.      Comparison CTA chest 07/14/2021. Findings      The heart and the great vessels are normal.  Tiny pericardial effusion is   noted. There is coronary artery calcification. The great vessels are   normal.  Partially occluding filling defects are identified in the right   lower lobe pulmonary artery without significant change. There is minimal   scarring and atelectasis in the lung bases. There is no focal consolidation   or pleural effusion. Impression      Partially occluding pulmonary embolism in the right lower lobe pulmonary   artery as before. There is no other acute traumatic injury in the chest.      CT abdomen and pelvis. Comparison 06/23/2020      Findings      There is hepatomegaly with liver measuring 19 cm which is fatty. Gallbladder   is normal.  Spleen, pancreas, the right adrenal gland and the kidneys are   normal.  2.5 x 2.7 cm left adrenal nodule is present. There is severe   vascular calcification aorta. Degenerative changes are identified in the   thoracolumbar spine. Pelvis. Bladder is distended. Radiopaque material is identified in the   colon likely swallowed material.  There is diverticulosis of colon without   diverticulitis. The appendix is upper normal.      Impression      There is no acute traumatic injury, acute inflammation or bowel obstruction   in the abdomen pelvis. CT thoracic spine.       There is mild less than 10% compression deformity of the superior endplate of   T2, the age of which is uncertain. No other acute displaced fractures are   identified in the thoracic spine. There is mild diffuse degenerative changes   in the thoracic spine      Impression      Mild less than 10% compression deformity of the superior endplate of T2 of   unknown age. No other acute displaced fractures are noted in the thoracic   spine. CT lumbar spine. There is no acute displaced fracture in the lumbar spine. There is mild   diffuse degenerative changes lumbar spine with mild disc bulges from L4-S1. Impression      No acute fractures. CT CERVICAL SPINE WO CONTRAST   Final Result   No acute intracranial abnormality. There is age-appropriate atrophy and   small-vessel ischemic disease. Mucosal thickening in the right maxillary sinuses. .      CT cervical spine. There is no acute displaced fracture in the cervical spine. The prevertebral   soft tissues are normal.  Diffuse degenerative changes are identified from   C3-T1 with osteophytes and multilevel disc bulges. Impression      No acute displaced fracture. Mild diffuse degenerative changes from C3-T1. CT chest.      Comparison CTA chest 07/14/2021. Findings      The heart and the great vessels are normal.  Tiny pericardial effusion is   noted. There is coronary artery calcification. The great vessels are   normal.  Partially occluding filling defects are identified in the right   lower lobe pulmonary artery without significant change. There is minimal   scarring and atelectasis in the lung bases. There is no focal consolidation   or pleural effusion. Impression      Partially occluding pulmonary embolism in the right lower lobe pulmonary   artery as before. There is no other acute traumatic injury in the chest.      CT abdomen and pelvis.       Comparison 06/23/2020      Findings      There is hepatomegaly with liver measuring 19 cm which is fatty. Gallbladder   is normal.  Spleen, pancreas, the right adrenal gland and the kidneys are   normal.  2.5 x 2.7 cm left adrenal nodule is present. There is severe   vascular calcification aorta. Degenerative changes are identified in the   thoracolumbar spine. Pelvis. Bladder is distended. Radiopaque material is identified in the   colon likely swallowed material.  There is diverticulosis of colon without   diverticulitis. The appendix is upper normal.      Impression      There is no acute traumatic injury, acute inflammation or bowel obstruction   in the abdomen pelvis. CT thoracic spine. There is mild less than 10% compression deformity of the superior endplate of   T2, the age of which is uncertain. No other acute displaced fractures are   identified in the thoracic spine. There is mild diffuse degenerative changes   in the thoracic spine      Impression      Mild less than 10% compression deformity of the superior endplate of T2 of   unknown age. No other acute displaced fractures are noted in the thoracic   spine. CT lumbar spine. There is no acute displaced fracture in the lumbar spine. There is mild   diffuse degenerative changes lumbar spine with mild disc bulges from L4-S1. Impression      No acute fractures. CT CHEST W CONTRAST   Final Result   No acute intracranial abnormality. There is age-appropriate atrophy and   small-vessel ischemic disease. Mucosal thickening in the right maxillary sinuses. .      CT cervical spine. There is no acute displaced fracture in the cervical spine. The prevertebral   soft tissues are normal.  Diffuse degenerative changes are identified from   C3-T1 with osteophytes and multilevel disc bulges. Impression      No acute displaced fracture. Mild diffuse degenerative changes from C3-T1. CT chest.      Comparison CTA chest 07/14/2021.       Findings      The heart and the great vessels are normal.  Tiny pericardial effusion is   noted. There is coronary artery calcification. The great vessels are   normal.  Partially occluding filling defects are identified in the right   lower lobe pulmonary artery without significant change. There is minimal   scarring and atelectasis in the lung bases. There is no focal consolidation   or pleural effusion. Impression      Partially occluding pulmonary embolism in the right lower lobe pulmonary   artery as before. There is no other acute traumatic injury in the chest.      CT abdomen and pelvis. Comparison 06/23/2020      Findings      There is hepatomegaly with liver measuring 19 cm which is fatty. Gallbladder   is normal.  Spleen, pancreas, the right adrenal gland and the kidneys are   normal.  2.5 x 2.7 cm left adrenal nodule is present. There is severe   vascular calcification aorta. Degenerative changes are identified in the   thoracolumbar spine. Pelvis. Bladder is distended. Radiopaque material is identified in the   colon likely swallowed material.  There is diverticulosis of colon without   diverticulitis. The appendix is upper normal.      Impression      There is no acute traumatic injury, acute inflammation or bowel obstruction   in the abdomen pelvis. CT thoracic spine. There is mild less than 10% compression deformity of the superior endplate of   T2, the age of which is uncertain. No other acute displaced fractures are   identified in the thoracic spine. There is mild diffuse degenerative changes   in the thoracic spine      Impression      Mild less than 10% compression deformity of the superior endplate of T2 of   unknown age. No other acute displaced fractures are noted in the thoracic   spine. CT lumbar spine. There is no acute displaced fracture in the lumbar spine. There is mild   diffuse degenerative changes lumbar spine with mild disc bulges from L4-S1.       Impression      No acute fractures. CT HEAD WO CONTRAST   Final Result   No acute intracranial abnormality. There is age-appropriate atrophy and   small-vessel ischemic disease. Mucosal thickening in the right maxillary sinuses. .      CT cervical spine. There is no acute displaced fracture in the cervical spine. The prevertebral   soft tissues are normal.  Diffuse degenerative changes are identified from   C3-T1 with osteophytes and multilevel disc bulges. Impression      No acute displaced fracture. Mild diffuse degenerative changes from C3-T1. CT chest.      Comparison CTA chest 07/14/2021. Findings      The heart and the great vessels are normal.  Tiny pericardial effusion is   noted. There is coronary artery calcification. The great vessels are   normal.  Partially occluding filling defects are identified in the right   lower lobe pulmonary artery without significant change. There is minimal   scarring and atelectasis in the lung bases. There is no focal consolidation   or pleural effusion. Impression      Partially occluding pulmonary embolism in the right lower lobe pulmonary   artery as before. There is no other acute traumatic injury in the chest.      CT abdomen and pelvis. Comparison 06/23/2020      Findings      There is hepatomegaly with liver measuring 19 cm which is fatty. Gallbladder   is normal.  Spleen, pancreas, the right adrenal gland and the kidneys are   normal.  2.5 x 2.7 cm left adrenal nodule is present. There is severe   vascular calcification aorta. Degenerative changes are identified in the   thoracolumbar spine. Pelvis. Bladder is distended. Radiopaque material is identified in the   colon likely swallowed material.  There is diverticulosis of colon without   diverticulitis. The appendix is upper normal.      Impression      There is no acute traumatic injury, acute inflammation or bowel obstruction   in the abdomen pelvis.       CT thoracic spine.      There is mild less than 10% compression deformity of the superior endplate of   T2, the age of which is uncertain. No other acute displaced fractures are   identified in the thoracic spine. There is mild diffuse degenerative changes   in the thoracic spine      Impression      Mild less than 10% compression deformity of the superior endplate of T2 of   unknown age. No other acute displaced fractures are noted in the thoracic   spine. CT lumbar spine. There is no acute displaced fracture in the lumbar spine. There is mild   diffuse degenerative changes lumbar spine with mild disc bulges from L4-S1. Impression      No acute fractures. CT LUMBAR SPINE WO CONTRAST   Final Result   No acute intracranial abnormality. There is age-appropriate atrophy and   small-vessel ischemic disease. Mucosal thickening in the right maxillary sinuses. .      CT cervical spine. There is no acute displaced fracture in the cervical spine. The prevertebral   soft tissues are normal.  Diffuse degenerative changes are identified from   C3-T1 with osteophytes and multilevel disc bulges. Impression      No acute displaced fracture. Mild diffuse degenerative changes from C3-T1. CT chest.      Comparison CTA chest 07/14/2021. Findings      The heart and the great vessels are normal.  Tiny pericardial effusion is   noted. There is coronary artery calcification. The great vessels are   normal.  Partially occluding filling defects are identified in the right   lower lobe pulmonary artery without significant change. There is minimal   scarring and atelectasis in the lung bases. There is no focal consolidation   or pleural effusion. Impression      Partially occluding pulmonary embolism in the right lower lobe pulmonary   artery as before. There is no other acute traumatic injury in the chest.      CT abdomen and pelvis.       Comparison 06/23/2020      Findings      There is hepatomegaly with liver measuring 19 cm which is fatty. Gallbladder   is normal.  Spleen, pancreas, the right adrenal gland and the kidneys are   normal.  2.5 x 2.7 cm left adrenal nodule is present. There is severe   vascular calcification aorta. Degenerative changes are identified in the   thoracolumbar spine. Pelvis. Bladder is distended. Radiopaque material is identified in the   colon likely swallowed material.  There is diverticulosis of colon without   diverticulitis. The appendix is upper normal.      Impression      There is no acute traumatic injury, acute inflammation or bowel obstruction   in the abdomen pelvis. CT thoracic spine. There is mild less than 10% compression deformity of the superior endplate of   T2, the age of which is uncertain. No other acute displaced fractures are   identified in the thoracic spine. There is mild diffuse degenerative changes   in the thoracic spine      Impression      Mild less than 10% compression deformity of the superior endplate of T2 of   unknown age. No other acute displaced fractures are noted in the thoracic   spine. CT lumbar spine. There is no acute displaced fracture in the lumbar spine. There is mild   diffuse degenerative changes lumbar spine with mild disc bulges from L4-S1. Impression      No acute fractures. CT THORACIC SPINE WO CONTRAST   Final Result   No acute intracranial abnormality. There is age-appropriate atrophy and   small-vessel ischemic disease. Mucosal thickening in the right maxillary sinuses. .      CT cervical spine. There is no acute displaced fracture in the cervical spine. The prevertebral   soft tissues are normal.  Diffuse degenerative changes are identified from   C3-T1 with osteophytes and multilevel disc bulges. Impression      No acute displaced fracture. Mild diffuse degenerative changes from C3-T1. CT chest.      Comparison CTA chest 07/14/2021. Findings      The heart and the great vessels are normal.  Tiny pericardial effusion is   noted. There is coronary artery calcification. The great vessels are   normal.  Partially occluding filling defects are identified in the right   lower lobe pulmonary artery without significant change. There is minimal   scarring and atelectasis in the lung bases. There is no focal consolidation   or pleural effusion. Impression      Partially occluding pulmonary embolism in the right lower lobe pulmonary   artery as before. There is no other acute traumatic injury in the chest.      CT abdomen and pelvis. Comparison 06/23/2020      Findings      There is hepatomegaly with liver measuring 19 cm which is fatty. Gallbladder   is normal.  Spleen, pancreas, the right adrenal gland and the kidneys are   normal.  2.5 x 2.7 cm left adrenal nodule is present. There is severe   vascular calcification aorta. Degenerative changes are identified in the   thoracolumbar spine. Pelvis. Bladder is distended. Radiopaque material is identified in the   colon likely swallowed material.  There is diverticulosis of colon without   diverticulitis. The appendix is upper normal.      Impression      There is no acute traumatic injury, acute inflammation or bowel obstruction   in the abdomen pelvis. CT thoracic spine. There is mild less than 10% compression deformity of the superior endplate of   T2, the age of which is uncertain. No other acute displaced fractures are   identified in the thoracic spine. There is mild diffuse degenerative changes   in the thoracic spine      Impression      Mild less than 10% compression deformity of the superior endplate of T2 of   unknown age. No other acute displaced fractures are noted in the thoracic   spine. CT lumbar spine. There is no acute displaced fracture in the lumbar spine.   There is mild   diffuse degenerative changes lumbar spine with mild disc bulges from L4-S1. Impression      No acute fractures. ------------------------- NURSING NOTES AND VITALS REVIEWED ---------------------------   The nursing notes within the ED encounter and vital signs as below have been reviewed by myself. BP (!) 156/72   Pulse 76   Temp 97.6 °F (36.4 °C) (Oral)   Resp 18   Wt 180 lb (81.6 kg)   LMP 06/26/1981   SpO2 96%   BMI 32.92 kg/m²   Oxygen Saturation Interpretation: Normal    The patients available past medical records and past encounters were reviewed.         ------------------------------ ED COURSE/MEDICAL DECISION MAKING----------------------  Medications   glucose (GLUTOSE) 40 % oral gel 15 g (has no administration in time range)   dextrose 50 % IV solution (has no administration in time range)   glucagon (rDNA) injection 1 mg (has no administration in time range)   dextrose 5 % solution (has no administration in time range)   sodium chloride flush 0.9 % injection 5-40 mL (has no administration in time range)   sodium chloride flush 0.9 % injection 5-40 mL (has no administration in time range)   0.9 % sodium chloride infusion (has no administration in time range)   polyethylene glycol (GLYCOLAX) packet 17 g (has no administration in time range)   acetaminophen (TYLENOL) tablet 650 mg (has no administration in time range)     Or   acetaminophen (TYLENOL) suppository 650 mg (has no administration in time range)   docusate sodium (COLACE) capsule 100 mg (has no administration in time range)   famotidine (PEPCID) tablet 40 mg (has no administration in time range)   fluticasone (FLONASE) 50 MCG/ACT nasal spray 1 spray (has no administration in time range)   latanoprost (XALATAN) 0.005 % ophthalmic solution 1 drop (has no administration in time range)   doxazosin (CARDURA) tablet 2 mg (has no administration in time range)   promethazine (PHENERGAN) tablet 25 mg (has no administration in time range)   potassium chloride (KLOR-CON M) extended release tablet 10 mEq (has no administration in time range)   albuterol (PROVENTIL) nebulizer solution 2.5 mg (has no administration in time range)   Arformoterol Tartrate (BROVANA) nebulizer solution 15 mcg (15 mcg Nebulization Not Given 21 0000)     And   budesonide (PULMICORT) nebulizer suspension 500 mcg (500 mcg Nebulization Not Given 21 0001)   apixaban (ELIQUIS) tablet 10 mg (has no administration in time range)   apixaban (ELIQUIS) tablet 10 mg (has no administration in time range)   acetaminophen (TYLENOL) tablet 1,000 mg (1,000 mg Oral Given 21)   iopamidol (ISOVUE-370) 76 % injection 75 mL (75 mLs Intravenous Given 21)     Counseling: The emergency provider has spoken with the patient and discussed todays results, in addition to providing specific details for the plan of care and counseling regarding the diagnosis and prognosis. Questions are answered at this time and they are agreeable with the plan. ED Course/Medical Decision Makin y.o. female here with diffuse pain s/p mechanical ground level fall. Non-toxic appearing, afebrile, hemodynamically stable, and in no acute distress. Breathing comfortably on room air without respiratory distress. Global weakness but no focal deficits. Extensive imaging workup notable for T2 compression fx w/ mild loss of height. Correlates w/ point tenderness. Patient unable to ambulate and requesting SNF placement. Admitted for PT/OT/CM evaluation and further management in stable condition.       --------------------------------- IMPRESSION AND DISPOSITION ---------------------------------    IMPRESSION  1. Closed wedge compression fracture of T2 vertebra, [facility]        DISPOSITION  Disposition: Admit to med/surg floor  Patient condition is stable    NOTE: This report was transcribed using voice recognition software.  Every effort was made to ensure accuracy; however, inadvertent computerized transcription errors may be present    Ollie Peraza MD  Attending Emergency Physician         Ollie Peraza MD  07/21/21 0005

## 2021-07-20 NOTE — LETTER
[place] Department Medicaid  CERTIFICATION OF NECESSITY  FOR NON-EMERGENCY TRANSPORTATION   BY GROUND AMBULANCE      Individual Information   1. Name: Romina Noriega 2. [place] Medicaid Billing Number:    3. Address: [place] [address]      Transportation Provider Information   4. Provider Name: [facility]   5. [place] Medicaid Provider Number:  National Provider Identifier (NPI):      Certification  7. Criteria:  During transport, this individual requires:  [] Medical treatment or continuous     supervision by an EMT. [] The administration or regulation of oxygen by another person. [] Supervised protective restraint. 8. Period Beginning Date: 7/22/2021     9. Length  [] Not more than 1 day(s)  [] One Year     Additional Information Relevant to Certification   10. Comments or Explanations, If Necessary or Appropriate   Closed wedge compression fracture of T2 vertebra, AM-PAC score 12, moderate assist for all mobility, significant pain with movement     Certifying Practitioner Information   11. Name of Practitioner: Dr. Shimon Matamoros   12. [place] Medicaid Provider Number, If Applicable:  [address] Provider Identifier (NPI):      Signature Information   14. Date of Signature: 7/22/2021 15. Name of Person Signing: Rex Webster RN   16.  Signature and Professional Designation: Electronically signed by Rex Webster RN on 7/22/2021 at 10:06 AM     [facility] [id number]  Rev. 7/2015

## 2021-07-20 NOTE — TELEPHONE ENCOUNTER
[facility] contacted Dr. Markell Pan to schedule ED f/u appt. Pt was seen yesterday by provider.  7-19

## 2021-07-20 NOTE — ED NOTES
Bed: H5  Expected date:   Expected time:   Means of arrival:   Comments:  FALL     Rich Ramirez, [address] [facility]  07/20/21 7318

## 2021-07-21 ENCOUNTER — APPOINTMENT (OUTPATIENT)
Dept: GENERAL RADIOLOGY | Age: 66
DRG: 551 | End: 2021-07-21
Payer: MEDICARE

## 2021-07-21 LAB
ALBUMIN SERPL-MCNC: 3.6 G/DL (ref 3.5–5.2)
ALP BLD-CCNC: 74 U/L (ref 35–104)
ALT SERPL-CCNC: 12 U/L (ref 0–32)
ANION GAP SERPL CALCULATED.3IONS-SCNC: 7 MMOL/L (ref 7–16)
AST SERPL-CCNC: 14 U/L (ref 0–31)
BACTERIA: 0 /HPF
BASOPHILS ABSOLUTE: 0.02 E9/L (ref 0–0.2)
BASOPHILS RELATIVE PERCENT: 0.3 % (ref 0–2)
BILIRUB SERPL-MCNC: 0.3 MG/DL (ref 0–1.2)
BILIRUBIN URINE: NEGATIVE
BLOOD, URINE: NEGATIVE
BUN BLDV-MCNC: 13 MG/DL (ref 6–23)
CALCIUM SERPL-MCNC: 10.1 MG/DL (ref 8.6–10.2)
CHLORIDE BLD-SCNC: 107 MMOL/L (ref 98–107)
CLARITY: CLEAR
CO2: 25 MMOL/L (ref 22–29)
COLOR: YELLOW
CREAT SERPL-MCNC: 0.8 MG/DL (ref 0.5–1)
EOSINOPHILS ABSOLUTE: 0.12 E9/L (ref 0.05–0.5)
EOSINOPHILS RELATIVE PERCENT: 1.9 % (ref 0–6)
EPITHELIAL CELLS, UA: 0 /HPF
GFR AFRICAN AMERICAN: >60
GFR NON-AFRICAN AMERICAN: >60 ML/MIN/1.73
GLUCOSE BLD-MCNC: 188 MG/DL (ref 74–99)
GLUCOSE URINE: NEGATIVE MG/DL
HCT VFR BLD CALC: 31.6 % (ref 34–48)
HEMOGLOBIN: 9.3 G/DL (ref 11.5–15.5)
IMMATURE GRANULOCYTES #: 0.02 E9/L
IMMATURE GRANULOCYTES %: 0.3 % (ref 0–5)
KETONES, URINE: NEGATIVE MG/DL
LEUKOCYTE ESTERASE, URINE: NEGATIVE
LYMPHOCYTES ABSOLUTE: 1.25 E9/L (ref 1.5–4)
LYMPHOCYTES RELATIVE PERCENT: 20.3 % (ref 20–42)
MAGNESIUM: 1.8 MG/DL (ref 1.6–2.6)
MCH RBC QN AUTO: 23.3 PG (ref 26–35)
MCHC RBC AUTO-ENTMCNC: 29.4 % (ref 32–34.5)
MCV RBC AUTO: 79 FL (ref 80–99.9)
METER GLUCOSE: 156 MG/DL (ref 74–99)
METER GLUCOSE: 203 MG/DL (ref 74–99)
METER GLUCOSE: 265 MG/DL (ref 74–99)
METER GLUCOSE: 291 MG/DL (ref 74–99)
METER GLUCOSE: 294 MG/DL (ref 74–99)
MONOCYTES ABSOLUTE: 0.54 E9/L (ref 0.1–0.95)
MONOCYTES RELATIVE PERCENT: 8.8 % (ref 2–12)
NEUTROPHILS ABSOLUTE: 4.21 E9/L (ref 1.8–7.3)
NEUTROPHILS RELATIVE PERCENT: 68.4 % (ref 43–80)
NITRITE, URINE: NEGATIVE
PDW BLD-RTO: 15.2 FL (ref 11.5–15)
PH UA: 6.5 (ref 5–9)
PHOSPHORUS: 3.4 MG/DL (ref 2.5–4.5)
PLATELET # BLD: 280 E9/L (ref 130–450)
PMV BLD AUTO: 9.1 FL (ref 7–12)
POTASSIUM SERPL-SCNC: 4.2 MMOL/L (ref 3.5–5)
PROTEIN UA: NEGATIVE MG/DL
RBC # BLD: 4 E12/L (ref 3.5–5.5)
RBC UA: 0 /HPF (ref 0–2)
SODIUM BLD-SCNC: 139 MMOL/L (ref 132–146)
SPECIFIC GRAVITY UA: 1.01 (ref 1–1.03)
TOTAL CK: 38 U/L (ref 20–180)
TOTAL PROTEIN: 6.6 G/DL (ref 6.4–8.3)
TSH SERPL DL<=0.05 MIU/L-ACNC: 1.98 UIU/ML (ref 0.27–4.2)
UROBILINOGEN, URINE: 0.2 E.U./DL
WBC # BLD: 6.2 E9/L (ref 4.5–11.5)
WBC UA: 0 /HPF (ref 0–5)

## 2021-07-21 PROCEDURE — 97530 THERAPEUTIC ACTIVITIES: CPT | Performed by: PHYSICAL THERAPIST

## 2021-07-21 PROCEDURE — 84100 ASSAY OF PHOSPHORUS: CPT

## 2021-07-21 PROCEDURE — 83735 ASSAY OF MAGNESIUM: CPT

## 2021-07-21 PROCEDURE — 1200000000 HC SEMI PRIVATE

## 2021-07-21 PROCEDURE — 6370000000 HC RX 637 (ALT 250 FOR IP): Performed by: INTERNAL MEDICINE

## 2021-07-21 PROCEDURE — 97161 PT EVAL LOW COMPLEX 20 MIN: CPT | Performed by: PHYSICAL THERAPIST

## 2021-07-21 PROCEDURE — 85025 COMPLETE CBC W/AUTO DIFF WBC: CPT

## 2021-07-21 PROCEDURE — 73030 X-RAY EXAM OF SHOULDER: CPT

## 2021-07-21 PROCEDURE — 94664 DEMO&/EVAL PT USE INHALER: CPT

## 2021-07-21 PROCEDURE — 81001 URINALYSIS AUTO W/SCOPE: CPT

## 2021-07-21 PROCEDURE — 97165 OT EVAL LOW COMPLEX 30 MIN: CPT | Performed by: OCCUPATIONAL THERAPIST

## 2021-07-21 PROCEDURE — 97535 SELF CARE MNGMENT TRAINING: CPT | Performed by: OCCUPATIONAL THERAPIST

## 2021-07-21 PROCEDURE — 6360000002 HC RX W HCPCS: Performed by: INTERNAL MEDICINE

## 2021-07-21 PROCEDURE — 2580000003 HC RX 258: Performed by: INTERNAL MEDICINE

## 2021-07-21 PROCEDURE — 36415 COLL VENOUS BLD VENIPUNCTURE: CPT

## 2021-07-21 PROCEDURE — 82962 GLUCOSE BLOOD TEST: CPT

## 2021-07-21 PROCEDURE — 80053 COMPREHEN METABOLIC PANEL: CPT

## 2021-07-21 PROCEDURE — 82550 ASSAY OF CK (CPK): CPT

## 2021-07-21 PROCEDURE — 94640 AIRWAY INHALATION TREATMENT: CPT

## 2021-07-21 PROCEDURE — 84443 ASSAY THYROID STIM HORMONE: CPT

## 2021-07-21 RX ORDER — AMLODIPINE BESYLATE 10 MG/1
10 TABLET ORAL DAILY
Status: DISCONTINUED | OUTPATIENT
Start: 2021-07-21 | End: 2021-07-22 | Stop reason: HOSPADM

## 2021-07-21 RX ORDER — MAGNESIUM OXIDE 400 MG/1
400 TABLET ORAL DAILY
Status: DISCONTINUED | OUTPATIENT
Start: 2021-07-21 | End: 2021-07-22 | Stop reason: HOSPADM

## 2021-07-21 RX ORDER — PRAVASTATIN SODIUM 20 MG
40 TABLET ORAL NIGHTLY
Status: DISCONTINUED | OUTPATIENT
Start: 2021-07-21 | End: 2021-07-22 | Stop reason: HOSPADM

## 2021-07-21 RX ORDER — FERROUS SULFATE 325(65) MG
325 TABLET ORAL 2 TIMES DAILY WITH MEALS
Status: DISCONTINUED | OUTPATIENT
Start: 2021-07-21 | End: 2021-07-22 | Stop reason: HOSPADM

## 2021-07-21 RX ORDER — FUROSEMIDE 40 MG/1
40 TABLET ORAL DAILY
Status: DISCONTINUED | OUTPATIENT
Start: 2021-07-21 | End: 2021-07-22 | Stop reason: HOSPADM

## 2021-07-21 RX ORDER — LISINOPRIL 20 MG/1
40 TABLET ORAL DAILY
Status: DISCONTINUED | OUTPATIENT
Start: 2021-07-21 | End: 2021-07-22 | Stop reason: HOSPADM

## 2021-07-21 RX ORDER — AMLODIPINE BESYLATE AND BENAZEPRIL HYDROCHLORIDE 10; 40 MG/1; MG/1
1 CAPSULE ORAL DAILY
Status: DISCONTINUED | OUTPATIENT
Start: 2021-07-21 | End: 2021-07-21 | Stop reason: CLARIF

## 2021-07-21 RX ADMIN — FAMOTIDINE 40 MG: 20 TABLET ORAL at 20:18

## 2021-07-21 RX ADMIN — APIXABAN 10 MG: 5 TABLET, FILM COATED ORAL at 00:26

## 2021-07-21 RX ADMIN — ARFORMOTEROL TARTRATE 15 MCG: 15 SOLUTION RESPIRATORY (INHALATION) at 06:13

## 2021-07-21 RX ADMIN — ACETAMINOPHEN 650 MG: 325 TABLET ORAL at 14:44

## 2021-07-21 RX ADMIN — ACETAMINOPHEN 650 MG: 325 TABLET ORAL at 07:52

## 2021-07-21 RX ADMIN — DOCUSATE SODIUM 100 MG: 100 CAPSULE, LIQUID FILLED ORAL at 20:18

## 2021-07-21 RX ADMIN — FUROSEMIDE 40 MG: 40 TABLET ORAL at 07:52

## 2021-07-21 RX ADMIN — FLUTICASONE PROPIONATE 1 SPRAY: 50 SPRAY, METERED NASAL at 07:56

## 2021-07-21 RX ADMIN — DOCUSATE SODIUM 100 MG: 100 CAPSULE, LIQUID FILLED ORAL at 00:26

## 2021-07-21 RX ADMIN — DOCUSATE SODIUM 100 MG: 100 CAPSULE, LIQUID FILLED ORAL at 07:53

## 2021-07-21 RX ADMIN — MAGNESIUM OXIDE 400 MG: 400 TABLET ORAL at 07:52

## 2021-07-21 RX ADMIN — FERROUS SULFATE TAB 325 MG (65 MG ELEMENTAL FE) 325 MG: 325 (65 FE) TAB at 07:52

## 2021-07-21 RX ADMIN — LISINOPRIL 40 MG: 20 TABLET ORAL at 11:08

## 2021-07-21 RX ADMIN — AMLODIPINE BESYLATE 10 MG: 10 TABLET ORAL at 11:08

## 2021-07-21 RX ADMIN — FERROUS SULFATE TAB 325 MG (65 MG ELEMENTAL FE) 325 MG: 325 (65 FE) TAB at 17:19

## 2021-07-21 RX ADMIN — BUDESONIDE 500 MCG: 0.5 INHALANT RESPIRATORY (INHALATION) at 06:13

## 2021-07-21 RX ADMIN — INSULIN LISPRO 2 UNITS: 100 INJECTION, SOLUTION INTRAVENOUS; SUBCUTANEOUS at 17:20

## 2021-07-21 RX ADMIN — APIXABAN 10 MG: 5 TABLET, FILM COATED ORAL at 20:18

## 2021-07-21 RX ADMIN — LATANOPROST 1 DROP: 50 SOLUTION OPHTHALMIC at 00:26

## 2021-07-21 RX ADMIN — FAMOTIDINE 40 MG: 20 TABLET ORAL at 00:26

## 2021-07-21 RX ADMIN — Medication 10 ML: at 07:53

## 2021-07-21 RX ADMIN — ARFORMOTEROL TARTRATE 15 MCG: 15 SOLUTION RESPIRATORY (INHALATION) at 17:31

## 2021-07-21 RX ADMIN — POTASSIUM CHLORIDE 10 MEQ: 750 TABLET, EXTENDED RELEASE ORAL at 07:52

## 2021-07-21 RX ADMIN — APIXABAN 10 MG: 5 TABLET, FILM COATED ORAL at 07:53

## 2021-07-21 RX ADMIN — LATANOPROST 1 DROP: 50 SOLUTION OPHTHALMIC at 20:18

## 2021-07-21 RX ADMIN — Medication 10 ML: at 20:19

## 2021-07-21 RX ADMIN — INSULIN LISPRO 2 UNITS: 100 INJECTION, SOLUTION INTRAVENOUS; SUBCUTANEOUS at 20:19

## 2021-07-21 RX ADMIN — BUDESONIDE 500 MCG: 0.5 INHALANT RESPIRATORY (INHALATION) at 17:31

## 2021-07-21 RX ADMIN — DOXAZOSIN 2 MG: 2 TABLET ORAL at 11:08

## 2021-07-21 RX ADMIN — INSULIN LISPRO 3 UNITS: 100 INJECTION, SOLUTION INTRAVENOUS; SUBCUTANEOUS at 11:08

## 2021-07-21 RX ADMIN — Medication 10 ML: at 00:27

## 2021-07-21 RX ADMIN — PRAVASTATIN SODIUM 40 MG: 20 TABLET ORAL at 20:18

## 2021-07-21 RX ADMIN — INSULIN LISPRO 1 UNITS: 100 INJECTION, SOLUTION INTRAVENOUS; SUBCUTANEOUS at 07:56

## 2021-07-21 ASSESSMENT — PAIN DESCRIPTION - LOCATION: LOCATION: GENERALIZED

## 2021-07-21 ASSESSMENT — PAIN SCALES - GENERAL
PAINLEVEL_OUTOF10: 1
PAINLEVEL_OUTOF10: 3
PAINLEVEL_OUTOF10: 0
PAINLEVEL_OUTOF10: 0
PAINLEVEL_OUTOF10: 3
PAINLEVEL_OUTOF10: 3

## 2021-07-21 ASSESSMENT — PAIN DESCRIPTION - PROGRESSION: CLINICAL_PROGRESSION: NOT CHANGED

## 2021-07-21 ASSESSMENT — PAIN DESCRIPTION - ORIENTATION: ORIENTATION: OTHER (COMMENT)

## 2021-07-21 ASSESSMENT — PAIN - FUNCTIONAL ASSESSMENT: PAIN_FUNCTIONAL_ASSESSMENT: PREVENTS OR INTERFERES SOME ACTIVE ACTIVITIES AND ADLS

## 2021-07-21 ASSESSMENT — PAIN DESCRIPTION - PAIN TYPE: TYPE: ACUTE PAIN

## 2021-07-21 NOTE — CONSULTS
Department of Orthopedic Surgery  Resident Consult Note          CHIEF COMPLAINT: Back pain and left shoulder pain    HISTORY OF PRESENT ILLNESS:                The patient is a 74 y.o. female who presents with back pain and left shoulder pain after fall from standing height. Patient is somewhat of a poor historian due to his confusion/baseline mental status. Reports a fall from standing height 1 to 2 days ago that resulted in back pain and left shoulder pain. States she had a history of left total hip arthroplasty. Currently patient states she would like to go to rehab    Past Medical History:        Diagnosis Date    A-fib [facility])     Acute exacerbation of chronic obstructive pulmonary disease (COPD) (HCC)     Anemia     Anxiety     Arthritis     Arthritis     Asthma     Atrial fibrillation (HCC)     CAD (coronary artery disease)     af and heart murmur    Chronic obstructive pulmonary disease (HCC)     [facility]     Diabetes mellitus ([address]     DVT (deep venous thrombosis) ([facility]     Emphysema lung (HCC)     Emphysema of lung ([address]     Encounter for imaging of bilateral cephalic veins     History of bilateral cephalic vein thrombosis    H/O cardiovascular stress test 04/25/2020    Lexiscan    Headache     History of blood transfusion     Hx of blood clots     Hypercholesterolemia     Hyperlipidemia     Hyperlipidemia     Hypertension     Mixed hyperlipidemia     Primary localized osteoarthritis of left hip     Primary osteoarthritis of left hip     Psychiatric problem     Seizures ([address]     last seizure  15 yrs ago had been on depakote    Thyroid disease     Thyroid disease     Type II or unspecified type diabetes mellitus without mention of complication, not stated as uncontrolled      Past Surgical History:        Procedure Laterality Date    CARDIAC CATHETERIZATION  12/01/2008    Normal Coronary arteries. Normal LV.   Elevated left ventricular end diastolic pressure suggestive of impaired relaxatin and possible diastolic dysfunction    HYSTERECTOMY  2004    JOINT REPLACEMENT      LEG SURGERY Right     right hip area, removed cyst [facility] developed infection went to [facility] SURG EXCISION OF ANAL LESION(S) N/A 1/25/2019    EXCISION PERIANAL WARTS performed by Tahir Rocha MD at [address] Left 8/8/2018    LEFT HIP TOTAL ARTHROPLASTY (KYLIE) performed by Stewart Covington DO at [address]     Current Medications:   Current Facility-Administered Medications: apixaban (ELIQUIS) tablet 10 mg, 10 mg, Oral, BID **FOLLOWED BY** [START ON 7/26/2021] apixaban (ELIQUIS) tablet 5 mg, 5 mg, Oral, BID  ferrous sulfate (IRON 325) tablet 325 mg, 325 mg, Oral, BID WC  furosemide (LASIX) tablet 40 mg, 40 mg, Oral, Daily  magnesium oxide (MAG-OX) tablet 400 mg, 400 mg, Oral, Daily  pravastatin (PRAVACHOL) tablet 40 mg, 40 mg, Oral, Nightly  insulin lispro (HUMALOG) injection vial 0-6 Units, 0-6 Units, Subcutaneous, TID WC  insulin lispro (HUMALOG) injection vial 0-3 Units, 0-3 Units, Subcutaneous, Nightly  amLODIPine (NORVASC) tablet 10 mg, 10 mg, Oral, Daily **AND** lisinopril (PRINIVIL;ZESTRIL) tablet 40 mg, 40 mg, Oral, Daily  glucose (GLUTOSE) 40 % oral gel 15 g, 15 g, Oral, PRN  dextrose 50 % IV solution, 12.5 g, Intravenous, PRN  glucagon (rDNA) injection 1 mg, 1 mg, Intramuscular, PRN  dextrose 5 % solution, 100 mL/hr, Intravenous, PRN  sodium chloride flush 0.9 % injection 5-40 mL, 5-40 mL, Intravenous, 2 times per day  sodium chloride flush 0.9 % injection 5-40 mL, 5-40 mL, Intravenous, PRN  0.9 % sodium chloride infusion, 25 mL, Intravenous, PRN  polyethylene glycol (GLYCOLAX) packet 17 g, 17 g, Oral, Daily PRN  acetaminophen (TYLENOL) tablet 650 mg, 650 mg, Oral, Q6H PRN **OR** acetaminophen (TYLENOL) suppository 650 mg, 650 mg, Rectal, Q6H PRN  docusate sodium (COLACE) capsule 100 mg, 100 mg, Oral, BID  famotidine (PEPCID) tablet 40 mg, 40 mg, Oral, Nightly  fluticasone (FLONASE) 50 MCG/ACT nasal spray 1 spray, 1 spray, Each Nostril, Daily  latanoprost (XALATAN) 0.005 % ophthalmic solution 1 drop, 1 drop, Both Eyes, Nightly  doxazosin (CARDURA) tablet 2 mg, 2 mg, Oral, Daily  promethazine (PHENERGAN) tablet 25 mg, 25 mg, Oral, Q6H PRN  potassium chloride (KLOR-CON M) extended release tablet 10 mEq, 10 mEq, Oral, Daily  albuterol (PROVENTIL) nebulizer solution 2.5 mg, 2.5 mg, Nebulization, Q6H PRN  Arformoterol Tartrate (BROVANA) nebulizer solution 15 mcg, 15 mcg, Nebulization, BID **AND** budesonide (PULMICORT) nebulizer suspension 500 mcg, 0.5 mg, Nebulization, BID  Allergies:  Atenolol, Lipitor [atorvastatin], Tylenol with codeine #3 [acetaminophen-codeine], Atenolol, Codeine, Lipitor [atorvastatin], Percocet [oxycodone-acetaminophen], Vicodin [hydrocodone-acetaminophen], and Other    Social History:   TOBACCO:   reports that she has never smoked. She has never used smokeless tobacco.  ETOH:   reports no history of alcohol use. DRUGS:   reports no history of drug use.   ACTIVITIES OF DAILY LIVING:    OCCUPATION:    Family History:       Problem Relation Age of Onset    Diabetes Mother          76    Kidney Disease Mother     Heart Attack Mother     Hypertension Mother     Cancer Mother     Stroke Father          age 47       General ROS: negative  Cardiovascular ROS: no chest pain or dyspnea on exertion  Respiratory ROS: no cough, shortness of breath, or wheezing  Gastrointestinal ROS: no abdominal pain, change in bowel habits, or black or bloody stools  Neurological ROS: no TIA or stroke symptoms  Musculoskeletal ROS: Per HPI    PHYSICAL EXAM:    VITALS:  /60   Pulse 82   Temp 98 °F (36.7 °C) (Oral)   Resp 18   Wt 180 lb (81.6 kg)   LMP 1981   SpO2 93%   BMI 32.92 kg/m²   CONSTITUTIONAL:  awake, alert, cooperative, no apparent distress, and appears stated age  MUSCULOSKELETAL:  BLE  · Skin skin intact  · Compartment soft compressible  · +2 DP, PT pulses  · Sensation intact light touch about deep peroneal, superficial peroneal, posterior tibial, sural, saphenous nerves  · Positive active range of motion with plantarflexion, dorsiflexion, HL  · Negative heel strike  · No tenderness palpation about the knees  · No pain with logroll  · Not a proportion pain or radiculopathy with leg extension  · Diffuse tenderness about the back with minimal palpation    SECONDARY EXAM    LUE: skin intact, there is some tenderness palpation about the shoulder and with range of motion, Radial pulses +2, cap refill <2 seconds, +sensation to radial/ulnar/median nerves sensation, +motor to AIN/PIN/ulnar nerves, compartments soft and compressible    RUE: skin intact, -TTP, Radial pulses +2, cap refill <2 seconds, +sensation to radial/ulnar/median nerves sensation, +motor to AIN/PIN/ulnar nerves, compartments soft and compressible          DATA:    CBC:   Lab Results   Component Value Date    WBC 6.2 07/21/2021    RBC 4.00 07/21/2021    HGB 9.3 07/21/2021    HCT 31.6 07/21/2021    MCV 79.0 07/21/2021    MCH 23.3 07/21/2021    MCHC 29.4 07/21/2021    RDW 15.2 07/21/2021     07/21/2021    MPV 9.1 07/21/2021     PT/INR:    Lab Results   Component Value Date    PROTIME 14.9 07/20/2021    PROTIME 11.4 01/27/2011    INR 1.3 07/20/2021     Radiology Review:      X-ray right and left knee: Demonstrates no acute fracture dislocations, mild to moderate tricompartmental arthritis    X-ray pelvis/left hip: Demonstrates no acute fracture dislocations, there is degenerative changes about the right hip as well as previous left total hip arthroplasty in good alignment    CT abdomen pelvis/lumbar/thoracic spines: Demonstrates no significant changes with respect to loss of vertebral height or kyphosis.   Possible chronic/subacute changes about the T2 vertebral endplate with minimal to no loss of alignment    IMPRESSION:  · Age-indeterminate fracture about the T2 vertebral endplate versus chronic degenerative changes  · Left shoulder pain    PLAN:  · Weightbearing as tolerated bilateral lower extremities  · PT OT  · X-ray left shoulder  · At this point no acute orthopedic intervention okay to follow-up in office pending results from shoulder x-ray. Will await final images of the shoulder to return.   Ortho to follow peripherally

## 2021-07-21 NOTE — CARE COORDINATION
CM note: Met with patient for transition of care. Pt immediately stated that she wanted to go to [facility] because she's was there before when she had her hip done and they were good to her there. Referral given to Brenna Thomas with [facility], PT eval still pending, Brenna Thomas will review evals and call back with determination. Pt is to speak with her neighbor regarding the [place] [id number] facilities as a secondary back up. For placement NO PRE-CERT needed. Pt will NEED covid testing and a PARK completed/signed by physician.

## 2021-07-21 NOTE — H&P
Department of Internal Medicine  History and Physical    PCP: Dr. Alverto Chilel   Admitting Physician: Dr. Mary Monroe  Consultants:   Date of Service: 7/20/2021    CHIEF COMPLAINT:  Ambulatory dysfunction    HISTORY OF PRESENT ILLNESS:    Patient is 67-year-old female who presented to the ED due to pain following fall earlier today. Patient states that she fell outside on the pavement after leaving her physician's office. States that she tripped and fell. She fell primarily on her left side. She was not able to get up on her own. She required assistance. In the ED again she required assistance and was not able to ambulate on her own. Patient does live alone and as such is not able to take care of self. On my exam patient states she is feeling pain in multiple areas of her body. She complains of neck pain and back pain. She complains of hip pain as well. However her greatest pain is located in the left hip area as well as mid to lower back. She states that ambulation worsens her pain. She states that using the bedpan itself causes much pain.     PAST MEDICAL Hx:  Past Medical History:   Diagnosis Date    A-fib [facility])     Acute exacerbation of chronic obstructive pulmonary disease (COPD) ([facility])     Anemia     Anxiety     Arthritis     Arthritis     Asthma     Atrial fibrillation ([address]     CAD (coronary artery disease)     af and heart murmur    Chronic obstructive pulmonary disease (HCC)     COPD (chronic obstructive pulmonary disease) ([facility])     Diabetes mellitus ([address]     DVT (deep venous thrombosis) ([facility])     Emphysema lung (HCC)     [facility]     Encounter for imaging of bilateral cephalic veins     History of bilateral cephalic vein thrombosis    H/O cardiovascular stress test 04/25/2020    Lexiscan    Headache     History of blood transfusion     Hx of blood clots     Hypercholesterolemia     Hyperlipidemia     Hyperlipidemia     Hypertension     Mixed hyperlipidemia  Primary localized osteoarthritis of left hip     Primary osteoarthritis of left hip     Psychiatric problem     Seizures ([address]     last seizure  15 yrs ago had been on depakote    Thyroid disease     Thyroid disease     Type II or unspecified type diabetes mellitus without mention of complication, not stated as uncontrolled        PAST SURGICAL Hx:   Past Surgical History:   Procedure Laterality Date    CARDIAC CATHETERIZATION  2008    Normal Coronary arteries. Normal LV. Elevated left ventricular end diastolic pressure suggestive of impaired relaxatin and possible diastolic dysfunction    HYSTERECTOMY      JOINT REPLACEMENT      LEG SURGERY Right     right hip area, removed cyst iccf developed infection went to [place]    PA SURG EXCISION OF ANAL LESION(S) N/A 2019    EXCISION PERIANAL WARTS performed by Goran Ruiz MD at [address] Left 2018    LEFT HIP TOTAL ARTHROPLASTY (KYLIE) performed by Paul Pollard DO at [address] Hx:  Family History   Problem Relation Age of Onset    Diabetes Mother          76    Kidney Disease Mother     Heart Attack Mother     Hypertension Mother     Cancer Mother     Stroke Father          age 47       HOME MEDICATIONS:  Prior to Admission medications    Medication Sig Start Date End Date Taking?  Authorizing Provider   amLODIPine-benazepril (LOTREL) 10-40 MG per capsule Take 1 capsule by mouth daily 21  Yes Ad Barker, DO   apixaban (ELIQUIS) 5 MG TABS tablet Take 2 tablets by mouth 2 times daily for 7 days 21 Yes Ad Barker,    potassium chloride (KLOR-CON) 10 MEQ extended release tablet TAKE 1 TABLET BY MOUTH EVERY DAY WITH LASIX 3/1/21  Yes Maria Luisa Kwong MD   cimetidine (TAGAMET) 400 MG tablet Take 1 tablet by mouth 2 times daily 21  Yes Maria Luisa Kwong MD   terazosin (HYTRIN) 2 MG capsule take 1 capsule by mouth once daily 12/24/20  Yes Historical Provider, MD   triamcinolone (KENALOG) 0.1 % cream Apply topically 2 times daily for up to 2 weeks then as needed for flareups.  1/13/21  Yes Nikko Rodriguez MD   SYMBICORT 160-4.5 MCG/ACT AERO INHALE TWO (2) PUFFS BY MOUTH TWICE DAILY 1/13/21  Yes Nikko Rodriguez MD   Prenatal Vit-Fe Fumarate-FA (PRENATAL VITAMIN PLUS LOW IRON) 27-1 MG TABS Take 1 tablet by mouth daily 12/29/20  Yes Nikko Rodriguez MD   promethazine (PHENERGAN) 25 MG tablet Take 1 tablet by mouth every 6 hours as needed for Nausea 12/9/20  Yes Nikko Rodriguez MD   vitamin D (ERGOCALCIFEROL) 1.25 MG (26120 UT) CAPS capsule TAKE 1 CAPSULE BY MOUTH ONCE WEEKLY 11/30/20  Yes Martin Hollingsworth PA-C   loperamide (IMODIUM) 2 MG capsule TAKE 1 CAPSULE BY MOUTH FOUR TIMES DAILY AS NEEDED FOR DIARRHEA 9/16/20  Yes Nikko Rodriguez MD   latanoprost (XALATAN) 0.005 % ophthalmic solution 1 drop nightly   Yes Historical Provider, MD   fluticasone (FLONASE) 50 MCG/ACT nasal spray 1 spray by Each Nostril route daily   Yes Historical Provider, MD   albuterol (PROVENTIL) (2.5 MG/3ML) 0.083% nebulizer solution Take 2.5 mg by nebulization every 6 hours as needed for Wheezing   Yes Historical Provider, MD   magnesium oxide (MAG-OX) 400 MG tablet Take 1 tablet by mouth daily 8/26/20  Yes Nikko Rodriguez MD   pravastatin (PRAVACHOL) 40 MG tablet TAKE 1 TABLET BY MOUTH NIGHTLY 6/20/20  Yes Nikko Rodriguez MD   furosemide (LASIX) 40 MG tablet TAKE 1 TABLET BY MOUTH DAILY 6/20/20  Yes Nikko Rodriguez MD   docusate sodium (COLACE) 100 MG capsule Take 1 capsule by mouth 2 times daily 1/25/19  Yes Chance Santizo DO   ferrous sulfate 325 (65 Fe) MG tablet Take 1 tablet by mouth 2 times daily (with meals) 8/10/18  Yes Jose De Jesus Meng DO   apixaban starter pack (ELIQUIS DVT/PE STARTER PACK) 5 MG TBPK tablet Take 1 tablet by mouth See Admin Instructions 7/16/21 joint pain, joint stiffness, joint swelling or muscle pain    Neurology:    Denies any headache or focal neurological deficits. No weakness or paresthesia. Derm:    Denies any rashes, ulcers, or excoriations. Denies bruising. Extremities:   Denies any lower extremity swelling or edema. PHYSICAL EXAM: Abnormal findings noted  VITALS:  Vitals:    07/20/21 2215   BP: 136/63   Pulse: 71   Resp: 18   Temp: 97.8 °F (36.6 °C)   SpO2: 96%         CONSTITUTIONAL:    Awake, alert, cooperative, no apparent distress, and appears stated age    EYES:     EOMI, sclera clear, conjunctiva normal    ENT:    Normocephalic, atraumatic,  External ears without lesions. NECK:    Supple, symmetrical, trachea midline, , no JVD    HEMATOLOGIC/LYMPHATICS:    No cervical lymphadenopathy and no supraclavicular lymphadenopathy    LUNGS:    Symmetric. No increased work of breathing, good air exchange, clear to auscultation bilaterally, no wheezes, rhonchi, or rales,     CARDIOVASCULAR:    Normal apical impulse, regular rate and rhythm, normal S1 and S2, no S3 or S4, and no murmur noted    ABDOMEN:     soft, non-distended, non-tender    MUSCULOSKELETAL:    There is no redness, warmth, or swelling of the joints. NEUROLOGIC:    Awake, alert, oriented to name, place and time. SKIN:    No bruising or bleeding. No redness, warmth, or swelling    EXTREMITIES:    Peripheral pulses present. No edema, cyanosis, or swelling.     LINES/CATHETERS   Kline catheter in place    LABORATORY DATA:  CBC with Differential:    Lab Results   Component Value Date    WBC 6.7 07/20/2021    RBC 3.89 07/20/2021    HGB 9.3 07/20/2021    HCT 29.5 07/20/2021     07/20/2021    MCV 75.8 07/20/2021    MCH 23.9 07/20/2021    MCHC 31.5 07/20/2021    RDW 15.1 07/20/2021    NRBC 1 10/30/2015    LYMPHOPCT 26.5 07/20/2021    MONOPCT 8.4 07/20/2021    BASOPCT 0.3 07/20/2021    MONOSABS 0.56 07/20/2021    LYMPHSABS 1.77 07/20/2021    EOSABS 0.08 07/20/2021    BASOSABS 0.02 07/20/2021     CMP:    Lab Results   Component Value Date     07/20/2021    K 3.8 07/20/2021    K 4.5 07/14/2021     07/20/2021    CO2 19 07/20/2021    BUN 11 07/20/2021    CREATININE 0.7 07/20/2021    GFRAA >60 07/20/2021    LABGLOM >60 07/20/2021    GLUCOSE 101 07/20/2021    GLUCOSE 103 01/27/2011    PROT 6.4 07/20/2021    LABALBU 3.3 07/20/2021    CALCIUM 8.9 07/20/2021    BILITOT 0.3 07/20/2021    ALKPHOS 72 07/20/2021    AST 14 07/20/2021    ALT 12 07/20/2021       ASSESSMENT/PLAN:  1. Mechanical fall resulting in ambulatory dysfunction secondary to severe pain  2. Acute peripheral neuropathy of lower extremity  3. Mild diffuse degenerative changes from C3-T1  4. Mild diffuse degenerative changes of L4-S1  5. Age indeterminate compression fracture of T2 vertebra  6. Chronic microcytic anemia  7. Atrial fibrillation  8. History of DVT  9. History of PE on Eliquis  10. Coronary artery disease  11. History of CVA with mild ambulatory dysfunction  12. Emphysema/COPD  13. Diabetes mellitus type 2  14. Seizure disorder  15. Hypothyroidism  16. Hyperlipidemia  17. Hypertension      Patient presented to the ED following fall. Secondary to fall she developed intense pain throughout her body. She complains mostly of left pain as well as lower back pain. Pain is exacerbated by movement. As such she is not able to walk on her own. Patient also adamantly refuses any opiate medications. She is adamant about treating her pain with Tylenol. Kline catheter was placed due to pain with using bedpan. PT OT consulted. Social work consulted. Consider further imaging with MRI of thoracolumbar spine. Orthopedic surgery consulted.       Karissa Diaz [address] [place] [address]  10:53 PM  7/20/2021    Electronically signed by Karissa Diaz DO on 7/20/21 at 10:53 PM EDT

## 2021-07-21 NOTE — PROGRESS NOTES
back pain  Integumentary:   Denies any rashes, ulcers, or excoriations. Denies bruising. Hematologic/Lymphatic:  Denies bruising or bleeding. Physical Exam:  No intake/output data recorded. Intake/Output Summary (Last 24 hours) at 7/21/2021 0709  Last data filed at 7/21/2021 0100  Gross per 24 hour   Intake 800 ml   Output --   Net 800 ml   I/O last 3 completed shifts: In: 800 [P.O.:800]  Out: -   Patient Vitals for the past 96 hrs (Last 3 readings):   Weight   07/20/21 1709 180 lb (81.6 kg)     Vital Signs:   Blood pressure 128/60, pulse 82, temperature 98 °F (36.7 °C), temperature source Oral, resp. rate 18, weight 180 lb (81.6 kg), last menstrual period 06/26/1981, SpO2 93 %. General appearance:  Alert, responsive, oriented to person, place, and time. Well preserved, alert, no distress. Head:  Normocephalic. No masses, lesions or tenderness. Eyes:  PERRLA. EOMI. Sclera clear. Buccal mucosa moist.  ENT:  Ears normal. Mucosa normal.  Neck:    Supple. Trachea midline. No thyromegaly. No JVD. No bruits. Heart:    Rhythm regular. Rate controlled. No murmurs. Lungs:    Symmetrical. Clear to auscultation bilaterally. No wheezes. No rhonchi. No rales. Abdomen:   Soft. Non-tender. Non-distended. Bowel sounds positive. No organomegaly or masses. No pain on palpation. Extremities:    Peripheral pulses present. No peripheral edema. No ulcers. No cyanosis. No clubbing. Neurologic:    Alert x 3. No focal deficit. Cranial nerves grossly intact. No focal weakness. Psych:   Manic features   Musculoskeletal:   Spine ROM normal. Muscular strength intact. Gait not assessed. Integumentary:  No rashes  Skin normal color and texture.   Genitalia/Breast:  Deferred    Medication:  Scheduled Meds:   apixaban  10 mg Oral BID    Followed by   Sushant Martins ON 7/26/2021] apixaban  5 mg Oral BID    ferrous sulfate  325 mg Oral BID WC    furosemide  40 mg Oral Daily    magnesium oxide  400 mg Oral Daily    pravastatin  40 mg Oral Nightly    insulin lispro  0-6 Units Subcutaneous TID     insulin lispro  0-3 Units Subcutaneous Nightly    amLODIPine  10 mg Oral Daily    And    lisinopril  40 mg Oral Daily    sodium chloride flush  5-40 mL Intravenous 2 times per day    docusate sodium  100 mg Oral BID    famotidine  40 mg Oral Nightly    fluticasone  1 spray Each Nostril Daily    latanoprost  1 drop Both Eyes Nightly    doxazosin  2 mg Oral Daily    potassium chloride  10 mEq Oral Daily    Arformoterol Tartrate  15 mcg Nebulization BID    And    budesonide  0.5 mg Nebulization BID     Continuous Infusions:   dextrose      sodium chloride         Objective Data:  CBC with Differential:    Lab Results   Component Value Date    WBC 6.2 07/21/2021    RBC 4.00 07/21/2021    HGB 9.3 07/21/2021    HCT 31.6 07/21/2021     07/21/2021    MCV 79.0 07/21/2021    MCH 23.3 07/21/2021    MCHC 29.4 07/21/2021    RDW 15.2 07/21/2021    NRBC 1 10/30/2015    LYMPHOPCT 20.3 07/21/2021    MONOPCT 8.8 07/21/2021    BASOPCT 0.3 07/21/2021    MONOSABS 0.54 07/21/2021    LYMPHSABS 1.25 07/21/2021    EOSABS 0.12 07/21/2021    BASOSABS 0.02 07/21/2021     BMP:    Lab Results   Component Value Date     07/21/2021    K 4.2 07/21/2021    K 4.5 07/14/2021     07/21/2021    CO2 25 07/21/2021    BUN 13 07/21/2021    LABALBU 3.6 07/21/2021    CREATININE 0.8 07/21/2021    CALCIUM 10.1 07/21/2021    GFRAA >60 07/21/2021    LABGLOM >60 07/21/2021    GLUCOSE 188 07/21/2021    GLUCOSE 103 01/27/2011       Assessment:  1. Mechanical fall resulting in intractable back pain with questionable compression fracture of T2 vertebrae in the setting of diffuse degenerative changes  2. Recent hospitalization for pulmonary embolism currently on anticoagulation therapy  3. Microcytic anemia of chronic disease  4. Atrial fibrillation  5. Coronary artery disease  6. History of CVA with mild ambulatory dysfunction  7.  Not oxygen dependent COPD  8. Non-insulin-dependent diabetes mellitus type 2  9. Seizure disorder  10. Hypothyroidism  11. Hyperlipidemia  12. Hypertension  13. Underlying psychiatric dysfunction with bipolar disease and manic episodes    Plan:   In the setting of the patient's back pain, she was minimally ambulatory in the emergency department. She will work with the therapy teams today as we attempt to determine discharge plan. The orthopedic team has provided consultation with no plans for intervention. Chronic comorbidities will be monitored. She is acceptable for discharge once final discharge plans have been arranged. Continue current therapy. See orders for further plan of care. More than 50% of my time was spent at the bedside counseling/coordinating care with the patient and/or family with face to face contact. This time was spent reviewing notes and laboratory data as well as instructing and counseling the patient. Time I spent with the family or surrogate(s) is included only if the patient was incapable of providing the necessary information or participating in medical decisions. I also discussed the differential diagnosis and all of the proposed management plans with the patient and individuals accompanying the patient. I am readily available for any further decision-making and intervention.        Lizett Kwong DO, F.A.C.O.I.  7/21/2021  7:09 AM

## 2021-07-21 NOTE — PROGRESS NOTES
[address] [facility]  [address], [address]         Date:2021                                                   Patient Name: Nadia Bergman     MRN: [id number]     : 1955     Room: [address] [place] [address]-       Evaluating OT: BARBARA Dela Cruz/DANIELLA; [id number]       Referring Provider and Orders/Date:   OT eval and treat Start: 21, End: 21, ONE TIME, Standing Count: 1 Occurrences, ANKITA Lang DO       Diagnosis:   1.  Closed wedge compression fracture of T2 vertebra, initial encounter [facility])         Pertinent Medical History:        Past Medical History:   Diagnosis Date    [facility]fib ([facility]     Acute exacerbation of chronic obstructive pulmonary disease (COPD) (HCC)     Anemia     Anxiety     Arthritis     Arthritis     Asthma     Atrial fibrillation (HCC)     CAD (coronary artery disease)     af and heart murmur    Chronic obstructive pulmonary disease (HCC)     COPD (chronic obstructive pulmonary disease) (HCC)     Diabetes mellitus ([address]     DVT (deep venous thrombosis) ([address]     Emphysema lung (HCC)     Emphysema of lung ([address]     Encounter for imaging of bilateral cephalic veins     History of bilateral cephalic vein thrombosis    H/O cardiovascular stress test 2020    Lexiscan    Headache     History of blood transfusion     Hx of blood clots     Hypercholesterolemia     Hyperlipidemia     Hyperlipidemia     Hypertension     Mixed hyperlipidemia     Primary localized osteoarthritis of left hip     Primary osteoarthritis of left hip     Psychiatric problem     Seizures ([address]     last seizure  15 yrs ago had been on depakote    Thyroid disease     Thyroid disease     Type II or unspecified type diabetes mellitus without mention of complication, not stated as uncontrolled           Past Surgical History:   Procedure Laterality Date    CARDIAC CATHETERIZATION  12/01/2008    Normal Coronary arteries. Normal LV.   Elevated left ventricular end diastolic pressure suggestive of impaired relaxatin and possible diastolic dysfunction    HYSTERECTOMY  2004    JOINT REPLACEMENT      LEG SURGERY Right     right hip area, removed cyst iccf developed infection went to [place]    [place] SURG EXCISION OF ANAL LESION(S) N/A 1/25/2019    EXCISION PERIANAL WARTS performed by Tahir Rocha MD at [address] Left 8/8/2018    LEFT HIP TOTAL ARTHROPLASTY (KYLIE) performed by Stewart Covington DO at [facility] OR       Precautions:  Fall Risk-fall at home    Recommended placement: IRF    Assessment of current deficits     [x] Functional mobility  [x]ADLs  [x] Strength               [x]Cognition     [x] Functional transfers   [x] IADLs         [x] Safety Awareness   [x]Endurance     [] Fine Coordination              [x] Balance      [] Vision/perception   []Sensation      [x]Gross Motor Coordination  [] ROM  [] Delirium                   [x] Motor Control     OT PLAN OF CARE   OT POC based on physician orders, patient diagnosis and results of clinical assessment    Frequency/Duration 1-3 days/wk for 2 weeks PRN   Specific OT Treatment Interventions to include:   * Instruction/training on adapted ADL techniques and AE recommendations to increase functional independence within precautions       * Training on energy conservation strategies, correct breathing pattern and techniques to improve independence/tolerance for self-care routine  * Functional transfer/mobility training/DME recommendations for increased independence, safety, and fall prevention  * Patient/Family education to increase follow through with safety techniques and functional independence  * Recommendation of environmental modifications for increased safety with functional transfers/mobility and ADLs  * Cognitive retraining/development of therapeutic activities to improve problem solving, judgement, memory, and attention for increased safety/participation in ADL/IADL tasks  * Therapeutic exercise to improve motor endurance, ROM, and functional strength for ADLs/functional transfers  * Therapeutic activities to facilitate/challenge dynamic balance, stand tolerance for increased safety and independence with ADLs  * Therapeutic activities to facilitate gross/fine motor skills for increased independence with ADLs     Recommended [employer]/DME:  TBD      Home Living: Pt lives alone with friend assist for errands. 1 story home with 2 steps to enter with no hand rail    Bathroom setup: tub shower, shower chair and grab bars, hand held shower head    DME owned: rollator, reacher, sock aide, 3:1, cane     Prior Level of Function: indep with ADLs , indep with IADLs; ambulated indep with cane   Driving: yes   Occupation: retired   Enjoys: fashion, tv    Pain Level: 8/10 in L shoulder; pt did not demonstrate pain at reported level with her able to joke and not showing signs of discomfort. Cognition: A&O: 4/4; Follows 2 step directions   Memory: min impaired-pt reports \"sometimes I drive to [place] instead of [place] because I forget what I'm doing\". Sequencing: Mod impaired with encouragement   Problem solving: Mod impaired   Judgement/safety: Mod impaired with fall risk and self limiting behaviors.     AM-PAC Daily Activity Inpatient   How much help for putting on and taking off regular lower body clothing?: A Lot  How much help for Bathing?: A Lot  How much help for Toileting?: A Lot  How much help for putting on and taking off regular upper body clothing?: A Little  How much help for taking care of personal grooming?: A Little  How much help for eating meals?: A Little  AM-[facility] Inpatient Daily Activity Raw Score: 15  AM-PAC Inpatient ADL T-Scale Score : 34.69  ADL Inpatient CMS 0-100% Score: 56.46  ADL Inpatient CMS G-Code Modifier : CK    Functional Assessment:     Initial Eval Status  Date: 7/21/2021   Treatment Status  Date: STGs = LTGs  Time frame: 10-14 days   Feeding Stand by Assist with assist to open containers and encouragement  Indep   Grooming Stand by Assist for oral care, face wash and hair care sitting in chair-including use of shower cap-cues for encouragement 7/21 Supervision in standing   UB Dressing Minimal Assist with management of sports bra due to pain. Pt was able to don and doff pull over shirt with cues for safety. Min A with gown. 7/21 Supervision    LB Dressing Maximal Assist with dep for lex socks to don and doff, to thread lex feet into pants and balance assist and prompting for pulling up over hips with use of cane  Moderate Assist    Bathing Moderate Assist from sitting/standing in arm chair. Pt was able to wash UB with encouragement and erica care/buttocks from standing with balance and safety assist. Assist for lex LE.  7/21 Minimal Assist    Toileting Moderate Assist for rodriguez management in pants and balance assist for clothing  Stand by Assist    Bed Mobility    Sit to supine: Moderate Assist  For LB assist   Supine to sit: Supervision   Sit to supine: Supervision    Functional Transfers Minimal Assist with cues for hand/feet placement. Fall risk noted. Completed from bed, toilet and arm chair. Stand by Assist    Functional Mobility Stand by Assist for short distance functional mobility throughout the room to simulate house hold distances with ww. Supervision    Balance Sitting:     Static:  fair    Dynamic:fair  Standing: fair-  Sitting:     Static:  Fair+    Dynamic:fair+  Standing: fair   Activity Tolerance Room air with vitals WFL throughout. Limited with standing tolerance <1min. Increase standing tolerance for >5min for carry over into toileting, functional tranfers and indep in ADLs   Visual/  Perceptual Glasses: not Present; WFL    Reports change in vision since admission: No-pt closing her eyes with testing, making it difficult to assess.       NA   Lex UE Strengthening  4-/5  4+/5MMT for carry over into self care, functional transfers and functional mobility with AD. Hand Dominance right   AROM (PROM) Strength Additional Info:    RUE  WFL 4-/5 good  and  FMC/dexterity noted during ADL tasks       LUE WFL-with exception of shoulder planes due to pain 4-/5 good  and FMC/dexterity noted during ADL tasks       [facility] [place]   Sensation:  No c/o numbness or tingling   Tone: WFL   Edema: none    Comments: Upon arrival patient was sitting up in arm chair. Pt required min A for most UB ADLs and max A LB ADLs tasks. Limited with min A for standing during LB ADLs and functional transfers. The biggest barriers reflect that of functional transfers, functional mobility, UB/LB ADLs, cognition, activity tolerance, balance, pain, safety and strengthening. At end of session, patient supine with call light and phone within reach, all lines and tubes intact. Overall patient demonstrated mod decreased independence and safety during completion of ADL/functional transfer/mobility tasks. Nursing updated on pt position and status following OT eval. Pt would benefit from continued skilled OT to increase safety and independence with completion of ADL/IADL tasks for functional independence and quality of life. Treatment: OT treatment provided this date includes:   Instruction, education and training on safe facilitation and adapted techniques for completion of ADLs. These include safe functional transfer techniques and on energy conservation/work simplification for completion of ADLs. Education provided on hand/feet placement with cane and walker and body mechanics for fall prevention. Cues for energy conservation and safety for in the home at [place], including modifications and DME. Extended time to complete all tasks, including skilled monitoring of patient's response during treatment session and vital signs.  Prior to and at the end of session, environmental modifications / line management completed for patients safety and efficiency of treatment session. . See above for further details. Rehab Potential: Good  for established goals     Patient / Family Goal: IRF for strengthening and pain management      Patient and/or family were instructed on functional diagnosis, prognosis/goals and OT plan of care. Demonstrated fair understanding. Eval Complexity: Low  · History: Brief review of medical records and additional review of physical, cognitive, or psychosocial history related to current functional performance  · Exam: 3+ performance deficits  · Assistance/Modification: Mod assistance or modifications required to perform tasks. May have comorbidities that affect occupational performance. Time In: 9566  Time Out: 0931  Total Treatment Time: 14    Min Units   OT Eval Low 97165  x  1   OT Eval Medium 37529      OT Eval High 29197      OT Re-Eval [id number]       Therapeutic Ex 66058       Therapeutic Activities 37842       ADL/Self Care 26891  14 1    Orthotic Management 62807       Manual [id number]     Neuro Re-Ed 39866       Non-Billable Time          Evaluation Time additionally includes thorough review of current medical information, gathering information on past medical history/social history and prior level of function, interpretation of standardized testing/informal observation of tasks, assessment of data and development of plan of care and goals.             hCico Chavez OTR/L; [id number]

## 2021-07-21 NOTE — PROGRESS NOTES
Physical Therapy Initial Evaluation/Plan of Care    Room #:  0321/0321-01  Patient Name: Nadia Bergman  YOB: 1955  MRN: [id number]    Date of Service: 7/21/2021      Tentative placement recommendation: Inpatient Rehab    Equipment recommendation: To be determined      Evaluating Physical Therapist: Bridget Hamilton, PT #[id number]      Specific Provider Orders/Date/Referring Provider :  07/20/21 2330   PT eval and treat Start: 07/20/21 2330, End: 07/20/21 2330, ONE TIME, Standing Count: 1 Occurrences, R    Lynn Lang DO      Admitting Diagnosis:   Closed wedge compression fracture of T2 vertebra, initial encounter ([facility] [address] [S22.020A]  Mild less than 10% compression deformity of the superior endplate of T2 of unknown age. No other acute displaced fractures are noted in the thoracic spine. Surgery: -    Patient Active Problem List   Diagnosis    Arthritis    Microcytic anemia    Bipolar disorder, current episode manic severe with psychotic features ([facility] [address] [facility]    Mixed hyperlipidemia    Type 2 diabetes mellitus without complication, without long-term current use of insulin ([facility] [address] [facility]    Cerebral atherosclerosis    Cognitive impairment    Hypercholesterolemia    Primary osteoarthritis of left hip    Primary localized osteoarthritis of left hip    Thyroid disease    Hypertension    Hyperlipidemia    Hx of blood clots    Chronic obstructive pulmonary disease (HCC)    DVT (deep venous thrombosis) (HCC)    Anxiety    Obesity (BMI 30-39. 9)    Anal warts    Genital warts    Thyroid nodule    Upper respiratory tract infection    Pulmonary embolism (HCC)    Closed wedge compression fracture of second thoracic vertebra (HCC)        ASSESSMENT of Current Deficits Patient exhibits decreased strength, balance, endurance and pain Back, left shoulder, knees  impairing functional mobility, transfers, gait , gait distance and tolerance to activity requires maximal encouragement for participation throughout. Steppage gait states relieves pain, slow adrian, decreased step length, assist to advance wheeled walker        PHYSICAL THERAPY  PLAN OF CARE       Physical therapy plan of care is established based on physician order,  patient diagnosis and clinical assessment    Current Treatment Recommendations:    -Bed Mobility: Lower extremity exercises , Upper extremity exercises  and Trunk control activities   -Sitting Balance: Incorporate reaching activities to activate trunk muscles   -Standing Balance: Perform strengthening exercises in standing to promote motor control with or without upper extremity support   -Transfers: Provide instruction on proper hand and foot position for adequate transfer of weight onto lower extremities and use of gait device  -Gait: Gait training, Standing activities to improve: base of support, weight shift, weight bearing  and Pregait training to emphasize: Sequencing , increased Adrian, Increased step length , Device control and Safety   -Endurance: Utilize Supervised activities to increase level of endurance to allow for safe functional mobility including transfers and gait     PT long term treatment goals are located in below grid    Patient and or family understand(s) diagnosis, prognosis, and plan of care. Frequency of treatments: Patient will be seen  daily.          Prior Level of Function: Patient ambulated with cane    Rehab Potential: fair  + for baseline    Past medical history:   Past Medical History:   Diagnosis Date    [facility] ([facility] [address]     Acute exacerbation of chronic obstructive pulmonary disease (COPD) ([facility] [facility]     Anemia     Anxiety     Arthritis     Arthritis     Asthma     Atrial fibrillation ([address]     CAD (coronary artery disease)     af and heart murmur    Chronic obstructive pulmonary disease (HCC)     COPD (chronic obstructive pulmonary disease) ([address]     Diabetes mellitus ([facility] [address] [facility]     DVT (deep venous thrombosis) ([facility] [address] [facility]     Emphysema lung ([facility] [address] [facility]     Emphysema of lung ([facility] [address] [facility]     Encounter for imaging of bilateral cephalic veins     History of bilateral cephalic vein thrombosis    H/O cardiovascular stress test 04/25/2020    Lexiscan    Headache     History of blood transfusion     Hx of blood clots     Hypercholesterolemia     Hyperlipidemia     Hyperlipidemia     Hypertension     Mixed hyperlipidemia     Primary localized osteoarthritis of left hip     Primary osteoarthritis of left hip     Psychiatric problem     Seizures ([facility] [address]     last seizure  15 yrs ago had been on depakote    Thyroid disease     Thyroid disease     Type II or unspecified type diabetes mellitus without mention of complication, not stated as uncontrolled      Past Surgical History:   Procedure Laterality Date    CARDIAC CATHETERIZATION  12/01/2008    Normal Coronary arteries. Normal LV. Elevated left ventricular end diastolic pressure suggestive of impaired relaxatin and possible diastolic dysfunction    HYSTERECTOMY  2004    JOINT REPLACEMENT      LEG SURGERY Right     right hip area, removed cyst iccf developed infection went to [place]    [place] SURG EXCISION OF ANAL LESION(S) N/A 1/25/2019    EXCISION PERIANAL WARTS performed by Moe Melendez MD at [address] Left 8/8/2018    LEFT HIP TOTAL ARTHROPLASTY (KYLIE) performed by Claudette Greenspan, DO at [facility]:    Precautions: Up with assistance, falls and alarm ,  Weightbearing as tolerated bilateral lower extremities  Mild less than 10% compression deformity of the superior endplate of T2 of unknown age. No other acute displaced fractures are noted in the thoracic spine. Partially occluding pulmonary embolism in the right lower lobe pulmonary artery as before.  There is no other acute traumatic injury in the chest. decreased vision, impulsive  Social history: Patient lives alone   in a ranch home  with 2 steps  to enter without Rail has pole to grab onto  [employer] bars    Equipment owned: [address] [facility], [address], Rollator, Bedside commode, Shower chair and Hand held shower head,  Grab bar around commode    AM-PAC Basic Mobility        AM-PAC Mobility Inpatient   How much difficulty turning over in bed?: A Lot  How much difficulty sitting down on / standing up from a chair with arms?: A Lot  How much difficulty moving from lying on back to sitting on side of bed?: A Lot  How much help from another person moving to and from a bed to a chair?: A Lot  How much help from another person needed to walk in hospital room?: A Lot  How much help from another person for climbing 3-5 steps with a railing?: A Lot  AM-PAC Inpatient Mobility Raw Score : 12  AM-PAC Inpatient T-Scale Score : 35.33  Mobility Inpatient CMS 0-100% Score: 68.66  Mobility Inpatient CMS G-Code Modifier : CL    Nursing cleared patient for PT evaluation. The admitting diagnosis and active problem list as listed above have been reviewed prior to the initiation of this evaluation. OBJECTIVE;   Initial Evaluation  Date: 7/21/2021  Treatment Date:     Short Term/ Long Term   Goals   Was pt agreeable to Eval/treatment? Yes    To be met in 3 days   Pain level   8/10  Back, left shoulder, knees      Bed Mobility    Rolling: Moderate assist of 1   Supine to sit: Moderate assist of 1 log roll  Sit to supine: Not assessed     Scooting: Moderate assist of 1    Rolling: Minimal assist of 1    Supine to sit: Minimal assist of 1    Sit to supine: Minimal assist of 1    Scooting: Minimal assist of 1     Transfers Sit to stand:  Moderate assist of 1 Cues for hand placement and safety   Sit to stand: Minimal assist of 1     Ambulation    30 feet using  wheeled walker with Moderate assist of 1   steppage gait, decreased adrian and step length, assist to advance walker     50 feet using  wheeled walker with Minimal assist of 1 improved walker control and technique   Stair negotiation: ascended and descended   Not assessed            ROM Within functional limits bilateral lower extremities     Increase range of motion 10% of affected joints    Strength BUE:  refer to OT eval  RLE:  3+ /5  LLE:  3+/5   Increase strength in affected mm groups by 1/3 grade   Balance Sitting EOB:  fair    Dynamic Standing:  fair wheeled walker   Sitting EOB:  good    Dynamic Standing: fair +wheeled walker      Patient is Alert & Oriented x person, place, time and situation and follows directions requires repeated instruction/cueing  Sensation:  Patient  denies numbness/tingling     Edema:  none noted    Endurance: fair       Vitals: room air    Blood Pressure at rest   Blood Pressure during session     Heart Rate at rest 71 Heart Rate during session     SPO2 at rest 94%  SPO2 during session  %     Patient education  Patient educated on role of Physical Therapy, risks of immobility, safety and plan of care,  importance of mobility while in hospital , ankle pumps, quad set and glut set for edema control, blood clot prevention and spinal precautions      Patient response to education:   Pt verbalized understanding Pt demonstrated skill Pt requires further education in this area   Yes Partial Yes      Treatment:  Patient practiced and was instructed/facilitated in the following treatment: Patient instructed/perfomed log roll, assisted to edge of bed,   Sat edge of bed 10 minutes with Minimal assist of 1 to increase dynamic sitting balance and activity tolerance. ambulated in room as above. Assisted up to chair, performed exercises. Therapeutic Exercises:  ankle pumps, glut sets, long arc quad and seated marching  x 15-20 reps. At end of session, patient in chair with OTpresent call light and phone within reach,   all lines and tubes intact, nursing notified. Patient would benefit from continued skilled Physical Therapy to improve functional independence and quality of life.           Patient's/ family goals   none stated        Time in  0820  Time out

## 2021-07-21 NOTE — ED NOTES
Pt awoke from nap, yelling for nurse in the hallway, I acknowledged her, she is yelling for a sandwich, pt notified she can eat once c-collar can be removed.       Siddharth Colorado RN  07/20/21 2007

## 2021-07-22 VITALS
SYSTOLIC BLOOD PRESSURE: 135 MMHG | WEIGHT: 180 LBS | RESPIRATION RATE: 18 BRPM | BODY MASS INDEX: 32.92 KG/M2 | DIASTOLIC BLOOD PRESSURE: 74 MMHG | OXYGEN SATURATION: 97 % | TEMPERATURE: 98.6 F | HEART RATE: 75 BPM

## 2021-07-22 LAB
ALBUMIN SERPL-MCNC: 3.7 G/DL (ref 3.5–5.2)
ALP BLD-CCNC: 80 U/L (ref 35–104)
ALT SERPL-CCNC: 12 U/L (ref 0–32)
ANION GAP SERPL CALCULATED.3IONS-SCNC: 9 MMOL/L (ref 7–16)
AST SERPL-CCNC: 11 U/L (ref 0–31)
BASOPHILS ABSOLUTE: 0.04 E9/L (ref 0–0.2)
BASOPHILS RELATIVE PERCENT: 0.6 % (ref 0–2)
BILIRUB SERPL-MCNC: 0.3 MG/DL (ref 0–1.2)
BUN BLDV-MCNC: 13 MG/DL (ref 6–23)
CALCIUM SERPL-MCNC: 10.1 MG/DL (ref 8.6–10.2)
CHLORIDE BLD-SCNC: 105 MMOL/L (ref 98–107)
CO2: 24 MMOL/L (ref 22–29)
CREAT SERPL-MCNC: 0.7 MG/DL (ref 0.5–1)
EOSINOPHILS ABSOLUTE: 0.1 E9/L (ref 0.05–0.5)
EOSINOPHILS RELATIVE PERCENT: 1.4 % (ref 0–6)
GFR AFRICAN AMERICAN: >60
GFR NON-AFRICAN AMERICAN: >60 ML/MIN/1.73
GLUCOSE BLD-MCNC: 186 MG/DL (ref 74–99)
HCT VFR BLD CALC: 32.5 % (ref 34–48)
HEMOGLOBIN: 9.8 G/DL (ref 11.5–15.5)
IMMATURE GRANULOCYTES #: 0.05 E9/L
IMMATURE GRANULOCYTES %: 0.7 % (ref 0–5)
LYMPHOCYTES ABSOLUTE: 1.91 E9/L (ref 1.5–4)
LYMPHOCYTES RELATIVE PERCENT: 26.8 % (ref 20–42)
MCH RBC QN AUTO: 23.5 PG (ref 26–35)
MCHC RBC AUTO-ENTMCNC: 30.2 % (ref 32–34.5)
MCV RBC AUTO: 77.9 FL (ref 80–99.9)
METER GLUCOSE: 188 MG/DL (ref 74–99)
METER GLUCOSE: 260 MG/DL (ref 74–99)
MONOCYTES ABSOLUTE: 0.57 E9/L (ref 0.1–0.95)
MONOCYTES RELATIVE PERCENT: 8 % (ref 2–12)
NEUTROPHILS ABSOLUTE: 4.46 E9/L (ref 1.8–7.3)
NEUTROPHILS RELATIVE PERCENT: 62.5 % (ref 43–80)
PDW BLD-RTO: 15.1 FL (ref 11.5–15)
PLATELET # BLD: 291 E9/L (ref 130–450)
PMV BLD AUTO: 8.7 FL (ref 7–12)
POTASSIUM SERPL-SCNC: 4.3 MMOL/L (ref 3.5–5)
RBC # BLD: 4.17 E12/L (ref 3.5–5.5)
SODIUM BLD-SCNC: 138 MMOL/L (ref 132–146)
TOTAL PROTEIN: 6.8 G/DL (ref 6.4–8.3)
WBC # BLD: 7.1 E9/L (ref 4.5–11.5)

## 2021-07-22 PROCEDURE — U0003 INFECTIOUS AGENT DETECTION BY NUCLEIC ACID (DNA OR RNA); SEVERE ACUTE RESPIRATORY SYNDROME CORONAVIRUS 2 (SARS-COV-2) (CORONAVIRUS DISEASE [COVID-19]), AMPLIFIED PROBE TECHNIQUE, MAKING USE OF HIGH THROUGHPUT TECHNOLOGIES AS DESCRIBED BY CMS-2020-01-R: HCPCS

## 2021-07-22 PROCEDURE — 6370000000 HC RX 637 (ALT 250 FOR IP): Performed by: INTERNAL MEDICINE

## 2021-07-22 PROCEDURE — 82962 GLUCOSE BLOOD TEST: CPT

## 2021-07-22 PROCEDURE — 97530 THERAPEUTIC ACTIVITIES: CPT

## 2021-07-22 PROCEDURE — 97110 THERAPEUTIC EXERCISES: CPT

## 2021-07-22 PROCEDURE — 97535 SELF CARE MNGMENT TRAINING: CPT

## 2021-07-22 PROCEDURE — 6360000002 HC RX W HCPCS: Performed by: INTERNAL MEDICINE

## 2021-07-22 PROCEDURE — 85025 COMPLETE CBC W/AUTO DIFF WBC: CPT

## 2021-07-22 PROCEDURE — 80053 COMPREHEN METABOLIC PANEL: CPT

## 2021-07-22 PROCEDURE — 94640 AIRWAY INHALATION TREATMENT: CPT

## 2021-07-22 PROCEDURE — U0005 INFEC AGEN DETEC AMPLI PROBE: HCPCS

## 2021-07-22 PROCEDURE — 36415 COLL VENOUS BLD VENIPUNCTURE: CPT

## 2021-07-22 RX ADMIN — INSULIN LISPRO 3 UNITS: 100 INJECTION, SOLUTION INTRAVENOUS; SUBCUTANEOUS at 12:40

## 2021-07-22 RX ADMIN — FERROUS SULFATE TAB 325 MG (65 MG ELEMENTAL FE) 325 MG: 325 (65 FE) TAB at 09:15

## 2021-07-22 RX ADMIN — ACETAMINOPHEN 650 MG: 325 TABLET ORAL at 09:27

## 2021-07-22 RX ADMIN — POTASSIUM CHLORIDE 10 MEQ: 750 TABLET, EXTENDED RELEASE ORAL at 09:15

## 2021-07-22 RX ADMIN — FUROSEMIDE 40 MG: 40 TABLET ORAL at 09:16

## 2021-07-22 RX ADMIN — INSULIN LISPRO 1 UNITS: 100 INJECTION, SOLUTION INTRAVENOUS; SUBCUTANEOUS at 09:00

## 2021-07-22 RX ADMIN — LISINOPRIL 40 MG: 20 TABLET ORAL at 09:15

## 2021-07-22 RX ADMIN — DOXAZOSIN 2 MG: 2 TABLET ORAL at 10:15

## 2021-07-22 RX ADMIN — DOCUSATE SODIUM 100 MG: 100 CAPSULE, LIQUID FILLED ORAL at 09:15

## 2021-07-22 RX ADMIN — APIXABAN 10 MG: 5 TABLET, FILM COATED ORAL at 09:16

## 2021-07-22 RX ADMIN — AMLODIPINE BESYLATE 10 MG: 10 TABLET ORAL at 09:16

## 2021-07-22 RX ADMIN — ARFORMOTEROL TARTRATE 15 MCG: 15 SOLUTION RESPIRATORY (INHALATION) at 06:16

## 2021-07-22 RX ADMIN — FLUTICASONE PROPIONATE 1 SPRAY: 50 SPRAY, METERED NASAL at 10:15

## 2021-07-22 RX ADMIN — MAGNESIUM OXIDE 400 MG: 400 TABLET ORAL at 09:16

## 2021-07-22 RX ADMIN — BUDESONIDE 500 MCG: 0.5 INHALANT RESPIRATORY (INHALATION) at 06:16

## 2021-07-22 ASSESSMENT — PAIN DESCRIPTION - PAIN TYPE: TYPE: ACUTE PAIN

## 2021-07-22 ASSESSMENT — PAIN DESCRIPTION - LOCATION: LOCATION: BACK

## 2021-07-22 ASSESSMENT — PAIN - FUNCTIONAL ASSESSMENT: PAIN_FUNCTIONAL_ASSESSMENT: PREVENTS OR INTERFERES SOME ACTIVE ACTIVITIES AND ADLS

## 2021-07-22 ASSESSMENT — PAIN SCALES - GENERAL
PAINLEVEL_OUTOF10: 1
PAINLEVEL_OUTOF10: 10

## 2021-07-22 ASSESSMENT — PAIN DESCRIPTION - PROGRESSION: CLINICAL_PROGRESSION: NOT CHANGED

## 2021-07-22 NOTE — DISCHARGE INSTR - COC
Continuity of Care Form    Patient Name: Marta Virk   :  1955  MRN:  [id number]    Admit date:  2021  Discharge date:  ***    Code Status Order: Full Code   Advance Directives:      Admitting Physician:  Thi Parsons DO  PCP: Nickie Barriga MD    Discharging Nurse: [facility] Unit/Room#: 0321/0321-01  Discharging Unit Phone Number: ***    Emergency Contact:   Extended Emergency Contact Information  Primary Emergency Contact: Jana Rosenthal [place] [address]  Phone: [phone]  Relation: Brother/Sister   needed? No  Secondary Emergency Contact: Arslan Watson [place] of [address] Phone: [phone]  Relation: Brother/Sister   needed? No    Past Surgical History:  Past Surgical History:   Procedure Laterality Date    CARDIAC CATHETERIZATION  2008    Normal Coronary arteries. Normal LV.   Elevated left ventricular end diastolic pressure suggestive of impaired relaxatin and possible diastolic dysfunction    HYSTERECTOMY      JOINT REPLACEMENT      LEG SURGERY Right     right hip area, removed cyst iccf developed infection went to [place]    [place] SURG EXCISION OF ANAL LESION(S) N/A 2019    EXCISION PERIANAL WARTS performed by Mason Vides MD at [address] Left 2018    LEFT HIP TOTAL ARTHROPLASTY (KYLIE) performed by Star Reyes DO at [address]       Immunization History:   Immunization History   Administered Date(s) Administered    Influenza Whole 2002    Pneumococcal Conjugate 13-valent ([id number]) 2018    Pneumococcal Polysaccharide ([id number]) 2001    Tdap (Boostrix, Adacel) 2000, 09/15/2016       Active Problems:  Patient Active Problem List   Diagnosis Code    Arthritis M19.90    Microcytic anemia D50.9    Bipolar disorder, current episode manic severe with psychotic features ([facility] [address] F31.2    Mixed hyperlipidemia E78.2    Type 2 diabetes mellitus without complication, without long-term current use of insulin ([facility]) E11.9    Cerebral atherosclerosis I67.2    Cognitive impairment R41.89    Hypercholesterolemia E78.00    Primary osteoarthritis of left hip M16.12    Primary localized osteoarthritis of left hip M16.12    Thyroid disease E07.9    Hypertension I10    Hyperlipidemia E78.5    Hx of blood clots Z86.718    Chronic obstructive pulmonary disease ([facility]) J44.9    DVT (deep venous thrombosis) ([facility]) I82.409    Anxiety F41.9    Obesity (BMI 30-39. 9) E66.9    Anal warts A63.0    Genital warts A63.0    Thyroid nodule E04.1    Upper respiratory tract infection J06.9    Pulmonary embolism ([facility]) I26.99    Closed wedge compression fracture of second thoracic vertebra ([facility]) S22.020A       Isolation/Infection:   Isolation            No Isolation          Patient Infection Status       Infection Onset Added Last Indicated Last Indicated By Review Planned Expiration Resolved Resolved By    None active    Resolved    COVID-19 Rule Out 21 COVID-19, Rapid (Ordered)   21 Rule-Out Test Resulted    COVID-19 Rule Out 21 COVID-19, Rapid (Ordered)   21     COVID-19 Rule Out 20 Covid-19 Ambulatory (Ordered)   20 Rule-Out Test Resulted    COVID-19 Rule Out 20 COVID-19 (Ordered)   20 Rule-Out Test Resulted    COVID-19 Rule Out 20 COVID-19 (Ordered)   20 Rule-Out Test Resulted            Nurse Assessment:  Last Vital Signs: BP (!) 183/90   Pulse 79   Temp 99 °F (37.2 °C) (Oral)   Resp 20   Wt 180 lb (81.6 kg)   LMP 1981   SpO2 98%   BMI 32.92 kg/m²     Last documented pain score (0-10 scale): Pain Level: 3  Last Weight:   Wt Readings from Last 1 Encounters:   21 180 lb (81.6 kg)     Mental Status:  {IP PT MENTAL STATUS::::0}    IV Access:  { PARK IV ACCESS:210779040:::0}    Nursing Mobility/ADLs:  Walking   {CHP DME ADLs:133561494:::0}  Transfer  {CHP DME ADLs:589293791:::0}  Bathing  {CHP DME ADLs:405682252:::0}  Dressing  {CHP DME ADLs:743057490:::0}  Toileting  {CHP DME ADLs:014451638:::0}  Feeding  {CHP DME ADLs:495047244:::0}  Med Admin  {CHP DME ADLs:249282277:::0}  Med Delivery   { PARK MED Delivery:733472171:::0}    Wound Care Documentation and Therapy:        Elimination:  Continence:   · Bowel: {YES / QF:09689}  · Bladder: {YES / TB:59803}  Urinary Catheter: {Urinary Catheter:927959926:::0}   Colostomy/Ileostomy/Ileal Conduit: {YES / GH:19360}       Date of Last BM: ***    Intake/Output Summary (Last 24 hours) at 2021 0713  Last data filed at 2021 0314  Gross per 24 hour   Intake 590 ml   Output 6900 ml   Net -6310 ml     I/O last 3 completed shifts:   In: 0 [P.O.:580; I.V.:10]  Out: 6900 [Urine:6900]    Safety Concerns:     508 Tag'By Safety Concerns:646229971:::0}    Impairments/Disabilities:      508 Tag'By Impairments/Disabilities:278465549:::0}    Nutrition Therapy:  Current Nutrition Therapy:   508 Tag'By Diet List:720394927:::0}    Routes of Feeding: {CHP DME Other Feedings:707610802:::0}  Liquids: {Slp liquid thickness:57383}  Daily Fluid Restriction: {CHP DME Yes amt example:209368901:::0}  Last Modified Barium Swallow with Video (Video Swallowing Test): {Done Not Done DUC:::3}    Treatments at the Time of Hospital Discharge:   Respiratory Treatments: ***  Oxygen Therapy:  {Therapy; copd oxygen:66978:::0}  Ventilator:    { CC Vent List:636207385:::0}    Rehab Therapies: {THERAPEUTIC INTERVENTION:4326818899}  Weight Bearing Status/Restrictions: { CC Weight Bearin:::0}  Other Medical Equipment (for information only, NOT a DME order):  {EQUIPMENT:442623961}  Other Treatments: ***    Patient's personal belongings (please select all that are sent with patient):  {CHP DME Belongings:850635826:::0}    RN SIGNATURE: {Esignature:012293556:::0}    CASE MANAGEMENT/SOCIAL WORK SECTION    Inpatient Status Date: ***    Readmission Risk Assessment Score:  Readmission Risk              Risk of Unplanned Readmission:  32           Discharging to Facility/ Agency   · Name: [facility] [place] [address]         Phone: [phone]       Fax: [phone]          Dialysis Facility (if applicable)   · Name:  · Address:  · Dialysis Schedule:  · Phone:  · Fax:    / signature: Electronically signed by Drew Whittaker RN on 7/22/21 at 10:13 AM EDT    PHYSICIAN SECTION    Prognosis: Good    Condition at Discharge: Stable    Rehab Potential (if transferring to Rehab): Good    Recommended Labs or Other Treatments After Discharge: ***    Physician Certification: I certify the above information and transfer of Marta Virk  is necessary for the continuing treatment of the diagnosis listed and that she requires Acute Rehab for less 30 days.      Update Admission H&P: No change in H&P    PHYSICIAN SIGNATURE:  Electronically signed by Thi Parsons DO on 7/22/21 at 7:13 AM EDT

## 2021-07-22 NOTE — PLAN OF CARE
Problem: Falls - Risk of:  Goal: Will remain free from falls  Outcome: Completed     Problem: Skin Integrity:  Goal: Will show no infection signs and symptoms  Outcome: Completed     Problem: Serum Glucose Level - Abnormal:  Goal: Ability to maintain appropriate glucose levels has stabilized  Outcome: Completed     Problem: Urinary Elimination:  Goal: Will remain free from infection  Outcome: Completed     Problem: Pain:  Goal: Pain level will decrease  Outcome: Completed     Problem: Pain:  Goal: Control of acute pain  Outcome: Completed     Problem: Pain:  Goal: Control of chronic pain  Outcome: Completed

## 2021-07-22 NOTE — PROGRESS NOTES
OT BEDSIDE TREATMENT NOTE      Date:2021  Patient Name: Gus Srivastava  MRN: [id number]  : 1955  Room: [address] [place] [address]        Evaluating OT: Molly Medina OTR/DANIELLA; [id number]        Referring Provider and Orders/Date:   OT eval and treat Start: 21, End: 21, ONE TIME, Standing Count: 1 Occurrences, R    Weikert Hernan, DO        Diagnosis:   1. Closed wedge compression fracture of T2 vertebra, initial encounter [facility])          Pertinent Medical History:        Past Medical History        Past Medical History:   Diagnosis Date    A-fib ([facility] [address]      Acute exacerbation of chronic obstructive pulmonary disease (COPD) ([facility])      Anemia      Anxiety      Arthritis      Arthritis      Asthma      Atrial fibrillation (HCC)      CAD (coronary artery disease)       af and heart murmur    Chronic obstructive pulmonary disease (HCC)      COPD (chronic obstructive pulmonary disease) (HCC)      Diabetes mellitus ([facility] [address]      DVT (deep venous thrombosis) ([facility])      Emphysema lung ([facility])      Emphysema of lung ([facility] [address]      Encounter for imaging of bilateral cephalic veins       History of bilateral cephalic vein thrombosis    H/O cardiovascular stress test 2020     Lexiscan    Headache      History of blood transfusion      Hx of blood clots      Hypercholesterolemia      Hyperlipidemia      Hyperlipidemia      Hypertension      Mixed hyperlipidemia      Primary localized osteoarthritis of left hip      Primary osteoarthritis of left hip      Psychiatric problem      Seizures (HCC)       last seizure  15 yrs ago had been on depakote    Thyroid disease      Thyroid disease      Type II or unspecified type diabetes mellitus without mention of complication, not stated as uncontrolled               Past Surgical History         Past Surgical History:   Procedure Laterality Date    CARDIAC CATHETERIZATION   2008     Normal Coronary arteries. Normal LV.   Elevated left ventricular end diastolic pressure suggestive of impaired relaxatin and possible diastolic dysfunction    HYSTERECTOMY   2004    JOINT REPLACEMENT        LEG SURGERY Right       right hip area, removed cyst iccf developed infection went to [facility] SURG EXCISION OF ANAL LESION(S) N/A 1/25/2019     EXCISION PERIANAL WARTS performed by Rafael Girard MD at [address] Left 8/8/2018     LEFT HIP TOTAL ARTHROPLASTY (KYLIE) performed by Lum Fothergill, DO at [address]           Precautions:  Fall Risk-fall at home, spinal precautions, T2 closed wedge compression fx    Recommended placement: IRF    Assessment of current deficits     [x]? Functional mobility           [x]? ADLs           [x]? Strength                   [x]? Cognition     [x]? Functional transfers         [x]? IADLs         [x]? Safety Awareness   [x]? Endurance     []? Fine Coordination                        [x]? Balance      []? Vision/perception    []? Sensation       [x]? Gross Motor Coordination            []? ROM           []? Delirium                   [x]?  Motor Control      OT PLAN OF CARE   OT POC based on physician orders, patient diagnosis and results of clinical assessment     Frequency/Duration 1-3 days/wk for 2 weeks PRN   Specific OT Treatment Interventions to include:   * Instruction/training on adapted ADL techniques and AE recommendations to increase functional independence within precautions       * Training on energy conservation strategies, correct breathing pattern and techniques to improve independence/tolerance for self-care routine  * Functional transfer/mobility training/DME recommendations for increased independence, safety, and fall prevention  * Patient/Family education to increase follow through with safety techniques and functional independence  * Recommendation of environmental modifications for increased safety with functional transfers/mobility and ADLs  * Cognitive retraining/development of therapeutic activities to improve problem solving, judgement, memory, and attention for increased safety/participation in ADL/IADL tasks  * Therapeutic exercise to improve motor endurance, ROM, and functional strength for ADLs/functional transfers  * Therapeutic activities to facilitate/challenge dynamic balance, stand tolerance for increased safety and independence with ADLs  * Therapeutic activities to facilitate gross/fine motor skills for increased independence with ADLs      Recommended Adaptive Equipmeant/DME:  TBD       Home Living: Pt lives alone with friend assist for errands. 1 story home with 2 steps to enter with no hand rail    Bathroom setup: tub shower, shower chair and grab bars, hand held shower head    DME owned: rollator, reacher, sock aide, 3:1, cane      Prior Level of Function: indep with ADLs , indep with IADLs; ambulated indep with cane   Driving: yes   Occupation: retired   Enjoys: fashion, tv     Pain Level: 10/10 in back, however pt talking and laughing throughout session  Cognition: A&O: 4/4; Follows 2 step directions              Memory: min impaired-pt reports at [facility] \"sometimes I drive to [place] instead of [place] because I forget what I'm doing\". Sequencing: Mod impaired with encouragement              Problem solving: Mod impaired              Judgement/safety: Mod impaired with fall risk and self limiting behaviors.                AM-PAC Daily Activity Inpatient   How much help for putting on and taking off regular lower body clothing?: A Lot  How much help for Bathing?: A Lot  How much help for Toileting?: A Lot  How much help for putting on and taking off regular upper body clothing?: A Little  How much help for taking care of personal grooming?: A Little  How much help for eating meals?: A Little  AM-[facility] Inpatient Daily Activity Raw Score: 15  AM-PAC Inpatient ADL T-Scale Score : 34.69  ADL Inpatient CMS 0-100% Score: 56.46  ADL Inpatient CMS G-Code Modifier : CK     Functional Assessment:      Initial Eval Status  Date: 7/21/2021   Treatment Status  Date:7/22/2021 STGs = LTGs  Time frame: 10-14 days   Feeding Stand by Assist with assist to open containers and encouragement Pt able to manage containers and bring food to mouth with supervision  Indep   Grooming Stand by Assist for oral care, face wash and hair care sitting in chair-including use of shower cap-cues for encouragement Min A to don shampoo cap; cuing to bring B hands to head to wash and towel dry hair; pt able to brush hair and place in ponytail and don hair decor; pt declined oral hygiene until after lunch; pt able to wash face with set up assist; max A to place lotion on pt's back Supervision in standing   UB Dressing Minimal Assist with management of sports bra due to pain. Pt was able to don and doff pull over shirt with cues for safety. Min A with gown. Min A to change gown; pt able to don/doff sports bra when bathing; pt able to thread B UE's through gown; assist to tie back of gown Supervision    LB Dressing Maximal Assist with dep for jose socks to don and doff, to thread jose feet into pants and balance assist and prompting for pulling up over hips with use of cane  Mod A/Max A ; pt able to complete side rolling with min/mod  A to don/doff pants over B hips with mod/max A ; max A to change socks when long seated in bed d/t spinal precautions Moderate Assist    Bathing Moderate Assist from sitting/standing in arm chair. Pt was able to wash UB with encouragement and erica care/buttocks from standing with balance and safety assist. Assist for jose LE.   Mod A ; pt required assist with LE bathing to wash distal LE's and feet d/t spinal precautions; pt seated in bed; pt able to wash UB and complete pericare when seated in bed; assist to wash back when side rolling  ; pt able to wash buttocks when side rolling; increased time d/t decreased pace Minimal Assist    Toileting Moderate Assist for rodriguez management in pants and balance assist for clothing Dep as pt has rodriguez catheter; pt declined need for bowel movement at this time  Stand by Assist    Bed Mobility     Sit to supine: Moderate Assist  For LB assist  Mod A for side rolling x 4 reps for bathing, hygiene , LE dressing and to change linens ; pt able to hold onto rail during bed mobility Supine to sit: Supervision   Sit to supine: Supervision    Functional Transfers Minimal Assist with cues for hand/feet placement. Fall risk noted. Completed from bed, toilet and arm chair. N/T as pt refused OOB activities d/t back pain  Stand by Assist    Functional Mobility Stand by Assist for short distance functional mobility throughout the room to simulate house hold distances with ww.      N/T as pt refused OOB activities at this time d/t back pain Supervision    Balance Sitting:     Static:  fair    Dynamic:fair  Standing: fair- Sitting:     Static:  fair    Dynamic:fair  Standing: N/T  Sitting:     Static:  Fair+    Dynamic:fair+  Standing: fair   Activity Tolerance Room air with vitals WFL throughout.  Limited with standing tolerance <1min.   fair/fair minus; pt willing to participate in ADL activities, however declined OOB or EOB activities d/t back pain; nsg aware Increase standing tolerance for >5min for carry over into toileting, functional tranfers and indep in ADLs   Visual/  Perceptual Glasses: not Present; WFL     Reports change in vision since admission: No-pt closing her eyes with testing, making it difficult to assess.       NA   Lex UE Strengthening  4-/5   4+/5MMT for carry over into self care, functional transfers and functional mobility with AD.       Hand Dominance right    AROM (PROM) Strength Additional Info:    RUE  WFL 4-/5 good  and  FMC/dexterity noted during ADL tasks         LUE WFL-with exception of shoulder planes due to pain 4-/5 good  and FMC/dexterity noted during ADL tasks       - BUE strengthening exercises: 10-15 reps in all planes of movement with mod resist theraband to increase/maintain strength required for functional transfers/ADL participation. Exercises completed in shoulder and elbow flexion/extension, internal/external rotation and abduction/adduction. Min rest breaks provided 2* to decreased endurance/tolerance. Ex's completed in bed with HOB elevated . Hearing: WFL   Sensation:  No c/o numbness or tingling   Tone: WFL   Edema: none    Comments: Patient cleared by nursing staff. Upon arrival pt partially seated in bed. Pt agreeable to OT tx session. Pt educated with regards to bed mobility, hand placement, safety awareness, static sitting balance, ADL retraining,  grooming tasks, UE/LE bathing, LE/UE dressing, toileting/hygiene, ECT's, B UE strengthening ex's. At end of session pt partially seated in bed with tray in front of pt to eat lunch; bed alarm on;  all lines and tubes intact, call light within reach. Overall, pt demonstrated decreased independence and safety during completion of ADL/functional transfers/mobility tasks. Pt would benefit from continued skilled OT to increase safety and independence with completion of ADL/IADL tasks for functional independence and quality of life. Pt required cues and education as noted above for safe facilitation and completion of tasks. Therapist provided skilled monitoring of patient's response during treatment session. Prior to and at the end of session, environmental modifications completed for patients safety and efficiency of treatment session. Overall, patient demonstrates moderate difficulties with completion of BADLs and IADLs. Factors contributing to these difficulties include back pain, spinal precautions,  decreased endurance, and generalized weakness. As noted above, patient likely to benefit from further OT intervention to increase independence, safety, and overall quality of life.      Treatment:     ? Bed mobility: Facilitated bed mobility with cues for proper body mechanics and sequencing to prepare for ADL completion. Log roll technique  ? ADL completion: Self-care retraining for the above-mentioned ADLs; training on proper hand placement, safety technique, sequencing, and energy conservation techniques. Continue to assess for AE for LE dressing d/t spinal precautions  ? Postural Balance: Sitting balance retraining with HOB elevated to improve righting reactions with postural changes during ADLs. ? Skilled positioning: Proper positioning to improve interaction with environment, overall functioning   ? Therapeutic Ex's: To increase B UE strength required for functional transfers and ADL participation     · Pt has made fair progress towards set goals    · OT 1-3x/week for 5-7 days during hospitalization       Treatment Time also includes thorough review of current medical information, gathering information on past medical history/social history and prior level of function, informal observation of tasks, assessment of data and education on plan of care and goals.     Treatment Time In: 10:18 AM     Treatment Time Out: 11:31 AM              Treatment Charges: Mins Units   ADL/Home Mgt     04604 50 3   Thera Activities     15962 10 1   Ther Ex                 60403 13 1   Manual Therapy    55612     Neuro Re-ed         87592     Orthotic manage/training                               88813     Non Billable Time     Total Timed Treatment 73 [id number] [address]

## 2021-07-22 NOTE — PROGRESS NOTES
XR left shoulder reviewed: no acute fractures or dislocations. [facility] for d/c from ortho standpoint. Please contact with any questions or concerns.

## 2021-07-22 NOTE — PLAN OF CARE
Problem: Falls - Risk of:  Goal: Will remain free from falls  Outcome: Met This Shift     Problem: Falls - Risk of:  Goal: Absence of physical injury  Outcome: Met This Shift     Problem: Skin Integrity:  Goal: Will show no infection signs and symptoms  Outcome: Met This Shift     Problem: Skin Integrity:  Goal: Absence of new skin breakdown  Outcome: Met This Shift     Problem: Urinary Elimination:  Goal: Will remain free from infection  Outcome: Met This Shift     Problem: Serum Glucose Level - Abnormal:  Goal: Ability to maintain appropriate glucose levels has stabilized  Outcome: Not Met This Shift     Problem: Mobility - Impaired:  Goal: Mobility will improve  Outcome: Not Met This Shift

## 2021-07-22 NOTE — CARE COORDINATION
CM note: Patient set up for 3PM transfer to [facility] via [facility] as patient experiencing too much pain to sit in wheelchair for duration of trip d/t T2 vertebral compression fx, no operable. Patient and liaison aware.

## 2021-07-22 NOTE — DISCHARGE SUMMARY
Internal Medicine Progress Note     [facility]     Sujata Diego. Nela Wills, JUANJOOADDY Jamil D.O., YULIET Moeller D.O. Sylvain Adkins, MSN, APRN, NP-C  Deidre [place]. Shayla Willingham, MSN, APRN-CNP       Internal Medicine  Discharge Summary    NAME: Costa Walker  :  1955  MRN:  [id number]  PCP:JACI SEGOVIA MD  ADMITTED: 2021      DISCHARGED: 21    ADMITTING PHYSICIAN: Alexandre Ordoñez DO    CONSULTANT(S):   IP CONSULT TO SOCIAL WORK  IP CONSULT TO ORTHOPEDIC SURGERY  IP CONSULT TO SOCIAL WORK  IP CONSULT TO SOCIAL WORK     ADMITTING DIAGNOSIS:   Closed wedge compression fracture of T2 vertebra, initial encounter ([facility] [address] [S22.020A]     DISCHARGE DIAGNOSES:   1. Mechanical fall resulting in intractable back pain with questionable compression fracture of T2 vertebrae in the setting of diffuse degenerative changes  2. Recent hospitalization for pulmonary embolism currently on anticoagulation therapy  3. Microcytic anemia of chronic disease  4. Atrial fibrillation  5. Coronary artery disease  6. History of CVA with mild ambulatory dysfunction  7. Not oxygen dependent COPD  8. Non-insulin-dependent diabetes mellitus type 2  9. Seizure disorder  10. Hypothyroidism  11. Hyperlipidemia  12. Hypertension  13. Underlying psychiatric dysfunction with bipolar disease and manic episodes    BRIEF HISTORY OF PRESENT ILLNESS:   Patient is 68-year-old female who presented to the ED due to pain following fall earlier today. Patient states that she fell outside on the pavement after leaving her physician's office. States that she tripped and fell. She fell primarily on her left side. She was not able to get up on her own. She required assistance. In the ED again she required assistance and was not able to ambulate on her own. Patient does live alone and as such is not able to take care of self.   On my exam patient states she is feeling pain in multiple areas of her body. She complains of neck pain and back pain. She complains of hip pain as well. However her greatest pain is located in the left hip area as well as mid to lower back. She states that ambulation worsens her pain. She states that using the bedpan itself causes much pain.     LABS[de-identified]  Lab Results   Component Value Date    WBC 7.1 07/22/2021    HGB 9.8 (L) 07/22/2021    HCT 32.5 (L) 07/22/2021     07/22/2021     07/22/2021    K 4.3 07/22/2021     07/22/2021    CREATININE 0.7 07/22/2021    BUN 13 07/22/2021    CO2 24 07/22/2021    GLUCOSE 186 (H) 07/22/2021    ALT 12 07/22/2021    AST 11 07/22/2021    INR 1.3 07/20/2021     Lab Results   Component Value Date    INR 1.3 07/20/2021    INR 1.0 11/21/2019    INR 1.0 08/03/2018    PROTIME 14.9 (H) 07/20/2021    PROTIME 11.1 11/21/2019    PROTIME 11.0 08/03/2018      Lab Results   Component Value Date    TSH 1.980 07/21/2021     Lab Results   Component Value Date    TRIG 176 (H) 07/15/2021    TRIG 165 (H) 04/26/2020    TRIG 107 12/03/2018     Lab Results   Component Value Date    HDL 62 07/15/2021    HDL 51 04/26/2020    HDL 65 12/03/2018     Lab Results   Component Value Date    LDLCALC 86 07/15/2021    LDLCALC 63 04/26/2020    LDLCALC 41 12/03/2018     Lab Results   Component Value Date    LABA1C 7.0 (H) 07/15/2021       IMAGING:  Echo Complete    Result Date: 7/16/2021  Transthoracic Echocardiography Report (TTE)  Demographics   Patient Name     Néstor Pham   Gender             Female                   [facility]   Medical Record   [id number]         Room Number        0532  Number   Account #        [de-identified]        Procedure Date     07/16/2021   Corporate ID                      Ordering Physician Vickie Bruce DO   Accession Number [id number]       Referring                                    Physician   Date of Birth    1955       Juliet Palmetto RDCS   Age              74 year(s)       Interpreting       Ambar Light DO                                    Physician                                     Any Other  Procedure Type of Study   TTE procedure  Procedure Date Date: 07/16/2021 Start: 07:50 AM Study Location: Echo Lab Technical Quality: Adequate visualization Indications:LV function. Patient Status: Routine Height: 62 inches Weight: 193 pounds BSA: 1.88 m^2 BMI: 35.3 kg/m^2 Rhythm: Within normal limits HR: 66 bpm BP: 155/80 mmHg  Findings   Left Ventricle  Left ventricular size is grossly normal.  Mild left ventricular concentric hypertrophy noted. Ejection fraction is measured at 53%. No evidence of left ventricular mass or thrombus noted. No regional wall motion abnormalities seen. Right Ventricle  Normal right ventricular size and function. Left Atrium  Normal sized left atrium. Interatrial septum appears intact. Right Atrium  Normal right atrium size. Mitral Valve  Physiologic and/or trace mitral regurgitation is present. No evidence of mitral valve stenosis. Tricuspid Valve  Physiologic and/or trace tricuspid regurgitation. Aortic Valve  Aortic valve opens well. The aortic valve is trileaflet. No evidence of aortic valve regurgitation. No hemodynamically significant aortic stenosis is present. Pulmonic Valve  Pulmonic valve is structurally normal.   Pericardial Effusion  No evidence of pericardial effusion. Aorta  The aorta is within normal limits. Miscellaneous  Regular rhythm. Conclusions   Summary  Left ventricular size is grossly normal.  Mild left ventricular concentric hypertrophy noted. Ejection fraction is measured at 53%. No evidence of left ventricular mass or thrombus noted. No regional wall motion abnormalities seen. Normal sized left atrium. Interatrial septum appears intact. Physiologic and/or trace mitral regurgitation is present. No evidence of mitral valve stenosis.   Physiologic and/or trace tricuspid regurgitation. Regular rhythm.    Signature   ----------------------------------------------------------------  Electronically signed by Khoa HALLInterpreting  physician) on 07/16/2021 06:03 PM  ----------------------------------------------------------------  M-Mode/2D Measurements & Calculations   LV Diastolic    LV Systolic Dimension: 3.3   AV Cusp Separation: 2.2 cmLA  Dimension: 4.5  cm                           Dimension: 3.5 cmAO Root  cm              LV Volume Diastolic: 92.7 ml Dimension: 2.7 cm  LV FS:26.7 %    LV Volume Systolic: 42.2 ml  LV PW           LV EDV/LV EDV Index: 97.7  Diastolic: 1.3  YA/70 UJ/L^6NG ESV/LV ESV  cm              Index: 43.8 ml/23ml/ m^2     RV Diastolic Dimension: 2.5  Septum          EF Calculated: 53.1 %        cm  Diastolic: 1.3  LV Mass Index: 118 l/min*m^2  cm  CO: 4.19 l/min                               LA volume/Index: 40.5 ml  CI: 2.23        LVOT: 2 cm  l/m*m^2  LV Mass: 222.57  g  Doppler Measurements & Calculations   MV Peak E-Wave: 0.87 AV Peak Velocity:     LVOT Peak Velocity: 0.79 m/s  m/s                  1.58 m/s              LVOT Mean Velocity: 0.55 m/s  MV Peak A-Wave: 1.13 AV Peak Gradient:     LVOT Peak Gradient: 2.5  m/s                  10.01 mmHg            mmHgLVOT Mean Gradient: 1.4  MV E/A Ratio: 0.77   AV Mean Velocity:     mmHg  MV Peak Gradient:    1.22 m/s  4.6 mmHg             AV Mean Gradient: 6.5  MV Mean Gradient:    mmHg  1.7 mmHg             AV VTI: 37.9 cm       TR Velocity:2.28 m/s  MV Mean Velocity:    AV Area               TR Gradient:20.74 mmHg  0.59 m/s             (Continuity):1.67     PV Peak Velocity: 1.14 m/s  MV Deceleration      cm^2                  PV Peak Gradient: 5.23 mmHg  Time: 258.6 msec                           PV Mean Velocity: 0.78 m/s  MV P1/2t: 54.4 msec  LVOT VTI: 20.2 cm     PV Mean Gradient: 2.8 mmHg  MVA by PHT:4.04 cm^2 IVRT: 96.9 msec  MV Area  (continuity): 2 cm^2 No focal osseous lesion. No evidence of joint effusion. No focal soft tissue abnormality. Superior and inferior patellar spurs. Narrowing of the lateral compartment. Superior and inferior patella spurs. XR KNEE RIGHT (1-2 VIEWS)    Result Date: 7/20/2021  EXAMINATION: TWO XRAY VIEWS OF THE RIGHT KNEE 7/20/2021 7:16 pm COMPARISON: None. HISTORY: ORDERING SYSTEM PROVIDED HISTORY: fall TECHNOLOGIST PROVIDED HISTORY: Reason for exam:->fall FINDINGS: There is no acute displaced fracture in the right knee. There is moderate tricompartmental degenerative joint disease with joint space narrowing, marginal osteophyte and joint effusion in the suprapatellar bursa. No acute displaced fracture. Moderate tricompartmental degenerative joint disease with joint effusion. CT HEAD WO CONTRAST    Result Date: 7/20/2021  EXAMINATION: CT OF THE HEAD WITHOUT CONTRAST; CT OF THE CHEST WITH CONTRAST; CT OF THE ABDOMEN AND PELVIS WITH CONTRAST; CT OF THE LUMBAR SPINE WITHOUT CONTRAST; CT OF THE THORACIC SPINE WITHOUT CONTRAST; CT OF THE CERVICAL SPINE WITHOUT CONTRAST  7/20/2021 7:03 pm TECHNIQUE: CT of the head was performed without the administration of intravenous contrast. Dose modulation, iterative reconstruction, and/or weight based adjustment of the mA/kV was utilized to reduce the radiation dose to as low as reasonably achievable.; CT of the chest was performed with the administration of intravenous contrast. Multiplanar reformatted images are provided for review. Dose modulation, iterative reconstruction, and/or weight based adjustment of the mA/kV was utilized to reduce the radiation dose to as low as reasonably achievable.; CT of the abdomen and pelvis was performed with the administration of intravenous contrast. Multiplanar reformatted images are provided for review.  Dose modulation, iterative reconstruction, and/or weight based adjustment of the mA/kV was utilized to reduce the radiation dose to as low as reasonably achievable.; CT of the lumbar spine was performed without the administration of intravenous contrast. Multiplanar reformatted images are provided for review. Dose modulation, iterative reconstruction, and/or weight based adjustment of the mA/kV was utilized to reduce the radiation dose to as low as reasonably achievable.; CT of the thoracic spine was performed without the administration of intravenous contrast. Multiplanar reformatted images are provided for review. Dose modulation, iterative reconstruction, and/or weight based adjustment of the mA/kV was utilized to reduce the radiation dose to as low as reasonably achievable.; CT of the cervical spine was performed without the administration of intravenous contrast. Multiplanar reformatted images are provided for review. Dose modulation, iterative reconstruction, and/or weight based adjustment of the mA/kV was utilized to reduce the radiation dose to as low as reasonably achievable. COMPARISON: 07/20/2020 HISTORY: ORDERING SYSTEM PROVIDED HISTORY: trauma TECHNOLOGIST PROVIDED HISTORY: Has a \"code stroke\" or \"stroke alert\" been called? ->No Reason for exam:->trauma Decision Support Exception - unselect if not a suspected or confirmed emergency medical condition->Emergency Medical Condition (MA) FINDINGS: BRAIN/VENTRICLES: There is no acute intracranial hemorrhage, mass effect or midline shift. No abnormal extra-axial fluid collection. The gray-white differentiation is maintained without evidence of an acute infarct. There is no evidence of hydrocephalus. ORBITS: The visualized portion of the orbits demonstrate no acute abnormality. SINUSES: The visualized paranasal sinuses and mastoid air cells demonstrate no acute abnormality. SOFT TISSUES/SKULL:  No acute abnormality of the visualized skull or soft tissues. No acute intracranial abnormality. There is age-appropriate atrophy and small-vessel ischemic disease.  Mucosal thickening in the right maxillary sinuses. . CT cervical spine. There is no acute displaced fracture in the cervical spine. The prevertebral soft tissues are normal.  Diffuse degenerative changes are identified from C3-T1 with osteophytes and multilevel disc bulges. Impression No acute displaced fracture. Mild diffuse degenerative changes from C3-T1. CT chest. Comparison CTA chest 07/14/2021. Findings The heart and the great vessels are normal.  Tiny pericardial effusion is noted. There is coronary artery calcification. The great vessels are normal.  Partially occluding filling defects are identified in the right lower lobe pulmonary artery without significant change. There is minimal scarring and atelectasis in the lung bases. There is no focal consolidation or pleural effusion. Impression Partially occluding pulmonary embolism in the right lower lobe pulmonary artery as before. There is no other acute traumatic injury in the chest. CT abdomen and pelvis. Comparison 06/23/2020 Findings There is hepatomegaly with liver measuring 19 cm which is fatty. Gallbladder is normal.  Spleen, pancreas, the right adrenal gland and the kidneys are normal.  2.5 x 2.7 cm left adrenal nodule is present. There is severe vascular calcification aorta. Degenerative changes are identified in the thoracolumbar spine. Pelvis. Bladder is distended. Radiopaque material is identified in the colon likely swallowed material.  There is diverticulosis of colon without diverticulitis. The appendix is upper normal. Impression There is no acute traumatic injury, acute inflammation or bowel obstruction in the abdomen pelvis. CT thoracic spine. There is mild less than 10% compression deformity of the superior endplate of T2, the age of which is uncertain. No other acute displaced fractures are identified in the thoracic spine.   There is mild diffuse degenerative changes in the thoracic spine Impression Mild less than 10% compression deformity of the superior endplate of T2 of unknown age. No other acute displaced fractures are noted in the thoracic spine. CT lumbar spine. There is no acute displaced fracture in the lumbar spine. There is mild diffuse degenerative changes lumbar spine with mild disc bulges from L4-S1. Impression No acute fractures. CT CHEST W CONTRAST    Result Date: 7/20/2021  EXAMINATION: CT OF THE HEAD WITHOUT CONTRAST; CT OF THE CHEST WITH CONTRAST; CT OF THE ABDOMEN AND PELVIS WITH CONTRAST; CT OF THE LUMBAR SPINE WITHOUT CONTRAST; CT OF THE THORACIC SPINE WITHOUT CONTRAST; CT OF THE CERVICAL SPINE WITHOUT CONTRAST  7/20/2021 7:03 pm TECHNIQUE: CT of the head was performed without the administration of intravenous contrast. Dose modulation, iterative reconstruction, and/or weight based adjustment of the mA/kV was utilized to reduce the radiation dose to as low as reasonably achievable.; CT of the chest was performed with the administration of intravenous contrast. Multiplanar reformatted images are provided for review. Dose modulation, iterative reconstruction, and/or weight based adjustment of the mA/kV was utilized to reduce the radiation dose to as low as reasonably achievable.; CT of the abdomen and pelvis was performed with the administration of intravenous contrast. Multiplanar reformatted images are provided for review. Dose modulation, iterative reconstruction, and/or weight based adjustment of the mA/kV was utilized to reduce the radiation dose to as low as reasonably achievable.; CT of the lumbar spine was performed without the administration of intravenous contrast. Multiplanar reformatted images are provided for review. Dose modulation, iterative reconstruction, and/or weight based adjustment of the mA/kV was utilized to reduce the radiation dose to as low as reasonably achievable.; CT of the thoracic spine was performed without the administration of intravenous contrast. Multiplanar reformatted images are provided for review.  Dose modulation, iterative reconstruction, and/or weight based adjustment of the mA/kV was utilized to reduce the radiation dose to as low as reasonably achievable.; CT of the cervical spine was performed without the administration of intravenous contrast. Multiplanar reformatted images are provided for review. Dose modulation, iterative reconstruction, and/or weight based adjustment of the mA/kV was utilized to reduce the radiation dose to as low as reasonably achievable. COMPARISON: 07/20/2020 HISTORY: ORDERING SYSTEM PROVIDED HISTORY: trauma TECHNOLOGIST PROVIDED HISTORY: Has a \"code stroke\" or \"stroke alert\" been called? ->No Reason for exam:->trauma Decision Support Exception - unselect if not a suspected or confirmed emergency medical condition->Emergency Medical Condition (MA) FINDINGS: BRAIN/VENTRICLES: There is no acute intracranial hemorrhage, mass effect or midline shift. No abnormal extra-axial fluid collection. The gray-white differentiation is maintained without evidence of an acute infarct. There is no evidence of hydrocephalus. ORBITS: The visualized portion of the orbits demonstrate no acute abnormality. SINUSES: The visualized paranasal sinuses and mastoid air cells demonstrate no acute abnormality. SOFT TISSUES/SKULL:  No acute abnormality of the visualized skull or soft tissues. No acute intracranial abnormality. There is age-appropriate atrophy and small-vessel ischemic disease. Mucosal thickening in the right maxillary sinuses. . CT cervical spine. There is no acute displaced fracture in the cervical spine. The prevertebral soft tissues are normal.  Diffuse degenerative changes are identified from C3-T1 with osteophytes and multilevel disc bulges. Impression No acute displaced fracture. Mild diffuse degenerative changes from C3-T1. CT chest. Comparison CTA chest 07/14/2021. Findings The heart and the great vessels are normal.  Tiny pericardial effusion is noted.   There is coronary artery calcification. The great vessels are normal.  Partially occluding filling defects are identified in the right lower lobe pulmonary artery without significant change. There is minimal scarring and atelectasis in the lung bases. There is no focal consolidation or pleural effusion. Impression Partially occluding pulmonary embolism in the right lower lobe pulmonary artery as before. There is no other acute traumatic injury in the chest. CT abdomen and pelvis. Comparison 06/23/2020 Findings There is hepatomegaly with liver measuring 19 cm which is fatty. Gallbladder is normal.  Spleen, pancreas, the right adrenal gland and the kidneys are normal.  2.5 x 2.7 cm left adrenal nodule is present. There is severe vascular calcification aorta. Degenerative changes are identified in the thoracolumbar spine. Pelvis. Bladder is distended. Radiopaque material is identified in the colon likely swallowed material.  There is diverticulosis of colon without diverticulitis. The appendix is upper normal. Impression There is no acute traumatic injury, acute inflammation or bowel obstruction in the abdomen pelvis. CT thoracic spine. There is mild less than 10% compression deformity of the superior endplate of T2, the age of which is uncertain. No other acute displaced fractures are identified in the thoracic spine. There is mild diffuse degenerative changes in the thoracic spine Impression Mild less than 10% compression deformity of the superior endplate of T2 of unknown age. No other acute displaced fractures are noted in the thoracic spine. CT lumbar spine. There is no acute displaced fracture in the lumbar spine. There is mild diffuse degenerative changes lumbar spine with mild disc bulges from L4-S1. Impression No acute fractures.      CT CERVICAL SPINE WO CONTRAST    Result Date: 7/20/2021  EXAMINATION: CT OF THE HEAD WITHOUT CONTRAST; CT OF THE CHEST WITH CONTRAST; CT OF THE ABDOMEN AND PELVIS WITH CONTRAST; CT OF THE LUMBAR SPINE WITHOUT CONTRAST; CT OF THE THORACIC SPINE WITHOUT CONTRAST; CT OF THE CERVICAL SPINE WITHOUT CONTRAST  7/20/2021 7:03 pm TECHNIQUE: CT of the head was performed without the administration of intravenous contrast. Dose modulation, iterative reconstruction, and/or weight based adjustment of the mA/kV was utilized to reduce the radiation dose to as low as reasonably achievable.; CT of the chest was performed with the administration of intravenous contrast. Multiplanar reformatted images are provided for review. Dose modulation, iterative reconstruction, and/or weight based adjustment of the mA/kV was utilized to reduce the radiation dose to as low as reasonably achievable.; CT of the abdomen and pelvis was performed with the administration of intravenous contrast. Multiplanar reformatted images are provided for review. Dose modulation, iterative reconstruction, and/or weight based adjustment of the mA/kV was utilized to reduce the radiation dose to as low as reasonably achievable.; CT of the lumbar spine was performed without the administration of intravenous contrast. Multiplanar reformatted images are provided for review. Dose modulation, iterative reconstruction, and/or weight based adjustment of the mA/kV was utilized to reduce the radiation dose to as low as reasonably achievable.; CT of the thoracic spine was performed without the administration of intravenous contrast. Multiplanar reformatted images are provided for review. Dose modulation, iterative reconstruction, and/or weight based adjustment of the mA/kV was utilized to reduce the radiation dose to as low as reasonably achievable.; CT of the cervical spine was performed without the administration of intravenous contrast. Multiplanar reformatted images are provided for review. Dose modulation, iterative reconstruction, and/or weight based adjustment of the mA/kV was utilized to reduce the radiation dose to as low as reasonably achievable. 06/23/2020 Findings There is hepatomegaly with liver measuring 19 cm which is fatty. Gallbladder is normal.  Spleen, pancreas, the right adrenal gland and the kidneys are normal.  2.5 x 2.7 cm left adrenal nodule is present. There is severe vascular calcification aorta. Degenerative changes are identified in the thoracolumbar spine. Pelvis. Bladder is distended. Radiopaque material is identified in the colon likely swallowed material.  There is diverticulosis of colon without diverticulitis. The appendix is upper normal. Impression There is no acute traumatic injury, acute inflammation or bowel obstruction in the abdomen pelvis. CT thoracic spine. There is mild less than 10% compression deformity of the superior endplate of T2, the age of which is uncertain. No other acute displaced fractures are identified in the thoracic spine. There is mild diffuse degenerative changes in the thoracic spine Impression Mild less than 10% compression deformity of the superior endplate of T2 of unknown age. No other acute displaced fractures are noted in the thoracic spine. CT lumbar spine. There is no acute displaced fracture in the lumbar spine. There is mild diffuse degenerative changes lumbar spine with mild disc bulges from L4-S1. Impression No acute fractures.      CT THORACIC SPINE WO CONTRAST    Result Date: 7/20/2021  EXAMINATION: CT OF THE HEAD WITHOUT CONTRAST; CT OF THE CHEST WITH CONTRAST; CT OF THE ABDOMEN AND PELVIS WITH CONTRAST; CT OF THE LUMBAR SPINE WITHOUT CONTRAST; CT OF THE THORACIC SPINE WITHOUT CONTRAST; CT OF THE CERVICAL SPINE WITHOUT CONTRAST  7/20/2021 7:03 pm TECHNIQUE: CT of the head was performed without the administration of intravenous contrast. Dose modulation, iterative reconstruction, and/or weight based adjustment of the mA/kV was utilized to reduce the radiation dose to as low as reasonably achievable.; CT of the chest was performed with the administration of intravenous contrast. Multiplanar reformatted images are provided for review. Dose modulation, iterative reconstruction, and/or weight based adjustment of the mA/kV was utilized to reduce the radiation dose to as low as reasonably achievable.; CT of the abdomen and pelvis was performed with the administration of intravenous contrast. Multiplanar reformatted images are provided for review. Dose modulation, iterative reconstruction, and/or weight based adjustment of the mA/kV was utilized to reduce the radiation dose to as low as reasonably achievable.; CT of the lumbar spine was performed without the administration of intravenous contrast. Multiplanar reformatted images are provided for review. Dose modulation, iterative reconstruction, and/or weight based adjustment of the mA/kV was utilized to reduce the radiation dose to as low as reasonably achievable.; CT of the thoracic spine was performed without the administration of intravenous contrast. Multiplanar reformatted images are provided for review. Dose modulation, iterative reconstruction, and/or weight based adjustment of the mA/kV was utilized to reduce the radiation dose to as low as reasonably achievable.; CT of the cervical spine was performed without the administration of intravenous contrast. Multiplanar reformatted images are provided for review. Dose modulation, iterative reconstruction, and/or weight based adjustment of the mA/kV was utilized to reduce the radiation dose to as low as reasonably achievable. COMPARISON: 07/20/2020 HISTORY: ORDERING SYSTEM PROVIDED HISTORY: trauma TECHNOLOGIST PROVIDED HISTORY: Has a \"code stroke\" or \"stroke alert\" been called? ->No Reason for exam:->trauma Decision Support Exception - unselect if not a suspected or confirmed emergency medical condition->Emergency Medical Condition (MA) FINDINGS: BRAIN/VENTRICLES: There is no acute intracranial hemorrhage, mass effect or midline shift. No abnormal extra-axial fluid collection.   The gray-white differentiation is maintained without evidence of an acute infarct. There is no evidence of hydrocephalus. ORBITS: The visualized portion of the orbits demonstrate no acute abnormality. SINUSES: The visualized paranasal sinuses and mastoid air cells demonstrate no acute abnormality. SOFT TISSUES/SKULL:  No acute abnormality of the visualized skull or soft tissues. No acute intracranial abnormality. There is age-appropriate atrophy and small-vessel ischemic disease. Mucosal thickening in the right maxillary sinuses. . CT cervical spine. There is no acute displaced fracture in the cervical spine. The prevertebral soft tissues are normal.  Diffuse degenerative changes are identified from C3-T1 with osteophytes and multilevel disc bulges. Impression No acute displaced fracture. Mild diffuse degenerative changes from C3-T1. CT chest. Comparison CTA chest 07/14/2021. Findings The heart and the great vessels are normal.  Tiny pericardial effusion is noted. There is coronary artery calcification. The great vessels are normal.  Partially occluding filling defects are identified in the right lower lobe pulmonary artery without significant change. There is minimal scarring and atelectasis in the lung bases. There is no focal consolidation or pleural effusion. Impression Partially occluding pulmonary embolism in the right lower lobe pulmonary artery as before. There is no other acute traumatic injury in the chest. CT abdomen and pelvis. Comparison 06/23/2020 Findings There is hepatomegaly with liver measuring 19 cm which is fatty. Gallbladder is normal.  Spleen, pancreas, the right adrenal gland and the kidneys are normal.  2.5 x 2.7 cm left adrenal nodule is present. There is severe vascular calcification aorta. Degenerative changes are identified in the thoracolumbar spine. Pelvis. Bladder is distended.   Radiopaque material is identified in the colon likely swallowed material.  There is diverticulosis of colon without diverticulitis. The appendix is upper normal. Impression There is no acute traumatic injury, acute inflammation or bowel obstruction in the abdomen pelvis. CT thoracic spine. There is mild less than 10% compression deformity of the superior endplate of T2, the age of which is uncertain. No other acute displaced fractures are identified in the thoracic spine. There is mild diffuse degenerative changes in the thoracic spine Impression Mild less than 10% compression deformity of the superior endplate of T2 of unknown age. No other acute displaced fractures are noted in the thoracic spine. CT lumbar spine. There is no acute displaced fracture in the lumbar spine. There is mild diffuse degenerative changes lumbar spine with mild disc bulges from L4-S1. Impression No acute fractures. CT LUMBAR SPINE WO CONTRAST    Result Date: 7/20/2021  EXAMINATION: CT OF THE HEAD WITHOUT CONTRAST; CT OF THE CHEST WITH CONTRAST; CT OF THE ABDOMEN AND PELVIS WITH CONTRAST; CT OF THE LUMBAR SPINE WITHOUT CONTRAST; CT OF THE THORACIC SPINE WITHOUT CONTRAST; CT OF THE CERVICAL SPINE WITHOUT CONTRAST  7/20/2021 7:03 pm TECHNIQUE: CT of the head was performed without the administration of intravenous contrast. Dose modulation, iterative reconstruction, and/or weight based adjustment of the mA/kV was utilized to reduce the radiation dose to as low as reasonably achievable.; CT of the chest was performed with the administration of intravenous contrast. Multiplanar reformatted images are provided for review. Dose modulation, iterative reconstruction, and/or weight based adjustment of the mA/kV was utilized to reduce the radiation dose to as low as reasonably achievable.; CT of the abdomen and pelvis was performed with the administration of intravenous contrast. Multiplanar reformatted images are provided for review.  Dose modulation, iterative reconstruction, and/or weight based adjustment of the mA/kV was utilized to reduce the radiation dose to as low as reasonably achievable.; CT of the lumbar spine was performed without the administration of intravenous contrast. Multiplanar reformatted images are provided for review. Dose modulation, iterative reconstruction, and/or weight based adjustment of the mA/kV was utilized to reduce the radiation dose to as low as reasonably achievable.; CT of the thoracic spine was performed without the administration of intravenous contrast. Multiplanar reformatted images are provided for review. Dose modulation, iterative reconstruction, and/or weight based adjustment of the mA/kV was utilized to reduce the radiation dose to as low as reasonably achievable.; CT of the cervical spine was performed without the administration of intravenous contrast. Multiplanar reformatted images are provided for review. Dose modulation, iterative reconstruction, and/or weight based adjustment of the mA/kV was utilized to reduce the radiation dose to as low as reasonably achievable. COMPARISON: 07/20/2020 HISTORY: ORDERING SYSTEM PROVIDED HISTORY: trauma TECHNOLOGIST PROVIDED HISTORY: Has a \"code stroke\" or \"stroke alert\" been called? ->No Reason for exam:->trauma Decision Support Exception - unselect if not a suspected or confirmed emergency medical condition->Emergency Medical Condition (MA) FINDINGS: BRAIN/VENTRICLES: There is no acute intracranial hemorrhage, mass effect or midline shift. No abnormal extra-axial fluid collection. The gray-white differentiation is maintained without evidence of an acute infarct. There is no evidence of hydrocephalus. ORBITS: The visualized portion of the orbits demonstrate no acute abnormality. SINUSES: The visualized paranasal sinuses and mastoid air cells demonstrate no acute abnormality. SOFT TISSUES/SKULL:  No acute abnormality of the visualized skull or soft tissues. No acute intracranial abnormality. There is age-appropriate atrophy and small-vessel ischemic disease.  Mucosal thickening in the right maxillary sinuses. . CT cervical spine. There is no acute displaced fracture in the cervical spine. The prevertebral soft tissues are normal.  Diffuse degenerative changes are identified from C3-T1 with osteophytes and multilevel disc bulges. Impression No acute displaced fracture. Mild diffuse degenerative changes from C3-T1. CT chest. Comparison CTA chest 07/14/2021. Findings The heart and the great vessels are normal.  Tiny pericardial effusion is noted. There is coronary artery calcification. The great vessels are normal.  Partially occluding filling defects are identified in the right lower lobe pulmonary artery without significant change. There is minimal scarring and atelectasis in the lung bases. There is no focal consolidation or pleural effusion. Impression Partially occluding pulmonary embolism in the right lower lobe pulmonary artery as before. There is no other acute traumatic injury in the chest. CT abdomen and pelvis. Comparison 06/23/2020 Findings There is hepatomegaly with liver measuring 19 cm which is fatty. Gallbladder is normal.  Spleen, pancreas, the right adrenal gland and the kidneys are normal.  2.5 x 2.7 cm left adrenal nodule is present. There is severe vascular calcification aorta. Degenerative changes are identified in the thoracolumbar spine. Pelvis. Bladder is distended. Radiopaque material is identified in the colon likely swallowed material.  There is diverticulosis of colon without diverticulitis. The appendix is upper normal. Impression There is no acute traumatic injury, acute inflammation or bowel obstruction in the abdomen pelvis. CT thoracic spine. There is mild less than 10% compression deformity of the superior endplate of T2, the age of which is uncertain. No other acute displaced fractures are identified in the thoracic spine.   There is mild diffuse degenerative changes in the thoracic spine Impression Mild less than 10% compression deformity of the superior endplate of T2 of unknown age. No other acute displaced fractures are noted in the thoracic spine. CT lumbar spine. There is no acute displaced fracture in the lumbar spine. There is mild diffuse degenerative changes lumbar spine with mild disc bulges from L4-S1. Impression No acute fractures. CT ABDOMEN PELVIS W IV CONTRAST Additional Contrast? None    Result Date: 7/20/2021  EXAMINATION: CT OF THE HEAD WITHOUT CONTRAST; CT OF THE CHEST WITH CONTRAST; CT OF THE ABDOMEN AND PELVIS WITH CONTRAST; CT OF THE LUMBAR SPINE WITHOUT CONTRAST; CT OF THE THORACIC SPINE WITHOUT CONTRAST; CT OF THE CERVICAL SPINE WITHOUT CONTRAST  7/20/2021 7:03 pm TECHNIQUE: CT of the head was performed without the administration of intravenous contrast. Dose modulation, iterative reconstruction, and/or weight based adjustment of the mA/kV was utilized to reduce the radiation dose to as low as reasonably achievable.; CT of the chest was performed with the administration of intravenous contrast. Multiplanar reformatted images are provided for review. Dose modulation, iterative reconstruction, and/or weight based adjustment of the mA/kV was utilized to reduce the radiation dose to as low as reasonably achievable.; CT of the abdomen and pelvis was performed with the administration of intravenous contrast. Multiplanar reformatted images are provided for review. Dose modulation, iterative reconstruction, and/or weight based adjustment of the mA/kV was utilized to reduce the radiation dose to as low as reasonably achievable.; CT of the lumbar spine was performed without the administration of intravenous contrast. Multiplanar reformatted images are provided for review.  Dose modulation, iterative reconstruction, and/or weight based adjustment of the mA/kV was utilized to reduce the radiation dose to as low as reasonably achievable.; CT of the thoracic spine was performed without the administration of intravenous contrast. Multiplanar reformatted images are provided for review. Dose modulation, iterative reconstruction, and/or weight based adjustment of the mA/kV was utilized to reduce the radiation dose to as low as reasonably achievable.; CT of the cervical spine was performed without the administration of intravenous contrast. Multiplanar reformatted images are provided for review. Dose modulation, iterative reconstruction, and/or weight based adjustment of the mA/kV was utilized to reduce the radiation dose to as low as reasonably achievable. COMPARISON: 07/20/2020 HISTORY: ORDERING SYSTEM PROVIDED HISTORY: trauma TECHNOLOGIST PROVIDED HISTORY: Has a \"code stroke\" or \"stroke alert\" been called? ->No Reason for exam:->trauma Decision Support Exception - unselect if not a suspected or confirmed emergency medical condition->Emergency Medical Condition (MA) FINDINGS: BRAIN/VENTRICLES: There is no acute intracranial hemorrhage, mass effect or midline shift. No abnormal extra-axial fluid collection. The gray-white differentiation is maintained without evidence of an acute infarct. There is no evidence of hydrocephalus. ORBITS: The visualized portion of the orbits demonstrate no acute abnormality. SINUSES: The visualized paranasal sinuses and mastoid air cells demonstrate no acute abnormality. SOFT TISSUES/SKULL:  No acute abnormality of the visualized skull or soft tissues. No acute intracranial abnormality. There is age-appropriate atrophy and small-vessel ischemic disease. Mucosal thickening in the right maxillary sinuses. . CT cervical spine. There is no acute displaced fracture in the cervical spine. The prevertebral soft tissues are normal.  Diffuse degenerative changes are identified from C3-T1 with osteophytes and multilevel disc bulges. Impression No acute displaced fracture. Mild diffuse degenerative changes from C3-T1. CT chest. Comparison CTA chest 07/14/2021.  Findings The heart and the great vessels are normal.  Tiny pericardial effusion is noted. There is coronary artery calcification. The great vessels are normal.  Partially occluding filling defects are identified in the right lower lobe pulmonary artery without significant change. There is minimal scarring and atelectasis in the lung bases. There is no focal consolidation or pleural effusion. Impression Partially occluding pulmonary embolism in the right lower lobe pulmonary artery as before. There is no other acute traumatic injury in the chest. CT abdomen and pelvis. Comparison 06/23/2020 Findings There is hepatomegaly with liver measuring 19 cm which is fatty. Gallbladder is normal.  Spleen, pancreas, the right adrenal gland and the kidneys are normal.  2.5 x 2.7 cm left adrenal nodule is present. There is severe vascular calcification aorta. Degenerative changes are identified in the thoracolumbar spine. Pelvis. Bladder is distended. Radiopaque material is identified in the colon likely swallowed material.  There is diverticulosis of colon without diverticulitis. The appendix is upper normal. Impression There is no acute traumatic injury, acute inflammation or bowel obstruction in the abdomen pelvis. CT thoracic spine. There is mild less than 10% compression deformity of the superior endplate of T2, the age of which is uncertain. No other acute displaced fractures are identified in the thoracic spine. There is mild diffuse degenerative changes in the thoracic spine Impression Mild less than 10% compression deformity of the superior endplate of T2 of unknown age. No other acute displaced fractures are noted in the thoracic spine. CT lumbar spine. There is no acute displaced fracture in the lumbar spine. There is mild diffuse degenerative changes lumbar spine with mild disc bulges from L4-S1. Impression No acute fractures.      XR SHOULDER LEFT (MIN 2 VIEWS)    Result Date: 7/21/2021  EXAMINATION: THREE XRAY VIEWS OF THE LEFT SHOULDER 7/21/2021 9:37 am COMPARISON: Left shoulder radiographs, 01/27/2016 HISTORY: ORDERING SYSTEM PROVIDED HISTORY: Shoulder Pain TECHNOLOGIST PROVIDED HISTORY: AP, true AP and scapular Y views, axillary Reason for exam:->Shoulder Pain FINDINGS: Radiographs of the left shoulder of fracture reveal or joint dislocation no evidence. Mild degenerative spurring is seen along the acromioclavicular joint and the glenohumeral joint. An enthesophyte is seen along the lateral margin of the acromion. 1. No fracture or joint dislocation is seen. 2. Degenerative changes, as described. XR CHEST PORTABLE    Result Date: 7/14/2021  EXAMINATION: ONE XRAY VIEW OF THE CHEST 7/14/2021 5:06 am COMPARISON: 10 July 2021 HISTORY: ORDERING SYSTEM PROVIDED HISTORY: Repeat PNA TECHNOLOGIST PROVIDED HISTORY: Reason for exam:->Repeat PNA FINDINGS: Heart is enlarged. Pulmonary vascularity is normal.  Lungs are clear. Neither costophrenic angle is blunted. Cardiomegaly. CTA PULMONARY W CONTRAST    Result Date: 7/14/2021  EXAMINATION: CTA OF THE CHEST 7/14/2021 5:58 am TECHNIQUE: CTA of the chest was performed after the administration of intravenous contrast.  Multiplanar reformatted images are provided for review. MIP images are provided for review. Dose modulation, iterative reconstruction, and/or weight based adjustment of the mA/kV was utilized to reduce the radiation dose to as low as reasonably achievable. COMPARISON: 06/23/2020, 10/02/2019 HISTORY: ORDERING SYSTEM PROVIDED HISTORY: Evaluate for PE TECHNOLOGIST PROVIDED HISTORY: Reason for exam:->Evaluate for PE Decision Support Exception - unselect if not a suspected or confirmed emergency medical condition->Emergency Medical Condition (MA) FINDINGS: Pulmonary Arteries: Within the right lower lobe, there are linear filling defects within proximal pulmonary arterial branches, extending to subsegmental branches. No other emboli are seen.  Mediastinum: No evidence of mediastinal We are awaiting final discharge plan to determine acute rehabilitation versus temporary skilled nursing facility placement. She is acceptable for discharge at this point. She will continue with her home medications including anticoagulation therapy in the setting of recent pulmonary embolism. BRIEF PHYSICAL EXAMINATION AND LABORATORIES ON DAY OF DISCHARGE:  VITALS:  BP (!) 183/90   Pulse 79   Temp 99 °F (37.2 °C) (Oral)   Resp 20   Wt 180 lb (81.6 kg)   LMP 06/26/1981   SpO2 98%   BMI 32.92 kg/m²     HEENT:  PERRLA. EOMI. Sclera clear. Buccal mucosa moist.    Neck:  Supple. Trachea midline. No thyromegaly. No JVD. No bruits. Heart:  Rhythm regular, rate controlled. No murmurs. Lungs:  Symmetrical. Clear to auscultation bilaterally. No wheezes. No rhonchi. No rales. Abdomen: Soft. Non-tender. Non-distended. Bowel sounds positive. No organomegaly or masses. No pain on palpation    Extremities:  Peripheral pulses present. No peripheral edema. No ulcers. Neurologic:  Alert x 3. No focal deficit. Cranial nerves grossly intact. Skin:  No petechia. No hemorrhage. No wounds. DISPOSITION:  The patient's condition is good. At this time the patient is without objective evidence of an acute process requiring continuing hospitalization or inpatient management. They are stable for discharge with outpatient follow-up. I have spoken with the patient and discussed the results of the current hospitalization, in addition to providing specific details for the plan of care and counseling regarding the diagnosis and prognosis. The plan has been discussed in detail and they are aware of the specific conditions for emergent return, as well as the importance of follow-up. Their questions are answered at this time and they are agreeable with the plan for discharge to rehab.     DISCHARGE MEDICATIONS:    Katia Mendez [facility]   Home Medication Instructions SOH:[id number]    Printed on:07/22/21 0710   Medication Information                      albuterol (PROVENTIL) (2.5 MG/3ML) 0.083% nebulizer solution  Take 2.5 mg by nebulization every 6 hours as needed for Wheezing             albuterol sulfate HFA (PROVENTIL HFA) 108 (90 Base) MCG/ACT inhaler  Inhale 2 puffs into the lungs every 4 hours as needed for Wheezing             amLODIPine-benazepril (LOTREL) 10-40 MG per capsule  Take 1 capsule by mouth daily             apixaban starter pack (ELIQUIS DVT/PE STARTER PACK) 5 MG TBPK tablet  Take 1 tablet by mouth See Admin Instructions             Blood Glucose Monitoring Suppl (ONE TOUCH ULTRA 2) w/Device KIT  Check blood sugar qam             blood glucose test strips (ONETOUCH ULTRA) strip  USE TO TEST BLOOD SUGAR TWICE DAILY             cimetidine (TAGAMET) 400 MG tablet  Take 1 tablet by mouth 2 times daily             docusate sodium (COLACE) 100 MG capsule  Take 1 capsule by mouth 2 times daily             ferrous sulfate 325 (65 Fe) MG tablet  Take 1 tablet by mouth 2 times daily (with meals)             fluticasone (FLONASE) 50 MCG/ACT nasal spray  1 spray by Each Nostril route daily             furosemide (LASIX) 40 MG tablet  TAKE 1 TABLET BY MOUTH DAILY             Incontinence Supply Disposable (DEPEND UNDERWEAR LARGE) MISC  Wear daily             Lancets (ONETOUCH DELICA PLUS [id number]) MISC  USE TO TEST BLOOD SUGAR TWICE DAILY             latanoprost (XALATAN) 0.005 % ophthalmic solution  1 drop nightly             loperamide (IMODIUM) 2 MG capsule  TAKE 1 CAPSULE BY MOUTH FOUR TIMES DAILY AS NEEDED FOR DIARRHEA             magnesium oxide (MAG-OX) 400 MG tablet  Take 1 tablet by mouth daily             Misc.  Devices MISC  Bedside commode             potassium chloride (KLOR-CON) 10 MEQ extended release tablet  TAKE 1 TABLET BY MOUTH EVERY DAY WITH LASIX             pravastatin (PRAVACHOL) 40 MG tablet  TAKE 1 TABLET BY MOUTH NIGHTLY             Prenatal Vit-Fe Fumarate-FA (PRENATAL VITAMIN PLUS LOW IRON) 27-1 MG TABS  Take 1 tablet by mouth daily             promethazine (PHENERGAN) 25 MG tablet  Take 1 tablet by mouth every 6 hours as needed for Nausea             SITagliptin-metFORMIN (JANUMET XR)  MG TB24 per extended release tablet  TAKE TWO (2) TABLETS BY MOUTH EACH MORNING             SYMBICORT 160-4.5 MCG/ACT AERO  INHALE TWO (2) PUFFS BY MOUTH TWICE DAILY             terazosin (HYTRIN) 2 MG capsule  take 1 capsule by mouth once daily             triamcinolone (KENALOG) 0.1 % cream  Apply topically 2 times daily for up to 2 weeks then as needed for flareups. vitamin D (ERGOCALCIFEROL) 1.25 MG (51349 UT) CAPS capsule  TAKE 1 CAPSULE BY MOUTH ONCE WEEKLY                 FOLLOW UP/INSTRUCTIONS:  · This patient is instructed to follow-up with her primary care physician. · Patient is instructed to follow-up with the consults listed above as directed by them. · she is instructed to resume home medications and take new medications as indicated in the list above. · If the patient has a recurrence of symptoms, she is instructed to go to the ED. Preparing for this patient's discharge, including paperwork, orders, instructions, and meeting with patient did require > 40 minutes.     Cathi Rivera DO   7/22/2021  7:10 AM

## 2021-07-22 NOTE — PROGRESS NOTES
Physical Therapy Treatment Note/Plan of Care    Room #:  6720/6713-02  Patient Name: Jorge Griffith  YOB: 1955  MRN: [id number]    Date of Service: 7/22/2021      Tentative placement recommendation: Inpatient Rehab    Equipment recommendation: To be determined      Evaluating Physical Therapist: Ardyce Kussmaul, PT #[id number]      Specific Provider Orders/Date/Referring Provider :  07/20/21 2330   PT eval and treat Start: 07/20/21 2330, End: 07/20/21 2330, ONE TIME, Standing Count: 1 Occurrences, R    Robbi Munoz DO      Admitting Diagnosis:   Closed wedge compression fracture of T2 vertebra, initial encounter ([facility] [address] [S22.020A]  Mild less than 10% compression deformity of the superior endplate of T2 of unknown age. No other acute displaced fractures are noted in the thoracic spine. Surgery: -    Patient Active Problem List   Diagnosis    Arthritis    Microcytic anemia    Bipolar disorder, current episode manic severe with psychotic features ([facility] [address]    Mixed hyperlipidemia    Type 2 diabetes mellitus without complication, without long-term current use of insulin ([facility] [address]    Cerebral atherosclerosis    Cognitive impairment    Hypercholesterolemia    Primary osteoarthritis of left hip    Primary localized osteoarthritis of left hip    Thyroid disease    Hypertension    Hyperlipidemia    Hx of blood clots    Chronic obstructive pulmonary disease (HCC)    DVT (deep venous thrombosis) (HCC)    Anxiety    Obesity (BMI 30-39. 9)    Anal warts    Genital warts    Thyroid nodule    Upper respiratory tract infection    Pulmonary embolism (HCC)    Closed wedge compression fracture of second thoracic vertebra (HCC)        ASSESSMENT of Current Deficits Patient exhibits decreased strength, balance, endurance and pain Back, left shoulder, knees  impairing functional mobility, transfers, gait , gait distance and tolerance to activity requires maximal encouragement for participation throughout, patient extremely limited d/t pain and self limiting with function as she would only complete supine exercises, refused to sit edge of bed and amb today. Patient will need stretcher at discharge d/t patient not tolerating pain and refusing to amb. PHYSICAL THERAPY  PLAN OF CARE       Physical therapy plan of care is established based on physician order,  patient diagnosis and clinical assessment    Current Treatment Recommendations:    -Bed Mobility: Lower extremity exercises , Upper extremity exercises  and Trunk control activities   -Sitting Balance: Incorporate reaching activities to activate trunk muscles   -Standing Balance: Perform strengthening exercises in standing to promote motor control with or without upper extremity support   -Transfers: Provide instruction on proper hand and foot position for adequate transfer of weight onto lower extremities and use of gait device  -Gait: Gait training, Standing activities to improve: base of support, weight shift, weight bearing  and Pregait training to emphasize: Sequencing , increased Vanesa, Increased step length , Device control and Safety   -Endurance: Utilize Supervised activities to increase level of endurance to allow for safe functional mobility including transfers and gait     PT long term treatment goals are located in below grid    Patient and or family understand(s) diagnosis, prognosis, and plan of care. Frequency of treatments: Patient will be seen  daily.          Prior Level of Function: Patient ambulated with cane    Rehab Potential: fair  + for baseline    Past medical history:   Past Medical History:   Diagnosis Date    A-fib ([facility] [address]     Acute exacerbation of chronic obstructive pulmonary disease (COPD) ([facility] [address]     Anemia     Anxiety     Arthritis     Arthritis     Asthma     Atrial fibrillation ([facility] [address]     CAD (coronary artery disease)     af and heart murmur    Chronic obstructive pulmonary disease (HCC)     COPD [facility]chronic obstructive pulmonary disease) ([facility] [address]     Diabetes mellitus ([facility] [address]     DVT (deep venous thrombosis) (HCC)     Emphysema lung (HCC)     Emphysema of lung ([facility] [address]     Encounter for imaging of bilateral cephalic veins     History of bilateral cephalic vein thrombosis    H/O cardiovascular stress test 04/25/2020    Lexiscan    Headache     History of blood transfusion     Hx of blood clots     Hypercholesterolemia     Hyperlipidemia     Hyperlipidemia     Hypertension     Mixed hyperlipidemia     Primary localized osteoarthritis of left hip     Primary osteoarthritis of left hip     Psychiatric problem     Seizures ([facility] [address]     last seizure  15 yrs ago had been on depakote    Thyroid disease     Thyroid disease     Type II or unspecified type diabetes mellitus without mention of complication, not stated as uncontrolled      Past Surgical History:   Procedure Laterality Date    CARDIAC CATHETERIZATION  12/01/2008    Normal Coronary arteries. Normal LV. Elevated left ventricular end diastolic pressure suggestive of impaired relaxatin and possible diastolic dysfunction    HYSTERECTOMY  2004    JOINT REPLACEMENT      LEG SURGERY Right     right hip area, removed cyst iccf developed infection went to [place]    NC SURG EXCISION OF ANAL LESION(S) N/A 1/25/2019    EXCISION PERIANAL WARTS performed by Nydia Spencer MD at [address] Left 8/8/2018    LEFT HIP TOTAL ARTHROPLASTY (KYLIE) performed by Halie Carrera DO at [address]:    Precautions: Up with assistance, falls and alarm ,  Weightbearing as tolerated bilateral lower extremities  Mild less than 10% compression deformity of the superior endplate of T2 of unknown age. No other acute displaced fractures are noted in the thoracic spine. Partially occluding pulmonary embolism in the right lower lobe pulmonary artery as before.  There is no other acute traumatic injury in the chest. decreased vision, impulsive  Social history: Patient lives alone   in a ranch home  with 2 steps  to enter without Rail has pole to grab onto  [employer] owned: Cane, Rollator, Bedside commode, Shower chair and Hand held shower head,  Grab bar around [address]   How much difficulty turning over in bed?: A Lot  How much difficulty sitting down on / standing up from a chair with arms?: A Lot  How much difficulty moving from lying on back to sitting on side of bed?: A Lot  How much help from another person moving to and from a bed to a chair?: A Lot  How much help from another person needed to walk in hospital room?: A Lot  How much help from another person for climbing 3-5 steps with a railing?: Total  AM-PAC Inpatient Mobility Raw Score : 11  AM-PAC Inpatient T-Scale Score : 33.86  Mobility Inpatient CMS 0-100% Score: 72.57  Mobility Inpatient CMS G-Code Modifier : CL    Nursing cleared patient for PT treatment. OBJECTIVE;   Initial Evaluation  Date: 7/21/2021  Treatment Date:  7/22/2021     Short Term/ Long Term   Goals   Was pt agreeable to Eval/treatment? Yes   yes To be met in 3 days   Pain level   8/10  Back, left shoulder, knees  10/10 mid back, shoulder and L hip    Bed Mobility    Rolling: Moderate assist of 1   Supine to sit: Moderate assist of 1 log roll  Sit to supine: Not assessed     Scooting: Moderate assist of 1   Rolling: Not assessed    Supine to sit: Not assessed    Sit to supine: Not assessed    Scooting: Not assessed     Rolling: Minimal assist of 1    Supine to sit: Minimal assist of 1    Sit to supine: Minimal assist of 1    Scooting: Minimal assist of 1     Transfers Sit to stand:  Moderate assist of 1 Cues for hand placement and safety  Sit to stand: Not assessed       Sit to stand: Minimal assist of 1     Ambulation    30 feet using  wheeled walker with Moderate assist of 1   steppage gait, decreased adrian and step length, assist to advance walker   not assessed     50 feet using  wheeled walker with Minimal assist of 1 improved walker control and technique   Stair negotiation: ascended and descended   Not assessed            ROM Within functional limits bilateral lower extremities     Increase range of motion 10% of affected joints    Strength BUE:  refer to OT noemi  RLE:  3+ /5  LLE:  3+/5   Increase strength in affected mm groups by 1/3 grade   Balance Sitting EOB:  fair    Dynamic Standing:  fair wheeled walker  Sitting EOB: not assessed   Dynamic Standing: not assessed    Sitting EOB:  good    Dynamic Standing: fair +wheeled walker      Patient is Alert & Oriented x person, place, time and situation and follows directions requires repeated instruction/cueing  Sensation:  Patient  denies numbness/tingling     Edema:  none noted    Endurance: fair       Vitals: room air    Blood Pressure at rest   Blood Pressure during session     Heart Rate at rest  Heart Rate during session     SPO2 at rest %  SPO2 during session  %     Patient education  Patient educated on role of Physical Therapy, risks of immobility, safety and plan of care,  importance of mobility while in hospital , ankle pumps, quad set and glut set for edema control, blood clot prevention and spinal precautions      Patient response to education:   Pt verbalized understanding Pt demonstrated skill Pt requires further education in this area   Yes Partial Yes      Treatment:  Patient practiced and was instructed/facilitated in the following treatment: Patient performed supine exercises in bed and experienced inc in pain limiting function. Patient refused further activity. Dependent of 2 for head of bed. Therapeutic Exercises:  ankle pumps, quad sets, glut sets, hip abduction/adduction and straight leg raise  x 15-20 reps x2 set. Hip abd/add only 5 reps d/t pain.          At end of session, patient in bed with nursing present call light and phone within reach,   all lines

## 2021-07-23 LAB — SARS-COV-2, PCR: NOT DETECTED

## 2021-07-28 NOTE — PROGRESS NOTES
Physician Progress Note      PATIENT:               Cindi Castillo  CSN #:                  [id number]  :                       1955  ADMIT DATE:       2021 4:49 PM  [address] [place] DATE:        2021 2:24 PM  RESPONDING  PROVIDER #:        Shayy Rehman DO          QUERY TEXT:    Pt admitted with compression fracture and has pulmonary embolism documented. If possible, please document in progress notes and discharge summary if you   are evaluating and/or treating any of the following: The medical record reflects the following:  Risk Factors: Recent hospitalization 21 for PE currently on Eliquis. Afib,  Clinical Indicators: Thoracic CT :?Partially occluding filling defects are   identified in the right lower lobe pulmonary artery without significant   change. Comparison CTA pulmonary : Pulmonary emboli within proximal right lower   lobe pulmonary arteries, extending to subsegmental branches. Treatment: Eliquis. Thank you, Elizabeth Becker RN [facility] [phone]  Options provided:  -- Acute pulmonary embolism  -- Chronic pulmonary embolism  -- Other - I will add my own diagnosis  -- Disagree - Not applicable / Not valid  -- Disagree - Clinically unable to determine / Unknown  -- Refer to Clinical Documentation Reviewer    PROVIDER RESPONSE TEXT:    This patient has acute pulmonary embolism.     Query created by: Emelina Ansari on 2021 8:57 AM      Electronically signed by:  Shayy Rehman DO 2021 1:34 PM

## 2021-08-02 DIAGNOSIS — K52.9 ACUTE GASTROENTERITIS: ICD-10-CM

## 2021-08-02 RX ORDER — LOPERAMIDE HYDROCHLORIDE 2 MG/1
CAPSULE ORAL
Qty: 60 CAPSULE | Refills: 10 | OUTPATIENT
Start: 2021-08-02

## 2021-08-05 DIAGNOSIS — E55.9 VITAMIN D DEFICIENCY: ICD-10-CM

## 2021-08-05 RX ORDER — ERGOCALCIFEROL 1.25 MG/1
CAPSULE ORAL
Qty: 4 CAPSULE | Refills: 3 | OUTPATIENT
Start: 2021-08-05

## 2021-08-05 RX ORDER — MAGNESIUM OXIDE 400 MG/1
400 TABLET ORAL DAILY
Qty: 30 TABLET | Refills: 5 | OUTPATIENT
Start: 2021-08-05

## 2021-08-05 RX ORDER — CIMETIDINE 400 MG/1
TABLET, FILM COATED ORAL
Qty: 60 TABLET | Refills: 3 | OUTPATIENT
Start: 2021-08-05

## 2021-08-06 RX ORDER — TRIAMCINOLONE ACETONIDE 1 MG/G
CREAM TOPICAL
Qty: 80 G | Refills: 3 | OUTPATIENT
Start: 2021-08-06

## 2021-08-06 RX ORDER — ALBUTEROL SULFATE 2.5 MG/3ML
SOLUTION RESPIRATORY (INHALATION)
Qty: 360 ML | Refills: 10 | OUTPATIENT
Start: 2021-08-06

## 2021-09-08 DIAGNOSIS — K52.9 ACUTE GASTROENTERITIS: ICD-10-CM

## 2021-09-08 RX ORDER — LOPERAMIDE HYDROCHLORIDE 2 MG/1
CAPSULE ORAL
Qty: 60 CAPSULE | Refills: 10 | OUTPATIENT
Start: 2021-09-08

## 2021-09-10 DIAGNOSIS — E55.9 VITAMIN D DEFICIENCY: ICD-10-CM

## 2021-09-10 RX ORDER — TRIAMCINOLONE ACETONIDE 1 MG/G
CREAM TOPICAL
Qty: 80 G | Refills: 3 | OUTPATIENT
Start: 2021-09-10

## 2021-09-10 RX ORDER — ALBUTEROL SULFATE 2.5 MG/3ML
SOLUTION RESPIRATORY (INHALATION)
Qty: 360 ML | Refills: 10 | OUTPATIENT
Start: 2021-09-10

## 2021-09-10 RX ORDER — MAGNESIUM OXIDE 400 MG/1
400 TABLET ORAL DAILY
Qty: 30 TABLET | Refills: 5 | OUTPATIENT
Start: 2021-09-10

## 2021-09-10 RX ORDER — ERGOCALCIFEROL 1.25 MG/1
CAPSULE ORAL
Qty: 4 CAPSULE | Refills: 3 | OUTPATIENT
Start: 2021-09-10

## 2021-09-10 RX ORDER — CIMETIDINE 400 MG/1
TABLET, FILM COATED ORAL
Qty: 60 TABLET | Refills: 3 | OUTPATIENT
Start: 2021-09-10

## 2021-09-24 ENCOUNTER — HOSPITAL ENCOUNTER (OUTPATIENT)
Age: 66
Discharge: HOME | End: 2021-09-26
Payer: MEDICARE

## 2021-09-24 ENCOUNTER — HOSPITAL ENCOUNTER (OUTPATIENT)
Dept: GENERAL RADIOLOGY | Age: 66
Discharge: HOME | End: 2021-09-26
Payer: MEDICARE

## 2021-09-24 DIAGNOSIS — R05.9 COUGH: ICD-10-CM

## 2021-09-24 PROCEDURE — 71046 X-RAY EXAM CHEST 2 VIEWS: CPT

## 2021-10-07 DIAGNOSIS — K52.9 ACUTE GASTROENTERITIS: ICD-10-CM

## 2021-10-07 RX ORDER — LOPERAMIDE HYDROCHLORIDE 2 MG/1
CAPSULE ORAL
Qty: 60 CAPSULE | Refills: 10 | OUTPATIENT
Start: 2021-10-07

## 2021-10-07 RX ORDER — ALBUTEROL SULFATE 2.5 MG/3ML
SOLUTION RESPIRATORY (INHALATION)
Qty: 360 ML | Refills: 10 | OUTPATIENT
Start: 2021-10-07

## 2021-10-08 RX ORDER — CIMETIDINE 400 MG/1
TABLET, FILM COATED ORAL
Qty: 60 TABLET | Refills: 3 | OUTPATIENT
Start: 2021-10-08

## 2021-10-08 RX ORDER — TRIAMCINOLONE ACETONIDE 1 MG/G
CREAM TOPICAL
Qty: 80 G | Refills: 3 | OUTPATIENT
Start: 2021-10-08

## 2021-11-03 DIAGNOSIS — E55.9 VITAMIN D DEFICIENCY: ICD-10-CM

## 2021-11-03 RX ORDER — ERGOCALCIFEROL 1.25 MG/1
CAPSULE ORAL
Qty: 4 CAPSULE | Refills: 3 | OUTPATIENT
Start: 2021-11-03

## 2021-11-04 DIAGNOSIS — K52.9 ACUTE GASTROENTERITIS: ICD-10-CM

## 2021-11-04 RX ORDER — BUDESONIDE AND FORMOTEROL FUMARATE DIHYDRATE 160; 4.5 UG/1; UG/1
AEROSOL RESPIRATORY (INHALATION)
Qty: 10.2 G | Refills: 10 | OUTPATIENT
Start: 2021-11-04

## 2021-11-04 RX ORDER — TRIAMCINOLONE ACETONIDE 1 MG/G
CREAM TOPICAL
Qty: 80 G | Refills: 3 | OUTPATIENT
Start: 2021-11-04

## 2021-11-04 RX ORDER — LOPERAMIDE HYDROCHLORIDE 2 MG/1
CAPSULE ORAL
Qty: 60 CAPSULE | Refills: 10 | OUTPATIENT
Start: 2021-11-04

## 2021-11-30 ENCOUNTER — TELEPHONE (OUTPATIENT)
Dept: FAMILY MEDICINE CLINIC | Age: 66
End: 2021-11-30

## 2021-11-30 NOTE — TELEPHONE ENCOUNTER
----- Message from Kyler Kraft sent at 11/29/2021  5:06 PM EST -----  Subject: Message to Provider    QUESTIONS  Information for Provider? Rand from [facility] is calling to check   the status of some paperwork for the pt. She is looking for Certificate of   Medical Necessity. Please faxed the paperwork back to fax? [phone] or   give Rand a call back at [phone]   ---------------------------------------------------------------------------  --------------  CALL BACK INFO  What is the best way for the office to contact you? OK to leave message on   voicemail  Preferred Call Back Phone Number?  [phone]  ---------------------------------------------------------------------------  --------------  SCRIPT ANSWERS  undefined
Pt dismissed from practice in March 2021.
no

## 2021-12-02 DIAGNOSIS — K52.9 ACUTE GASTROENTERITIS: ICD-10-CM

## 2021-12-02 RX ORDER — BUDESONIDE AND FORMOTEROL FUMARATE DIHYDRATE 160; 4.5 UG/1; UG/1
AEROSOL RESPIRATORY (INHALATION)
Qty: 10.2 G | Refills: 10 | OUTPATIENT
Start: 2021-12-02

## 2021-12-02 RX ORDER — LOPERAMIDE HYDROCHLORIDE 2 MG/1
CAPSULE ORAL
Qty: 60 CAPSULE | Refills: 10 | OUTPATIENT
Start: 2021-12-02

## 2021-12-10 ENCOUNTER — HOSPITAL ENCOUNTER (OUTPATIENT)
Dept: GENERAL RADIOLOGY | Age: 66
Discharge: HOME | End: 2021-12-12
Payer: MEDICARE

## 2021-12-10 ENCOUNTER — HOSPITAL ENCOUNTER (OUTPATIENT)
Age: 66
Discharge: HOME | End: 2021-12-12
Payer: MEDICARE

## 2021-12-10 DIAGNOSIS — R05.9 COUGH: ICD-10-CM

## 2021-12-10 DIAGNOSIS — M25.551 RIGHT HIP PAIN: ICD-10-CM

## 2021-12-10 PROCEDURE — 73502 X-RAY EXAM HIP UNI 2-3 VIEWS: CPT

## 2021-12-10 PROCEDURE — 71046 X-RAY EXAM CHEST 2 VIEWS: CPT

## 2021-12-11 ENCOUNTER — HOSPITAL ENCOUNTER (OUTPATIENT)
Age: 66
Discharge: HOME | End: 2021-12-11
Payer: MEDICARE

## 2021-12-11 ENCOUNTER — HOSPITAL ENCOUNTER (OUTPATIENT)
Age: 66
Discharge: HOME | End: 2021-12-13
Payer: MEDICARE

## 2021-12-11 ENCOUNTER — HOSPITAL ENCOUNTER (OUTPATIENT)
Dept: GENERAL RADIOLOGY | Age: 66
Discharge: HOME | End: 2021-12-13
Payer: MEDICARE

## 2021-12-11 DIAGNOSIS — J32.0 SINUSITIS, MAXILLARY, CHRONIC: ICD-10-CM

## 2021-12-11 LAB
ALBUMIN SERPL-MCNC: 4.3 G/DL (ref 3.5–5.2)
ALP BLD-CCNC: 91 U/L (ref 35–104)
ALT SERPL-CCNC: 12 U/L (ref 0–32)
ANION GAP SERPL CALCULATED.3IONS-SCNC: 11 MMOL/L (ref 7–16)
AST SERPL-CCNC: 13 U/L (ref 0–31)
BILIRUB SERPL-MCNC: 0.5 MG/DL (ref 0–1.2)
BUN BLDV-MCNC: 17 MG/DL (ref 6–23)
CALCIUM SERPL-MCNC: 10 MG/DL (ref 8.6–10.2)
CHLORIDE BLD-SCNC: 105 MMOL/L (ref 98–107)
CO2: 23 MMOL/L (ref 22–29)
CREAT SERPL-MCNC: 0.8 MG/DL (ref 0.5–1)
GFR AFRICAN AMERICAN: >60
GFR NON-AFRICAN AMERICAN: >60 ML/MIN/1.73
GLUCOSE BLD-MCNC: 116 MG/DL (ref 74–99)
HCT VFR BLD CALC: 31.5 % (ref 34–48)
HEMOGLOBIN: 9.6 G/DL (ref 11.5–15.5)
MCH RBC QN AUTO: 23.6 PG (ref 26–35)
MCHC RBC AUTO-ENTMCNC: 30.5 % (ref 32–34.5)
MCV RBC AUTO: 77.6 FL (ref 80–99.9)
PDW BLD-RTO: 16.4 FL (ref 11.5–15)
PLATELET # BLD: 256 E9/L (ref 130–450)
PMV BLD AUTO: 9.5 FL (ref 7–12)
POTASSIUM SERPL-SCNC: 4 MMOL/L (ref 3.5–5)
RBC # BLD: 4.06 E12/L (ref 3.5–5.5)
SODIUM BLD-SCNC: 139 MMOL/L (ref 132–146)
TOTAL PROTEIN: 7.2 G/DL (ref 6.4–8.3)
WBC # BLD: 8 E9/L (ref 4.5–11.5)

## 2021-12-11 PROCEDURE — 70220 X-RAY EXAM OF SINUSES: CPT

## 2021-12-11 PROCEDURE — 80053 COMPREHEN METABOLIC PANEL: CPT

## 2021-12-11 PROCEDURE — 85027 COMPLETE CBC AUTOMATED: CPT

## 2021-12-11 PROCEDURE — 36415 COLL VENOUS BLD VENIPUNCTURE: CPT

## 2021-12-30 RX ORDER — POTASSIUM CHLORIDE 750 MG/1
TABLET, FILM COATED, EXTENDED RELEASE ORAL
Qty: 30 TABLET | Refills: 10 | OUTPATIENT
Start: 2021-12-30

## 2021-12-30 RX ORDER — BUDESONIDE AND FORMOTEROL FUMARATE DIHYDRATE 160; 4.5 UG/1; UG/1
AEROSOL RESPIRATORY (INHALATION)
Qty: 10.2 G | Refills: 10 | OUTPATIENT
Start: 2021-12-30

## 2022-01-06 RX ORDER — BUDESONIDE AND FORMOTEROL FUMARATE DIHYDRATE 160; 4.5 UG/1; UG/1
AEROSOL RESPIRATORY (INHALATION)
Qty: 10.2 G | Refills: 10 | OUTPATIENT
Start: 2022-01-06

## 2022-01-13 ENCOUNTER — TELEPHONE (OUTPATIENT)
Dept: CARDIOLOGY CLINIC | Age: 67
End: 2022-01-13

## 2022-01-13 NOTE — TELEPHONE ENCOUNTER
----- Message from Ervin Tian MD sent at 1/12/2022  2:41 PM EST -----  Please contact patient so that I can determine if there are any issues appropriate for evaluation as multiple physicians in the group have stated will only see if significant concerns develop. Dr Jaclyn Gutierrez,      Dr Renata Monroe office called and requested patient to be seen due to chest heaviness, and SOB. I spoke with patient as well, and she is having SOB on exertion with chest heaviness.     Danie Smoker

## 2022-01-27 ENCOUNTER — OFFICE VISIT (OUTPATIENT)
Dept: ENT CLINIC | Age: 67
End: 2022-01-27
Payer: MEDICARE

## 2022-01-27 ENCOUNTER — OFFICE VISIT (OUTPATIENT)
Dept: FAMILY MEDICINE CLINIC | Age: 67
End: 2022-01-27
Payer: MEDICARE

## 2022-01-27 VITALS
BODY MASS INDEX: 33.13 KG/M2 | DIASTOLIC BLOOD PRESSURE: 59 MMHG | HEART RATE: 93 BPM | SYSTOLIC BLOOD PRESSURE: 99 MMHG | HEIGHT: 62 IN | WEIGHT: 180 LBS

## 2022-01-27 VITALS
DIASTOLIC BLOOD PRESSURE: 68 MMHG | OXYGEN SATURATION: 97 % | SYSTOLIC BLOOD PRESSURE: 125 MMHG | HEART RATE: 94 BPM | TEMPERATURE: 97.3 F | RESPIRATION RATE: 16 BRPM

## 2022-01-27 DIAGNOSIS — R04.0 EPISTAXIS: Primary | ICD-10-CM

## 2022-01-27 DIAGNOSIS — R42 DIZZINESS: Primary | ICD-10-CM

## 2022-01-27 LAB
CHP ED QC CHECK: NORMAL
GLUCOSE BLD-MCNC: 94 MG/DL

## 2022-01-27 PROCEDURE — 99203 OFFICE O/P NEW LOW 30 MIN: CPT | Performed by: NURSE PRACTITIONER

## 2022-01-27 PROCEDURE — 3017F COLORECTAL CA SCREEN DOC REV: CPT | Performed by: NURSE PRACTITIONER

## 2022-01-27 PROCEDURE — G8400 PT W/DXA NO RESULTS DOC: HCPCS | Performed by: NURSE PRACTITIONER

## 2022-01-27 PROCEDURE — 4040F PNEUMOC VAC/ADMIN/RCVD: CPT | Performed by: NURSE PRACTITIONER

## 2022-01-27 PROCEDURE — G8417 CALC BMI ABV UP PARAM F/U: HCPCS | Performed by: NURSE PRACTITIONER

## 2022-01-27 PROCEDURE — 1090F PRES/ABSN URINE INCON ASSESS: CPT | Performed by: NURSE PRACTITIONER

## 2022-01-27 PROCEDURE — G8427 DOCREV CUR MEDS BY ELIG CLIN: HCPCS | Performed by: NURSE PRACTITIONER

## 2022-01-27 PROCEDURE — G8484 FLU IMMUNIZE NO ADMIN: HCPCS | Performed by: NURSE PRACTITIONER

## 2022-01-27 PROCEDURE — 1036F TOBACCO NON-USER: CPT | Performed by: NURSE PRACTITIONER

## 2022-01-27 PROCEDURE — 1123F ACP DISCUSS/DSCN MKR DOCD: CPT | Performed by: NURSE PRACTITIONER

## 2022-01-27 PROCEDURE — 82962 GLUCOSE BLOOD TEST: CPT | Performed by: NURSE PRACTITIONER

## 2022-01-27 PROCEDURE — 99214 OFFICE O/P EST MOD 30 MIN: CPT | Performed by: NURSE PRACTITIONER

## 2022-01-27 PROCEDURE — 30901 CONTROL OF NOSEBLEED: CPT | Performed by: NURSE PRACTITIONER

## 2022-01-27 ASSESSMENT — ENCOUNTER SYMPTOMS
RESPIRATORY NEGATIVE: 1
EYES NEGATIVE: 1
STRIDOR: 0
SHORTNESS OF BREATH: 0

## 2022-01-27 NOTE — PATIENT INSTRUCTIONS
Patient Education        Dizziness: Care Instructions  Your Care Instructions  Dizziness is the feeling of unsteadiness or fuzziness in your head. It is different than having vertigo, which is a feeling that the room is spinning or that you are moving or falling. It is also different from lightheadedness, which is the feeling that you are about to faint. It can be hard to know what causes dizziness. Some people feel dizzy when they have migraine headaches. Sometimes bouts of flu can make you feel dizzy. Some medical conditions, such as heart problems or high blood pressure, can make you feel dizzy. Many medicines can cause dizziness, including medicines for high blood pressure, pain, or anxiety. If a medicine causes your symptoms, your doctor may recommend that you stop or change the medicine. If it is a problem with your heart, you may need medicine to help your heart work better. If there is no clear reason for your symptoms, your doctor may suggest watching and waiting for a while to see if the dizziness goes away on its own. Follow-up care is a key part of your treatment and safety. Be sure to make and go to all appointments, and call your doctor if you are having problems. It's also a good idea to know your test results and keep a list of the medicines you take. How can you care for yourself at home? · If your doctor recommends or prescribes medicine, take it exactly as directed. Call your doctor if you think you are having a problem with your medicine. · Do not drive while you feel dizzy. · Try to prevent falls. Steps you can take include:  ? Using nonskid mats, adding grab bars near the tub, and using night-lights. ? Clearing your home so that walkways are free of anything you might trip on.  ? Letting family and friends know that you have been feeling dizzy. This will help them know how to help you. When should you call for help? Call 911 anytime you think you may need emergency care.  For example, call if:    · You passed out (lost consciousness).     · You have dizziness along with symptoms of a heart attack. These may include:  ? Chest pain or pressure, or a strange feeling in the chest.  ? Sweating. ? Shortness of breath. ? Nausea or vomiting. ? Pain, pressure, or a strange feeling in the back, neck, jaw, or upper belly or in one or both shoulders or arms. ? Lightheadedness or sudden weakness. ? A fast or irregular heartbeat.     · You have symptoms of a stroke. These may include:  ? Sudden numbness, tingling, weakness, or loss of movement in your face, arm, or leg, especially on only one side of your body. ? Sudden vision changes. ? Sudden trouble speaking. ? Sudden confusion or trouble understanding simple statements. ? Sudden problems with walking or balance. ? A sudden, severe headache that is different from past headaches. Call your doctor now or seek immediate medical care if:    · You feel dizzy and have a fever, headache, or ringing in your ears.     · You have new or increased nausea and vomiting.     · Your dizziness does not go away or comes back. Watch closely for changes in your health, and be sure to contact your doctor if:    · You do not get better as expected. Where can you learn more? Go to https://[employer]. org and sign in to your [email or web address] account. Enter O606 in the [facility] box to learn more about \"Dizziness: Care Instructions. \"     If you do not have an account, please click on the \"Sign Up Now\" link. Current as of: July 1, 2021               Content Version: 13.1  © 7736-4927 [employer]. Care instructions adapted under license by [facility] ([facility]). If you have questions about a medical condition or this instruction, always ask your healthcare professional. [address] any warranty or liability for your use of this information.

## 2022-01-27 NOTE — PROGRESS NOTES
22  Nohemy Bruner : 1955 Sex: female  Age 69 y.o. Subjective:  Chief Complaint   Patient presents with    Dizziness       HPI:   Nohemy Bruner , 69 y.o. female presents to the clinic for evaluation of dizziness with ambulation. Patient reports she was here seeing Jessica Morrissey NP for epistaxis. Patient reports she developed dizziness with ambulation when exiting building. The patient denies any associated symptoms. The patient has not taken any treatment for symptoms. The patient reports dizziness has resolved over time. The patient denies heart palpitations, syncope, visual changes, hearing changes, and extremity weakness or loss of sensation. The patient also denies headache, fever, chest pain, abdominal pain, shortness of breath, and nausea / vomiting / diarrhea. ROS:   Unless otherwise stated in this report the patient's positive and negative responses for review of systems for constitutional, eyes, ENT, cardiovascular, respiratory, gastrointestinal, neurological, , musculoskeletal, and integument systems and related systems to the presenting problem are either stated in the history of present illness or were not pertinent or were negative for the symptoms and/or complaints related to the presenting medical problem. Positives and pertinent negatives as per HPI. All others reviewed and are negative.       PMH:     Past Medical History:   Diagnosis Date    [facility] [address]     Acute exacerbation of chronic obstructive pulmonary disease (COPD) ([address]     Anemia     Anxiety     Arthritis     Arthritis     Asthma     Atrial fibrillation ([address]     CAD (coronary artery disease)     af and heart murmur    Chronic obstructive pulmonary disease (HCC)     COPD (chronic obstructive pulmonary disease) (HCC)     Diabetes mellitus ([address]     DVT (deep venous thrombosis) ([address]     Emphysema lung (HCC)     Emphysema of lung ([address]     Encounter for imaging of bilateral cephalic veins History of bilateral cephalic vein thrombosis    H/O cardiovascular stress test 2020    Lexiscan    Headache     History of blood transfusion     Hx of blood clots     Hypercholesterolemia     Hyperlipidemia     Hyperlipidemia     Hypertension     Mixed hyperlipidemia     Primary localized osteoarthritis of left hip     Primary osteoarthritis of left hip     Psychiatric problem     Seizures ([address]     last seizure  15 yrs ago had been on depakote    Thyroid disease     Thyroid disease     Type II or unspecified type diabetes mellitus without mention of complication, not stated as uncontrolled        Past Surgical History:   Procedure Laterality Date    CARDIAC CATHETERIZATION  2008    Normal Coronary arteries. Normal LV.   Elevated left ventricular end diastolic pressure suggestive of impaired relaxatin and possible diastolic dysfunction    HYSTERECTOMY      JOINT REPLACEMENT      LEG SURGERY Right     right hip area, removed cyst iccf developed infection went to [place]    [place] SURG EXCISION OF ANAL LESION(S) N/A 2019    EXCISION PERIANAL WARTS performed by Demetrius Arroyo MD at [address] Left 2018    LEFT HIP TOTAL ARTHROPLASTY ([place]) performed by Maximilian Rodríguez DO at [facility] [address] History   Problem Relation Age of Onset    Diabetes Mother          76    Kidney Disease Mother     Heart Attack Mother     Hypertension Mother     Cancer Mother     Stroke Father          age 47       Medications:     Current Outpatient Medications:     amLODIPine-benazepril (LOTREL) 10-40 MG per capsule, Take 1 capsule by mouth daily, Disp: 30 capsule, Rfl: 3    apixaban starter pack (ELIQUIS DVT/PE STARTER PACK) 5 MG TBPK tablet, Take 1 tablet by mouth See Admin Instructions, Disp: 74 tablet, Rfl: 0    potassium chloride (KLOR-CON) 10 MEQ extended release tablet, TAKE 1 TABLET BY MOUTH EVERY DAY WITH LASIX, Disp: 30 tablet, Rfl: 10    cimetidine (TAGAMET) 400 MG tablet, Take 1 tablet by mouth 2 times daily, Disp: 60 tablet, Rfl: 3    terazosin (HYTRIN) 2 MG capsule, take 1 capsule by mouth once daily, Disp: , Rfl:     triamcinolone (KENALOG) 0.1 % cream, Apply topically 2 times daily for up to 2 weeks then as needed for flareups. , Disp: 80 g, Rfl: 3    Blood Glucose Monitoring Suppl (ONE TOUCH ULTRA 2) w/Device KIT, Check blood sugar qam, Disp: 1 kit, Rfl: 0    SITagliptin-metFORMIN (JANUMET XR)  MG TB24 per extended release tablet, TAKE TWO (2) TABLETS BY MOUTH EACH MORNING, Disp: 60 tablet, Rfl: 10    SYMBICORT 160-4.5 MCG/ACT AERO, INHALE TWO (2) PUFFS BY MOUTH TWICE DAILY, Disp: 1 Inhaler, Rfl: 10    blood glucose test strips (ONETOUCH ULTRA) strip, USE TO TEST BLOOD SUGAR TWICE DAILY, Disp: 100 each, Rfl: 10    Prenatal Vit-Fe Fumarate-FA (PRENATAL VITAMIN PLUS LOW IRON) 27-1 MG TABS, Take 1 tablet by mouth daily, Disp: 30 tablet, Rfl: 5    Lancets (ONETOUCH DELICA PLUS [id number]) MISC, USE TO TEST BLOOD SUGAR TWICE DAILY, Disp: 100 each, Rfl: 12    promethazine (PHENERGAN) 25 MG tablet, Take 1 tablet by mouth every 6 hours as needed for Nausea, Disp: 90 tablet, Rfl: 2    vitamin D (ERGOCALCIFEROL) 1.25 MG (21153 UT) CAPS capsule, TAKE 1 CAPSULE BY MOUTH ONCE WEEKLY, Disp: 4 capsule, Rfl: 3    loperamide (IMODIUM) 2 MG capsule, TAKE 1 CAPSULE BY MOUTH FOUR TIMES DAILY AS NEEDED FOR DIARRHEA, Disp: 60 capsule, Rfl: 10    latanoprost (XALATAN) 0.005 % ophthalmic solution, 1 drop nightly, Disp: , Rfl:     fluticasone (FLONASE) 50 MCG/ACT nasal spray, 1 spray by Each Nostril route daily, Disp: , Rfl:     albuterol (PROVENTIL) (2.5 MG/3ML) 0.083% nebulizer solution, Take 2.5 mg by nebulization every 6 hours as needed for Wheezing, Disp: , Rfl:     magnesium oxide (MAG-OX) 400 MG tablet, Take 1 tablet by mouth daily, Disp: 30 tablet, Rfl: 5    pravastatin (PRAVACHOL) 40 MG tablet, TAKE 1 TABLET BY MOUTH NIGHTLY, Disp: 30 tablet, Rfl: 10    furosemide (LASIX) 40 MG tablet, TAKE 1 TABLET BY MOUTH DAILY, Disp: 30 tablet, Rfl: 10    albuterol sulfate HFA (PROVENTIL HFA) 108 (90 Base) MCG/ACT inhaler, Inhale 2 puffs into the lungs every 4 hours as needed for Wheezing, Disp: 1 Inhaler, Rfl: 1    Incontinence Supply Disposable (DEPEND UNDERWEAR LARGE) MISC, Wear daily, Disp: 50 each, Rfl: 12    docusate sodium (COLACE) 100 MG capsule, Take 1 capsule by mouth 2 times daily, Disp: 60 capsule, Rfl: 1    ferrous sulfate 325 (65 Fe) MG tablet, Take 1 tablet by mouth 2 times daily (with meals), Disp: 30 tablet, Rfl: 3    Misc. Devices MISC, Bedside commode, Disp: 1 Device, Rfl: 0    Allergies: Allergies   Allergen Reactions    Atenolol Palpitations    Lipitor [Atorvastatin] Palpitations    Tylenol With Codeine #3 [Acetaminophen-Codeine] Itching    Atenolol      increased heart beat    Codeine     Lipitor [Atorvastatin]     Percocet [Oxycodone-Acetaminophen]     Vicodin [Hydrocodone-Acetaminophen]     Other Other (See Comments)     Anti-depressants/Anti-psychotic: Causes hallucinations    Allergic to all pain meds can take extra strenghth tylenol         Social History:     Social History     Tobacco Use    Smoking status: Never Smoker    Smokeless tobacco: Never Used   Vaping Use    Vaping Use: Never used   Substance Use Topics    Alcohol use: Never     Comment: occasional diet pepsi    Drug use: Never       Patient lives at home. Physical Exam:     Vitals:    01/27/22 1411   BP: 125/68   Pulse: 94   Resp: 16   Temp: 97.3 °F (36.3 °C)   TempSrc: Temporal   SpO2: 97%       Physical Exam (PE)   Constitutional: Alert, and development consistent with age. HENT:      Head: Normocephalic. Right Ear: External ear normal.      Left Ear: External ear normal.      Nose: Normal.      Mouth/Throat:     Mouth: Mucous membranes are moist.      Pharynx: Oropharynx is clear.   Eyes: Pupils: Pupils are equal, round, and reactive to light. Neck: Normal ROM. Supple. Cardiovascular: Heart RRR without pathologic murmurs or gallops. Pulmonary: Respiratory effort normal.  Normal breath sounds. Abdomen: Soft, nontender, normal bowel sounds. Back:  No costovertebral tenderness. Skin:  Normal turgor. Warm, dry, without visible rash, unless noted elsewhere. Neurological:  Oriented. Motor functions intact. CN II-XII intact. No nystagmus noted. Ambulates with walker. UE/LE/ strength 4/4 bilaterally. Psychiatric: Mood and Affect: Mood normal. Behavior: Agitated and angry when questioned. Testing:   (All laboratory and radiology results have been personally reviewed by myself)  Labs:  No results found for this visit on 01/27/22. Imaging: All Radiology results interpreted by Radiologist unless otherwise noted. No orders to display       Assessment / Plan:   The patient's vitals, allergies, medications, and past medical history have been reviewed. Elisha Conde was seen today for dizziness. Diagnoses and all orders for this visit:    Dizziness  -     POCT Glucose        - Disposition: Home    - Educational material printed for patient's review and were included in patient instructions. After Visit Summary and given to patient at the end of visit. - Patient was given something to eat and drink because she felt that her blood sugar was low. The patient became angry and agitated when recommended that a family member be contacted and drive her home. The patient states she he feels better after eating and drinking and felt safe driving herself home and did not want anyone contacted. - The patient refused EKG today. Vitals reviewed which are stable. Neuro exam is benign. Advise f/u with PCP in 3-5 days for recheck if symptoms persist. ED sooner if symptoms worsen or change. ED immediately with severe/worsening vertigo, vomiting, severe HA, vomiting, fever, neck stiffness, unilateral weakness, or paresthesias.  Pt states understanding and is in agreement with this care plan. All questions answered. SIGNATURE: WILLIAM Conley-CNP    *NOTE: This report was transcribed using voice recognition software. Every effort was made to ensure accuracy; however, inadvertent computerized transcription errors may be present.

## 2022-01-27 NOTE — PROGRESS NOTES
[facility]  Dr. Andrea Rivera. SEN Quesada Ms.Ed. New Consult       Patient Name:  Darrin Goldstein  :  1955     CHIEF C/O:    Chief Complaint   Patient presents with   Prosper Nayak Other     NP epistaxis       HISTORY OBTAINED FROM:  patient    HISTORY OF PRESENT ILLNESS:       Brendon Alonzo is a 69y.o. year old female, here today for nosebleed from the right nostril. Started this AM  [facility] for 1 hours, seen at PCP and sent to this office  No current bleeding, stopped 1-2 hours ago  No hx of nosebleeds in the past   Is on eliquis of hx of PE  No current sinus pain or pressure  No congestion at this time  No other bleeding disorders.           Past Medical History:   Diagnosis Date    [facility]fib [facility])     Acute exacerbation of chronic obstructive pulmonary disease [facility]D) ([facility]     Anemia     Anxiety     Arthritis     Arthritis     Asthma     Atrial fibrillation (HCC)     CAD (coronary artery disease)     af and heart murmur    Chronic obstructive pulmonary disease (HCC)     COPD (chronic obstructive pulmonary disease) (HCC)     Diabetes mellitus ([address]     DVT (deep venous thrombosis) (HCC)     Emphysema lung (HCC)     Emphysema of lung ([address]     Encounter for imaging of bilateral cephalic veins     History of bilateral cephalic vein thrombosis    H/O cardiovascular stress test 2020    Lexiscan    Headache     History of blood transfusion     Hx of blood clots     Hypercholesterolemia     Hyperlipidemia     Hyperlipidemia     Hypertension     Mixed hyperlipidemia     Primary localized osteoarthritis of left hip     Primary osteoarthritis of left hip     Psychiatric problem     Seizures ([address]     last seizure  15 yrs ago had been on depakote    Thyroid disease     Thyroid disease     Type II or unspecified type diabetes mellitus without mention of complication, not stated as uncontrolled      Past Surgical History:   Procedure Laterality Date    CARDIAC CATHETERIZATION  2008 Normal Coronary arteries. Normal LV. Elevated left ventricular end diastolic pressure suggestive of impaired relaxatin and possible diastolic dysfunction    HYSTERECTOMY  2004    JOINT REPLACEMENT      LEG SURGERY Right     right hip area, removed cyst iccf developed infection went to [facility] SURG EXCISION OF ANAL LESION(S) N/A 1/25/2019    EXCISION PERIANAL WARTS performed by Rosetta Ruiz MD at [facility] Left 8/8/2018    LEFT HIP TOTAL ARTHROPLASTY ([address]) performed by Guerda De La Garza DO at [address]       Current Outpatient Medications:     amLODIPine-benazepril (LOTREL) 10-40 MG per capsule, Take 1 capsule by mouth daily, Disp: 30 capsule, Rfl: 3    apixaban starter pack (ELIQUIS DVT/PE STARTER PACK) 5 MG TBPK tablet, Take 1 tablet by mouth See Admin Instructions, Disp: 74 tablet, Rfl: 0    potassium chloride (KLOR-CON) 10 MEQ extended release tablet, TAKE 1 TABLET BY MOUTH EVERY DAY WITH LASIX, Disp: 30 tablet, Rfl: 10    cimetidine (TAGAMET) 400 MG tablet, Take 1 tablet by mouth 2 times daily, Disp: 60 tablet, Rfl: 3    terazosin (HYTRIN) 2 MG capsule, take 1 capsule by mouth once daily, Disp: , Rfl:     triamcinolone (KENALOG) 0.1 % cream, Apply topically 2 times daily for up to 2 weeks then as needed for flareups. , Disp: 80 g, Rfl: 3    Blood Glucose Monitoring Suppl (ONE TOUCH ULTRA 2) w/Device KIT, Check blood sugar qam, Disp: 1 kit, Rfl: 0    SITagliptin-metFORMIN (JANUMET XR)  MG TB24 per extended release tablet, TAKE TWO (2) TABLETS BY MOUTH EACH MORNING, Disp: 60 tablet, Rfl: 10    SYMBICORT 160-4.5 MCG/ACT AERO, INHALE TWO (2) PUFFS BY MOUTH TWICE DAILY, Disp: 1 Inhaler, Rfl: 10    blood glucose test strips (ONETOUCH ULTRA) strip, USE TO TEST BLOOD SUGAR TWICE DAILY, Disp: 100 each, Rfl: 10    Prenatal Vit-Fe Fumarate-FA (PRENATAL VITAMIN PLUS LOW IRON) 27-1 MG TABS, Take 1 tablet by mouth daily, Disp: 30 tablet, Rfl: 5    Lancets (ONETOUCH DELICA PLUS [id number]) MISC, USE TO TEST BLOOD SUGAR TWICE DAILY, Disp: 100 each, Rfl: 12    promethazine (PHENERGAN) 25 MG tablet, Take 1 tablet by mouth every 6 hours as needed for Nausea, Disp: 90 tablet, Rfl: 2    vitamin D (ERGOCALCIFEROL) 1.25 MG (49777 UT) CAPS capsule, TAKE 1 CAPSULE BY MOUTH ONCE WEEKLY, Disp: 4 capsule, Rfl: 3    loperamide (IMODIUM) 2 MG capsule, TAKE 1 CAPSULE BY MOUTH FOUR TIMES DAILY AS NEEDED FOR DIARRHEA, Disp: 60 capsule, Rfl: 10    latanoprost (XALATAN) 0.005 % ophthalmic solution, 1 drop nightly, Disp: , Rfl:     fluticasone (FLONASE) 50 MCG/ACT nasal spray, 1 spray by Each Nostril route daily, Disp: , Rfl:     albuterol (PROVENTIL) (2.5 MG/3ML) 0.083% nebulizer solution, Take 2.5 mg by nebulization every 6 hours as needed for Wheezing, Disp: , Rfl:     magnesium oxide (MAG-OX) 400 MG tablet, Take 1 tablet by mouth daily, Disp: 30 tablet, Rfl: 5    pravastatin (PRAVACHOL) 40 MG tablet, TAKE 1 TABLET BY MOUTH NIGHTLY, Disp: 30 tablet, Rfl: 10    furosemide (LASIX) 40 MG tablet, TAKE 1 TABLET BY MOUTH DAILY, Disp: 30 tablet, Rfl: 10    Incontinence Supply Disposable (DEPEND UNDERWEAR LARGE) MISC, Wear daily, Disp: 50 each, Rfl: 12    docusate sodium (COLACE) 100 MG capsule, Take 1 capsule by mouth 2 times daily, Disp: 60 capsule, Rfl: 1    ferrous sulfate 325 (65 Fe) MG tablet, Take 1 tablet by mouth 2 times daily (with meals), Disp: 30 tablet, Rfl: 3    Misc.  Devices [facility], Bedside commode, Disp: 1 Device, Rfl: 0    albuterol sulfate HFA (PROVENTIL HFA) 108 (90 Base) MCG/ACT inhaler, Inhale 2 puffs into the lungs every 4 hours as needed for Wheezing, Disp: 1 Inhaler, Rfl: 1  Atenolol, Lipitor [atorvastatin], Tylenol with codeine #3 [acetaminophen-codeine], Atenolol, Codeine, Lipitor [atorvastatin], Percocet [oxycodone-acetaminophen], Vicodin [hydrocodone-acetaminophen], and Other  Social History     Tobacco Use    Smoking status: Never Smoker    Smokeless General: No focal deficit present. Mental Status: She is alert and oriented to person, place, and time. Psychiatric:         Mood and Affect: Mood normal.         Behavior: Behavior normal.         Thought Content: Thought content normal.         Judgment: Judgment normal.         IMPRESSION/PLAN:    Nasal Packing:    Patient is seen and examined today for epistaxis for 4 to 6 hours this a.m. Upon arrival there is no active bleeding. Visualization reveals prominent vessel noted on the left anterior septum with no active bleeding or oozing. Area is reinforced with fibrillar packing soaked in oxymetazoline and lidocaine. Patient tolerated well. There is no further bleeding noted from the area. Cleveland Stroud was seen today for other. Diagnoses and all orders for this visit:    Epistaxis  -     IL CTRL NOSEBLEED,ANTER,SIMPLE        Fibrillar packing placed in the left nares with no further bleeding noted. Patient is instructed to begin nasal saline, 2 sprays each nostril 3-4 times daily. She is also instructed to avoid manipulation of her nose, avoid nose blowing, as well as strenuous activity. She will follow up in 1 month for reevaluation. She is instructed to call with any new or worsening symptoms prior to her next appointment.         Carla Salinas, LINDA, FNP-C  [facility], Nose and Throat    The information contained in this note has been dictated using drug and medical speech recognition software and may contain errors

## 2022-01-28 ENCOUNTER — HOSPITAL ENCOUNTER (OUTPATIENT)
Age: 67
Discharge: HOME | End: 2022-01-28
Payer: MEDICARE

## 2022-01-28 ENCOUNTER — HOSPITAL ENCOUNTER (OUTPATIENT)
Dept: GENERAL RADIOLOGY | Age: 67
Discharge: HOME | End: 2022-01-30
Payer: MEDICARE

## 2022-01-28 ENCOUNTER — HOSPITAL ENCOUNTER (OUTPATIENT)
Age: 67
Discharge: HOME | End: 2022-01-30
Payer: MEDICARE

## 2022-01-28 ENCOUNTER — OFFICE VISIT (OUTPATIENT)
Dept: CARDIOLOGY CLINIC | Age: 67
End: 2022-01-28
Payer: MEDICARE

## 2022-01-28 VITALS
WEIGHT: 180 LBS | DIASTOLIC BLOOD PRESSURE: 68 MMHG | HEART RATE: 95 BPM | HEIGHT: 62 IN | SYSTOLIC BLOOD PRESSURE: 130 MMHG | BODY MASS INDEX: 33.13 KG/M2

## 2022-01-28 DIAGNOSIS — R52 PAIN: ICD-10-CM

## 2022-01-28 DIAGNOSIS — E11.9 TYPE 2 DIABETES MELLITUS WITHOUT COMPLICATION, WITHOUT LONG-TERM CURRENT USE OF INSULIN (HCC): ICD-10-CM

## 2022-01-28 DIAGNOSIS — F31.2 BIPOLAR DISORDER, CURRENT EPISODE MANIC SEVERE WITH PSYCHOTIC FEATURES (HCC): ICD-10-CM

## 2022-01-28 DIAGNOSIS — J40 BRONCHITIS, NOT SPECIFIED AS ACUTE OR CHRONIC: ICD-10-CM

## 2022-01-28 DIAGNOSIS — E78.2 MIXED HYPERLIPIDEMIA: ICD-10-CM

## 2022-01-28 DIAGNOSIS — R07.89 ATYPICAL CHEST PAIN: Primary | ICD-10-CM

## 2022-01-28 DIAGNOSIS — I10 PRIMARY HYPERTENSION: ICD-10-CM

## 2022-01-28 DIAGNOSIS — J44.9 CHRONIC OBSTRUCTIVE PULMONARY DISEASE, UNSPECIFIED COPD TYPE (HCC): ICD-10-CM

## 2022-01-28 LAB
ALBUMIN SERPL-MCNC: 4.1 G/DL (ref 3.5–5.2)
ALP BLD-CCNC: 76 U/L (ref 35–104)
ALT SERPL-CCNC: 33 U/L (ref 0–32)
ANION GAP SERPL CALCULATED.3IONS-SCNC: 12 MMOL/L (ref 7–16)
AST SERPL-CCNC: 22 U/L (ref 0–31)
BILIRUB SERPL-MCNC: 0.3 MG/DL (ref 0–1.2)
BUN BLDV-MCNC: 24 MG/DL (ref 6–23)
CALCIUM SERPL-MCNC: 10.3 MG/DL (ref 8.6–10.2)
CHLORIDE BLD-SCNC: 107 MMOL/L (ref 98–107)
CO2: 22 MMOL/L (ref 22–29)
CREAT SERPL-MCNC: 1.1 MG/DL (ref 0.5–1)
GFR AFRICAN AMERICAN: 60
GFR NON-AFRICAN AMERICAN: 60 ML/MIN/1.73
GLUCOSE BLD-MCNC: 63 MG/DL (ref 74–99)
HCT VFR BLD CALC: 33.4 % (ref 34–48)
HEMOGLOBIN: 10 G/DL (ref 11.5–15.5)
MCH RBC QN AUTO: 23.8 PG (ref 26–35)
MCHC RBC AUTO-ENTMCNC: 29.9 % (ref 32–34.5)
MCV RBC AUTO: 79.3 FL (ref 80–99.9)
PDW BLD-RTO: 16.5 FL (ref 11.5–15)
PLATELET # BLD: 205 E9/L (ref 130–450)
PMV BLD AUTO: 9.5 FL (ref 7–12)
POTASSIUM SERPL-SCNC: 4.4 MMOL/L (ref 3.5–5)
RBC # BLD: 4.21 E12/L (ref 3.5–5.5)
SODIUM BLD-SCNC: 141 MMOL/L (ref 132–146)
TOTAL PROTEIN: 7.3 G/DL (ref 6.4–8.3)
WBC # BLD: 7.2 E9/L (ref 4.5–11.5)

## 2022-01-28 PROCEDURE — 93000 ELECTROCARDIOGRAM COMPLETE: CPT | Performed by: INTERNAL MEDICINE

## 2022-01-28 PROCEDURE — 36415 COLL VENOUS BLD VENIPUNCTURE: CPT

## 2022-01-28 PROCEDURE — 99214 OFFICE O/P EST MOD 30 MIN: CPT | Performed by: INTERNAL MEDICINE

## 2022-01-28 PROCEDURE — G8427 DOCREV CUR MEDS BY ELIG CLIN: HCPCS | Performed by: INTERNAL MEDICINE

## 2022-01-28 PROCEDURE — 73630 X-RAY EXAM OF FOOT: CPT

## 2022-01-28 PROCEDURE — 2022F DILAT RTA XM EVC RTNOPTHY: CPT | Performed by: INTERNAL MEDICINE

## 2022-01-28 PROCEDURE — 71046 X-RAY EXAM CHEST 2 VIEWS: CPT

## 2022-01-28 PROCEDURE — 3046F HEMOGLOBIN A1C LEVEL >9.0%: CPT | Performed by: INTERNAL MEDICINE

## 2022-01-28 PROCEDURE — 3023F SPIROM DOC REV: CPT | Performed by: INTERNAL MEDICINE

## 2022-01-28 PROCEDURE — G8400 PT W/DXA NO RESULTS DOC: HCPCS | Performed by: INTERNAL MEDICINE

## 2022-01-28 PROCEDURE — 1123F ACP DISCUSS/DSCN MKR DOCD: CPT | Performed by: INTERNAL MEDICINE

## 2022-01-28 PROCEDURE — 1036F TOBACCO NON-USER: CPT | Performed by: INTERNAL MEDICINE

## 2022-01-28 PROCEDURE — 4040F PNEUMOC VAC/ADMIN/RCVD: CPT | Performed by: INTERNAL MEDICINE

## 2022-01-28 PROCEDURE — 85027 COMPLETE CBC AUTOMATED: CPT

## 2022-01-28 PROCEDURE — G8484 FLU IMMUNIZE NO ADMIN: HCPCS | Performed by: INTERNAL MEDICINE

## 2022-01-28 PROCEDURE — 3017F COLORECTAL CA SCREEN DOC REV: CPT | Performed by: INTERNAL MEDICINE

## 2022-01-28 PROCEDURE — 80053 COMPREHEN METABOLIC PANEL: CPT

## 2022-01-28 PROCEDURE — 1090F PRES/ABSN URINE INCON ASSESS: CPT | Performed by: INTERNAL MEDICINE

## 2022-01-28 PROCEDURE — G8417 CALC BMI ABV UP PARAM F/U: HCPCS | Performed by: INTERNAL MEDICINE

## 2022-01-28 RX ORDER — BUDESONIDE AND FORMOTEROL FUMARATE DIHYDRATE 160; 4.5 UG/1; UG/1
AEROSOL RESPIRATORY (INHALATION)
Qty: 10.2 G | Refills: 10 | OUTPATIENT
Start: 2022-01-28

## 2022-01-28 NOTE — PROGRESS NOTES
OUTPATIENT CARDIOLOGY FOLLOW-UP    Name: Kori Hernandez    Age: 69 y.o. Primary Care Physician: Dutch Day MD    Date of Service: 1/28/2022    Chief Complaint: Atypical chest pain, hypertension, chronic obstructive lung disease, bipolar disorder    Interim History: Since her most recent evaluation by Katherine Nolasco approximately 18 months earlier face of multiple somatic complaints and in the absence of documented structural cardiac abnormalities inclusive of that of an echocardiogram within the past 6 months demonstrating evidence only of left ventricular hypertrophy with normal left ventricular systolic function and no evidence of valvular pathology and that of perfusion imaging within the past 2 years demonstrating no evidence of perfusion abnormalities with normal left ventricular systolic function as well as that of remote coronary angiography in 2008 demonstrating no evidence of coronary atherosclerosis. Upon entering the room following assessment by my support staff and obtaining an electrocardiogram and vital signs, the patient was resting quietly but upon simple questioning as to the reason for recommended evaluation, she immediately became belligerent and threatening with multiple repeated expletives \"mother fucker\" and demands of seeing the [facility].. \"  In spite of attempted calmly attempting to assess the presence or absence of needs of emergent assessment as well as to diffuse her agitation, her symptoms progressively intensified with ongoing threatening behavior both of myself and staff with further assessment terminated and the patient informed if she did not leave the office quietly she would be escorted by security. Review of Systems: The remainder of a complete multisystem review including consitutional, central nervous, respiratory, circulatory, gastrointestinal, genitourinary, endocrinologic, hematologic, musculoskeletal and psychiatric are negative.     Past Medical History:  Past Medical History:   Diagnosis Date    [facility] ([facility]     Acute exacerbation of chronic obstructive pulmonary disease (COPD) (HCC)     Anemia     Anxiety     Arthritis     Arthritis     Asthma     Atrial fibrillation (HCC)     CAD (coronary artery disease)     af and heart murmur    Chronic obstructive pulmonary disease (HCC)     COPD (chronic obstructive pulmonary disease) (HCC)     Diabetes mellitus ([facility]     DVT (deep venous thrombosis) (HCC)     Emphysema lung (HCC)     [facility]     Encounter for imaging of bilateral cephalic veins     History of bilateral cephalic vein thrombosis    H/O cardiovascular stress test 04/25/2020    Lexiscan    Headache     History of blood transfusion     Hx of blood clots     Hypercholesterolemia     Hyperlipidemia     Hyperlipidemia     Hypertension     Mixed hyperlipidemia     Primary localized osteoarthritis of left hip     Primary osteoarthritis of left hip     Psychiatric problem     Seizures ([facility]     last seizure  15 yrs ago had been on depakote    Thyroid disease     Thyroid disease     Type II or unspecified type diabetes mellitus without mention of complication, not stated as uncontrolled        Past Surgical History:  Past Surgical History:   Procedure Laterality Date    CARDIAC CATHETERIZATION  12/01/2008    Normal Coronary arteries. Normal LV.   Elevated left ventricular end diastolic pressure suggestive of impaired relaxatin and possible diastolic dysfunction    HYSTERECTOMY  2004    JOINT REPLACEMENT      LEG SURGERY Right     right hip area, removed cyst iccf developed infection went to [place]    GA SURG EXCISION OF ANAL LESION(S) N/A 1/25/2019    EXCISION PERIANAL WARTS performed by Danie Dumont MD at [address] Left 8/8/2018    LEFT HIP TOTAL ARTHROPLASTY (KYLIE) performed by Leann Lama DO at [facility]. 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   Tobacco Use    Smoking status: Never Smoker    Smokeless tobacco: Never Used   Vaping Use    Vaping Use: Never used   Substance and Sexual Activity    Alcohol use: Never     Comment: occasional diet pepsi    Drug use: Never    Sexual activity: Never   Other Topics Concern    Not on file   Social History Narrative    ** Merged History Encounter **         ** Merged History Encounter **        Social Determinants of Health     Financial Resource Strain: Medium Risk    Difficulty of Paying Living Expenses: Somewhat hard   Food Insecurity: No Food Insecurity    Worried About Running Out of Food in the Last Year: Never true    Nika of Food in the Last Year: Never true   Transportation Needs: Unmet Transportation Needs    Lack of Transportation (Medical): Yes    Lack of Transportation (Non-Medical): Yes   Physical Activity: Inactive    Days of Exercise per Week: 0 days    Minutes of Exercise per Session: 0 min   Stress: Stress Concern Present    Feeling of Stress : To some extent   Social Connections: Moderately Integrated    Frequency of Communication with Friends and Family: More than three times a week    Frequency of Social Gatherings with Friends and Family: Not on file    Attends Synagogue Services: More than 4 times per year    Active Member of [address] [place] [address] [employer] or Organizations: Yes    Attends Club or Organization Meetings: 1 to 4 times per year    Marital Status:     Intimate Partner Violence:     Fear of Current or Ex-Partner: Not on file    Emotionally Abused: Not on file    Physically Abused: Not on file    Sexually Abused: Not on file   Housing Stability: Unknown    Unable to Pay for Housing in the Last Year: No    Number of Places Lived in the Last Year: Not on file    Unstable Housing in the Last Year: No       Allergies: Allergies   Allergen Reactions    Atenolol Palpitations    Lipitor [Atorvastatin] Palpitations    Tylenol With Codeine #3 [Acetaminophen-Codeine] Itching    Atenolol      increased heart beat    Codeine     Lipitor [Atorvastatin]     Percocet [Oxycodone-Acetaminophen]     Vicodin [Hydrocodone-Acetaminophen]     Other Other (See Comments)     Anti-depressants/Anti-psychotic: Causes hallucinations    Allergic to all pain meds can take extra strenghth tylenol         Current Medications:  Current Outpatient Medications   Medication Sig Dispense Refill    amLODIPine-benazepril (LOTREL) 10-40 MG per capsule Take 1 capsule by mouth daily 30 capsule 3    apixaban starter pack (ELIQUIS DVT/PE STARTER PACK) 5 MG TBPK tablet Take 1 tablet by mouth See Admin Instructions 74 tablet 0    potassium chloride (KLOR-CON) 10 MEQ extended release tablet TAKE 1 TABLET BY MOUTH EVERY DAY WITH LASIX 30 tablet 10    cimetidine (TAGAMET) 400 MG tablet Take 1 tablet by mouth 2 times daily 60 tablet 3    terazosin (HYTRIN) 2 MG capsule take 1 capsule by mouth once daily      triamcinolone (KENALOG) 0.1 % cream Apply topically 2 times daily for up to 2 weeks then as needed for flareups.  80 g 3    Blood Glucose Monitoring Suppl (ONE TOUCH ULTRA 2) w/Device KIT Check blood sugar qam 1 kit 0    SITagliptin-metFORMIN (JANUMET XR)  MG TB24 per extended release tablet TAKE TWO (2) TABLETS BY MOUTH EACH MORNING 60 tablet 10    SYMBICORT 160-4.5 MCG/ACT AERO INHALE TWO (2) PUFFS BY MOUTH TWICE DAILY 1 Inhaler 10    blood glucose test strips (ONETOUCH ULTRA) strip USE TO TEST BLOOD SUGAR TWICE DAILY 100 each 10    Prenatal Vit-Fe Fumarate-FA (PRENATAL VITAMIN PLUS LOW IRON) 27-1 MG TABS Take 1 tablet by mouth daily 30 tablet 5    Lancets (ONETOUCH DELICA PLUS EDDHGN99I) MISC USE TO TEST BLOOD SUGAR TWICE DAILY 100 each 12    promethazine (PHENERGAN) 25 MG tablet Take 1 tablet by mouth every 6 hours as needed for Nausea 90 tablet 2    vitamin D (ERGOCALCIFEROL) 1.25 MG (02945 UT) CAPS capsule TAKE 1 CAPSULE BY MOUTH ONCE WEEKLY 4 capsule 3    loperamide (IMODIUM) 2 MG capsule TAKE 1 CAPSULE BY MOUTH FOUR TIMES DAILY AS NEEDED FOR DIARRHEA 60 capsule 10    latanoprost (XALATAN) 0.005 % ophthalmic solution 1 drop nightly      fluticasone (FLONASE) 50 MCG/ACT nasal spray 1 spray by Each Nostril route daily      albuterol (PROVENTIL) (2.5 MG/3ML) 0.083% nebulizer solution Take 2.5 mg by nebulization every 6 hours as needed for Wheezing      magnesium oxide (MAG-OX) 400 MG tablet Take 1 tablet by mouth daily 30 tablet 5    pravastatin (PRAVACHOL) 40 MG tablet TAKE 1 TABLET BY MOUTH NIGHTLY 30 tablet 10    furosemide (LASIX) 40 MG tablet TAKE 1 TABLET BY MOUTH DAILY 30 tablet 10    Incontinence Supply Disposable (DEPEND UNDERWEAR LARGE) MISC Wear daily 50 each 12    docusate sodium (COLACE) 100 MG capsule Take 1 capsule by mouth 2 times daily 60 capsule 1    ferrous sulfate 325 (65 Fe) MG tablet Take 1 tablet by mouth 2 times daily (with meals) 30 tablet 3    [facility]. Devices [facility] Bedside commode 1 Device 0    albuterol sulfate HFA (PROVENTIL HFA) 108 (90 Base) MCG/ACT inhaler Inhale 2 puffs into the lungs every 4 hours as needed for Wheezing 1 Inhaler 1     No current facility-administered medications for this visit.          Physical Exam:  /68 (Site: Right Upper Arm, Position: Sitting, Cuff Size: Medium Adult)   Pulse 95   Ht 5' 2\" (1.575 m)   Wt 180 lb (81.6 kg)   LMP 06/26/1981   BMI 32.92 kg/m²   Wt Readings from Last 3 Encounters:   01/28/22 180 lb (81.6 kg)   01/27/22 180 lb (81.6 kg)   07/20/21 180 lb (81.6 kg)     Based on the nature of the encounter and the patient's behavior, examination was not performed    Laboratory Tests:  Lab Results   Component Value Date    CREATININE 0.8 12/11/2021    BUN 17 12/11/2021     12/11/2021    K 4.0 12/11/2021     12/11/2021    CO2 23 12/11/2021     No results found for: BNP  Lab Results   Component Value Date    WBC 8.0 12/11/2021    RBC 4.06 12/11/2021    HGB 9.6 12/11/2021    HCT 31.5 12/11/2021    MCV 77.6 12/11/2021    MCH 23.6 12/11/2021    MCHC 30.5 12/11/2021    RDW 16.4 12/11/2021     12/11/2021    MPV 9.5 12/11/2021     No results for input(s): ALKPHOS, ALT, AST, PROT, BILITOT, BILIDIR, LABALBU in the last 72 hours. Lab Results   Component Value Date    MG 1.8 07/21/2021     Lab Results   Component Value Date    PROTIME 14.9 07/20/2021    PROTIME 11.4 01/27/2011    INR 1.3 07/20/2021     Lab Results   Component Value Date    TSH 1.980 07/21/2021     No components found for: CHLPL  Lab Results   Component Value Date    TRIG 176 (H) 07/15/2021    TRIG 165 (H) 04/26/2020    TRIG 107 12/03/2018     Lab Results   Component Value Date    HDL 62 07/15/2021    HDL 51 04/26/2020    HDL 65 12/03/2018     Lab Results   Component Value Date    LDLCALC 86 07/15/2021    [facility] 63 04/26/2020    LDLCALC 41 12/03/2018       Cardiac Tests:  ECG: A resting electrocardiogram demonstrates evidence of sinus rhythm with borderline voltage within the limb leads and delayed precordial transition zone      ASSESSMENT / PLAN: On a clinical basis, the patient presents without a history of known structural heart disease and presently her belligerent attitude precluded adequate further assessment during the course of her present encounter.   Based on her threatening behavior, [facility] will no longer participate in her future care and if concerns related to that of cardiac issues persist, alternative arrangements for evaluation by an alternative cardiovascular group will be necessitated. Following initially leaving the office, she returned with a further expletive related tirade and was informed if her departure was not prompt and quiet that security would be immediately summoned. The patient's current medication list, allergies, problem list and results of all previously ordered testing were reviewed at today's visit. Follow-up office visit the patient is no longer to be permitted evaluation by [facility] on the basis of her present behavior. Note: This report was completed using computerized voice recognition software. Every effort has been made to ensure accuracy, however; inadvertent computerized transcription errors may be present. Christoper Duane.  Shila Dia, [address] [place] [address] Cardiology    An electronic copy of this follow-up progress note was forwarded to Dr. Wendy Coppola

## 2022-02-04 ENCOUNTER — HOSPITAL ENCOUNTER (EMERGENCY)
Age: 67
Discharge: HOME | End: 2022-02-04
Attending: EMERGENCY MEDICINE
Payer: MEDICARE

## 2022-02-04 ENCOUNTER — APPOINTMENT (OUTPATIENT)
Dept: GENERAL RADIOLOGY | Age: 67
End: 2022-02-04
Payer: MEDICARE

## 2022-02-04 VITALS
HEIGHT: 62 IN | TEMPERATURE: 98 F | SYSTOLIC BLOOD PRESSURE: 193 MMHG | OXYGEN SATURATION: 98 % | DIASTOLIC BLOOD PRESSURE: 92 MMHG | HEART RATE: 86 BPM | BODY MASS INDEX: 36.44 KG/M2 | WEIGHT: 198 LBS | RESPIRATION RATE: 28 BRPM

## 2022-02-04 DIAGNOSIS — K62.5 RECTAL BLEEDING: ICD-10-CM

## 2022-02-04 DIAGNOSIS — R07.9 CHEST PAIN, UNSPECIFIED TYPE: Primary | ICD-10-CM

## 2022-02-04 LAB
ALBUMIN SERPL-MCNC: 4.2 G/DL (ref 3.5–5.2)
ALP BLD-CCNC: 86 U/L (ref 35–104)
ALT SERPL-CCNC: 27 U/L (ref 0–32)
ANION GAP SERPL CALCULATED.3IONS-SCNC: 11 MMOL/L (ref 7–16)
APTT: 29.2 SEC (ref 24.5–35.1)
AST SERPL-CCNC: 21 U/L (ref 0–31)
BASOPHILS ABSOLUTE: 0.03 E9/L (ref 0–0.2)
BASOPHILS RELATIVE PERCENT: 0.3 % (ref 0–2)
BILIRUB SERPL-MCNC: 0.2 MG/DL (ref 0–1.2)
BUN BLDV-MCNC: 18 MG/DL (ref 6–23)
CALCIUM SERPL-MCNC: 10.4 MG/DL (ref 8.6–10.2)
CHLORIDE BLD-SCNC: 108 MMOL/L (ref 98–107)
CO2: 23 MMOL/L (ref 22–29)
CREAT SERPL-MCNC: 0.7 MG/DL (ref 0.5–1)
EKG ATRIAL RATE: 69 BPM
EKG P AXIS: 64 DEGREES
EKG P-R INTERVAL: 142 MS
EKG Q-T INTERVAL: 378 MS
EKG QRS DURATION: 76 MS
EKG QTC CALCULATION (BAZETT): 405 MS
EKG R AXIS: -19 DEGREES
EKG T AXIS: 41 DEGREES
EKG VENTRICULAR RATE: 69 BPM
EOSINOPHILS ABSOLUTE: 0.05 E9/L (ref 0.05–0.5)
EOSINOPHILS RELATIVE PERCENT: 0.6 % (ref 0–6)
GFR AFRICAN AMERICAN: >60
GFR NON-AFRICAN AMERICAN: >60 ML/MIN/1.73
GLUCOSE BLD-MCNC: 124 MG/DL (ref 74–99)
HCT VFR BLD CALC: 30.9 % (ref 34–48)
HEMOGLOBIN: 9.5 G/DL (ref 11.5–15.5)
IMMATURE GRANULOCYTES #: 0.25 E9/L
IMMATURE GRANULOCYTES %: 2.8 % (ref 0–5)
INR BLD: 0.9
LYMPHOCYTES ABSOLUTE: 2.54 E9/L (ref 1.5–4)
LYMPHOCYTES RELATIVE PERCENT: 28.3 % (ref 20–42)
MCH RBC QN AUTO: 24.1 PG (ref 26–35)
MCHC RBC AUTO-ENTMCNC: 30.7 % (ref 32–34.5)
MCV RBC AUTO: 78.4 FL (ref 80–99.9)
MONOCYTES ABSOLUTE: 0.68 E9/L (ref 0.1–0.95)
MONOCYTES RELATIVE PERCENT: 7.6 % (ref 2–12)
NEUTROPHILS ABSOLUTE: 5.44 E9/L (ref 1.8–7.3)
NEUTROPHILS RELATIVE PERCENT: 60.4 % (ref 43–80)
PDW BLD-RTO: 16.6 FL (ref 11.5–15)
PLATELET # BLD: 262 E9/L (ref 130–450)
PMV BLD AUTO: 8.9 FL (ref 7–12)
POTASSIUM SERPL-SCNC: 3.9 MMOL/L (ref 3.5–5)
PROTHROMBIN TIME: 10.4 SEC (ref 9.3–12.4)
RBC # BLD: 3.94 E12/L (ref 3.5–5.5)
SODIUM BLD-SCNC: 142 MMOL/L (ref 132–146)
TOTAL PROTEIN: 6.8 G/DL (ref 6.4–8.3)
TROPONIN, HIGH SENSITIVITY: 9 NG/L (ref 0–9)
TROPONIN, HIGH SENSITIVITY: 9 NG/L (ref 0–9)
WBC # BLD: 9 E9/L (ref 4.5–11.5)

## 2022-02-04 PROCEDURE — 85025 COMPLETE CBC W/AUTO DIFF WBC: CPT

## 2022-02-04 PROCEDURE — 93010 ELECTROCARDIOGRAM REPORT: CPT | Performed by: INTERNAL MEDICINE

## 2022-02-04 PROCEDURE — 71045 X-RAY EXAM CHEST 1 VIEW: CPT

## 2022-02-04 PROCEDURE — 84484 ASSAY OF TROPONIN QUANT: CPT

## 2022-02-04 PROCEDURE — 85730 THROMBOPLASTIN TIME PARTIAL: CPT

## 2022-02-04 PROCEDURE — 80053 COMPREHEN METABOLIC PANEL: CPT

## 2022-02-04 PROCEDURE — 99283 EMERGENCY DEPT VISIT LOW MDM: CPT

## 2022-02-04 PROCEDURE — 93005 ELECTROCARDIOGRAM TRACING: CPT | Performed by: EMERGENCY MEDICINE

## 2022-02-04 PROCEDURE — 36415 COLL VENOUS BLD VENIPUNCTURE: CPT

## 2022-02-04 PROCEDURE — 85610 PROTHROMBIN TIME: CPT

## 2022-02-04 ASSESSMENT — ENCOUNTER SYMPTOMS
VOMITING: 0
COUGH: 0
EYE REDNESS: 0
EYE DISCHARGE: 0
DIARRHEA: 0
NAUSEA: 0
BLOOD IN STOOL: 1
ABDOMINAL DISTENTION: 0
WHEEZING: 0
SHORTNESS OF BREATH: 0
ABDOMINAL PAIN: 0
BACK PAIN: 0
ANAL BLEEDING: 1
SORE THROAT: 0
EYE PAIN: 0
SINUS PRESSURE: 0

## 2022-02-04 NOTE — ED PROVIDER NOTES
Patient is a 70 y/o female who presents to the ED via EMS with multiple complaints. Patient states that she had a bowel movement this morning and saw bright red blood. She states that this scared her so she called a family member who called EMS. Patient states that she has had intermittent rectal bleeding for the past year and follows with Dr. Donald Parr. She is on blood thinners. She also states that en route her heart was hurting. She states that EMS gave her aspirin and nitroglycerin and the pain resolved. She denies any current chest pain. She states that she has had pain in her heart after falling [address] 14 years ago. She has seen multiple cardiologists for this. Review of Systems   Constitutional: Negative for chills and fever. HENT: Negative for ear pain, sinus pressure and sore throat. Eyes: Negative for pain, discharge and redness. Respiratory: Negative for cough, shortness of breath and wheezing. Cardiovascular: Positive for chest pain. Gastrointestinal: Positive for anal bleeding and blood in stool. Negative for abdominal distention, abdominal pain, diarrhea, nausea and vomiting. Genitourinary: Negative for dysuria and frequency. Musculoskeletal: Negative for arthralgias and back pain. Skin: Negative for rash and wound. Neurological: Negative for weakness and headaches. Hematological: Negative for adenopathy. All other systems reviewed and are negative. Physical Exam  Vitals and nursing note reviewed. Constitutional:       General: She is not in acute distress. HENT:      Head: Normocephalic and atraumatic. Right Ear: External ear normal.      Left Ear: External ear normal.      Nose: Nose normal.      Mouth/Throat:      Mouth: Mucous membranes are moist.   Eyes:      Conjunctiva/sclera: Conjunctivae normal.      Pupils: Pupils are equal, round, and reactive to light. Cardiovascular:      Rate and Rhythm: Normal rate and regular rhythm.       Heart sounds: No murmur heard. Pulmonary:      Effort: Pulmonary effort is normal. No respiratory distress. Breath sounds: Normal breath sounds. No stridor. No wheezing, rhonchi or rales. Abdominal:      General: Bowel sounds are normal. There is no distension. Palpations: Abdomen is soft. Tenderness: There is no abdominal tenderness. There is no guarding. Musculoskeletal:         General: Normal range of motion. Cervical back: Normal range of motion and neck supple. Skin:     General: Skin is warm and dry. Findings: No rash. Neurological:      Mental Status: She is alert and oriented to person, place, and time. Procedures     MDM           EKG:  Normal sinus rhythm with ventricular rate of 69 and PACs. MA interval, QRS duration and QT interval within normal range. Normal axis. No acute changes. --------------------------------------------- PAST HISTORY ---------------------------------------------  Past Medical History:  has a past medical history of A-fib ([facility] Acute exacerbation of chronic obstructive pulmonary disease (COPD) ([facility] Anemia, Anxiety, Arthritis, Arthritis, Asthma, Atrial fibrillation ([facility] CAD (coronary artery disease), Chronic obstructive pulmonary disease ([address] COPD (chronic obstructive pulmonary disease) ([facility] Diabetes mellitus ([facility] DVT (deep venous thrombosis) ([facility] Emphysema lung ([address] Emphysema of lung ([facility] Encounter for imaging of bilateral cephalic veins, H/O cardiovascular stress test, Headache, History of blood transfusion, Hx of blood clots, Hypercholesterolemia, Hyperlipidemia, Hyperlipidemia, Hypertension, Mixed hyperlipidemia, Primary localized osteoarthritis of left hip, Primary osteoarthritis of left hip, Psychiatric problem, Seizures ([address] Thyroid disease, Thyroid disease, and Type II or unspecified type diabetes mellitus without mention of complication, not stated as uncontrolled.     Past Surgical History:  has a past surgical history that includes Leg Surgery (Right); Cardiac catheterization (12/01/2008); pr total hip arthroplasty (Left, 8/8/2018); Hysterectomy (2004); pr surg excision of anal lesion(s) (N/A, 1/25/2019); and joint replacement. Social History:  reports that she has never smoked. She has never used smokeless tobacco. She reports that she does not drink alcohol and does not use drugs. Family History: family history includes Cancer in her mother; Diabetes in her mother; Heart Attack in her mother; Hypertension in her mother; Kidney Disease in her mother; Stroke in her father. The patients home medications have been reviewed.     Allergies: Atenolol, Lipitor [atorvastatin], Tylenol with codeine #3 [acetaminophen-codeine], Atenolol, Codeine, Lipitor [atorvastatin], Percocet [oxycodone-acetaminophen], Vicodin [hydrocodone-acetaminophen], and Other    -------------------------------------------------- RESULTS -------------------------------------------------  Labs:  Results for orders placed or performed during the hospital encounter of 02/04/22   CBC auto differential   Result Value Ref Range    WBC 9.0 4.5 - 11.5 E9/L    RBC 3.94 3.50 - 5.50 E12/L    Hemoglobin 9.5 (L) 11.5 - 15.5 g/dL    Hematocrit 30.9 (L) 34.0 - 48.0 %    MCV 78.4 (L) 80.0 - 99.9 fL    MCH 24.1 (L) 26.0 - 35.0 pg    MCHC 30.7 (L) 32.0 - 34.5 %    RDW 16.6 (H) 11.5 - 15.0 fL    Platelets 698 151 - 529 E9/L    MPV 8.9 7.0 - 12.0 fL    Neutrophils % 60.4 43.0 - 80.0 %    Immature Granulocytes % 2.8 0.0 - 5.0 %    Lymphocytes % 28.3 20.0 - 42.0 %    Monocytes % 7.6 2.0 - 12.0 %    Eosinophils % 0.6 0.0 - 6.0 %    Basophils % 0.3 0.0 - 2.0 %    Neutrophils Absolute 5.44 1.80 - 7.30 E9/L    Immature Granulocytes # 0.25 E9/L    Lymphocytes Absolute 2.54 1.50 - 4.00 E9/L    Monocytes Absolute 0.68 0.10 - 0.95 E9/L    Eosinophils Absolute 0.05 0.05 - 0.50 E9/L    Basophils Absolute 0.03 0.00 - 0.20 E9/L   Comprehensive Metabolic Panel   Result Value Ref Range    Sodium 142 132 - 146 mmol/L    Potassium 3.9 3.5 - 5.0 mmol/L    Chloride 108 (H) 98 - 107 mmol/L    CO2 23 22 - 29 mmol/L    Anion Gap 11 7 - 16 mmol/L    Glucose 124 (H) 74 - 99 mg/dL    BUN 18 6 - 23 mg/dL    CREATININE 0.7 0.5 - 1.0 mg/dL    GFR Non-African American >60 >=60 mL/min/1.73    GFR African American >60     Calcium 10.4 (H) 8.6 - 10.2 mg/dL    Total Protein 6.8 6.4 - 8.3 g/dL    Albumin 4.2 3.5 - 5.2 g/dL    Total Bilirubin 0.2 0.0 - 1.2 mg/dL    Alkaline Phosphatase 86 35 - 104 U/L    ALT 27 0 - 32 U/L    AST 21 0 - 31 U/L   Troponin   Result Value Ref Range    Troponin, High Sensitivity 9 0 - 9 ng/L   Protime-INR   Result Value Ref Range    Protime 10.4 9.3 - 12.4 sec    INR 0.9    APTT   Result Value Ref Range    aPTT 29.2 24.5 - 35.1 sec   Troponin   Result Value Ref Range    Troponin, High Sensitivity 9 0 - 9 ng/L   EKG 12 Lead   Result Value Ref Range    Ventricular Rate 69 BPM    Atrial Rate 69 BPM    P-R Interval 142 ms    QRS Duration 76 ms    Q-T Interval 378 ms    QTc Calculation (Bazett) 405 ms    P Axis 64 degrees    R Axis -19 degrees    T Axis 41 degrees       Radiology:  XR CHEST PORTABLE   Final Result   Findings suggest mild edema or possible developing infiltrates from   pneumonia. Continued follow-up recommended. ------------------------- NURSING NOTES AND VITALS REVIEWED ---------------------------  Date / Time Roomed:  2/4/2022  4:55 AM  ED Bed Assignment:  14/14    The nursing notes within the ED encounter and vital signs as below have been reviewed.    BP (!) 193/92   Pulse 86   Temp 98 °F (36.7 °C) (Infrared)   Resp 28   Ht 5' 2\" (1.575 m)   Wt 198 lb (89.8 kg)   LMP 06/26/1981   SpO2 98%   BMI 36.21 kg/m²   Oxygen Saturation Interpretation: Normal      ------------------------------------------ PROGRESS NOTES ------------------------------------------  I have spoken with the patient and discussed todays results, in addition to providing specific details for the plan of care and counseling regarding the diagnosis and prognosis. Their questions are answered at this time and they are agreeable with the plan. I discussed at length with them reasons for immediate return here for re evaluation. They will followup with primary care by calling their office tomorrow. --------------------------------- ADDITIONAL PROVIDER NOTES ---------------------------------  At this time the patient is without objective evidence of an acute process requiring hospitalization or inpatient management. They have remained hemodynamically stable throughout their entire ED visit and are stable for discharge with outpatient follow-up. The plan has been discussed in detail and they are aware of the specific conditions for emergent return, as well as the importance of follow-up. New Prescriptions    No medications on file       Diagnosis:  1. Chest pain, unspecified type    2. Rectal bleeding        Disposition:  Patient's disposition: Discharge to home  Patient's condition is stable.            [address] [facility],   02/04/22 2013

## 2022-02-04 NOTE — ED NOTES
Bed: 14  Expected date: 2/4/22  Expected time: 4:56 AM  Means of arrival: Ambulance  Comments:  Steffanie Barcenas RN  02/04/22 [id number]

## 2022-02-14 ENCOUNTER — OFFICE VISIT (OUTPATIENT)
Dept: ENT CLINIC | Age: 67
End: 2022-02-14
Payer: MEDICARE

## 2022-02-14 VITALS
SYSTOLIC BLOOD PRESSURE: 140 MMHG | HEIGHT: 62 IN | WEIGHT: 197 LBS | HEART RATE: 96 BPM | BODY MASS INDEX: 36.25 KG/M2 | DIASTOLIC BLOOD PRESSURE: 80 MMHG

## 2022-02-14 DIAGNOSIS — R04.0 EPISTAXIS: Primary | ICD-10-CM

## 2022-02-14 PROCEDURE — 3017F COLORECTAL CA SCREEN DOC REV: CPT | Performed by: OTOLARYNGOLOGY

## 2022-02-14 PROCEDURE — G8400 PT W/DXA NO RESULTS DOC: HCPCS | Performed by: OTOLARYNGOLOGY

## 2022-02-14 PROCEDURE — 1036F TOBACCO NON-USER: CPT | Performed by: OTOLARYNGOLOGY

## 2022-02-14 PROCEDURE — 1090F PRES/ABSN URINE INCON ASSESS: CPT | Performed by: OTOLARYNGOLOGY

## 2022-02-14 PROCEDURE — G8428 CUR MEDS NOT DOCUMENT: HCPCS | Performed by: OTOLARYNGOLOGY

## 2022-02-14 PROCEDURE — G8484 FLU IMMUNIZE NO ADMIN: HCPCS | Performed by: OTOLARYNGOLOGY

## 2022-02-14 PROCEDURE — 4040F PNEUMOC VAC/ADMIN/RCVD: CPT | Performed by: OTOLARYNGOLOGY

## 2022-02-14 PROCEDURE — 99213 OFFICE O/P EST LOW 20 MIN: CPT | Performed by: OTOLARYNGOLOGY

## 2022-02-14 PROCEDURE — 1123F ACP DISCUSS/DSCN MKR DOCD: CPT | Performed by: OTOLARYNGOLOGY

## 2022-02-14 PROCEDURE — G8417 CALC BMI ABV UP PARAM F/U: HCPCS | Performed by: OTOLARYNGOLOGY

## 2022-02-14 RX ORDER — AZELASTINE 1 MG/ML
2 SPRAY, METERED NASAL 2 TIMES DAILY
Qty: 120 ML | Refills: 1 | Status: SHIPPED | OUTPATIENT
Start: 2022-02-14

## 2022-02-14 ASSESSMENT — ENCOUNTER SYMPTOMS
SHORTNESS OF BREATH: 0
RESPIRATORY NEGATIVE: 1
EYES NEGATIVE: 1
STRIDOR: 0

## 2022-02-14 NOTE — PROGRESS NOTES
[facility] Otolaryngology  Dr. Chu Garcia. SEN Quesada Ms.Ed. New Consult       Patient Name:  Hermes Llamas  :  1955     CHIEF C/O:    Chief Complaint   Patient presents with    Epistaxis     when blowing nose gets a small clot and some blood on tissue        HISTORY OBTAINED FROM:  patient    HISTORY OF PRESENT ILLNESS:       Angelina Hernandez is a 69y.o. year old female, here today for nosebleed from the right nostril. Started this AM  [facility] for 1 hours, seen at PCP and sent to this office  No current bleeding, stopped 1-2 hours ago  No hx of nosebleeds in the past   Is on eliquis of hx of PE  No current sinus pain or pressure  No congestion at this time  No other bleeding disorders. 22: patient here for recheck of epistaxis. Was packed with fibrillar on 22. Patient states she continues to notice occasional bleeding.        Past Medical History:   Diagnosis Date    A-fib [facility])     Acute exacerbation of chronic obstructive pulmonary disease (COPD) (HCC)     Anemia     Anxiety     Arthritis     Arthritis     Asthma     Atrial fibrillation (HCC)     CAD (coronary artery disease)     af and heart murmur    Chronic obstructive pulmonary disease (HCC)     COPD (chronic obstructive pulmonary disease) (HCC)     Diabetes mellitus ([address]     DVT (deep venous thrombosis) (HCC)     Emphysema lung (HCC)     Emphysema of lung ([address]     Encounter for imaging of bilateral cephalic veins     History of bilateral cephalic vein thrombosis    H/O cardiovascular stress test 2020    Lexiscan    Headache     History of blood transfusion     Hx of blood clots     Hypercholesterolemia     Hyperlipidemia     Hyperlipidemia     Hypertension     Mixed hyperlipidemia     Primary localized osteoarthritis of left hip     Primary osteoarthritis of left hip     Psychiatric problem     Seizures ([address]     last seizure  15 yrs ago had been on depakote    Thyroid disease     Thyroid disease     Type II or unspecified type diabetes mellitus without mention of complication, not stated as uncontrolled      Past Surgical History:   Procedure Laterality Date    CARDIAC CATHETERIZATION  12/01/2008    Normal Coronary arteries. Normal LV. Elevated left ventricular end diastolic pressure suggestive of impaired relaxatin and possible diastolic dysfunction    HYSTERECTOMY  2004    JOINT REPLACEMENT      LEG SURGERY Right     right hip area, removed cyst iccf developed infection went to [facility]    ME SURG EXCISION OF ANAL LESION(S) N/A 1/25/2019    EXCISION PERIANAL WARTS performed by Tad Frost MD at [address] Left 8/8/2018    LEFT HIP TOTAL ARTHROPLASTY ([address]) performed by Wai Tierney DO at [address]       Current Outpatient Medications:     amLODIPine-benazepril (LOTREL) 10-40 MG per capsule, Take 1 capsule by mouth daily, Disp: 30 capsule, Rfl: 3    apixaban starter pack (ELIQUIS DVT/PE STARTER PACK) 5 MG TBPK tablet, Take 1 tablet by mouth See Admin Instructions, Disp: 74 tablet, Rfl: 0    potassium chloride (KLOR-CON) 10 MEQ extended release tablet, TAKE 1 TABLET BY MOUTH EVERY DAY WITH LASIX, Disp: 30 tablet, Rfl: 10    cimetidine (TAGAMET) 400 MG tablet, Take 1 tablet by mouth 2 times daily, Disp: 60 tablet, Rfl: 3    terazosin (HYTRIN) 2 MG capsule, take 1 capsule by mouth once daily, Disp: , Rfl:     triamcinolone (KENALOG) 0.1 % cream, Apply topically 2 times daily for up to 2 weeks then as needed for flareups. , Disp: 80 g, Rfl: 3    Blood Glucose Monitoring Suppl (ONE TOUCH ULTRA 2) w/Device KIT, Check blood sugar qam, Disp: 1 kit, Rfl: 0    SITagliptin-metFORMIN (JANUMET XR)  MG TB24 per extended release tablet, TAKE TWO (2) TABLETS BY MOUTH EACH MORNING, Disp: 60 tablet, Rfl: 10    SYMBICORT 160-4.5 MCG/ACT AERO, INHALE TWO (2) PUFFS BY MOUTH TWICE DAILY, Disp: 1 Inhaler, Rfl: 10    blood glucose test strips (ONETOUCH ULTRA) strip, USE TO TEST BLOOD SUGAR TWICE DAILY, Disp: 100 each, Rfl: 10    Prenatal Vit-Fe Fumarate-FA (PRENATAL VITAMIN PLUS LOW IRON) 27-1 MG TABS, Take 1 tablet by mouth daily, Disp: 30 tablet, Rfl: 5    Lancets (ONETOUCH DELICA PLUS [id number]) MISC, USE TO TEST BLOOD SUGAR TWICE DAILY, Disp: 100 each, Rfl: 12    promethazine (PHENERGAN) 25 MG tablet, Take 1 tablet by mouth every 6 hours as needed for Nausea, Disp: 90 tablet, Rfl: 2    vitamin D (ERGOCALCIFEROL) 1.25 MG (74406 UT) CAPS capsule, TAKE 1 CAPSULE BY MOUTH ONCE WEEKLY, Disp: 4 capsule, Rfl: 3    loperamide (IMODIUM) 2 MG capsule, TAKE 1 CAPSULE BY MOUTH FOUR TIMES DAILY AS NEEDED FOR DIARRHEA, Disp: 60 capsule, Rfl: 10    latanoprost (XALATAN) 0.005 % ophthalmic solution, 1 drop nightly, Disp: , Rfl:     fluticasone (FLONASE) 50 MCG/ACT nasal spray, 1 spray by Each Nostril route daily, Disp: , Rfl:     albuterol (PROVENTIL) (2.5 MG/3ML) 0.083% nebulizer solution, Take 2.5 mg by nebulization every 6 hours as needed for Wheezing, Disp: , Rfl:     magnesium oxide (MAG-OX) 400 MG tablet, Take 1 tablet by mouth daily, Disp: 30 tablet, Rfl: 5    pravastatin (PRAVACHOL) 40 MG tablet, TAKE 1 TABLET BY MOUTH NIGHTLY, Disp: 30 tablet, Rfl: 10    furosemide (LASIX) 40 MG tablet, TAKE 1 TABLET BY MOUTH DAILY, Disp: 30 tablet, Rfl: 10    Incontinence Supply Disposable (DEPEND UNDERWEAR LARGE) MISC, Wear daily, Disp: 50 each, Rfl: 12    docusate sodium (COLACE) 100 MG capsule, Take 1 capsule by mouth 2 times daily, Disp: 60 capsule, Rfl: 1    ferrous sulfate 325 (65 Fe) MG tablet, Take 1 tablet by mouth 2 times daily (with meals), Disp: 30 tablet, Rfl: 3    Misc.  Devices [facility], Bedside commode, Disp: 1 Device, Rfl: 0    albuterol sulfate HFA (PROVENTIL HFA) 108 (90 Base) MCG/ACT inhaler, Inhale 2 puffs into the lungs every 4 hours as needed for Wheezing, Disp: 1 Inhaler, Rfl: 1  Atenolol, Lipitor [atorvastatin], Tylenol with codeine #3 [acetaminophen-codeine], Atenolol, Codeine, Lipitor [atorvastatin], Percocet [oxycodone-acetaminophen], Vicodin [hydrocodone-acetaminophen], and Other  Social History     Tobacco Use    Smoking status: Never Smoker    Smokeless tobacco: Never Used   Vaping Use    Vaping Use: Never used   Substance Use Topics    Alcohol use: Never     Comment: occasional diet pepsi    Drug use: Never     Family History   Problem Relation Age of Onset    Diabetes Mother          76    Kidney Disease Mother     Heart Attack Mother     Hypertension Mother     Cancer Mother     Stroke Father          age 47       Review of Systems   Constitutional: Negative. Negative for activity change and appetite change. HENT: Positive for nosebleeds. Eyes: Negative. Respiratory: Negative. Negative for shortness of breath and stridor. Cardiovascular: Negative. Negative for chest pain and palpitations. Endocrine: Negative. Skin: Negative. Neurological: Negative. Negative for dizziness. Hematological: Negative. Psychiatric/Behavioral: Negative. BP (!) 140/80 (Site: Right Wrist, Position: Sitting, Cuff Size: Small Adult)   Pulse 96   Ht 5' 2\" (1.575 m)   Wt 197 lb (89.4 kg)   LMP 1981   BMI 36.03 kg/m²   Physical Exam  Constitutional:       Appearance: Normal appearance. HENT:      Head: Normocephalic. Right Ear: External ear normal.      Left Ear: External ear normal.      Nose:      Right Nostril: No epistaxis. Left Nostril: Epistaxis present. Right Turbinates: Not pale. Left Turbinates: Not pale. Mouth/Throat:      Lips: Pink. Mouth: Mucous membranes are moist.   Eyes:      Conjunctiva/sclera: Conjunctivae normal.      Pupils: Pupils are equal, round, and reactive to light. Cardiovascular:      Rate and Rhythm: Normal rate and regular rhythm. Pulses: Normal pulses. Pulmonary:      Effort: Pulmonary effort is normal. No respiratory distress. Breath sounds: No stridor. Musculoskeletal:         General: Normal range of motion. Cervical back: Normal range of motion. No rigidity. No muscular tenderness. Skin:     General: Skin is warm and dry. Neurological:      General: No focal deficit present. Mental Status: She is alert and oriented to person, place, and time. Psychiatric:         Mood and Affect: Mood normal.         Behavior: Behavior normal.         Thought Content: Thought content normal.         Judgment: Judgment normal.         IMPRESSION/PLAN:  Patient is seen and examined today for history of epistaxis was packed with dissolvable packing in the form of fibrillar last visit. On exam today, we did not notice any prominent vessels or active bleeding. Advised patient to stop using Flonase given her history of epistaxis. Recommend she start using Astelin 2 sprays each side up to twice daily in addition to regular saline irrigations. Follow up as scheduled. She is instructed to call with any new or worsening symptoms prior to her next appointment. Electronically signed by Jarod Hogan DO on 2/14/2022 at 2:07 PM            [address] [place] [address]  1955      I have discussed the case, including pertinent history and exam findings with the resident. I have seen and examined the patient and the key elements of the encounter have been performed by me. I agree with the assessment, plan and orders as documented by the resident. Patient here for follow up of medical problems. Remainder of medical problems as per resident note.       [facility],   2/23/22

## 2022-02-28 RX ORDER — BUDESONIDE AND FORMOTEROL FUMARATE DIHYDRATE 160; 4.5 UG/1; UG/1
AEROSOL RESPIRATORY (INHALATION)
Qty: 10.2 G | Refills: 10 | OUTPATIENT
Start: 2022-02-28

## 2022-03-03 LAB
BASOPHILS ABSOLUTE: 0.03 E9/L (ref 0–0.2)
BASOPHILS RELATIVE PERCENT: 0.4 % (ref 0–2)
EOSINOPHILS ABSOLUTE: 0.06 E9/L (ref 0.05–0.5)
EOSINOPHILS RELATIVE PERCENT: 0.8 % (ref 0–6)
HCT VFR BLD CALC: 34.7 % (ref 34–48)
HEMOGLOBIN: 10.5 G/DL (ref 11.5–15.5)
IMMATURE GRANULOCYTES #: 0.06 E9/L
IMMATURE GRANULOCYTES %: 0.8 % (ref 0–5)
LYMPHOCYTES ABSOLUTE: 1.6 E9/L (ref 1.5–4)
LYMPHOCYTES RELATIVE PERCENT: 22.7 % (ref 20–42)
MCH RBC QN AUTO: 24.9 PG (ref 26–35)
MCHC RBC AUTO-ENTMCNC: 30.3 % (ref 32–34.5)
MCV RBC AUTO: 82.4 FL (ref 80–99.9)
MONOCYTES ABSOLUTE: 0.57 E9/L (ref 0.1–0.95)
MONOCYTES RELATIVE PERCENT: 8.1 % (ref 2–12)
NEUTROPHILS ABSOLUTE: 4.74 E9/L (ref 1.8–7.3)
NEUTROPHILS RELATIVE PERCENT: 67.2 % (ref 43–80)
PDW BLD-RTO: 19.2 FL (ref 11.5–15)
PLATELET # BLD: 232 E9/L (ref 130–450)
PMV BLD AUTO: 8.7 FL (ref 7–12)
RBC # BLD: 4.21 E12/L (ref 3.5–5.5)
WBC # BLD: 7.1 E9/L (ref 4.5–11.5)

## 2022-03-03 PROCEDURE — 93005 ELECTROCARDIOGRAM TRACING: CPT | Performed by: PHYSICIAN ASSISTANT

## 2022-03-03 PROCEDURE — 83880 ASSAY OF NATRIURETIC PEPTIDE: CPT

## 2022-03-03 PROCEDURE — 84484 ASSAY OF TROPONIN QUANT: CPT

## 2022-03-03 PROCEDURE — 87635 SARS-COV-2 COVID-19 AMP PRB: CPT

## 2022-03-03 PROCEDURE — 99283 EMERGENCY DEPT VISIT LOW MDM: CPT

## 2022-03-03 PROCEDURE — 87502 INFLUENZA DNA AMP PROBE: CPT

## 2022-03-03 PROCEDURE — 85025 COMPLETE CBC W/AUTO DIFF WBC: CPT

## 2022-03-03 PROCEDURE — 80053 COMPREHEN METABOLIC PANEL: CPT

## 2022-03-04 ENCOUNTER — HOSPITAL ENCOUNTER (EMERGENCY)
Age: 67
Discharge: HOME | End: 2022-03-04
Attending: EMERGENCY MEDICINE
Payer: MEDICARE

## 2022-03-04 ENCOUNTER — APPOINTMENT (OUTPATIENT)
Dept: CT IMAGING | Age: 67
End: 2022-03-04
Payer: MEDICARE

## 2022-03-04 VITALS
OXYGEN SATURATION: 96 % | HEIGHT: 62 IN | BODY MASS INDEX: 36.25 KG/M2 | TEMPERATURE: 98.2 F | DIASTOLIC BLOOD PRESSURE: 74 MMHG | HEART RATE: 88 BPM | SYSTOLIC BLOOD PRESSURE: 154 MMHG | RESPIRATION RATE: 18 BRPM | WEIGHT: 197 LBS

## 2022-03-04 DIAGNOSIS — R07.9 CHEST PAIN, UNSPECIFIED TYPE: Primary | ICD-10-CM

## 2022-03-04 DIAGNOSIS — J06.9 ACUTE UPPER RESPIRATORY INFECTION: ICD-10-CM

## 2022-03-04 LAB
ALBUMIN SERPL-MCNC: 4.4 G/DL (ref 3.5–5.2)
ALP BLD-CCNC: 97 U/L (ref 35–104)
ALT SERPL-CCNC: 11 U/L (ref 0–32)
ANION GAP SERPL CALCULATED.3IONS-SCNC: 11 MMOL/L (ref 7–16)
AST SERPL-CCNC: 12 U/L (ref 0–31)
BILIRUB SERPL-MCNC: 0.4 MG/DL (ref 0–1.2)
BUN BLDV-MCNC: 17 MG/DL (ref 6–23)
CALCIUM SERPL-MCNC: 10.4 MG/DL (ref 8.6–10.2)
CHLORIDE BLD-SCNC: 104 MMOL/L (ref 98–107)
CO2: 26 MMOL/L (ref 22–29)
CREAT SERPL-MCNC: 0.9 MG/DL (ref 0.5–1)
EKG ATRIAL RATE: 89 BPM
EKG P AXIS: 61 DEGREES
EKG P-R INTERVAL: 170 MS
EKG Q-T INTERVAL: 358 MS
EKG QRS DURATION: 78 MS
EKG QTC CALCULATION (BAZETT): 435 MS
EKG R AXIS: -14 DEGREES
EKG T AXIS: 62 DEGREES
EKG VENTRICULAR RATE: 89 BPM
GFR AFRICAN AMERICAN: >60
GFR NON-AFRICAN AMERICAN: >60 ML/MIN/1.73
GLUCOSE BLD-MCNC: 117 MG/DL (ref 74–99)
INFLUENZA A BY PCR: NOT DETECTED
INFLUENZA B BY PCR: NOT DETECTED
POTASSIUM REFLEX MAGNESIUM: 4.2 MMOL/L (ref 3.5–5)
PRO-BNP: 66 PG/ML (ref 0–125)
SARS-COV-2, NAAT: NOT DETECTED
SODIUM BLD-SCNC: 141 MMOL/L (ref 132–146)
TOTAL PROTEIN: 7.5 G/DL (ref 6.4–8.3)
TROPONIN, HIGH SENSITIVITY: 10 NG/L (ref 0–9)
TROPONIN, HIGH SENSITIVITY: 11 NG/L (ref 0–9)

## 2022-03-04 PROCEDURE — 6360000004 HC RX CONTRAST MEDICATION: Performed by: RADIOLOGY

## 2022-03-04 PROCEDURE — 93010 ELECTROCARDIOGRAM REPORT: CPT | Performed by: INTERNAL MEDICINE

## 2022-03-04 PROCEDURE — 84484 ASSAY OF TROPONIN QUANT: CPT

## 2022-03-04 PROCEDURE — 36415 COLL VENOUS BLD VENIPUNCTURE: CPT

## 2022-03-04 PROCEDURE — 71275 CT ANGIOGRAPHY CHEST: CPT

## 2022-03-04 RX ORDER — IPRATROPIUM BROMIDE 21 UG/1
2 SPRAY, METERED NASAL EVERY 12 HOURS
Qty: 30 ML | Refills: 3 | Status: SHIPPED | OUTPATIENT
Start: 2022-03-04

## 2022-03-04 RX ADMIN — IOPAMIDOL 75 ML: 755 INJECTION, SOLUTION INTRAVENOUS at 02:19

## 2022-03-04 ASSESSMENT — ENCOUNTER SYMPTOMS
ABDOMINAL DISTENTION: 0
EYE PAIN: 0
SORE THROAT: 0
COUGH: 1
ABDOMINAL PAIN: 0
WHEEZING: 0
SINUS PRESSURE: 0
VOMITING: 0
SHORTNESS OF BREATH: 0
ORTHOPNEA: 0
DIARRHEA: 0
BACK PAIN: 0
EYE DISCHARGE: 0
EYE REDNESS: 0
TROUBLE SWALLOWING: 0
NAUSEA: 0

## 2022-03-04 NOTE — ED NOTES
Pt presents to the ED with c/o generally not feeling well. Pt states she has some minor cp and has recently been dealing with a pneumonia. Pt denies any other medical complaints at this time.        Nolan Almanza RN  03/04/22 0143

## 2022-03-04 NOTE — ED PROVIDER NOTES
69-year-old female presents to the emergency department with chest pains as well as upper respiratory infection symptoms. Patient has had history of blood clots in the past and currently on Eliquis for this. She states shortness of breath no nausea vomiting diarrhea abdominal pain urinary symptoms or leg swelling. States no fevers patient has recently been treated for pneumonia with antibiotics    The history is provided by the patient. Chest Pain  Pain location:  Substernal area  Pain quality: aching    Pain radiates to:  Does not radiate  Pain severity:  Mild  Onset quality:  Gradual  Duration:  1 week  Timing:  Intermittent  Progression:  Waxing and waning  Chronicity:  New  Relieved by:  Nothing  Worsened by:  Nothing  Ineffective treatments:  None tried  Associated symptoms: cough    Associated symptoms: no abdominal pain, no altered mental status, no anorexia, no anxiety, no back pain, no dysphagia, no fever, no headache, no nausea, no orthopnea, no shortness of breath, no syncope, no vomiting and no weakness         Review of Systems   Constitutional: Negative for chills and fever. HENT: Negative for ear pain, sinus pressure, sore throat and trouble swallowing. Eyes: Negative for pain, discharge and redness. Respiratory: Positive for cough. Negative for shortness of breath and wheezing. Cardiovascular: Positive for chest pain. Negative for orthopnea and syncope. Gastrointestinal: Negative for abdominal distention, abdominal pain, anorexia, diarrhea, nausea and vomiting. Genitourinary: Negative for dysuria and frequency. Musculoskeletal: Negative for arthralgias and back pain. Skin: Negative for rash and wound. Neurological: Negative for weakness and headaches. Hematological: Negative for adenopathy. All other systems reviewed and are negative. Physical Exam  Constitutional:       Appearance: She is well-developed. HENT:      Head: Normocephalic and atraumatic. Cardiovascular:      Rate and Rhythm: Normal rate. Rhythm irregular. Heart sounds: No murmur heard. Pulmonary:      Effort: Pulmonary effort is normal. No tachypnea or bradypnea. Breath sounds: Normal breath sounds. No decreased breath sounds. Musculoskeletal:         General: Normal range of motion. Cervical back: Normal range of motion and neck supple. Right lower leg: No tenderness. No edema. Left lower leg: No tenderness. No edema. Skin:     General: Skin is warm. Capillary Refill: Capillary refill takes less than 2 seconds. Neurological:      General: No focal deficit present. Mental Status: She is alert and oriented to person, place, and time. Procedures     MDM  Number of Diagnoses or Management Options  Acute upper respiratory infection  Chest pain, unspecified type  Diagnosis management comments: Patient seen and examined. Labs and imaging were ordered and reviewed. Patient had delta troponin was reassuring. Patient CT of the chest showed no evidence of pneumonia as well as no evidence of PE. Patient is already on Eliquis. Review of work-up was not felt patient warrants admission at this time.   Discussed all results with patient along with the nursing staff who patient was discharged with outpatient follow-up            --------------------------------------------- PAST HISTORY ---------------------------------------------  Past Medical History:  has a past medical history of A-fib ([facility] Acute exacerbation of chronic obstructive pulmonary disease (COPD) ([facility] Anemia, Anxiety, Arthritis, Arthritis, Asthma, Atrial fibrillation ([facility] CAD (coronary artery disease), Chronic obstructive pulmonary disease ([facility] COPD (chronic obstructive pulmonary disease) ([facility] Diabetes mellitus ([facility] DVT (deep venous thrombosis) ([facility] Emphysema lung ([facility] Emphysema of lung ([facility] Encounter for imaging of bilateral cephalic veins, H/O cardiovascular stress test, Headache, History of blood transfusion, Hx of blood clots, Hypercholesterolemia, Hyperlipidemia, Hyperlipidemia, Hypertension, Mixed hyperlipidemia, Primary localized osteoarthritis of left hip, Primary osteoarthritis of left hip, Psychiatric problem, Seizures ([facility] Thyroid disease, Thyroid disease, and Type II or unspecified type diabetes mellitus without mention of complication, not stated as uncontrolled. Past Surgical History:  has a past surgical history that includes Leg Surgery (Right); Cardiac catheterization (12/01/2008); pr total hip arthroplasty (Left, 8/8/2018); Hysterectomy (2004); pr surg excision of anal lesion(s) (N/A, 1/25/2019); and joint replacement. Social History:  reports that she has never smoked. She has never used smokeless tobacco. She reports that she does not drink alcohol and does not use drugs. Family History: family history includes Cancer in her mother; Diabetes in her mother; Heart Attack in her mother; Hypertension in her mother; Kidney Disease in her mother; Stroke in her father. The patients home medications have been reviewed.     Allergies: Atenolol, Lipitor [atorvastatin], Tylenol with codeine #3 [acetaminophen-codeine], Atenolol, Codeine, Lipitor [atorvastatin], Percocet [oxycodone-acetaminophen], Vicodin [hydrocodone-acetaminophen], and Other    -------------------------------------------------- RESULTS -------------------------------------------------  Labs:  Results for orders placed or performed during the hospital encounter of 03/04/22   COVID-19, Rapid    Specimen: Nasopharyngeal Swab   Result Value Ref Range    SARS-CoV-2, NAAT Not Detected Not Detected   RAPID INFLUENZA A/B ANTIGENS    Specimen: Nasopharyngeal   Result Value Ref Range    Influenza A by PCR Not Detected Not Detected    Influenza B by PCR Not Detected Not Detected   CBC with Auto Differential   Result Value Ref Range    WBC 7.1 4.5 - 11.5 E9/L    RBC 4.21 3.50 - 5.50 E12/L    Hemoglobin acute pulmonary abnormality. RECOMMENDATIONS:   Unavailable             ------------------------- NURSING NOTES AND VITALS REVIEWED ---------------------------  Date / Time Roomed:  3/4/2022  1:14 AM  ED Bed Assignment:  ADDIE/ADDIE    The nursing notes within the ED encounter and vital signs as below have been reviewed. BP (!) 154/74   Pulse 88   Temp 98.2 °F (36.8 °C) (Oral)   Resp 18   Ht 5' 2\" (1.575 m)   Wt 197 lb (89.4 kg)   LMP 06/26/1981   SpO2 96%   BMI 36.03 kg/m²   Oxygen Saturation Interpretation: Normal      ------------------------------------------ PROGRESS NOTES ------------------------------------------  I have spoken with the patient and discussed todays results, in addition to providing specific details for the plan of care and counseling regarding the diagnosis and prognosis. Their questions are answered at this time and they are agreeable with the plan. I discussed at length with them reasons for immediate return here for re evaluation. They will followup with their primary care physician by calling their office on Monday.      --------------------------------- ADDITIONAL PROVIDER NOTES ---------------------------------  At this time the patient is without objective evidence of an acute process requiring hospitalization or inpatient management. They have remained hemodynamically stable throughout their entire ED visit and are stable for discharge with outpatient follow-up. The plan has been discussed in detail and they are aware of the specific conditions for emergent return, as well as the importance of follow-up. Discharge Medication List as of 3/4/2022  4:05 AM      START taking these medications    Details   ipratropium (ATROVENT) 0.03 % nasal spray 2 sprays by Each Nostril route every 12 hours, Disp-30 mL, R-3Print             Diagnosis:  1. Chest pain, unspecified type    2.  Acute upper respiratory infection        Disposition:  Patient's disposition: Discharge to home  Patient's condition is stable.        Jhonny Epps,   03/04/22 1540

## 2022-03-04 NOTE — ED NOTES
Department of Emergency Medicine    FIRST PROVIDER TRIAGE NOTE             Independent MLP           3/3/22  9:29 PM EST    Date of Encounter: 3/3/22   MRN: [id number]    Vitals:    03/03/22 2130   BP: (!) 184/83   Pulse: 112   Resp: 18   Temp: 97.3 °F (36.3 °C)   TempSrc: Tympanic   SpO2: 97%   Weight: 197 lb (89.4 kg)   Height: 5' 2\" (1.575 m)      HPI: Keron Garcia is a 69 y.o. female who presents to the ED for Chest Pain (Began approx 1 week ago) and Shortness of Breath (x1 month)  Patient reports known PEs- on Eliquis since July 2021    ROS: Negative for abd pain or diarrhea. Physical Exam:   Gen Appearance/Constitutional: alert  CV: regular rate  Pulm: abnormal breath sounds auscultated- diminished b/l     Initial Plan of Care: All treatment areas with department are currently occupied. Plan to order/Initiate the following while awaiting opening in ED: labs, EKG and imaging studies.     Initial Plan of Care: Initiate Treatment-Testing, Proceed toTreatment Area When Bed Available for ED Attending/MLP to Continue Care    Electronically signed by Jag Johnston PA-C   DD: 3/3/22       Jag Johnston PA-C  03/03/22 2133

## 2022-04-07 RX ORDER — BUDESONIDE AND FORMOTEROL FUMARATE DIHYDRATE 160; 4.5 UG/1; UG/1
AEROSOL RESPIRATORY (INHALATION)
Qty: 10.2 G | Refills: 10 | OUTPATIENT
Start: 2022-04-07

## 2022-05-13 ENCOUNTER — HOSPITAL ENCOUNTER (OUTPATIENT)
Age: 67
Discharge: HOME | End: 2022-05-15
Payer: MEDICARE

## 2022-05-13 ENCOUNTER — HOSPITAL ENCOUNTER (OUTPATIENT)
Dept: GENERAL RADIOLOGY | Age: 67
Discharge: HOME | End: 2022-05-15
Payer: MEDICARE

## 2022-05-13 ENCOUNTER — TELEPHONE (OUTPATIENT)
Dept: ENT CLINIC | Age: 67
End: 2022-05-13

## 2022-05-13 DIAGNOSIS — R06.02 SOB (SHORTNESS OF BREATH): ICD-10-CM

## 2022-05-13 PROCEDURE — 71046 X-RAY EXAM CHEST 2 VIEWS: CPT

## 2022-05-13 NOTE — TELEPHONE ENCOUNTER
Patient is asking if there is any way her appointment can be moved to 10:15 on 5/18. She states that her ride is unable to get her there until that time. Nothing available to reschedule to soon. Please contact patient.

## 2022-05-31 ENCOUNTER — HOSPITAL ENCOUNTER (OUTPATIENT)
Age: 67
Discharge: HOME | End: 2022-05-31
Payer: MEDICARE

## 2022-05-31 LAB
ALBUMIN SERPL-MCNC: 4.3 G/DL (ref 3.5–5.2)
ALP BLD-CCNC: 79 U/L (ref 35–104)
ALT SERPL-CCNC: 11 U/L (ref 0–32)
ANION GAP SERPL CALCULATED.3IONS-SCNC: 11 MMOL/L (ref 7–16)
AST SERPL-CCNC: 13 U/L (ref 0–31)
BASOPHILS ABSOLUTE: 0.03 E9/L (ref 0–0.2)
BASOPHILS RELATIVE PERCENT: 0.4 % (ref 0–2)
BILIRUB SERPL-MCNC: 0.4 MG/DL (ref 0–1.2)
BUN BLDV-MCNC: 16 MG/DL (ref 6–23)
CALCIUM SERPL-MCNC: 10.3 MG/DL (ref 8.6–10.2)
CHLORIDE BLD-SCNC: 106 MMOL/L (ref 98–107)
CHOLESTEROL, TOTAL: 142 MG/DL (ref 0–199)
CO2: 25 MMOL/L (ref 22–29)
CREAT SERPL-MCNC: 0.7 MG/DL (ref 0.5–1)
EKG ATRIAL RATE: 62 BPM
EKG P AXIS: 71 DEGREES
EKG P-R INTERVAL: 162 MS
EKG Q-T INTERVAL: 428 MS
EKG QRS DURATION: 88 MS
EKG QTC CALCULATION (BAZETT): 434 MS
EKG R AXIS: 28 DEGREES
EKG T AXIS: 52 DEGREES
EKG VENTRICULAR RATE: 62 BPM
EOSINOPHILS ABSOLUTE: 0.15 E9/L (ref 0.05–0.5)
EOSINOPHILS RELATIVE PERCENT: 1.9 % (ref 0–6)
GFR AFRICAN AMERICAN: >60
GFR NON-AFRICAN AMERICAN: >60 ML/MIN/1.73
GLUCOSE BLD-MCNC: 113 MG/DL (ref 74–99)
HBA1C MFR BLD: 6.5 % (ref 4–5.6)
HCT VFR BLD CALC: 30.1 % (ref 34–48)
HDLC SERPL-MCNC: 72 MG/DL
HEMOGLOBIN: 9.5 G/DL (ref 11.5–15.5)
IMMATURE GRANULOCYTES #: 0.06 E9/L
IMMATURE GRANULOCYTES %: 0.7 % (ref 0–5)
LDL CHOLESTEROL CALCULATED: 41 MG/DL (ref 0–99)
LYMPHOCYTES ABSOLUTE: 1.43 E9/L (ref 1.5–4)
LYMPHOCYTES RELATIVE PERCENT: 17.8 % (ref 20–42)
MCH RBC QN AUTO: 25.4 PG (ref 26–35)
MCHC RBC AUTO-ENTMCNC: 31.6 % (ref 32–34.5)
MCV RBC AUTO: 80.5 FL (ref 80–99.9)
MONOCYTES ABSOLUTE: 0.65 E9/L (ref 0.1–0.95)
MONOCYTES RELATIVE PERCENT: 8.1 % (ref 2–12)
NEUTROPHILS ABSOLUTE: 5.71 E9/L (ref 1.8–7.3)
NEUTROPHILS RELATIVE PERCENT: 71.1 % (ref 43–80)
PDW BLD-RTO: 15.5 FL (ref 11.5–15)
PLATELET # BLD: 245 E9/L (ref 130–450)
PMV BLD AUTO: 9.3 FL (ref 7–12)
POTASSIUM SERPL-SCNC: 3.7 MMOL/L (ref 3.5–5)
RBC # BLD: 3.74 E12/L (ref 3.5–5.5)
SODIUM BLD-SCNC: 142 MMOL/L (ref 132–146)
T3 TOTAL: 85.68 NG/DL (ref 80–200)
T4 TOTAL: 11.4 MCG/DL (ref 4.5–11.7)
TOTAL PROTEIN: 7.2 G/DL (ref 6.4–8.3)
TRIGL SERPL-MCNC: 143 MG/DL (ref 0–149)
TSH SERPL DL<=0.05 MIU/L-ACNC: 2.41 UIU/ML (ref 0.27–4.2)
VITAMIN B-12: 542 PG/ML (ref 211–946)
VITAMIN D 25-HYDROXY: 26 NG/ML (ref 30–100)
VLDLC SERPL CALC-MCNC: 29 MG/DL
WBC # BLD: 8 E9/L (ref 4.5–11.5)

## 2022-05-31 PROCEDURE — 85025 COMPLETE CBC W/AUTO DIFF WBC: CPT

## 2022-05-31 PROCEDURE — 84436 ASSAY OF TOTAL THYROXINE: CPT

## 2022-05-31 PROCEDURE — 84443 ASSAY THYROID STIM HORMONE: CPT

## 2022-05-31 PROCEDURE — 83036 HEMOGLOBIN GLYCOSYLATED A1C: CPT

## 2022-05-31 PROCEDURE — 93005 ELECTROCARDIOGRAM TRACING: CPT

## 2022-05-31 PROCEDURE — 82306 VITAMIN D 25 HYDROXY: CPT

## 2022-05-31 PROCEDURE — 82607 VITAMIN B-12: CPT

## 2022-05-31 PROCEDURE — 84480 ASSAY TRIIODOTHYRONINE (T3): CPT

## 2022-05-31 PROCEDURE — 80053 COMPREHEN METABOLIC PANEL: CPT

## 2022-05-31 PROCEDURE — 80061 LIPID PANEL: CPT

## 2022-05-31 PROCEDURE — 36415 COLL VENOUS BLD VENIPUNCTURE: CPT

## 2022-06-07 ENCOUNTER — HOSPITAL ENCOUNTER (OUTPATIENT)
Age: 67
Discharge: HOME | End: 2022-06-07
Payer: MEDICARE

## 2022-06-07 LAB
BACTERIA: ABNORMAL /HPF
BILIRUBIN URINE: NEGATIVE
BLOOD, URINE: NEGATIVE
CLARITY: CLEAR
COLOR: YELLOW
EPITHELIAL CELLS, UA: ABNORMAL /HPF
GLUCOSE URINE: NEGATIVE MG/DL
KETONES, URINE: NEGATIVE MG/DL
LEUKOCYTE ESTERASE, URINE: ABNORMAL
LITHIUM DOSE AMOUNT: NORMAL
LITHIUM LEVEL: 0.52 MMOL/L (ref 0.5–1.5)
NITRITE, URINE: NEGATIVE
PH UA: 7 (ref 5–9)
PROTEIN UA: ABNORMAL MG/DL
RBC UA: ABNORMAL /HPF (ref 0–2)
SPECIFIC GRAVITY UA: 1.02 (ref 1–1.03)
UROBILINOGEN, URINE: 0.2 E.U./DL
WBC UA: ABNORMAL /HPF (ref 0–5)

## 2022-06-07 PROCEDURE — 81001 URINALYSIS AUTO W/SCOPE: CPT

## 2022-06-07 PROCEDURE — 36415 COLL VENOUS BLD VENIPUNCTURE: CPT

## 2022-06-07 PROCEDURE — 80178 ASSAY OF LITHIUM: CPT

## 2022-06-17 ENCOUNTER — OFFICE VISIT (OUTPATIENT)
Dept: ENT CLINIC | Age: 67
End: 2022-06-17
Payer: MEDICARE

## 2022-06-17 VITALS
BODY MASS INDEX: 36.47 KG/M2 | HEIGHT: 62 IN | SYSTOLIC BLOOD PRESSURE: 169 MMHG | HEART RATE: 88 BPM | WEIGHT: 198.2 LBS | DIASTOLIC BLOOD PRESSURE: 78 MMHG

## 2022-06-17 DIAGNOSIS — R22.1 MASS OF LEFT SIDE OF NECK: Primary | ICD-10-CM

## 2022-06-17 DIAGNOSIS — E04.1 THYROID NODULE: ICD-10-CM

## 2022-06-17 LAB
BUN BLDV-MCNC: 15 MG/DL (ref 6–23)
CREAT SERPL-MCNC: 0.7 MG/DL (ref 0.5–1)
GFR AFRICAN AMERICAN: >60
GFR NON-AFRICAN AMERICAN: >60 ML/MIN/1.73

## 2022-06-17 PROCEDURE — G8417 CALC BMI ABV UP PARAM F/U: HCPCS | Performed by: OTOLARYNGOLOGY

## 2022-06-17 PROCEDURE — 1036F TOBACCO NON-USER: CPT | Performed by: OTOLARYNGOLOGY

## 2022-06-17 PROCEDURE — 99214 OFFICE O/P EST MOD 30 MIN: CPT | Performed by: OTOLARYNGOLOGY

## 2022-06-17 PROCEDURE — 1124F ACP DISCUSS-NO DSCNMKR DOCD: CPT | Performed by: OTOLARYNGOLOGY

## 2022-06-17 PROCEDURE — 3017F COLORECTAL CA SCREEN DOC REV: CPT | Performed by: OTOLARYNGOLOGY

## 2022-06-17 PROCEDURE — G8427 DOCREV CUR MEDS BY ELIG CLIN: HCPCS | Performed by: OTOLARYNGOLOGY

## 2022-06-17 PROCEDURE — G8400 PT W/DXA NO RESULTS DOC: HCPCS | Performed by: OTOLARYNGOLOGY

## 2022-06-17 PROCEDURE — 1090F PRES/ABSN URINE INCON ASSESS: CPT | Performed by: OTOLARYNGOLOGY

## 2022-06-17 RX ORDER — IBUPROFEN 800 MG/1
TABLET ORAL
COMMUNITY
Start: 2022-06-14

## 2022-06-17 RX ORDER — ISOSORBIDE MONONITRATE 30 MG/1
TABLET, EXTENDED RELEASE ORAL
COMMUNITY
Start: 2022-04-22

## 2022-06-17 RX ORDER — BUSPIRONE HYDROCHLORIDE 5 MG/1
TABLET ORAL
COMMUNITY
Start: 2022-05-16

## 2022-06-17 RX ORDER — PANTOPRAZOLE SODIUM 40 MG/1
TABLET, DELAYED RELEASE ORAL
COMMUNITY
Start: 2022-05-24

## 2022-06-17 RX ORDER — MONTELUKAST SODIUM 10 MG/1
TABLET ORAL
COMMUNITY
Start: 2022-06-14

## 2022-06-17 RX ORDER — LINAGLIPTIN 5 MG/1
TABLET, FILM COATED ORAL
COMMUNITY
Start: 2022-06-14

## 2022-06-17 RX ORDER — PALIPERIDONE 3 MG/1
TABLET, EXTENDED RELEASE ORAL
COMMUNITY
Start: 2022-06-14

## 2022-06-17 RX ORDER — LOSARTAN POTASSIUM AND HYDROCHLOROTHIAZIDE 25; 100 MG/1; MG/1
TABLET ORAL
COMMUNITY
Start: 2022-04-22

## 2022-06-17 RX ORDER — CHOLESTYRAMINE 4 G/9G
POWDER, FOR SUSPENSION ORAL
COMMUNITY
Start: 2022-06-14

## 2022-06-17 RX ORDER — GLIMEPIRIDE 2 MG/1
TABLET ORAL
COMMUNITY
Start: 2022-06-14

## 2022-06-17 ASSESSMENT — ENCOUNTER SYMPTOMS
VOMITING: 0
DIARRHEA: 0
EYE PAIN: 0
BACK PAIN: 0
EYE DISCHARGE: 0
RHINORRHEA: 0
SINUS PRESSURE: 0
COUGH: 0
SHORTNESS OF BREATH: 0
VOICE CHANGE: 0
ALLERGIC/IMMUNOLOGIC NEGATIVE: 1
SORE THROAT: 0
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
## 2022-06-22 ENCOUNTER — TELEPHONE (OUTPATIENT)
Dept: ENT CLINIC | Age: 67
End: 2022-06-22

## 2022-06-22 NOTE — TELEPHONE ENCOUNTER
Please Advise      Rosa Beaulieu from [facility] in [place] called stating \"they do not do CT soft tissue neck with and without contrast, they only do with contrast and need a new order for the patient. \"     Can I place a new order for the patient?

## 2022-06-23 DIAGNOSIS — R22.1 MASS OF LEFT SIDE OF NECK: Primary | ICD-10-CM

## 2022-06-23 DIAGNOSIS — E04.1 THYROID NODULE: ICD-10-CM

## 2022-07-06 DIAGNOSIS — R22.1 MASS OF LEFT SIDE OF NECK: ICD-10-CM

## 2022-07-25 ENCOUNTER — OFFICE VISIT (OUTPATIENT)
Dept: ENT CLINIC | Age: 67
End: 2022-07-25
Payer: MEDICARE

## 2022-07-25 VITALS — BODY MASS INDEX: 36.78 KG/M2 | WEIGHT: 199.9 LBS | HEIGHT: 62 IN

## 2022-07-25 DIAGNOSIS — R22.1 MASS OF LEFT SIDE OF NECK: Primary | ICD-10-CM

## 2022-07-25 DIAGNOSIS — E04.1 THYROID NODULE: ICD-10-CM

## 2022-07-25 PROCEDURE — G8417 CALC BMI ABV UP PARAM F/U: HCPCS | Performed by: OTOLARYNGOLOGY

## 2022-07-25 PROCEDURE — 1124F ACP DISCUSS-NO DSCNMKR DOCD: CPT | Performed by: OTOLARYNGOLOGY

## 2022-07-25 PROCEDURE — G8428 CUR MEDS NOT DOCUMENT: HCPCS | Performed by: OTOLARYNGOLOGY

## 2022-07-25 PROCEDURE — 1090F PRES/ABSN URINE INCON ASSESS: CPT | Performed by: OTOLARYNGOLOGY

## 2022-07-25 PROCEDURE — 3017F COLORECTAL CA SCREEN DOC REV: CPT | Performed by: OTOLARYNGOLOGY

## 2022-07-25 PROCEDURE — 99213 OFFICE O/P EST LOW 20 MIN: CPT | Performed by: OTOLARYNGOLOGY

## 2022-07-25 PROCEDURE — 1036F TOBACCO NON-USER: CPT | Performed by: OTOLARYNGOLOGY

## 2022-07-25 PROCEDURE — G8400 PT W/DXA NO RESULTS DOC: HCPCS | Performed by: OTOLARYNGOLOGY

## 2022-07-25 RX ORDER — DOXAZOSIN 2 MG/1
2 TABLET ORAL NIGHTLY
COMMUNITY

## 2022-07-25 RX ORDER — MESALAMINE 1.2 G/1
TABLET, DELAYED RELEASE ORAL
COMMUNITY
Start: 2022-07-14

## 2022-07-25 RX ORDER — ASPIRIN 81 MG/1
81 TABLET ORAL DAILY
COMMUNITY

## 2022-07-25 NOTE — PROGRESS NOTES
[facility] Otolaryngology  Dr. Maya Rivas. Ferny Pizarro. Ms.Ed        Patient Name:  Tanmay Solano  :  1955     CHIEF C/O:    Chief Complaint   Patient presents with    Results     From [facility]       HISTORY OBTAINED FROM:  patient    HISTORY OF PRESENT ILLNESS:       Carmela Hayes is a 69y.o. year old female, here today for follow up of here for evaluation of left-sided neck mass status post CT scan. No abdominal mass was present, no complaints of new swelling or drainage from area no erythema no other complaints today of difficulty swallowing hoarseness shortness of breath stridor nausea or chest pain. He has a history of thyroid nodularity, with thyroid ultrasound showing no significant concerning nodules for biopsy at this time.       Past Medical History:   Diagnosis Date    A-fib [facility])     Acute exacerbation of chronic obstructive pulmonary disease (COPD) (HCC)     Anemia     Anxiety     Arthritis     Arthritis     Asthma     Atrial fibrillation (HCC)     CAD (coronary artery disease)     af and heart murmur    Chronic obstructive pulmonary disease (HCC)     COPD (chronic obstructive pulmonary disease) (HCC)     Diabetes mellitus ([address]     DVT (deep venous thrombosis) (HCC)     Emphysema lung (HCC)     Emphysema of lung ([address]     Encounter for imaging of bilateral cephalic veins     History of bilateral cephalic vein thrombosis    H/O cardiovascular stress test 2020    Lexiscan    Headache     History of blood transfusion     Hx of blood clots     Hypercholesterolemia     Hyperlipidemia     Hyperlipidemia     Hypertension     Mixed hyperlipidemia     Primary localized osteoarthritis of left hip     Primary osteoarthritis of left hip     Psychiatric problem     Seizures ([address]     last seizure  15 yrs ago had been on depakote    Thyroid disease     Thyroid disease     Type II or unspecified type diabetes mellitus without mention of complication, not stated as uncontrolled      Past Surgical History:   Procedure Laterality Date    CARDIAC CATHETERIZATION  12/01/2008    Normal Coronary arteries. Normal LV.   Elevated left ventricular end diastolic pressure suggestive of impaired relaxatin and possible diastolic dysfunction    HYSTERECTOMY (CERVIX STATUS UNKNOWN)  2004    JOINT REPLACEMENT      LEG SURGERY Right     right hip area, removed cyst iccf developed infection went to [facility] SURG EXCISION OF ANAL LESION(S) N/A 1/25/2019    EXCISION PERIANAL WARTS performed by Chelsi Hernandez MD at [address] Left 8/8/2018    LEFT HIP TOTAL ARTHROPLASTY ([address]) performed by Travis Carey DO at [address]       Current Outpatient Medications:     doxazosin (CARDURA) 2 MG tablet, Take 2 mg by mouth nightly, Disp: , Rfl:     COD LIVER OIL PO, Take by mouth, Disp: , Rfl:     aspirin 81 MG EC tablet, Take 81 mg by mouth in the morning., Disp: , Rfl:     mesalamine (LIALDA) 1.2 g EC tablet, TAKE 2 TABLETS BY MOUTH EVERY DAY, Disp: , Rfl:     glimepiride (AMARYL) 2 MG tablet, , Disp: , Rfl:     ibuprofen (ADVIL;MOTRIN) 800 MG tablet, , Disp: , Rfl:     isosorbide mononitrate (IMDUR) 30 MG extended release tablet, take 1 tablet by mouth once daily, Disp: , Rfl:     TRADJENTA 5 MG tablet, , Disp: , Rfl:     losartan-hydroCHLOROthiazide (HYZAAR) 100-25 MG per tablet, take 1 tablet by mouth daily, Disp: , Rfl:     metoprolol tartrate (LOPRESSOR) 25 MG tablet, take 1 tablet by mouth twice a day, Disp: , Rfl:     montelukast (SINGULAIR) 10 MG tablet, , Disp: , Rfl:     pantoprazole (PROTONIX) 40 MG tablet, TAKE 1 TABLET BY MOUTH EVERY DAY, Disp: , Rfl:     amLODIPine-benazepril (LOTREL) 10-40 MG per capsule, Take 1 capsule by mouth daily, Disp: 30 capsule, Rfl: 3    potassium chloride (KLOR-CON) 10 MEQ extended release tablet, TAKE 1 TABLET BY MOUTH EVERY DAY WITH LASIX, Disp: 30 tablet, Rfl: 10    SYMBICORT 160-4.5 MCG/ACT AERO, INHALE strip, USE TO TEST BLOOD SUGAR TWICE DAILY, Disp: 100 each, Rfl: 10    Lancets (ONETOUCH DELICA PLUS [id number]) MISC, USE TO TEST BLOOD SUGAR TWICE DAILY, Disp: 100 each, Rfl: 12    promethazine (PHENERGAN) 25 MG tablet, Take 1 tablet by mouth every 6 hours as needed for Nausea (Patient not taking: Reported on 7/25/2022), Disp: 90 tablet, Rfl: 2    loperamide (IMODIUM) 2 MG capsule, TAKE 1 CAPSULE BY MOUTH FOUR TIMES DAILY AS NEEDED FOR DIARRHEA, Disp: 60 capsule, Rfl: 10    latanoprost (XALATAN) 0.005 % ophthalmic solution, 1 drop nightly (Patient not taking: Reported on 7/25/2022), Disp: , Rfl:     fluticasone (FLONASE) 50 MCG/ACT nasal spray, 1 spray by Each Nostril route daily (Patient not taking: Reported on 7/25/2022), Disp: , Rfl:     albuterol (PROVENTIL) (2.5 MG/3ML) 0.083% nebulizer solution, Take 2.5 mg by nebulization every 6 hours as needed for Wheezing (Patient not taking: Reported on 7/25/2022), Disp: , Rfl:     pravastatin (PRAVACHOL) 40 MG tablet, TAKE 1 TABLET BY MOUTH NIGHTLY, Disp: 30 tablet, Rfl: 10    furosemide (LASIX) 40 MG tablet, TAKE 1 TABLET BY MOUTH DAILY, Disp: 30 tablet, Rfl: 10    albuterol sulfate HFA (PROVENTIL HFA) 108 (90 Base) MCG/ACT inhaler, Inhale 2 puffs into the lungs every 4 hours as needed for Wheezing, Disp: 1 Inhaler, Rfl: 1    Incontinence Supply Disposable (DEPEND UNDERWEAR LARGE) MISC, Wear daily, Disp: 50 each, Rfl: 12    docusate sodium (COLACE) 100 MG capsule, Take 1 capsule by mouth 2 times daily (Patient not taking: Reported on 7/25/2022), Disp: 60 capsule, Rfl: 1    Misc.  Devices MISC, Bedside commode, Disp: 1 Device, Rfl: 0  Atenolol, Lipitor [atorvastatin], Tylenol with codeine #3 [acetaminophen-codeine], Atenolol, Codeine, Lipitor [atorvastatin], Percocet [oxycodone-acetaminophen], Vicodin [hydrocodone-acetaminophen], and Other  Social History     Tobacco Use    Smoking status: Never    Smokeless tobacco: Never   Vaping Use    Vaping Use: Never used   Substance Use Topics    Alcohol use: Never     Comment: occasional diet pepsi    Drug use: Never     Family History   Problem Relation Age of Onset    Diabetes Mother          76    Kidney Disease Mother     Heart Attack Mother     Hypertension Mother     Cancer Mother     Stroke Father          age 47       Review of Systems   Constitutional:  Negative for activity change, chills and fever. HENT:  Negative for congestion, dental problem, ear discharge, hearing loss, postnasal drip, rhinorrhea, sinus pressure and sinus pain. Respiratory:  Negative for cough and shortness of breath. Cardiovascular:  Negative for chest pain and palpitations. Gastrointestinal:  Negative for vomiting. Skin:  Negative for rash. Allergic/Immunologic: Negative for environmental allergies. Neurological:  Negative for dizziness and headaches. Hematological:  Does not bruise/bleed easily. All other systems reviewed and are negative. Ht 5' 2\" (1.575 m)   Wt 199 lb 14.4 oz (90.7 kg)   LMP 1981   BMI 36.56 kg/m²   Physical Exam  Vitals and nursing note reviewed. Constitutional:       Appearance: She is well-developed. HENT:      Head: Normocephalic and atraumatic. Right Ear: Tympanic membrane and ear canal normal.      Left Ear: Tympanic membrane and ear canal normal.      Nose: No congestion or rhinorrhea. Mouth/Throat:      Mouth: Mucous membranes are moist.      Comments: No oropharyngeal masses no tongue lesions  Eyes:      Pupils: Pupils are equal, round, and reactive to light. Neck:      Thyroid: No thyromegaly. Trachea: No tracheal deviation. Cardiovascular:      Rate and Rhythm: Normal rate. Pulmonary:      Effort: Pulmonary effort is normal. No respiratory distress. Musculoskeletal:         General: Normal range of motion. Cervical back: Normal range of motion. Lymphadenopathy:      Cervical: No cervical adenopathy.    Skin:     General: Skin is warm.      Findings: No erythema. Neurological:      Mental Status: She is alert. Cranial Nerves: No cranial nerve deficit. IMPRESSION/PLAN:    Patient seen exam for left-sided neck fullness without any discrete subcu muscular mass, continue to consider possible lipoma there although no significant pathological concerns at this time patient may follow-up with ENT as needed in the future. Dr. Tacho Theodore.  Otolaryngology Facial Plastic Surgery  :  [facility] Otolaryngology/Facial Plastic Surgery Residency  Associate Clinical Professor:  Nasima Aguayo [facility]

## 2022-08-23 ASSESSMENT — ENCOUNTER SYMPTOMS
SHORTNESS OF BREATH: 0
VOMITING: 0
SINUS PAIN: 0
RHINORRHEA: 0
SINUS PRESSURE: 0
COUGH: 0

## 2022-10-29 ENCOUNTER — APPOINTMENT (OUTPATIENT)
Dept: GENERAL RADIOLOGY | Age: 67
End: 2022-10-29
Payer: MEDICARE

## 2022-10-29 ENCOUNTER — HOSPITAL ENCOUNTER (EMERGENCY)
Age: 67
Discharge: HOME | End: 2022-10-29
Attending: EMERGENCY MEDICINE
Payer: MEDICARE

## 2022-10-29 VITALS
DIASTOLIC BLOOD PRESSURE: 69 MMHG | BODY MASS INDEX: 37.68 KG/M2 | HEART RATE: 86 BPM | WEIGHT: 206 LBS | RESPIRATION RATE: 18 BRPM | SYSTOLIC BLOOD PRESSURE: 106 MMHG | TEMPERATURE: 97.3 F | OXYGEN SATURATION: 94 %

## 2022-10-29 DIAGNOSIS — J44.1 COPD EXACERBATION (HCC): Primary | ICD-10-CM

## 2022-10-29 DIAGNOSIS — J18.9 ATYPICAL PNEUMONIA: ICD-10-CM

## 2022-10-29 LAB
ALBUMIN SERPL-MCNC: 3.9 G/DL (ref 3.5–5.2)
ALP BLD-CCNC: 88 U/L (ref 35–104)
ALT SERPL-CCNC: 12 U/L (ref 0–32)
ANION GAP SERPL CALCULATED.3IONS-SCNC: 6 MMOL/L (ref 7–16)
AST SERPL-CCNC: 14 U/L (ref 0–31)
BASOPHILS ABSOLUTE: 0.03 E9/L (ref 0–0.2)
BASOPHILS RELATIVE PERCENT: 0.3 % (ref 0–2)
BILIRUB SERPL-MCNC: 0.3 MG/DL (ref 0–1.2)
BUN BLDV-MCNC: 17 MG/DL (ref 6–23)
CALCIUM SERPL-MCNC: 10.2 MG/DL (ref 8.6–10.2)
CHLORIDE BLD-SCNC: 103 MMOL/L (ref 98–107)
CO2: 29 MMOL/L (ref 22–29)
CREAT SERPL-MCNC: 1 MG/DL (ref 0.5–1)
EOSINOPHILS ABSOLUTE: 0.06 E9/L (ref 0.05–0.5)
EOSINOPHILS RELATIVE PERCENT: 0.7 % (ref 0–6)
GFR SERPL CREATININE-BSD FRML MDRD: >60 ML/MIN/1.73
GLUCOSE BLD-MCNC: 179 MG/DL (ref 74–99)
HCT VFR BLD CALC: 31 % (ref 34–48)
HEMOGLOBIN: 9.3 G/DL (ref 11.5–15.5)
IMMATURE GRANULOCYTES #: 0.07 E9/L
IMMATURE GRANULOCYTES %: 0.8 % (ref 0–5)
INFLUENZA A BY PCR: NOT DETECTED
INFLUENZA B BY PCR: NOT DETECTED
LYMPHOCYTES ABSOLUTE: 1.19 E9/L (ref 1.5–4)
LYMPHOCYTES RELATIVE PERCENT: 13.2 % (ref 20–42)
MCH RBC QN AUTO: 24.8 PG (ref 26–35)
MCHC RBC AUTO-ENTMCNC: 30 % (ref 32–34.5)
MCV RBC AUTO: 82.7 FL (ref 80–99.9)
MONOCYTES ABSOLUTE: 0.68 E9/L (ref 0.1–0.95)
MONOCYTES RELATIVE PERCENT: 7.5 % (ref 2–12)
NEUTROPHILS ABSOLUTE: 7.01 E9/L (ref 1.8–7.3)
NEUTROPHILS RELATIVE PERCENT: 77.5 % (ref 43–80)
PDW BLD-RTO: 16.5 FL (ref 11.5–15)
PLATELET # BLD: 300 E9/L (ref 130–450)
PMV BLD AUTO: 9.4 FL (ref 7–12)
POTASSIUM REFLEX MAGNESIUM: 3.6 MMOL/L (ref 3.5–5)
PRO-BNP: 72 PG/ML (ref 0–125)
RBC # BLD: 3.75 E12/L (ref 3.5–5.5)
SARS-COV-2, NAAT: NOT DETECTED
SODIUM BLD-SCNC: 138 MMOL/L (ref 132–146)
TOTAL PROTEIN: 6.8 G/DL (ref 6.4–8.3)
TROPONIN, HIGH SENSITIVITY: 10 NG/L (ref 0–9)
WBC # BLD: 9 E9/L (ref 4.5–11.5)

## 2022-10-29 PROCEDURE — 71046 X-RAY EXAM CHEST 2 VIEWS: CPT

## 2022-10-29 PROCEDURE — 99285 EMERGENCY DEPT VISIT HI MDM: CPT

## 2022-10-29 PROCEDURE — 6370000000 HC RX 637 (ALT 250 FOR IP): Performed by: STUDENT IN AN ORGANIZED HEALTH CARE EDUCATION/TRAINING PROGRAM

## 2022-10-29 PROCEDURE — 87635 SARS-COV-2 COVID-19 AMP PRB: CPT

## 2022-10-29 PROCEDURE — 96374 THER/PROPH/DIAG INJ IV PUSH: CPT

## 2022-10-29 PROCEDURE — 83880 ASSAY OF NATRIURETIC PEPTIDE: CPT

## 2022-10-29 PROCEDURE — 94640 AIRWAY INHALATION TREATMENT: CPT

## 2022-10-29 PROCEDURE — 87502 INFLUENZA DNA AMP PROBE: CPT

## 2022-10-29 PROCEDURE — 94664 DEMO&/EVAL PT USE INHALER: CPT

## 2022-10-29 PROCEDURE — 80053 COMPREHEN METABOLIC PANEL: CPT

## 2022-10-29 PROCEDURE — 93005 ELECTROCARDIOGRAM TRACING: CPT | Performed by: STUDENT IN AN ORGANIZED HEALTH CARE EDUCATION/TRAINING PROGRAM

## 2022-10-29 PROCEDURE — 84484 ASSAY OF TROPONIN QUANT: CPT

## 2022-10-29 PROCEDURE — 6360000002 HC RX W HCPCS: Performed by: STUDENT IN AN ORGANIZED HEALTH CARE EDUCATION/TRAINING PROGRAM

## 2022-10-29 PROCEDURE — 85025 COMPLETE CBC W/AUTO DIFF WBC: CPT

## 2022-10-29 RX ORDER — IPRATROPIUM BROMIDE AND ALBUTEROL SULFATE 2.5; .5 MG/3ML; MG/3ML
3 SOLUTION RESPIRATORY (INHALATION) ONCE
Status: COMPLETED | OUTPATIENT
Start: 2022-10-29 | End: 2022-10-29

## 2022-10-29 RX ORDER — PREDNISONE 50 MG/1
50 TABLET ORAL DAILY
Qty: 5 TABLET | Refills: 0 | Status: SHIPPED | OUTPATIENT
Start: 2022-10-29 | End: 2022-11-03

## 2022-10-29 RX ORDER — METHYLPREDNISOLONE SODIUM SUCCINATE 125 MG/2ML
125 INJECTION, POWDER, LYOPHILIZED, FOR SOLUTION INTRAMUSCULAR; INTRAVENOUS ONCE
Status: COMPLETED | OUTPATIENT
Start: 2022-10-29 | End: 2022-10-29

## 2022-10-29 RX ADMIN — METHYLPREDNISOLONE SODIUM SUCCINATE 125 MG: 125 INJECTION, POWDER, FOR SOLUTION INTRAMUSCULAR; INTRAVENOUS at 16:17

## 2022-10-29 RX ADMIN — IPRATROPIUM BROMIDE AND ALBUTEROL SULFATE 3 AMPULE: .5; 2.5 SOLUTION RESPIRATORY (INHALATION) at 16:04

## 2022-10-29 NOTE — ED PROVIDER NOTES
Department of Emergency Medicine   ED Provider Note  Admit Date/RoomTime: 10/29/2022  3:13 PM  ED Room: 15/15          History of Present Illness:  10/29/22, Time: 3:17 PM EDT         Glen Vicente is a 69 y.o. female with history of COPD, CHF, hypertension, hyperlipidemia, presenting to the ED for cough, congestion, shortness of breath, beginning 3 days ago. The complaint has been intermittent, moderate in severity, and worsened by moderate exertion. Patient states she has had nasal congestion, cough productive of yellow sputum, some mild shortness of breath with significant exertion for the past several days. She was prescribed a Z-Moose that she started taking yesterday without significant relief. She did use some of her breathing treatments at home yesterday with no significant relief. She has had chills at home, no objective fevers. No specific chest pain. No significant leg pain or swelling. No hemoptysis. No lightheadedness or syncope. She did have some diarrhea today, no melena hematochezia. She states she takes iron supplements so her stools are always a little bit dark. No dysuria hematuria urgency or frequency. She denies recent corticosteroid use. No known ill contacts. No headache or numbness weakness or tingling. Review of Systems:     Pertinent positives and negatives are stated within HPI.   10 point ROS otherwise negative.  --------------------------------------------- PAST HISTORY ---------------------------------------------  Past Medical History:  has a past medical history of A-fib ([facility] [address] Acute exacerbation of chronic obstructive pulmonary disease (COPD) ([facility] [address] Anemia, Anxiety, Arthritis, Arthritis, Asthma, Atrial fibrillation ([facility] [address] CAD (coronary artery disease), Chronic obstructive pulmonary disease ([facility] [address] COPD (chronic obstructive pulmonary disease) ([facility] [address] Diabetes mellitus ([facility] [address] DVT (deep venous thrombosis) ([address] Emphysema lung ([address] Emphysema of lung ([address] Encounter for imaging of bilateral cephalic veins, H/O cardiovascular stress test, Headache, History of blood transfusion, Hx of blood clots, Hypercholesterolemia, Hyperlipidemia, Hyperlipidemia, Hypertension, Mixed hyperlipidemia, Primary localized osteoarthritis of left hip, Primary osteoarthritis of left hip, Psychiatric problem, Seizures ([facility] [address] Thyroid disease, Thyroid disease, and Type II or unspecified type diabetes mellitus without mention of complication, not stated as uncontrolled. Past Surgical History:  has a past surgical history that includes Leg Surgery (Right); Cardiac catheterization (12/01/2008); pr total hip arthroplasty (Left, 8/8/2018); Hysterectomy (2004); pr surg excision of anal lesion(s) (N/A, 1/25/2019); and joint replacement. Social History:  reports that she has never smoked. She has never used smokeless tobacco. She reports that she does not drink alcohol and does not use drugs. Family History: family history includes Cancer in her mother; Diabetes in her mother; Heart Attack in her mother; Hypertension in her mother; Kidney Disease in her mother; Stroke in her father. The patients home medications have been reviewed. Allergies: Atenolol, Lipitor [atorvastatin], Tylenol with codeine #3 [acetaminophen-codeine], Atenolol, Codeine, Lipitor [atorvastatin], Percocet [oxycodone-acetaminophen], Vicodin [hydrocodone-acetaminophen], and Other        ---------------------------------------------------PHYSICAL EXAM--------------------------------------    Constitutional/General: AAO to person/place/time/purpose, NAD, no labored breathing  Head: Normocephalic and atraumatic  Eyes: EOMI, conjunctiva normal, sclera non icteric  Mouth: Moist mucous membranes, uvula midline  Neck: Supple, no stridor, no meningeal signs  Respiratory: Scattered expiratory wheezes throughout bilateral lungs, scattered rhonchi. No accessory muscle use, no tachypnea, no respiratory distress.   Cardiovascular: Regular rate. Regular rhythm. No murmurs, no gallops, or rubs. 2+ distal pulses. Equal extremity pulses. Chest: No chest wall tenderness or deformity  GI:  Abdomen Soft, Non tender, Non distended. No rebound, guarding, or rigidity. No pulsatile masses. Musculoskeletal: Moves all extremities x 4. Warm and well perfused, no clubbing, cyanosis. 1+ pitting edema bilateral pretibial. capillary refill <3 seconds  Integument: skin warm and dry. Neurologic: GCS 15, no focal deficits, symmetric strength 5/5 in the major muscle groups of upper and lower extremities bilaterally  Psychiatric: Normal Affect    -------------------------------------------------- RESULTS -------------------------------------------------  I have personally reviewed all laboratory and imaging results for this patient. Results are listed below.      LABS:  Results for orders placed or performed during the hospital encounter of 10/29/22   COVID-19, Rapid    Specimen: Nasopharyngeal Swab   Result Value Ref Range    SARS-CoV-2, NAAT Not Detected Not Detected   RAPID INFLUENZA A/B ANTIGENS    Specimen: Nasopharyngeal   Result Value Ref Range    Influenza A by PCR Not Detected Not Detected    Influenza B by PCR Not Detected Not Detected   CBC with Auto Differential   Result Value Ref Range    WBC 9.0 4.5 - 11.5 E9/L    RBC 3.75 3.50 - 5.50 E12/L    Hemoglobin 9.3 (L) 11.5 - 15.5 g/dL    Hematocrit 31.0 (L) 34.0 - 48.0 %    MCV 82.7 80.0 - 99.9 fL    MCH 24.8 (L) 26.0 - 35.0 pg    MCHC 30.0 (L) 32.0 - 34.5 %    RDW 16.5 (H) 11.5 - 15.0 fL    Platelets 364 323 - 914 E9/L    MPV 9.4 7.0 - 12.0 fL    Neutrophils % 77.5 43.0 - 80.0 %    Immature Granulocytes % 0.8 0.0 - 5.0 %    Lymphocytes % 13.2 (L) 20.0 - 42.0 %    Monocytes % 7.5 2.0 - 12.0 %    Eosinophils % 0.7 0.0 - 6.0 %    Basophils % 0.3 0.0 - 2.0 %    Neutrophils Absolute 7.01 1.80 - 7.30 E9/L    Immature Granulocytes # 0.07 E9/L    Lymphocytes Absolute 1.19 (L) 1.50 - 4.00 E9/L    Monocytes Absolute 0.68 0.10 - 0.95 E9/L    Eosinophils Absolute 0.06 0.05 - 0.50 E9/L    Basophils Absolute 0.03 0.00 - 0.20 E9/L   Comprehensive Metabolic Panel w/ Reflex to MG   Result Value Ref Range    Sodium 138 132 - 146 mmol/L    Potassium reflex Magnesium 3.6 3.5 - 5.0 mmol/L    Chloride 103 98 - 107 mmol/L    CO2 29 22 - 29 mmol/L    Anion Gap 6 (L) 7 - 16 mmol/L    Glucose 179 (H) 74 - 99 mg/dL    BUN 17 6 - 23 mg/dL    Creatinine 1.0 0.5 - 1.0 mg/dL    Est, Glom Filt Rate >60 >=60 mL/min/1.73    Calcium 10.2 8.6 - 10.2 mg/dL    Total Protein 6.8 6.4 - 8.3 g/dL    Albumin 3.9 3.5 - 5.2 g/dL    Total Bilirubin 0.3 0.0 - 1.2 mg/dL    Alkaline Phosphatase 88 35 - 104 U/L    ALT 12 0 - 32 U/L    AST 14 0 - 31 U/L   Troponin   Result Value Ref Range    Troponin, High Sensitivity 10 (H) 0 - 9 ng/L   Brain Natriuretic Peptide   Result Value Ref Range    Pro-BNP 72 0 - 125 pg/mL   EKG 12 Lead   Result Value Ref Range    Ventricular Rate 75 BPM    Atrial Rate 75 BPM    P-R Interval 164 ms    QRS Duration 90 ms    Q-T Interval 374 ms    QTc Calculation (Bazett) 417 ms    P Axis 58 degrees    R Axis 20 degrees    T Axis 68 degrees       RADIOLOGY:  Interpreted by Radiologist unless otherwise specified  XR CHEST (2 VW)   Final Result   Bilateral perihilar and lower lobe airspace disease. Differential includes   CHF versus pneumonia in the proper clinical setting      Cardiomegaly               EKG:  This EKG is signed by emergency department physician. Rate: 75  Rhythm: Sinus  Interpretation: no acute changes, no acute ischemic changes, normal axis  Comparison: stable as compared to patient's most recent EKG       ------------------------- NURSING NOTES AND VITALS REVIEWED ---------------------------   The nursing notes within the ED encounter and vital signs as below have been reviewed by myself.   /69   Pulse 86   Temp 97.3 °F (36.3 °C) (Infrared)   Resp 18   Wt 206 lb (93.4 kg)   LMP 06/26/1981   SpO2 94% BMI 37.68 kg/m²   Oxygen Saturation Interpretation: Normal    The patients available past medical records and past encounters were reviewed. ------------------------------ ED COURSE/MEDICAL DECISION MAKING----------------------  Medications   ipratropium-albuterol (DUONEB) nebulizer solution 3 ampule (3 ampules Inhalation Given 10/29/22 1604)   methylPREDNISolone sodium (SOLU-MEDROL) injection 125 mg (125 mg IntraVENous Given 10/29/22 1617)           Medical Decision Making: This is a 57-year-old female presents emerged department for cough, nasal congestion. Patient has some shortness of breath above baseline. She does have history of COPD, wheezing on exam.  No respiratory distress, satting 94 to 98% SPO2 on room air. She is normotensive and afebrile and not tachycardic. She does started a Z-Moose yesterday. Chest x-ray with possible atypical pneumonia, should be covered by Z-Moose patient is currently taking. Lab work unremarkable and reassuring, no leukocytosis, troponin at baseline, BNP not elevated. Patient given duo nebs, Solu-Medrol with improvement. Given suspected COPD exacerbation, patient will be given course of steroids. Instructed to finish her Z-Moose. Given strict return precautions and follow-up with PCP. See ED COURSE for additional MDM. Labs & imaging reviewed and interpreted, see RESULTS above. Re-Evaluations:             ED Course as of 10/29/22 1837   Sat Oct 29, 2022   1530 ATTENDING PROVIDER ATTESTATION:     I have personally performed and/or participated in the history, exam, medical decision making, and procedures and agree with all pertinent clinical information unless otherwise noted. I have also reviewed and agree with the past medical, family and social history unless otherwise noted.     I have discussed this patient in detail with the resident, and provided the instruction and education regarding patient here complaining of 3 days of increasing wheezing, cough and shortness of breath. Cough productive of sputum. Having some body aches and chills with it. No specific chest pain. No palpitations. No abdominal pain. No vomiting or diarrhea. No stiff neck or headache. No confusion. No leg pain or swelling. .  My findings/plan: Patient sitting in the bed resting comfortably no distress. Heart rate regular, lungs with scattered wheezes in all lung fields. Mild nasal congestion noted. Arms and legs are neurovascular intact with no pretibial edema or calf pain. No adenopathy or meningeal signs. [NC]   0285 EKG shows normal sinus rhythm with a rate of 75. Normal axis, normal conduction, no acute ischemic ST-T wave changes. Otherwise agree with resident. [NC]      ED Course User Index  [NC] Onelia Minor,          This patient's ED course included: a personal history and physicial examination, re-evaluation prior to disposition, multiple bedside re-evaluations, IV medications, cardiac monitoring, and continuous pulse oximetry    This patient has remained hemodynamically stable during their ED course. Consultations:  See ED Course    Counseling: The emergency provider has spoken with the patient and discussed todays results, in addition to providing specific details for the plan of care and counseling regarding the diagnosis and prognosis. Questions are answered at this time and they are agreeable with the plan.       --------------------------------- IMPRESSION AND DISPOSITION ---------------------------------    IMPRESSION  1. COPD exacerbation ([address]    2. Atypical pneumonia        DISPOSITION  Disposition: Discharge to home  Patient condition is stable    NOTE: This report was transcribed using voice recognition software. Every effort was made to ensure accuracy; however, inadvertent computerized transcription errors may be present. Also please note that patient was seen and examined by attending physician.  Plan of care and disposition discussed with attending physician and they were immediately available or present for all procedures performed.        -- Toribio Wright D.O. PGY-3     Resident Physician     Emergency Medicine      10/29/2022 6:37 PM         [address] DO  Resident  10/29/22 [id number]

## 2022-10-30 LAB
EKG ATRIAL RATE: 75 BPM
EKG P AXIS: 58 DEGREES
EKG P-R INTERVAL: 164 MS
EKG Q-T INTERVAL: 374 MS
EKG QRS DURATION: 90 MS
EKG QTC CALCULATION (BAZETT): 417 MS
EKG R AXIS: 20 DEGREES
EKG T AXIS: 68 DEGREES
EKG VENTRICULAR RATE: 75 BPM

## 2022-10-30 PROCEDURE — 93010 ELECTROCARDIOGRAM REPORT: CPT | Performed by: INTERNAL MEDICINE

## 2022-11-07 ENCOUNTER — INITIAL CONSULT (OUTPATIENT)
Dept: SURGERY | Age: 67
End: 2022-11-07
Payer: MEDICARE

## 2022-11-07 VITALS
TEMPERATURE: 97.3 F | HEIGHT: 62 IN | BODY MASS INDEX: 37.91 KG/M2 | SYSTOLIC BLOOD PRESSURE: 146 MMHG | HEART RATE: 66 BPM | DIASTOLIC BLOOD PRESSURE: 76 MMHG | WEIGHT: 206 LBS

## 2022-11-07 DIAGNOSIS — E66.01 SEVERE OBESITY (BMI 35.0-39.9) WITH COMORBIDITY (HCC): ICD-10-CM

## 2022-11-07 DIAGNOSIS — D49.2 SOFT TISSUE NEOPLASM: Primary | ICD-10-CM

## 2022-11-07 PROCEDURE — 3074F SYST BP LT 130 MM HG: CPT | Performed by: SURGERY

## 2022-11-07 PROCEDURE — 99203 OFFICE O/P NEW LOW 30 MIN: CPT | Performed by: SURGERY

## 2022-11-07 PROCEDURE — 1090F PRES/ABSN URINE INCON ASSESS: CPT | Performed by: SURGERY

## 2022-11-07 PROCEDURE — G8484 FLU IMMUNIZE NO ADMIN: HCPCS | Performed by: SURGERY

## 2022-11-07 PROCEDURE — G8400 PT W/DXA NO RESULTS DOC: HCPCS | Performed by: SURGERY

## 2022-11-07 PROCEDURE — 3078F DIAST BP <80 MM HG: CPT | Performed by: SURGERY

## 2022-11-07 PROCEDURE — G8417 CALC BMI ABV UP PARAM F/U: HCPCS | Performed by: SURGERY

## 2022-11-07 PROCEDURE — 1036F TOBACCO NON-USER: CPT | Performed by: SURGERY

## 2022-11-07 PROCEDURE — 3017F COLORECTAL CA SCREEN DOC REV: CPT | Performed by: SURGERY

## 2022-11-07 PROCEDURE — G8427 DOCREV CUR MEDS BY ELIG CLIN: HCPCS | Performed by: SURGERY

## 2022-11-07 PROCEDURE — 1124F ACP DISCUSS-NO DSCNMKR DOCD: CPT | Performed by: SURGERY

## 2022-11-07 RX ORDER — LITHIUM CARBONATE 300 MG/1
CAPSULE ORAL
COMMUNITY
Start: 2022-10-12

## 2022-11-07 RX ORDER — INSULIN ASPART 100 [IU]/ML
INJECTION, SOLUTION INTRAVENOUS; SUBCUTANEOUS
COMMUNITY
Start: 2022-11-01

## 2022-11-07 RX ORDER — POTASSIUM CHLORIDE 20 MEQ/1
TABLET, EXTENDED RELEASE ORAL
COMMUNITY
Start: 2022-10-27

## 2022-11-07 RX ORDER — EMPAGLIFLOZIN 10 MG/1
TABLET, FILM COATED ORAL
COMMUNITY
Start: 2022-10-10

## 2022-11-07 RX ORDER — PEN NEEDLE, DIABETIC 31 GX5/16"
NEEDLE, DISPOSABLE MISCELLANEOUS
COMMUNITY
Start: 2022-11-01

## 2022-11-07 RX ORDER — ATORVASTATIN CALCIUM 20 MG/1
TABLET, FILM COATED ORAL
COMMUNITY
Start: 2022-10-14

## 2022-11-07 NOTE — PROGRESS NOTES
General Surgery History and Physical    Patient's Name/Date of Birth: Dede Cano / 1955    Date: November 7, 2022     Surgeon: Ben Doran M.D.    PCP: Suhail Ramires DO     Chief Complaint: soft tissue neoplasm of neck and left calf    HPI:   Dede Cano is a 69 y.o. female who presents for evaluation of soft tissue neoplasm of neck and left calf. It has become more painful and is enlarging. Past Medical History:   Diagnosis Date    A-fib [facility])     Acute exacerbation of chronic obstructive pulmonary disease (COPD) (HCC)     Anemia     Anxiety     Arthritis     Arthritis     Asthma     Atrial fibrillation (HCC)     CAD (coronary artery disease)     af and heart murmur    Chronic obstructive pulmonary disease (HCC)     COPD (chronic obstructive pulmonary disease) ([address]     Diabetes mellitus ([address]     DVT (deep venous thrombosis) ([address]     Emphysema lung (HCC)     Emphysema of lung ([address]     Encounter for imaging of bilateral cephalic veins     History of bilateral cephalic vein thrombosis    H/O cardiovascular stress test 04/25/2020    Lexiscan    Headache     History of blood transfusion     Hx of blood clots     Hypercholesterolemia     Hyperlipidemia     Hyperlipidemia     Hypertension     Mixed hyperlipidemia     Primary localized osteoarthritis of left hip     Primary osteoarthritis of left hip     Psychiatric problem     Seizures ([address]     last seizure  15 yrs ago had been on depakote    Thyroid disease     Thyroid disease     Type II or unspecified type diabetes mellitus without mention of complication, not stated as uncontrolled        Past Surgical History:   Procedure Laterality Date    CARDIAC CATHETERIZATION  12/01/2008    Normal Coronary arteries. Normal LV.   Elevated left ventricular end diastolic pressure suggestive of impaired relaxatin and possible diastolic dysfunction    HYSTERECTOMY (CERVIX STATUS UNKNOWN)  2004    JOINT REPLACEMENT      LEG SURGERY Right     right hip area, removed cyst iccf developed infection went to [facility] [address] LESION(S) N/A 1/25/2019    EXCISION PERIANAL WARTS performed by Siva Mcdaniel MD at [address] Left 8/8/2018    LEFT HIP TOTAL ARTHROPLASTY (KYLIE) performed by Azael Quinn DO at [address]       Current Outpatient Medications   Medication Sig Dispense Refill    atorvastatin (LIPITOR) 20 MG tablet take 1 tablet by mouth once daily      JARDIANCE 10 MG tablet TAKE 1 TABLET BY MOUTH EVERY DAY      NOVOLOG FLEXPEN 100 UNIT/ML injection pen inject UP TO 12 UNITS subcutaneously before meals PER SLIDING SCALE      B-D UF III MINI PEN NEEDLES 31G X 5 MM MISC use 1 PEN NEEDLE to inject MEDICATION subcutaneously three times a day      lithium 300 MG capsule       potassium chloride (KLOR-CON M) 20 MEQ extended release tablet take 1 tablet by mouth once daily      doxazosin (CARDURA) 2 MG tablet Take 2 mg by mouth nightly      COD LIVER OIL PO Take by mouth      aspirin 81 MG EC tablet Take 81 mg by mouth in the morning.       mesalamine (LIALDA) 1.2 g EC tablet TAKE 2 TABLETS BY MOUTH EVERY DAY      busPIRone (BUSPAR) 5 MG tablet TAKE 1 TABLET BY MOUTH TWICE DAILY AS NEEDED, NO ALCOHOL OR DRIVING WITH USE      cholestyramine (QUESTRAN) 4 g packet       glimepiride (AMARYL) 2 MG tablet       ibuprofen (ADVIL;MOTRIN) 800 MG tablet       isosorbide mononitrate (IMDUR) 30 MG extended release tablet take 1 tablet by mouth once daily      TRADJENTA 5 MG tablet       losartan-hydroCHLOROthiazide (HYZAAR) 100-25 MG per tablet take 1 tablet by mouth daily      metoprolol tartrate (LOPRESSOR) 25 MG tablet take 1 tablet by mouth twice a day      montelukast (SINGULAIR) 10 MG tablet       pantoprazole (PROTONIX) 40 MG tablet TAKE 1 TABLET BY MOUTH EVERY DAY      paliperidone (INVEGA) 3 MG extended release tablet       ipratropium (ATROVENT) 0.03 % nasal spray 2 sprays by Each Nostril route every 12 hours (Patient not taking: No sig reported) 30 mL 3    azelastine (ASTELIN) 0.1 % nasal spray 2 sprays by Nasal route 2 times daily Use in each nostril as directed (Patient not taking: No sig reported) 120 mL 1    amLODIPine-benazepril (LOTREL) 10-40 MG per capsule Take 1 capsule by mouth daily 30 capsule 3    apixaban starter pack (ELIQUIS DVT/PE STARTER PACK) 5 MG TBPK tablet Take 1 tablet by mouth See Admin Instructions 74 tablet 0    potassium chloride (KLOR-CON) 10 MEQ extended release tablet TAKE 1 TABLET BY MOUTH EVERY DAY WITH LASIX 30 tablet 10    cimetidine (TAGAMET) 400 MG tablet Take 1 tablet by mouth 2 times daily 60 tablet 3    terazosin (HYTRIN) 2 MG capsule take 1 capsule by mouth once daily      triamcinolone (KENALOG) 0.1 % cream Apply topically 2 times daily for up to 2 weeks then as needed for flareups.  (Patient not taking: Reported on 7/25/2022) 80 g 3    Blood Glucose Monitoring Suppl (ONE TOUCH ULTRA 2) w/Device KIT Check blood sugar qam 1 kit 0    SITagliptin-metFORMIN (JANUMET XR)  MG TB24 per extended release tablet TAKE TWO (2) TABLETS BY MOUTH EACH MORNING 60 tablet 10    SYMBICORT 160-4.5 MCG/ACT AERO INHALE TWO (2) PUFFS BY MOUTH TWICE DAILY 1 Inhaler 10    blood glucose test strips (ONETOUCH ULTRA) strip USE TO TEST BLOOD SUGAR TWICE DAILY 100 each 10    Prenatal Vit-Fe Fumarate-FA (PRENATAL VITAMIN PLUS LOW IRON) 27-1 MG TABS Take 1 tablet by mouth daily 30 tablet 5    Lancets (ONETOUCH DELICA PLUS MESKYQ10S) MISC USE TO TEST BLOOD SUGAR TWICE DAILY 100 each 12    promethazine (PHENERGAN) 25 MG tablet Take 1 tablet by mouth every 6 hours as needed for Nausea (Patient not taking: Reported on 7/25/2022) 90 tablet 2    vitamin D (ERGOCALCIFEROL) 1.25 MG (39838 UT) CAPS capsule TAKE 1 CAPSULE BY MOUTH ONCE WEEKLY 4 capsule 3    loperamide (IMODIUM) 2 MG capsule TAKE 1 CAPSULE BY MOUTH FOUR TIMES DAILY AS NEEDED FOR DIARRHEA 60 capsule 10    latanoprost (XALATAN) 0.005 % ophthalmic solution 1 drop nightly (Patient not taking: Reported on 7/25/2022)      fluticasone (FLONASE) 50 MCG/ACT nasal spray 1 spray by Each Nostril route daily (Patient not taking: Reported on 7/25/2022)      albuterol (PROVENTIL) (2.5 MG/3ML) 0.083% nebulizer solution Take 2.5 mg by nebulization every 6 hours as needed for Wheezing (Patient not taking: Reported on 7/25/2022)      magnesium oxide (MAG-OX) 400 MG tablet Take 1 tablet by mouth daily 30 tablet 5    pravastatin (PRAVACHOL) 40 MG tablet TAKE 1 TABLET BY MOUTH NIGHTLY 30 tablet 10    furosemide (LASIX) 40 MG tablet TAKE 1 TABLET BY MOUTH DAILY 30 tablet 10    albuterol sulfate HFA (PROVENTIL HFA) 108 (90 Base) MCG/ACT inhaler Inhale 2 puffs into the lungs every 4 hours as needed for Wheezing 1 Inhaler 1    Incontinence Supply Disposable (DEPEND UNDERWEAR LARGE) MISC Wear daily 50 each 12    docusate sodium (COLACE) 100 MG capsule Take 1 capsule by mouth 2 times daily (Patient not taking: Reported on 7/25/2022) 60 capsule 1    ferrous sulfate 325 (65 Fe) MG tablet Take 1 tablet by mouth 2 times daily (with meals) 30 tablet 3    Misc. Devices MISC Bedside commode 1 Device 0     No current facility-administered medications for this visit. Allergies   Allergen Reactions    Atenolol Palpitations    Lipitor [Atorvastatin] Palpitations    Tylenol With Codeine #3 [Acetaminophen-Codeine] Itching    Atenolol      increased heart beat    Codeine     Lipitor [Atorvastatin]     Percocet [Oxycodone-Acetaminophen]     Vicodin [Hydrocodone-Acetaminophen]     Other Other (See Comments)     Anti-depressants/Anti-psychotic: Causes hallucinations    Allergic to all pain meds can take extra strenghth tylenol         The patient has a family history that is negative for severe cardiovascular or respiratory issues, negative for reaction to anesthesia. 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   Tobacco Use    Smoking status: Never    Smokeless tobacco: Never   Vaping Use    Vaping Use: Never used   Substance and Sexual Activity    Alcohol use: Never     Comment: occasional diet pepsi    Drug use: Never    Sexual activity: Never   Other Topics Concern    Not on file   Social History Narrative    ** Merged History Encounter **         ** Merged History Encounter **        Social Determinants of Health     Financial Resource Strain: Not on file   Food Insecurity: Not on file   Transportation Needs: Not on file   Physical Activity: Not on file   Stress: Not on file   Social Connections: Not on file   Intimate Partner Violence: Not on file   Housing Stability: Not on file           Review of Systems  Review of Systems -  General ROS: negative for - chills, fatigue or malaise  ENT ROS: negative for - hearing change, nasal congestion or nasal discharge  Allergy and Immunology ROS: negative for - hives, itchy/watery eyes or nasal congestion  Hematological and Lymphatic ROS: negative for - blood clots, blood transfusions, bruising or fatigue  Endocrine ROS: negative for - malaise/lethargy, mood swings, palpitations or polydipsia/polyuria  Breast ROS: negative for - new or changing breast lumps or nipple changes  Respiratory ROS: negative for - sputum changes, stridor, tachypnea or wheezing  Cardiovascular ROS: negative for - irregular heartbeat, loss of consciousness, murmur or orthopnea  Gastrointestinal ROS: negative for - constipation, diarrhea, gas/bloating, heartburn or hematemesis  Genito-Urinary ROS: negative for -  genital discharge, genital ulcers or hematuria  Musculoskeletal ROS: negative for - gait disturbance, muscle pain or muscular weakness      Physical exam:   BP (!) 146/76   Pulse 66   Temp 97.3 °F (36.3 °C) (Temporal)   Ht 5' 2\" (1.575 m)   Wt 206 lb (93.4 kg)   LMP 06/26/1981   BMI 37.68 kg/m²   General appearance:  NAD  Head: NCAT, PERRLA, EOMI, red conjunctiva  Neck: supple, no masses  Lungs: CTAB, equal chest rise bilateral  Heart: Reg rate  Abdomen: soft, nontender, nondistended  Skin; soft tissue neoplasm of neck and left calf that is soft rubbery and mobile. Gu: no cva tenderness  Extremities: extremities normal, atraumatic, no cyanosis or edema  Pyschosocial: normal affect, no signs of depression      Assessment:  69 y.o. female with soft tissue neoplasm of neck and left calf consistent with cyst    Plan:  She wishes to observe these at this time as they are not giving her pain or any other issues.  She will call for removal if they enlarge or cause symptoms    Valentina Patel MD  12:54 PM  11/7/2022

## 2022-11-25 NOTE — TELEPHONE ENCOUNTER
Script for Amoxicillin sent to [facility] based on this message. Please contact patient to schedule a virtual visit (video or phone only) for f/u urgent care. Abdominal Pain, N/V/D

## 2023-01-25 ENCOUNTER — HOSPITAL ENCOUNTER (EMERGENCY)
Age: 68
Discharge: HOME | End: 2023-01-26
Attending: EMERGENCY MEDICINE
Payer: MEDICARE

## 2023-01-25 DIAGNOSIS — E16.2 HYPOGLYCEMIA: Primary | ICD-10-CM

## 2023-01-25 LAB
CHP ED QC CHECK: YES
GLUCOSE BLD-MCNC: 160 MG/DL
METER GLUCOSE: 160 MG/DL (ref 74–99)
METER GLUCOSE: 55 MG/DL (ref 74–99)

## 2023-01-25 PROCEDURE — 99283 EMERGENCY DEPT VISIT LOW MDM: CPT

## 2023-01-25 PROCEDURE — 82962 GLUCOSE BLOOD TEST: CPT

## 2023-01-25 ASSESSMENT — ENCOUNTER SYMPTOMS
WHEEZING: 0
VOMITING: 0
EYE REDNESS: 0
ABDOMINAL DISTENTION: 0
COUGH: 0
SORE THROAT: 0
EYE DISCHARGE: 0
SHORTNESS OF BREATH: 0
DIARRHEA: 0
NAUSEA: 0
BACK PAIN: 0
EYE PAIN: 0
SINUS PRESSURE: 0

## 2023-01-25 ASSESSMENT — PAIN - FUNCTIONAL ASSESSMENT: PAIN_FUNCTIONAL_ASSESSMENT: NONE - DENIES PAIN

## 2023-01-26 VITALS
TEMPERATURE: 97.9 F | WEIGHT: 180 LBS | DIASTOLIC BLOOD PRESSURE: 63 MMHG | HEIGHT: 62 IN | OXYGEN SATURATION: 93 % | HEART RATE: 74 BPM | SYSTOLIC BLOOD PRESSURE: 128 MMHG | RESPIRATION RATE: 27 BRPM | BODY MASS INDEX: 33.13 KG/M2

## 2023-01-26 LAB
CHP ED QC CHECK: YES
GLUCOSE BLD-MCNC: 150 MG/DL
METER GLUCOSE: 150 MG/DL (ref 74–99)

## 2023-01-26 PROCEDURE — 82962 GLUCOSE BLOOD TEST: CPT

## 2023-01-26 NOTE — ED PROVIDER NOTES
Patient is a 76 y/o female who presents to the ED with generalized weakness. Patient states that she went to get up to take her nighttime medications and she slid from the bed to the floor. She states that she could not get up and called EMS. EMS arrived approximately fifteen minutes later. They checked her glucose and it was 52. They did give her juice and cake. Currently, she states that she doesn't feel right. She denies any pain. She denies any recent fever. Review of Systems   Constitutional:  Positive for fatigue. Negative for chills and fever. HENT:  Negative for ear pain, sinus pressure and sore throat. Eyes:  Negative for pain, discharge and redness. Respiratory:  Negative for cough, shortness of breath and wheezing. Cardiovascular:  Negative for chest pain. Gastrointestinal:  Negative for abdominal distention, diarrhea, nausea and vomiting. Genitourinary:  Negative for dysuria and frequency. Musculoskeletal:  Negative for arthralgias and back pain. Skin:  Negative for rash and wound. Neurological:  Positive for weakness. Negative for headaches. Hematological:  Negative for adenopathy. All other systems reviewed and are negative. Physical Exam  Vitals and nursing note reviewed. Constitutional:       General: She is not in acute distress. HENT:      Head: Normocephalic and atraumatic. Right Ear: External ear normal.      Left Ear: External ear normal.      Nose: Nose normal.      Mouth/Throat:      Mouth: Mucous membranes are moist.   Eyes:      Conjunctiva/sclera: Conjunctivae normal.      Pupils: Pupils are equal, round, and reactive to light. Cardiovascular:      Rate and Rhythm: Normal rate and regular rhythm. Heart sounds: No murmur heard. Pulmonary:      Effort: Pulmonary effort is normal. No respiratory distress. Breath sounds: Normal breath sounds. No stridor. No wheezing, rhonchi or rales.    Abdominal:      General: Bowel sounds are normal. There is no distension. Palpations: Abdomen is soft. Tenderness: There is no abdominal tenderness. There is no guarding. Musculoskeletal:         General: Normal range of motion. Cervical back: Normal range of motion and neck supple. Skin:     General: Skin is warm and dry. Findings: No rash. Neurological:      Mental Status: She is alert and oriented to person, place, and time. Procedures     MDM     History from : Patient and EMS    Limitations to history : None    Chronic Conditions: Diabetes mellitus, hypertension, hyperlipidemia, atrial fibrillation, hypothyroidism, seizure disorder, COPD, coronary artery disease, anemia, asthma, arthritis, anxiety    CONSULTS: (Who and What was discussed)  None    Discussion with Other Profesionals : None    Social Determinants : None    Records Reviewed : None    CC/HPI Summary, DDx, ED Course, and Reassessment: Patient is a 78 y/o female who presents to the ED with generalized weakness. Patient states that she went to get up to take her nighttime medications and she slid from the bed to the floor. She states that she could not get up and called EMS. EMS arrived approximately fifteen minutes later. They checked her glucose and it was 52. They did give her juice and cake. Currently, she states that she doesn't feel right. She denies any pain. She denies any recent fever. Patient fed. Repeat glucose performed times two. She ambulates in the department without difficulty. Will discharge for outpatient follow up. Disposition Considerations (Tests not ordered but considered, Shared Decision Making, Pt Expectation of Test or Tx.): Differential diagnoses include hypoglycemia.   Appropriate for outpatient management        I am the Primary Clinician of Record.              --------------------------------------------- PAST HISTORY ---------------------------------------------  Past Medical History:  has a past medical history of A-fib ([address] Acute exacerbation of chronic obstructive pulmonary disease ([facility]) [facility] Anemia, Anxiety, Arthritis, Arthritis, Asthma, Atrial fibrillation ([facility] CAD (coronary artery disease), Chronic obstructive pulmonary disease ([facility] COPD (chronic obstructive pulmonary disease) ([facility] Diabetes mellitus ([facility] DVT (deep venous thrombosis) ([facility] Emphysema lung ([facility] Emphysema of lung ([facility] Encounter for imaging of bilateral cephalic veins, H/O cardiovascular stress test, Headache, History of blood transfusion, Hx of blood clots, Hypercholesterolemia, Hyperlipidemia, Hyperlipidemia, Hypertension, Mixed hyperlipidemia, Primary localized osteoarthritis of left hip, Primary osteoarthritis of left hip, Psychiatric problem, Seizures ([facility] Thyroid disease, Thyroid disease, and Type II or unspecified type diabetes mellitus without mention of complication, not stated as uncontrolled. Past Surgical History:  has a past surgical history that includes Leg Surgery (Right); Cardiac catheterization (12/01/2008); pr arthrp acetblr/prox fem prostc agrft/algrft (Left, 8/8/2018); Hysterectomy (2004); pr dstrj lesion anus simple surg excision (N/A, 1/25/2019); and joint replacement. Social History:  reports that she has never smoked. She has never used smokeless tobacco. She reports that she does not drink alcohol and does not use drugs. Family History: family history includes Cancer in her mother; Diabetes in her mother; Heart Attack in her mother; Hypertension in her mother; Kidney Disease in her mother; Stroke in her father. The patients home medications have been reviewed.     Allergies: Atenolol, Lipitor [atorvastatin], Tylenol with codeine #3 [acetaminophen-codeine], Atenolol, Codeine, Lipitor [atorvastatin], Percocet [oxycodone-acetaminophen], Vicodin [hydrocodone-acetaminophen], and Other    -------------------------------------------------- RESULTS -------------------------------------------------  Labs:  Results for orders placed or performed during the hospital encounter of 01/25/23   POCT Glucose   Result Value Ref Range    Meter Glucose 55 (L) 74 - 99 mg/dL   POCT Glucose   Result Value Ref Range    Meter Glucose 160 (H) 74 - 99 mg/dL   POCT glucose   Result Value Ref Range    Glucose 160 mg/dL    QC OK? yes    POCT glucose   Result Value Ref Range    Glucose 150 mg/dL    QC OK? yes    POCT Glucose   Result Value Ref Range    Meter Glucose 150 (H) 74 - 99 mg/dL       Radiology:  No orders to display       ------------------------- NURSING NOTES AND VITALS REVIEWED ---------------------------  Date / Time Roomed:  1/25/2023 10:15 PM  ED Bed Assignment:  05/05    The nursing notes within the ED encounter and vital signs as below have been reviewed. /63   Pulse 74   Temp 97.9 °F (36.6 °C) (Oral)   Resp 27   Ht 5' 2\" (1.575 m)   Wt 180 lb (81.6 kg)   LMP 06/26/1981   SpO2 93%   BMI 32.92 kg/m²   Oxygen Saturation Interpretation: Normal      ------------------------------------------ PROGRESS NOTES ------------------------------------------  I have spoken with the patient and discussed todays results, in addition to providing specific details for the plan of care and counseling regarding the diagnosis and prognosis. Their questions are answered at this time and they are agreeable with the plan. I discussed at length with them reasons for immediate return here for re evaluation. They will followup with primary care by calling their office tomorrow. --------------------------------- ADDITIONAL PROVIDER NOTES ---------------------------------  At this time the patient is without objective evidence of an acute process requiring hospitalization or inpatient management. They have remained hemodynamically stable throughout their entire ED visit and are stable for discharge with outpatient follow-up.      The plan has been discussed in detail and they are aware of the specific conditions for emergent return, as well as the importance of follow-up. New Prescriptions    No medications on file       Diagnosis:  1. Hypoglycemia        Disposition:  Patient's disposition: Discharge to home  Patient's condition is stable.             [address],   01/26/23 [id number]

## 2023-01-26 NOTE — ED NOTES
Pt ambulated to bathroom and back with walker.  Tolerated without any difficulty     Terrance Aleman RN  01/26/23 0155

## 2023-01-26 NOTE — ED NOTES
Pt given something to eat and some juice. Tolerating PO intake.       Aguilar Zhang RN  01/26/23 [id number]

## 2023-01-26 NOTE — ED NOTES
Assisted up to BR, taken out to lobby, family is on way. Discharge paperwork reviewed, patient verbalized understanding.      Dequan Pollard RN  01/26/23 1489

## 2023-01-26 NOTE — ED NOTES
Called Lior Montano, pt sister, stated she could not come get her due to not being able to see at night.  Pt stated her brother can come get her at [facility], [address] [place] [address]  01/26/23 3415

## 2023-01-26 NOTE — ED NOTES
EMS states that patient is more alert in ED than at home. Patient given juice and cake by EMS who were unable to obtain vascular access. Patient's glucose rechecked by this RN at bedside. BGL:55mg/dL. Given 2 cups of orange juice. A&O x4 in ED.      Cherry Coffey RN  01/25/23 [id number]

## 2023-04-06 ENCOUNTER — OUTSIDE SERVICES (OUTPATIENT)
Dept: PRIMARY CARE CLINIC | Age: 68
End: 2023-04-06

## 2023-04-06 DIAGNOSIS — L89.154 STAGE IV PRESSURE ULCER OF SACRAL REGION (HCC): Primary | ICD-10-CM

## 2023-04-06 DIAGNOSIS — R41.89 COGNITIVE IMPAIRMENT: ICD-10-CM

## 2023-04-06 DIAGNOSIS — F31.2 BIPOLAR DISORDER, CURRENT EPISODE MANIC SEVERE WITH PSYCHOTIC FEATURES (HCC): ICD-10-CM

## 2023-04-06 DIAGNOSIS — I48.0 PAROXYSMAL ATRIAL FIBRILLATION (HCC): ICD-10-CM

## 2023-04-06 DIAGNOSIS — E11.9 TYPE 2 DIABETES MELLITUS WITHOUT COMPLICATION, WITHOUT LONG-TERM CURRENT USE OF INSULIN (HCC): ICD-10-CM

## 2023-04-06 DIAGNOSIS — J44.9 CHRONIC OBSTRUCTIVE PULMONARY DISEASE, UNSPECIFIED COPD TYPE (HCC): ICD-10-CM

## 2023-04-06 DIAGNOSIS — I10 PRIMARY HYPERTENSION: ICD-10-CM

## 2023-04-06 DIAGNOSIS — E78.2 MIXED HYPERLIPIDEMIA: ICD-10-CM

## 2023-04-11 ENCOUNTER — OUTSIDE SERVICES (OUTPATIENT)
Dept: PRIMARY CARE CLINIC | Age: 68
End: 2023-04-11
Payer: MEDICARE

## 2023-04-11 DIAGNOSIS — I10 PRIMARY HYPERTENSION: ICD-10-CM

## 2023-04-11 DIAGNOSIS — J44.9 CHRONIC OBSTRUCTIVE PULMONARY DISEASE, UNSPECIFIED COPD TYPE (HCC): ICD-10-CM

## 2023-04-11 DIAGNOSIS — F31.2 BIPOLAR DISORDER, CURRENT EPISODE MANIC SEVERE WITH PSYCHOTIC FEATURES (HCC): ICD-10-CM

## 2023-04-11 DIAGNOSIS — E11.9 TYPE 2 DIABETES MELLITUS WITHOUT COMPLICATION, WITHOUT LONG-TERM CURRENT USE OF INSULIN (HCC): ICD-10-CM

## 2023-04-11 DIAGNOSIS — L89.154 STAGE IV PRESSURE ULCER OF SACRAL REGION (HCC): Primary | ICD-10-CM

## 2023-04-11 DIAGNOSIS — E78.2 MIXED HYPERLIPIDEMIA: ICD-10-CM

## 2023-04-11 DIAGNOSIS — R41.89 COGNITIVE IMPAIRMENT: ICD-10-CM

## 2023-04-11 DIAGNOSIS — I48.0 PAROXYSMAL ATRIAL FIBRILLATION (HCC): ICD-10-CM

## 2023-04-11 PROCEDURE — 99309 SBSQ NF CARE MODERATE MDM 30: CPT | Performed by: INTERNAL MEDICINE

## 2023-04-12 ENCOUNTER — APPOINTMENT (OUTPATIENT)
Dept: CT IMAGING | Age: 68
End: 2023-04-12
Payer: MEDICARE

## 2023-04-12 ENCOUNTER — HOSPITAL ENCOUNTER (EMERGENCY)
Age: 68
Discharge: HOME | End: 2023-04-13
Attending: EMERGENCY MEDICINE
Payer: MEDICARE

## 2023-04-12 ENCOUNTER — APPOINTMENT (OUTPATIENT)
Dept: GENERAL RADIOLOGY | Age: 68
End: 2023-04-12
Payer: MEDICARE

## 2023-04-12 DIAGNOSIS — S09.90XA INJURY OF HEAD, INITIAL ENCOUNTER: ICD-10-CM
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
## 2023-04-12 DIAGNOSIS — N30.00 ACUTE CYSTITIS WITHOUT HEMATURIA: ICD-10-CM

## 2023-04-12 DIAGNOSIS — M54.50 LOW BACK PAIN WITHOUT SCIATICA, UNSPECIFIED BACK PAIN LATERALITY, UNSPECIFIED CHRONICITY: ICD-10-CM

## 2023-04-12 DIAGNOSIS — S31.000D WOUND OF SACRAL REGION, SUBSEQUENT ENCOUNTER: ICD-10-CM

## 2023-04-12 LAB
ALBUMIN SERPL-MCNC: 3.4 G/DL (ref 3.5–5.2)
ALP SERPL-CCNC: 81 U/L (ref 35–104)
ALT SERPL-CCNC: 9 U/L (ref 0–32)
ANION GAP SERPL CALCULATED.3IONS-SCNC: 11 MMOL/L (ref 7–16)
AST SERPL-CCNC: 15 U/L (ref 0–31)
BACTERIA URNS QL MICRO: ABNORMAL /HPF
BASOPHILS # BLD: 0.03 E9/L (ref 0–0.2)
BASOPHILS NFR BLD: 0.4 % (ref 0–2)
BILIRUB SERPL-MCNC: <0.2 MG/DL (ref 0–1.2)
BILIRUB UR QL STRIP: NEGATIVE
BUN SERPL-MCNC: 12 MG/DL (ref 6–23)
CALCIUM SERPL-MCNC: 9.5 MG/DL (ref 8.6–10.2)
CHLORIDE SERPL-SCNC: 108 MMOL/L (ref 98–107)
CLARITY UR: ABNORMAL
CO2 SERPL-SCNC: 23 MMOL/L (ref 22–29)
COLOR UR: YELLOW
CREAT SERPL-MCNC: 0.9 MG/DL (ref 0.5–1)
EOSINOPHIL # BLD: 0.07 E9/L (ref 0.05–0.5)
EOSINOPHIL NFR BLD: 0.9 % (ref 0–6)
EPI CELLS #/AREA URNS HPF: ABNORMAL /HPF
ERYTHROCYTE [DISTWIDTH] IN BLOOD BY AUTOMATED COUNT: 18.1 FL (ref 11.5–15)
GLUCOSE SERPL-MCNC: 200 MG/DL (ref 74–99)
GLUCOSE UR STRIP-MCNC: 250 MG/DL
HCT VFR BLD AUTO: 26.8 % (ref 34–48)
HGB BLD-MCNC: 7.9 G/DL (ref 11.5–15.5)
HGB UR QL STRIP: ABNORMAL
IMM GRANULOCYTES # BLD: 0.05 E9/L
IMM GRANULOCYTES NFR BLD: 0.6 % (ref 0–5)
KETONES UR STRIP-MCNC: NEGATIVE MG/DL
LEUKOCYTE ESTERASE UR QL STRIP: ABNORMAL
LYMPHOCYTES # BLD: 1.43 E9/L (ref 1.5–4)
LYMPHOCYTES NFR BLD: 18.1 % (ref 20–42)
MCH RBC QN AUTO: 23.9 PG (ref 26–35)
MCHC RBC AUTO-ENTMCNC: 29.5 % (ref 32–34.5)
MCV RBC AUTO: 81 FL (ref 80–99.9)
MONOCYTES # BLD: 0.88 E9/L (ref 0.1–0.95)
MONOCYTES NFR BLD: 11.2 % (ref 2–12)
NEUTROPHILS # BLD: 5.43 E9/L (ref 1.8–7.3)
NEUTS SEG NFR BLD: 68.8 % (ref 43–80)
NITRITE UR QL STRIP: NEGATIVE
PH UR STRIP: 6 [PH] (ref 5–9)
PLATELET # BLD AUTO: 326 E9/L (ref 130–450)
PMV BLD AUTO: 7.9 FL (ref 7–12)
POTASSIUM SERPL-SCNC: 3.6 MMOL/L (ref 3.5–5)
PROT SERPL-MCNC: 7.4 G/DL (ref 6.4–8.3)
PROT UR STRIP-MCNC: 30 MG/DL
RBC # BLD AUTO: 3.31 E12/L (ref 3.5–5.5)
RBC #/AREA URNS HPF: ABNORMAL /HPF (ref 0–2)
SODIUM SERPL-SCNC: 142 MMOL/L (ref 132–146)
SP GR UR STRIP: 1.01 (ref 1–1.03)
TROPONIN, HIGH SENSITIVITY: 23 NG/L (ref 0–9)
UROBILINOGEN UR STRIP-ACNC: 0.2 E.U./DL
WBC # BLD: 7.9 E9/L (ref 4.5–11.5)
WBC #/AREA URNS HPF: ABNORMAL /HPF (ref 0–5)

## 2023-04-12 PROCEDURE — 81001 URINALYSIS AUTO W/SCOPE: CPT

## 2023-04-12 PROCEDURE — 84484 ASSAY OF TROPONIN QUANT: CPT

## 2023-04-12 PROCEDURE — 87186 SC STD MICRODIL/AGAR DIL: CPT

## 2023-04-12 PROCEDURE — 85025 COMPLETE CBC W/AUTO DIFF WBC: CPT

## 2023-04-12 PROCEDURE — 80053 COMPREHEN METABOLIC PANEL: CPT

## 2023-04-12 PROCEDURE — 87088 URINE BACTERIA CULTURE: CPT

## 2023-04-12 PROCEDURE — 70450 CT HEAD/BRAIN W/O DYE: CPT

## 2023-04-12 PROCEDURE — 36415 COLL VENOUS BLD VENIPUNCTURE: CPT

## 2023-04-12 PROCEDURE — 72100 X-RAY EXAM L-S SPINE 2/3 VWS: CPT

## 2023-04-12 PROCEDURE — 72170 X-RAY EXAM OF PELVIS: CPT

## 2023-04-12 PROCEDURE — 93005 ELECTROCARDIOGRAM TRACING: CPT | Performed by: PHYSICIAN ASSISTANT

## 2023-04-12 PROCEDURE — 71045 X-RAY EXAM CHEST 1 VIEW: CPT

## 2023-04-12 PROCEDURE — 99285 EMERGENCY DEPT VISIT HI MDM: CPT

## 2023-04-12 RX ORDER — CEPHALEXIN 500 MG/1
500 CAPSULE ORAL 4 TIMES DAILY
Qty: 28 CAPSULE | Refills: 0 | Status: SHIPPED | OUTPATIENT
Start: 2023-04-12 | End: 2023-04-19

## 2023-04-12 ASSESSMENT — PAIN - FUNCTIONAL ASSESSMENT: PAIN_FUNCTIONAL_ASSESSMENT: NONE - DENIES PAIN

## 2023-04-13 VITALS
SYSTOLIC BLOOD PRESSURE: 128 MMHG | TEMPERATURE: 98.4 F | RESPIRATION RATE: 16 BRPM | OXYGEN SATURATION: 100 % | DIASTOLIC BLOOD PRESSURE: 77 MMHG | HEART RATE: 104 BPM

## 2023-04-13 PROCEDURE — 2580000003 HC RX 258: Performed by: PHYSICIAN ASSISTANT

## 2023-04-13 PROCEDURE — 6360000002 HC RX W HCPCS: Performed by: PHYSICIAN ASSISTANT

## 2023-04-13 RX ADMIN — WATER 2000 MG: 1 INJECTION INTRAMUSCULAR; INTRAVENOUS; SUBCUTANEOUS at 01:29

## 2023-04-13 NOTE — ED PROVIDER NOTES
/HPF    Epithelial Cells, UA FEW /HPF    Bacteria, UA MODERATE (A) None Seen /HPF   EKG 12 Lead   Result Value Ref Range    Ventricular Rate 100 BPM    Atrial Rate 100 BPM    P-R Interval 142 ms    QRS Duration 76 ms    Q-T Interval 366 ms    QTc Calculation (Bazett) 472 ms    P Axis 90 degrees    R Axis 17 degrees    T Axis 46 degrees     Imaging: All Radiology results interpreted by Radiologist unless otherwise noted. CT HEAD WO CONTRAST   Final Result   No acute intracranial abnormality. XR LUMBAR SPINE (2-3 VIEWS)   Final Result   No acute fractures. Mild diffuse degenerative changes         XR PELVIS (1-2 VIEWS)   Final Result   No acute osseous abnormality. No significant change compared to prior exam.         XR CHEST 1 VIEW   Final Result   Mild CHF. Developing pneumonia in the lung bases has to be excluded and   surveillance recommended. ED COURSE   Vitals:    Vitals:    04/12/23 1931   BP: (!) 135/92   Pulse: (!) 116   Resp: 18   Temp: 99 °F (37.2 °C)   TempSrc: Oral   SpO2: 98%       Patient was given the following medications:  Medications   cefTRIAXone (ROCEPHIN) 2,000 mg in sterile water 20 mL IV syringe (has no administration in time range)       ED Course as of 04/12/23 2221 Wed Apr 12, 2023 2054 ATTENDING PROVIDER ATTESTATION:     I have personally performed and/or participated in the history, exam, medical decision making, and procedures and agree with all pertinent clinical information unless otherwise noted. I have also reviewed and agree with the past medical, family and social history unless otherwise noted. History: Patient from local nursing facility. At baseline, she has confusion. She reportedly fell by sliding out of her wheelchair and striking her buttock on the floor. She has a large chronic decubitus ulcer located there. This fall caused some mild bleeding. My findings: Mani Dial is a 77 y.o. female whom is in no distress.  Physical

## 2023-04-14 LAB
EKG ATRIAL RATE: 100 BPM
EKG P AXIS: 90 DEGREES
EKG P-R INTERVAL: 142 MS
EKG Q-T INTERVAL: 366 MS
EKG QRS DURATION: 76 MS
EKG QTC CALCULATION (BAZETT): 472 MS
EKG R AXIS: 17 DEGREES
EKG T AXIS: 46 DEGREES
EKG VENTRICULAR RATE: 100 BPM

## 2023-04-14 PROCEDURE — 93010 ELECTROCARDIOGRAM REPORT: CPT | Performed by: INTERNAL MEDICINE

## 2023-04-15 LAB
BACTERIA UR CULT: ABNORMAL
ORGANISM: ABNORMAL

## 2023-04-26 ENCOUNTER — OUTSIDE SERVICES (OUTPATIENT)
Dept: PRIMARY CARE CLINIC | Age: 68
End: 2023-04-26
Payer: COMMERCIAL

## 2023-04-26 DIAGNOSIS — E11.9 TYPE 2 DIABETES MELLITUS WITHOUT COMPLICATION, WITHOUT LONG-TERM CURRENT USE OF INSULIN (HCC): ICD-10-CM

## 2023-04-26 DIAGNOSIS — E78.2 MIXED HYPERLIPIDEMIA: ICD-10-CM

## 2023-04-26 DIAGNOSIS — F31.2 BIPOLAR DISORDER, CURRENT EPISODE MANIC SEVERE WITH PSYCHOTIC FEATURES (HCC): Primary | ICD-10-CM

## 2023-04-26 DIAGNOSIS — J44.9 CHRONIC OBSTRUCTIVE PULMONARY DISEASE, UNSPECIFIED COPD TYPE (HCC): ICD-10-CM

## 2023-04-26 DIAGNOSIS — I48.0 PAROXYSMAL ATRIAL FIBRILLATION (HCC): ICD-10-CM

## 2023-04-26 DIAGNOSIS — I10 PRIMARY HYPERTENSION: ICD-10-CM

## 2023-04-26 DIAGNOSIS — L89.154 STAGE IV PRESSURE ULCER OF SACRAL REGION (HCC): ICD-10-CM

## 2023-04-26 PROCEDURE — 99306 1ST NF CARE HIGH MDM 50: CPT | Performed by: INTERNAL MEDICINE

## 2023-04-27 NOTE — TELEPHONE ENCOUNTER
Thanks, just leave this as scheduled Minocycline Counseling: Patient advised regarding possible photosensitivity and discoloration of the teeth, skin, lips, tongue and gums.  Patient instructed to avoid sunlight, if possible.  When exposed to sunlight, patients should wear protective clothing, sunglasses, and sunscreen.  The patient was instructed to call the office immediately if the following severe adverse effects occur:  hearing changes, easy bruising/bleeding, severe headache, or vision changes.  The patient verbalized understanding of the proper use and possible adverse effects of minocycline.  All of the patient's questions and concerns were addressed.

## 2023-05-04 ENCOUNTER — OUTSIDE SERVICES (OUTPATIENT)
Dept: PRIMARY CARE CLINIC | Age: 68
End: 2023-05-04
Payer: COMMERCIAL

## 2023-05-04 DIAGNOSIS — J44.9 CHRONIC OBSTRUCTIVE PULMONARY DISEASE, UNSPECIFIED COPD TYPE (HCC): ICD-10-CM

## 2023-05-04 DIAGNOSIS — L89.154 STAGE IV PRESSURE ULCER OF SACRAL REGION (HCC): ICD-10-CM

## 2023-05-04 DIAGNOSIS — I48.0 PAROXYSMAL ATRIAL FIBRILLATION (HCC): ICD-10-CM

## 2023-05-04 DIAGNOSIS — I10 PRIMARY HYPERTENSION: ICD-10-CM

## 2023-05-04 DIAGNOSIS — F31.2 BIPOLAR DISORDER, CURRENT EPISODE MANIC SEVERE WITH PSYCHOTIC FEATURES (HCC): Primary | ICD-10-CM

## 2023-05-04 DIAGNOSIS — E11.9 TYPE 2 DIABETES MELLITUS WITHOUT COMPLICATION, WITHOUT LONG-TERM CURRENT USE OF INSULIN (HCC): ICD-10-CM

## 2023-05-04 DIAGNOSIS — E78.2 MIXED HYPERLIPIDEMIA: ICD-10-CM

## 2023-05-04 PROCEDURE — 99306 1ST NF CARE HIGH MDM 50: CPT | Performed by: INTERNAL MEDICINE

## 2023-05-09 ASSESSMENT — VISUAL ACUITY: OU: 1

## 2023-05-09 ASSESSMENT — COPD QUESTIONNAIRES: COPD: 1

## 2023-05-09 NOTE — PROGRESS NOTES
unchanged. Her symptoms are aggravated by change in weather and strenuous activity. Relieved by: taking medications, no medication side effects. She reports minimal improvement on treatment. Her past medical history is significant for COPD. Atrial Fibrillation  Presents for follow-up visit. The symptoms have been stable. Compliance problems: taking medications, no medication side effects. ROS:  Review of Systems   Unable to perform ROS: Mental status change      Current Meds: Refer to nursing home record    PMH:    Medical Problems: reviewed and updated       Diagnosis Date    A-fib ([facility]     Acute exacerbation of chronic obstructive pulmonary disease (COPD) ([facility])     Anemia     Anxiety     Arthritis     Arthritis     Asthma     Atrial fibrillation (HCC)     CAD (coronary artery disease)     af and heart murmur    Chronic obstructive pulmonary disease ([facility])     COPD (chronic obstructive pulmonary disease) ([facility]     Diabetes mellitus ([facility]     DVT (deep venous thrombosis) ([facility])     Emphysema lung ([facility])     Emphysema of lung ([facility]     Encounter for imaging of bilateral cephalic veins     History of bilateral cephalic vein thrombosis    H/O cardiovascular stress test 04/25/2020    Lexiscan    Headache     History of blood transfusion     Hx of blood clots     Hypercholesterolemia     Hyperlipidemia     Hyperlipidemia     Hypertension     Mixed hyperlipidemia     Primary localized osteoarthritis of left hip     Primary osteoarthritis of left hip     Psychiatric problem     Seizures ([facility]     last seizure  15 yrs ago had been on depakote    Thyroid disease     Thyroid disease     Type II or unspecified type diabetes mellitus without mention of complication, not stated as uncontrolled         Surgical Hx: reviewed and updated   Past Surgical History:   Procedure Laterality Date    CARDIAC CATHETERIZATION  12/01/2008    Normal Coronary arteries. Normal LV.   Elevated left ventricular end diastolic pressure suggestive

## 2023-05-11 ENCOUNTER — OUTSIDE SERVICES (OUTPATIENT)
Dept: PRIMARY CARE CLINIC | Age: 68
End: 2023-05-11

## 2023-05-11 DIAGNOSIS — I10 PRIMARY HYPERTENSION: ICD-10-CM

## 2023-05-11 DIAGNOSIS — I48.0 PAROXYSMAL ATRIAL FIBRILLATION (HCC): ICD-10-CM

## 2023-05-11 DIAGNOSIS — E11.9 TYPE 2 DIABETES MELLITUS WITHOUT COMPLICATION, WITHOUT LONG-TERM CURRENT USE OF INSULIN (HCC): ICD-10-CM

## 2023-05-11 DIAGNOSIS — J44.9 CHRONIC OBSTRUCTIVE PULMONARY DISEASE, UNSPECIFIED COPD TYPE (HCC): ICD-10-CM

## 2023-05-11 DIAGNOSIS — L89.154 STAGE IV PRESSURE ULCER OF SACRAL REGION (HCC): ICD-10-CM

## 2023-05-11 DIAGNOSIS — E78.2 MIXED HYPERLIPIDEMIA: ICD-10-CM

## 2023-05-11 DIAGNOSIS — F31.2 BIPOLAR DISORDER, CURRENT EPISODE MANIC SEVERE WITH PSYCHOTIC FEATURES (HCC): Primary | ICD-10-CM

## 2023-05-16 ASSESSMENT — VISUAL ACUITY: OU: 1

## 2023-05-16 ASSESSMENT — COPD QUESTIONNAIRES: COPD: 1

## 2023-05-24 ENCOUNTER — OUTSIDE SERVICES (OUTPATIENT)
Dept: PRIMARY CARE CLINIC | Age: 68
End: 2023-05-24

## 2023-05-24 DIAGNOSIS — I48.0 PAROXYSMAL ATRIAL FIBRILLATION (HCC): ICD-10-CM

## 2023-05-24 DIAGNOSIS — F31.2 BIPOLAR DISORDER, CURRENT EPISODE MANIC SEVERE WITH PSYCHOTIC FEATURES (HCC): Primary | ICD-10-CM

## 2023-05-24 DIAGNOSIS — I10 PRIMARY HYPERTENSION: ICD-10-CM

## 2023-05-24 DIAGNOSIS — J44.9 CHRONIC OBSTRUCTIVE PULMONARY DISEASE, UNSPECIFIED COPD TYPE (HCC): ICD-10-CM

## 2023-05-24 DIAGNOSIS — E78.2 MIXED HYPERLIPIDEMIA: ICD-10-CM

## 2023-05-24 DIAGNOSIS — E11.9 TYPE 2 DIABETES MELLITUS WITHOUT COMPLICATION, WITHOUT LONG-TERM CURRENT USE OF INSULIN (HCC): ICD-10-CM

## 2023-05-24 DIAGNOSIS — L89.154 STAGE IV PRESSURE ULCER OF SACRAL REGION (HCC): ICD-10-CM

## 2023-05-31 ENCOUNTER — OUTSIDE SERVICES (OUTPATIENT)
Dept: PRIMARY CARE CLINIC | Age: 68
End: 2023-05-31

## 2023-05-31 DIAGNOSIS — E78.2 MIXED HYPERLIPIDEMIA: ICD-10-CM

## 2023-05-31 DIAGNOSIS — F31.2 BIPOLAR DISORDER, CURRENT EPISODE MANIC SEVERE WITH PSYCHOTIC FEATURES (HCC): Primary | ICD-10-CM

## 2023-05-31 DIAGNOSIS — L89.154 STAGE IV PRESSURE ULCER OF SACRAL REGION (HCC): ICD-10-CM

## 2023-05-31 DIAGNOSIS — I10 PRIMARY HYPERTENSION: ICD-10-CM

## 2023-05-31 DIAGNOSIS — I48.0 PAROXYSMAL ATRIAL FIBRILLATION (HCC): ICD-10-CM

## 2023-05-31 DIAGNOSIS — J44.9 CHRONIC OBSTRUCTIVE PULMONARY DISEASE, UNSPECIFIED COPD TYPE (HCC): ICD-10-CM

## 2023-05-31 DIAGNOSIS — E11.9 TYPE 2 DIABETES MELLITUS WITHOUT COMPLICATION, WITHOUT LONG-TERM CURRENT USE OF INSULIN (HCC): ICD-10-CM

## 2023-05-31 DIAGNOSIS — R41.89 COGNITIVE IMPAIRMENT: ICD-10-CM

## 2023-06-05 ASSESSMENT — VISUAL ACUITY: OU: 1

## 2023-06-05 ASSESSMENT — COPD QUESTIONNAIRES: COPD: 1

## 2023-06-05 NOTE — PROGRESS NOTES
present. Mental Status: Mental status is at baseline. Cranial Nerves: No cranial nerve deficit. Deep Tendon Reflexes: Reflexes normal.   Psychiatric:         Mood and Affect: Mood and affect normal. Mood is not depressed. Behavior: Behavior normal. Behavior is not agitated. Cognition and Memory: Cognition is impaired. Memory is impaired. Comments: No apparent anxiety       Assessment & Plan:  1. Bipolar disorder, current episode manic severe with psychotic features ([address]  2. Stage IV pressure ulcer of sacral region ([address]  3. Type 2 diabetes mellitus without complication, without long-term current use of insulin (HCC)  4. Paroxysmal atrial fibrillation ([address]  5. Primary hypertension  6. Mixed hyperlipidemia  7.  Chronic obstructive pulmonary disease, unspecified COPD type [facility])     Hospital notes reviewed   Chart and medications reviewed   Bipolar still having episodes of screaming will add lamictal 100 mg daily   Continue wound care   Monitor labs   Continue current treatment      Himanshu GUTHRIE MA  am scribing for Dr. Dioni Atkins MA   IJuancarlos MD, personally performed the services described in this documentation as scribed by Himanshu Guardado and it is both accurate and complete

## 2023-06-07 ASSESSMENT — COPD QUESTIONNAIRES: COPD: 1

## 2023-06-07 ASSESSMENT — VISUAL ACUITY: OU: 1

## 2023-06-08 ASSESSMENT — COPD QUESTIONNAIRES: COPD: 1

## 2023-06-08 ASSESSMENT — VISUAL ACUITY: OU: 1

## 2023-06-08 NOTE — PROGRESS NOTES
psychotic features ([facility]  2. Stage IV pressure ulcer of sacral region ([facility]  3. Type 2 diabetes mellitus without complication, without long-term current use of insulin (HCC)  4. Paroxysmal atrial fibrillation ([facility]  5. Primary hypertension  6. Mixed hyperlipidemia  7.  Chronic obstructive pulmonary disease, unspecified COPD type ([facility]     Chart reviewed   Medications reviewed   Continue wound care   Waiting to go to a behavioral unit   Monitor labs due to psych medications   Continue current treatment      I, Neeraj Abbott MA  am scribing for Dr. Halle Caraballo MD, personally performed the services described in this documentation as scribed by Neeraj Abbott and it is both accurate and complete      Neeraj Abbott MA

## 2023-06-09 ASSESSMENT — COPD QUESTIONNAIRES
COPD: 1
COPD: 1

## 2023-06-09 ASSESSMENT — VISUAL ACUITY
OU: 1
OU: 1

## 2023-06-09 NOTE — PROGRESS NOTES
bilaterally  Cardiovascular:      Rate and Rhythm: Normal rate and regular rhythm. Pulses: Normal pulses. Heart sounds: Normal heart sounds, S1 normal and S2 normal. No murmur heard. No friction rub. No gallop. Comments: Pedal pulses 2+ and equal bilaterally  Pulmonary:      Effort: Pulmonary effort is normal.      Breath sounds: Normal breath sounds. Abdominal:      General: Bowel sounds are normal. There is no distension. Palpations: Abdomen is soft. There is no mass. Tenderness: There is no abdominal tenderness. There is no guarding or rebound. Hernia: No hernia is present. Comments: No rigidity   Musculoskeletal:         General: Normal range of motion. Right shoulder: Normal.      Left shoulder: Normal.      Right upper arm: Normal.      Left upper arm: Normal.      Cervical back: Normal range of motion. Right hip: Normal.      Left hip: Normal.      Right upper leg: Normal.      Left upper leg: Normal.      Right lower leg: Normal.      Left lower leg: Normal.      Right foot: Normal.      Left foot: Normal.      Comments: Unable to ambulate wheelchair bound    Lymphadenopathy:      Comments: No palpable or visible regional lymphadenopathy   Skin:     General: Skin is warm and dry. Findings: No bruising, ecchymosis, lesion or rash. Comments: Sacral ulcer    Neurological:      General: No focal deficit present. Mental Status: Mental status is at baseline. Cranial Nerves: No cranial nerve deficit. Deep Tendon Reflexes: Reflexes normal.   Psychiatric:         Mood and Affect: Mood and affect normal. Mood is not depressed. Behavior: Behavior normal. Behavior is not agitated. Cognition and Memory: Cognition is impaired. Memory is impaired. Comments: No apparent anxiety       Assessment & Plan:  1.  Bipolar disorder, current episode manic severe with psychotic features ([facility]  2. Stage IV pressure ulcer of sacral region

## 2023-06-22 ENCOUNTER — OUTSIDE SERVICES (OUTPATIENT)
Dept: PRIMARY CARE CLINIC | Age: 68
End: 2023-06-22
Payer: MEDICAID

## 2023-06-22 DIAGNOSIS — J44.9 CHRONIC OBSTRUCTIVE PULMONARY DISEASE, UNSPECIFIED COPD TYPE (HCC): ICD-10-CM

## 2023-06-22 DIAGNOSIS — I10 PRIMARY HYPERTENSION: ICD-10-CM

## 2023-06-22 DIAGNOSIS — E11.9 TYPE 2 DIABETES MELLITUS WITHOUT COMPLICATION, WITHOUT LONG-TERM CURRENT USE OF INSULIN (HCC): ICD-10-CM

## 2023-06-22 DIAGNOSIS — F31.2 BIPOLAR DISORDER, CURRENT EPISODE MANIC SEVERE WITH PSYCHOTIC FEATURES (HCC): Primary | ICD-10-CM

## 2023-06-22 DIAGNOSIS — L89.154 STAGE IV PRESSURE ULCER OF SACRAL REGION (HCC): ICD-10-CM

## 2023-06-22 DIAGNOSIS — I48.0 PAROXYSMAL ATRIAL FIBRILLATION (HCC): ICD-10-CM

## 2023-06-22 DIAGNOSIS — E78.2 MIXED HYPERLIPIDEMIA: ICD-10-CM

## 2023-06-22 PROCEDURE — 99309 SBSQ NF CARE MODERATE MDM 30: CPT | Performed by: INTERNAL MEDICINE

## 2023-06-27 ASSESSMENT — VISUAL ACUITY: OU: 1

## 2023-06-27 ASSESSMENT — COPD QUESTIONNAIRES: COPD: 1

## 2023-06-28 ENCOUNTER — OUTSIDE SERVICES (OUTPATIENT)
Dept: PRIMARY CARE CLINIC | Age: 68
End: 2023-06-28
Payer: MEDICAID

## 2023-06-28 DIAGNOSIS — E11.9 TYPE 2 DIABETES MELLITUS WITHOUT COMPLICATION, WITHOUT LONG-TERM CURRENT USE OF INSULIN (HCC): ICD-10-CM

## 2023-06-28 DIAGNOSIS — J44.9 CHRONIC OBSTRUCTIVE PULMONARY DISEASE, UNSPECIFIED COPD TYPE (HCC): ICD-10-CM

## 2023-06-28 DIAGNOSIS — I48.0 PAROXYSMAL ATRIAL FIBRILLATION (HCC): ICD-10-CM

## 2023-06-28 DIAGNOSIS — L89.154 STAGE IV PRESSURE ULCER OF SACRAL REGION (HCC): ICD-10-CM

## 2023-06-28 DIAGNOSIS — F31.2 BIPOLAR DISORDER, CURRENT EPISODE MANIC SEVERE WITH PSYCHOTIC FEATURES (HCC): Primary | ICD-10-CM

## 2023-06-28 DIAGNOSIS — R41.89 COGNITIVE IMPAIRMENT: ICD-10-CM

## 2023-06-28 DIAGNOSIS — E78.2 MIXED HYPERLIPIDEMIA: ICD-10-CM

## 2023-06-28 DIAGNOSIS — I10 PRIMARY HYPERTENSION: ICD-10-CM

## 2023-06-28 PROCEDURE — 99309 SBSQ NF CARE MODERATE MDM 30: CPT | Performed by: INTERNAL MEDICINE

## 2023-07-05 ENCOUNTER — OUTSIDE SERVICES (OUTPATIENT)
Dept: PRIMARY CARE CLINIC | Age: 68
End: 2023-07-05

## 2023-07-05 DIAGNOSIS — R41.89 COGNITIVE IMPAIRMENT: ICD-10-CM

## 2023-07-05 DIAGNOSIS — L89.154 STAGE IV PRESSURE ULCER OF SACRAL REGION (HCC): ICD-10-CM

## 2023-07-05 DIAGNOSIS — F31.2 BIPOLAR DISORDER, CURRENT EPISODE MANIC SEVERE WITH PSYCHOTIC FEATURES (HCC): Primary | ICD-10-CM

## 2023-07-05 DIAGNOSIS — E78.2 MIXED HYPERLIPIDEMIA: ICD-10-CM

## 2023-07-05 DIAGNOSIS — E11.9 TYPE 2 DIABETES MELLITUS WITHOUT COMPLICATION, WITHOUT LONG-TERM CURRENT USE OF INSULIN (HCC): ICD-10-CM

## 2023-07-05 DIAGNOSIS — I10 PRIMARY HYPERTENSION: ICD-10-CM

## 2023-07-05 DIAGNOSIS — I48.0 PAROXYSMAL ATRIAL FIBRILLATION (HCC): ICD-10-CM

## 2023-07-05 DIAGNOSIS — J44.9 CHRONIC OBSTRUCTIVE PULMONARY DISEASE, UNSPECIFIED COPD TYPE (HCC): ICD-10-CM

## 2023-07-06 ASSESSMENT — VISUAL ACUITY: OU: 1

## 2023-07-06 ASSESSMENT — COPD QUESTIONNAIRES: COPD: 1

## 2023-07-06 NOTE — PROGRESS NOTES
Visit Date: 6/28/23  Kassandra Gandhi  1955  female 77 y.o. Subjective:    CC: Patient presents with stage 4 sacral ulcer and weakness. Patient presents for follow up of dm, htn, copd, afib, and bipolar disorder. HPI:  Skin Problem  This is a recurrent (infected sacral wound on antibiotics and wound care improving slowly) problem. The problem has been gradually improving since onset. The rash is characterized by redness and draining. Treatments tried: antibiotics and wound care. The treatment provided mild relief. Diabetes  She presents for her follow-up diabetic visit. She has type 2 diabetes mellitus. Her disease course has been stable. There are no hypoglycemic associated symptoms. There are no hypoglycemic complications. There are no diabetic complications. Risk factors for coronary artery disease include diabetes mellitus, hypertension and obesity. Current diabetic treatments: taking medications, no medication side effects. She is compliant with treatment most of the time. Her weight is fluctuating minimally. Her home blood glucose trend is fluctuating minimally. Hypertension  This is a chronic problem. The current episode started more than 1 year ago. The problem is unchanged. The problem is controlled. There are no associated agents to hypertension. Risk factors for coronary artery disease include diabetes mellitus and obesity. Treatments tried: taking medications, no medication side effects. The current treatment provides mild improvement. There are no compliance problems. COPD  This is a chronic problem. The current episode started more than 1 year ago. The problem has been unchanged. Her symptoms are aggravated by change in weather and strenuous activity. Relieved by: taking medications, no medication side effects. She reports minimal improvement on treatment. Her past medical history is significant for COPD. Atrial Fibrillation  Presents for follow-up visit.  The symptoms have been

## 2023-07-10 ASSESSMENT — COPD QUESTIONNAIRES: COPD: 1

## 2023-07-10 ASSESSMENT — VISUAL ACUITY: OU: 1

## 2023-07-12 ENCOUNTER — OUTSIDE SERVICES (OUTPATIENT)
Dept: PRIMARY CARE CLINIC | Age: 68
End: 2023-07-12

## 2023-07-12 DIAGNOSIS — I48.0 PAROXYSMAL ATRIAL FIBRILLATION (HCC): ICD-10-CM

## 2023-07-12 DIAGNOSIS — F31.2 BIPOLAR DISORDER, CURRENT EPISODE MANIC SEVERE WITH PSYCHOTIC FEATURES (HCC): Primary | ICD-10-CM

## 2023-07-12 DIAGNOSIS — R41.89 COGNITIVE IMPAIRMENT: ICD-10-CM

## 2023-07-12 DIAGNOSIS — L89.154 STAGE IV PRESSURE ULCER OF SACRAL REGION (HCC): ICD-10-CM

## 2023-07-12 DIAGNOSIS — E78.2 MIXED HYPERLIPIDEMIA: ICD-10-CM

## 2023-07-12 DIAGNOSIS — J44.9 CHRONIC OBSTRUCTIVE PULMONARY DISEASE, UNSPECIFIED COPD TYPE (HCC): ICD-10-CM

## 2023-07-12 DIAGNOSIS — I10 PRIMARY HYPERTENSION: ICD-10-CM

## 2023-07-12 DIAGNOSIS — E11.9 TYPE 2 DIABETES MELLITUS WITHOUT COMPLICATION, WITHOUT LONG-TERM CURRENT USE OF INSULIN (HCC): ICD-10-CM

## 2023-07-18 ASSESSMENT — COPD QUESTIONNAIRES: COPD: 1

## 2023-07-18 ASSESSMENT — VISUAL ACUITY: OU: 1

## 2023-07-18 NOTE — PROGRESS NOTES
Visit Date: 7/12/23  Jose Santillan  1955  female 77 y.o. Subjective:    CC: Patient presents with stage 4 sacral ulcer and weakness. Patient presents for follow up of dm, htn, copd, afib, and bipolar disorder. HPI:  Skin Problem  This is a recurrent (infected sacral wound on antibiotics and wound care improving slowly) problem. The problem has been gradually improving since onset. The rash is characterized by redness and draining. Treatments tried: antibiotics and wound care. The treatment provided mild relief. Diabetes  She presents for her follow-up diabetic visit. She has type 2 diabetes mellitus. Her disease course has been stable. There are no hypoglycemic associated symptoms. There are no hypoglycemic complications. There are no diabetic complications. Risk factors for coronary artery disease include diabetes mellitus, hypertension and obesity. Current diabetic treatments: taking medications, no medication side effects. She is compliant with treatment most of the time. Her weight is fluctuating minimally. Her home blood glucose trend is fluctuating minimally. Hypertension  This is a chronic problem. The current episode started more than 1 year ago. The problem is unchanged. The problem is controlled. There are no associated agents to hypertension. Risk factors for coronary artery disease include diabetes mellitus and obesity. Treatments tried: taking medications, no medication side effects. The current treatment provides mild improvement. There are no compliance problems. COPD  This is a chronic problem. The current episode started more than 1 year ago. The problem has been unchanged. Her symptoms are aggravated by change in weather and strenuous activity. Relieved by: taking medications, no medication side effects. She reports minimal improvement on treatment. Her past medical history is significant for COPD. Atrial Fibrillation  Presents for follow-up visit.  The symptoms have been

## 2023-07-20 ENCOUNTER — OUTSIDE SERVICES (OUTPATIENT)
Dept: PRIMARY CARE CLINIC | Age: 68
End: 2023-07-20
Payer: MEDICAID

## 2023-07-20 DIAGNOSIS — F31.2 BIPOLAR DISORDER, CURRENT EPISODE MANIC SEVERE WITH PSYCHOTIC FEATURES (HCC): Primary | ICD-10-CM

## 2023-07-20 DIAGNOSIS — F41.9 ANXIETY: ICD-10-CM

## 2023-07-20 DIAGNOSIS — L89.154 STAGE IV PRESSURE ULCER OF SACRAL REGION (HCC): ICD-10-CM

## 2023-07-20 DIAGNOSIS — R41.89 COGNITIVE IMPAIRMENT: ICD-10-CM

## 2023-07-20 DIAGNOSIS — I48.0 PAROXYSMAL ATRIAL FIBRILLATION (HCC): ICD-10-CM

## 2023-07-20 DIAGNOSIS — J44.9 CHRONIC OBSTRUCTIVE PULMONARY DISEASE, UNSPECIFIED COPD TYPE (HCC): ICD-10-CM

## 2023-07-20 DIAGNOSIS — I10 PRIMARY HYPERTENSION: ICD-10-CM

## 2023-07-20 DIAGNOSIS — E11.9 TYPE 2 DIABETES MELLITUS WITHOUT COMPLICATION, WITHOUT LONG-TERM CURRENT USE OF INSULIN (HCC): ICD-10-CM

## 2023-07-20 DIAGNOSIS — E78.2 MIXED HYPERLIPIDEMIA: ICD-10-CM

## 2023-07-20 PROCEDURE — 99309 SBSQ NF CARE MODERATE MDM 30: CPT | Performed by: INTERNAL MEDICINE

## 2023-07-24 ASSESSMENT — COPD QUESTIONNAIRES: COPD: 1

## 2023-07-24 ASSESSMENT — VISUAL ACUITY: OU: 1

## 2023-07-24 NOTE — PROGRESS NOTES
Demetrius De La Garza Tim  1955  female 77 y.o. Subjective:    CC: Patient presents with stage 4 sacral ulcer and weakness. Patient presents for follow up of dm, htn, copd, afib, and bipolar disorder. HPI:  Skin Problem  This is a recurrent (infected sacral wound on antibiotics and wound care improving slowly) problem. The problem has been gradually improving since onset. The rash is characterized by redness and draining. Treatments tried: antibiotics and wound care. The treatment provided mild relief. Diabetes  She presents for her follow-up diabetic visit. She has type 2 diabetes mellitus. Her disease course has been stable. There are no hypoglycemic complications. There are no diabetic complications. Risk factors for coronary artery disease include diabetes mellitus, hypertension and obesity. Current diabetic treatments: taking medications, no medication side effects. She is compliant with treatment most of the time. Her weight is fluctuating minimally. Her home blood glucose trend is fluctuating minimally. Hypertension  This is a chronic problem. The current episode started more than 1 year ago. The problem is unchanged. The problem is controlled. Associated symptoms include anxiety. There are no associated agents to hypertension. Risk factors for coronary artery disease include diabetes mellitus and obesity. Treatments tried: taking medications, no medication side effects. The current treatment provides mild improvement. There are no compliance problems. COPD  This is a chronic problem. The current episode started more than 1 year ago. The problem has been unchanged. Her symptoms are aggravated by change in weather and strenuous activity. Relieved by: taking medications, no medication side effects. She reports minimal improvement on treatment. Her past medical history is significant for COPD. Atrial Fibrillation  Presents for follow-up visit. The symptoms have been stable.  Compliance problems:

## (undated) DEVICE — INTENDED FOR TISSUE SEPARATION, AND OTHER PROCEDURES THAT REQUIRE A SHARP SURGICAL BLADE TO PUNCTURE OR CUT.: Brand: BARD-PARKER ® STAINLESS STEEL BLADES

## (undated) DEVICE — PAD,ABDOMINAL,5"X9",ST,LF,25/BX: Brand: MEDLINE INDUSTRIES, INC.

## (undated) DEVICE — GAUZE,SPONGE,4"X4",16PLY,STRL,LF,10/TRAY: Brand: MEDLINE

## (undated) DEVICE — SOLUTION IV IRRIG 500ML 0.9% SODIUM CHL 2F7123

## (undated) DEVICE — BANDAGE COMPR L W4INXL11YD 100% COT WVN E DBL LEN CLP CLSR

## (undated) DEVICE — STANDARD HYPODERMIC NEEDLE,POLYPROPYLENE HUB: Brand: MONOJECT

## (undated) DEVICE — TOWEL,OR,DSP,ST,BLUE,STD,6/PK,12PK/CS: Brand: MEDLINE

## (undated) DEVICE — SHEET DRAPE FULL 70X100

## (undated) DEVICE — SOLUTION IV IRRIG WATER 1000ML POUR BRL 2F7114

## (undated) DEVICE — BANDAGE COMPR W6INXL5YD SELF ADH COHESIVE CO FLX

## (undated) DEVICE — SUTURE SUTTAPE L40IN DIA1.3MM NONABSORBABLE WHT BLU L26.5MM AR7500

## (undated) DEVICE — GOWN,SIRUS,POLYRNF,BRTHSLV,XLN/XL,20/CS: Brand: MEDLINE

## (undated) DEVICE — MARKER,SKIN,WI/RULER AND LABELS: Brand: MEDLINE

## (undated) DEVICE — PENCIL ES L3M BTTN SWCH HOLSTER W/ BLDE ELECTRD EDGE

## (undated) DEVICE — COVER,TABLE,60X90,STERILE: Brand: MEDLINE

## (undated) DEVICE — YANKAUER,BULB TIP,W/O VENT,RIGID,STERILE: Brand: MEDLINE

## (undated) DEVICE — TUBING, SUCTION, 1/4" X 10', STRAIGHT: Brand: MEDLINE

## (undated) DEVICE — PAD,ABDOMINAL,8"X10",ST,LF: Brand: MEDLINE

## (undated) DEVICE — GOWN,SIRUS,FABRNF,XL,20/CS: Brand: MEDLINE

## (undated) DEVICE — Z DISCONTINUED USE 2275686 GLOVE SURG SZ 8 L12IN FNGR THK13MIL WHT ISOLEX POLYISOPRENE

## (undated) DEVICE — SOLUTION IV IRRIG POUR BRL 0.9% SODIUM CHL 2F7124

## (undated) DEVICE — APPLICATOR SURG XL L38CM FOR ARISTA ABSRB HEMSTAT FLEXITIP

## (undated) DEVICE — 2108 SERIES SAGITTAL BLADE (24.8 X 0.88 X 90.5MM)

## (undated) DEVICE — TOWEL,OR,DSP,ST,BLUE,DLX,1/PK,80PK/CS: Brand: MEDLINE

## (undated) DEVICE — PATIENT RETURN ELECTRODE, SINGLE-USE, CONTACT QUALITY MONITORING, ADULT, WITH 9FT CORD, FOR PATIENTS WEIGING OVER 33LBS. (15KG): Brand: MEGADYNE

## (undated) DEVICE — SYRINGE MED 50ML LUERLOCK TIP

## (undated) DEVICE — TOTAL TRAY, 16FR 10ML SIL FOLEY, URN: Brand: MEDLINE

## (undated) DEVICE — BLADE ES L6IN ELASTOMERIC COAT EXT DURABLE BEND UPTO 90DEG

## (undated) DEVICE — CHLORAPREP 26ML ORANGE

## (undated) DEVICE — 3M™ IOBAN™ 2 ANTIMICROBIAL INCISE DRAPE 6651EZ: Brand: IOBAN™ 2

## (undated) DEVICE — CONTROL SYRINGE LUER-LOCK TIP: Brand: MONOJECT

## (undated) DEVICE — MEDI-VAC YANKAUER SUCTION HANDLE: Brand: CARDINAL HEALTH

## (undated) DEVICE — GARMENT,MEDLINE,DVT,INT,CALF,MED, GEN2: Brand: MEDLINE

## (undated) DEVICE — Device

## (undated) DEVICE — T4 HOOD

## (undated) DEVICE — Z DISCONTINUED USE 2516375 APPLICATOR MEDICATED 3 CC CLR STRL CHLORAPREP

## (undated) DEVICE — DOUBLE BASIN SET: Brand: MEDLINE INDUSTRIES, INC.

## (undated) DEVICE — PICO SINGLE USE NEGATIVE PRESSURE WOUND THERAPY SYSTEM 10CM X 30CM 4IN. X 12IN.: Brand: PICO

## (undated) DEVICE — SPONGE LAP W18XL18IN WHT COT 4 PLY FLD STRUNG RADPQ DISP ST

## (undated) DEVICE — NDL CNTR 40CT FM MAG: Brand: MEDLINE INDUSTRIES, INC.

## (undated) DEVICE — Z DISCONTINUED NO SUB IDED GLOVE SURG BEAD CUF 8 STD PF WHT STRL TRIUMPH LT LTX

## (undated) DEVICE — GAUZE,SPONGE,4"X4",16PLY,XRAY,STRL,LF: Brand: MEDLINE

## (undated) DEVICE — 3M™ STERI-DRAPE™ U-DRAPE 1015: Brand: STERI-DRAPE™

## (undated) DEVICE — PACK,ORTHOPEDIC III,AURORA: Brand: MEDLINE

## (undated) DEVICE — SUTURE STRATAFIX SYMMETRIC SZ 1 L18IN ABSRB VLT CT1 L36CM SXPP1A404

## (undated) DEVICE — NEEDLE FLTR 18GA L1.5IN MEM THK5UM BLNT DISP

## (undated) DEVICE — GRADUATE

## (undated) DEVICE — GOWN,SIRUS,NONRNF,SETINSLV,XL,20/CS: Brand: MEDLINE

## (undated) DEVICE — SYRINGE IRRIG 60ML SFT PLIABLE BLB EZ TO GRP 1 HND USE W/

## (undated) DEVICE — ELECTRODE PT RET AD L9FT HI MOIST COND ADH HYDRGEL CORDED

## (undated) DEVICE — GLOVE ORANGE PI 7 1/2   MSG9075

## (undated) DEVICE — SOLUTION IRRIG 1500ML 0.9% SOD CHL USP POUR PLAS BTL

## (undated) DEVICE — SET SURG INSTR DISSECT

## (undated) DEVICE — SET EXTN L14IN 1ML IV L BOR NO FLTR NO STPCOCK

## (undated) DEVICE — SET MAJOR INSTR ORTHO

## (undated) DEVICE — PACK,LAPAROTOMY,NO GOWNS: Brand: MEDLINE

## (undated) DEVICE — NDLCTR: MAG/MAG 30CT 168/CS: Brand: MEDICAL ACTION INDUSTRIES